# Patient Record
Sex: FEMALE | Race: BLACK OR AFRICAN AMERICAN | NOT HISPANIC OR LATINO | Employment: UNEMPLOYED | URBAN - METROPOLITAN AREA
[De-identification: names, ages, dates, MRNs, and addresses within clinical notes are randomized per-mention and may not be internally consistent; named-entity substitution may affect disease eponyms.]

---

## 2022-07-19 PROCEDURE — 88342 CHG IMMUNOCYTOCHEMISTRY: ICD-10-PCS | Mod: 26,59,, | Performed by: PATHOLOGY

## 2022-07-19 PROCEDURE — 88342 IMHCHEM/IMCYTCHM 1ST ANTB: CPT | Mod: 26,59,, | Performed by: PATHOLOGY

## 2022-07-19 PROCEDURE — 88323 CONSLTJ&REPRT MATRL PREP SLD: CPT | Mod: 26,,, | Performed by: PATHOLOGY

## 2022-07-19 PROCEDURE — 88323 PR  MICROSLIDE CONSULT W SLIDE PREP: ICD-10-PCS | Mod: 26,,, | Performed by: PATHOLOGY

## 2022-07-19 PROCEDURE — 88313 PR  SPECIAL STAINS,GROUP II: ICD-10-PCS | Mod: 26,59,, | Performed by: PATHOLOGY

## 2022-07-19 PROCEDURE — 88313 SPECIAL STAINS GROUP 2: CPT | Mod: 26,59,, | Performed by: PATHOLOGY

## 2022-07-20 ENCOUNTER — TELEPHONE (OUTPATIENT)
Dept: HEMATOLOGY/ONCOLOGY | Facility: CLINIC | Age: 62
End: 2022-07-20
Payer: MEDICAID

## 2022-07-20 NOTE — TELEPHONE ENCOUNTER
----- Message from Harvey Merino sent at 7/20/2022  1:48 PM CDT -----  Contact: Dr. Becca Swanson with Dr. Hudson office in [place], [place] called and said that the doctor would like to discuss the pt case with you     Please call him back at [phone] or [phone]

## 2022-07-20 NOTE — TELEPHONE ENCOUNTER
----- Message from Harvey Merino sent at 7/20/2022  1:48 PM CDT -----  Contact: Dr. Becca Swanson with Dr. Hudosn office in [place], [place] called and said that the doctor would like to discuss the pt case with you     Please call him back at [phone] or [phone]

## 2022-07-21 ENCOUNTER — TELEPHONE (OUTPATIENT)
Dept: HEMATOLOGY/ONCOLOGY | Facility: CLINIC | Age: 62
End: 2022-07-21
Payer: MEDICAID

## 2022-07-21 NOTE — TELEPHONE ENCOUNTER
Met with son, Fahad [place].   Scanned copy of Advance Directive and Power of  (POA) in media.    MM  dxd 7/19/22.  Started velcade-dexa-zometa regimen yesterday.      Audi Hudson MD  -  Hem-Onc    He has 2 other siblings but hes the only one here in [place].  Moved mother out of [place] as she cannot be by herself. He will be the caregiver. They scheduled appt for 8/19 but I explained to him this was incorrect scheduling.    We will correct this after we have sufficient data.    Explained the process of collecting clinic data, scheduling with appropriate specialist, getting insurance authorization and scheduling treatment.    Family was hoping to have patient to have a smooth transition of care . Cannot travel much due to physical limitation.  Hip surgery (with screw to femur) 7/18/22.  Will not be able to travel back-and-forth between [place] and [place].  This will have to be discussed with whoever the provider will be.  We will call him should we need more information and when ready to schedule.  Gave him contact information.    Await records.    =========================  From Dr. Ko:   Dr. Huntley from [place] confirmed she has a new diagnosis of myeloma and their family wants to establish care with us. There is apparently a family connection to [facility]. I believe the patient is currently in rehab or about to be discharged from inpatient to rehab.

## 2022-07-21 NOTE — TELEPHONE ENCOUNTER
Spoke with MD. Patient has a new diagnosis of Myeloma and family wants to establish care here at [facility]. Discussed plan to get patient in our Myeloma/Plasma Cell clinic with Dr. Sherman or Joey.

## 2022-07-25 ENCOUNTER — TELEPHONE (OUTPATIENT)
Dept: HEMATOLOGY/ONCOLOGY | Facility: CLINIC | Age: 62
End: 2022-07-25
Payer: MEDICAID

## 2022-07-25 DIAGNOSIS — C90.00 MULTIPLE MYELOMA, REMISSION STATUS UNSPECIFIED: Primary | ICD-10-CM

## 2022-07-27 ENCOUNTER — TELEPHONE (OUTPATIENT)
Dept: HEMATOLOGY/ONCOLOGY | Facility: CLINIC | Age: 62
End: 2022-07-27
Payer: MEDICAID

## 2022-07-29 ENCOUNTER — TELEPHONE (OUTPATIENT)
Dept: HEMATOLOGY/ONCOLOGY | Facility: CLINIC | Age: 62
End: 2022-07-29
Payer: MEDICAID

## 2022-07-29 DIAGNOSIS — C90.00 MULTIPLE MYELOMA, REMISSION STATUS UNSPECIFIED: Primary | ICD-10-CM

## 2022-08-01 NOTE — PROGRESS NOTES
PATIENT: Raquel [place]  MRN: [id number]  DATE: 8/2/2022      Diagnosis:   1. Multiple myeloma not having achieved remission        Chief Complaint: Multiple Myeloma    Oncologic History:     7/14/21: Presented with several months of worsening lethargy, generalized pain, 40-60lbs weight loss, and encephalopathy. Labs at time of presentation: hgb 7.6, plts 82, wbc 12, sodium 120, creatinine 1.55, corrected calcium 9.7, transaminitis, M-spike 6.4 g/dL. Skeletal survey showed lesions of left/right humerus, left/right proximal radius, pelvis, and femurs with a significant left femur lytic lesion. Multiple lucent lesions in calvarium. Given two doses of velcade while hospitalized.  7/18/21: Underwent left trochanteric femoral nail fixation. Pathology consistent with plasma cell neoplasm  7/19/22: BMBx showing 90% plasma cells with 100% cellularity, gain 1q21, t(4;14), and monosomy 13.      Subjective:    Initial History: Ms. [place] is a 62 y.o. female with medical history of OA, gout, and osteoporosis presenting for evaluation for Multiple Myeloma. History as above. Felt unable to live independently any longer so moved to live with son in [place]. Is currently wheelchair bound with her recent left femur procedure but is scheduled to being working with physical therapy on Monday. Feels well today. Has some MSK pain but otherwise denies complaints.    Smoked 1ppd for 20 years, quit April 2022  Quit alcohol Apirl 2022  Denies recreational drug use  Father has a history of a diffuse bone cancer that he did not want to be treated for      Past Medical History:   Past Medical History:   Diagnosis Date    Multiple myeloma        Past Surgical HIstory:   Past Surgical History:   Procedure Laterality Date    FEMUR SURGERY         Family History:   Family History   Problem Relation Age of Onset    Cancer Father        Social History:      Allergies:  Review of patient's allergies indicates:  No Known  "Allergies    Medications:  Current Outpatient Medications   Medication Sig Dispense Refill    aspirin (ECOTRIN) 325 MG EC tablet Take 325 mg by mouth once daily.      docusate sodium (COLACE) 100 MG capsule Take 100 mg by mouth 2 (two) times daily.      amLODIPine (NORVASC) 5 MG tablet Take 2 tablets (10 mg total) by mouth once daily. 30 tablet 3    aspirin (CIERRA ASPIRIN) 325 MG tablet Take 1 tablet (325 mg total) by mouth once daily. 100 tablet 3    oxyCODONE (ROXICODONE) 5 MG immediate release tablet Take 1 tablet (5 mg total) by mouth every 6 (six) hours as needed for Pain. 28 tablet 0    pantoprazole (PROTONIX) 40 MG tablet Take 1 tablet (40 mg total) by mouth once daily. 30 tablet 3     No current facility-administered medications for this visit.       Review of Systems   Constitutional: Negative for chills, fatigue and fever.   HENT: Negative for dental problem and trouble swallowing.    Eyes: Negative for photophobia and visual disturbance.   Respiratory: Negative for cough and shortness of breath.    Cardiovascular: Negative for chest pain and leg swelling.   Gastrointestinal: Negative for abdominal pain, diarrhea, nausea and vomiting.   Musculoskeletal: Positive for arthralgias.   Skin: Positive for wound (healing sites to left leg). Negative for rash.   Neurological: Negative for light-headedness and headaches.   Hematological: Negative for adenopathy. Does not bruise/bleed easily.   Psychiatric/Behavioral: Negative for confusion. The patient is not nervous/anxious.        ECOG Performance Status: 3; however underwent recent procedure which is limiting her abilities, expect to improve  Objective:      Vitals:   Vitals:    08/02/22 1050   BP: 124/71   Pulse: 101   Resp: 16   SpO2: 97%   Height: 5' 7" (1.702 m)       Physical Exam  Vitals reviewed.   Constitutional:       Appearance: Normal appearance.      Comments: In wheelchair   HENT:      Head: Normocephalic and atraumatic.      Mouth/Throat:     "  Mouth: Mucous membranes are moist.      Pharynx: Oropharynx is clear.   Eyes:      Extraocular Movements: Extraocular movements intact.      Conjunctiva/sclera: Conjunctivae normal.   Cardiovascular:      Rate and Rhythm: Normal rate and regular rhythm.   Pulmonary:      Effort: Pulmonary effort is normal. No respiratory distress.   Abdominal:      General: There is no distension.      Palpations: Abdomen is soft.      Tenderness: There is no abdominal tenderness.   Musculoskeletal:      Right lower leg: No edema.      Left lower leg: No edema.   Skin:     General: Skin is warm and dry.   Neurological:      General: No focal deficit present.      Mental Status: She is alert and oriented to person, place, and time.   Psychiatric:         Mood and Affect: Mood normal.         Behavior: Behavior normal.         Laboratory Data:  Lab Visit on 08/02/2022   Component Date Value Ref Range Status    WBC 08/02/2022 13.88 (A) 3.90 - 12.70 K/uL Final    RBC 08/02/2022 2.82 (A) 4.00 - 5.40 M/uL Final    Hemoglobin 08/02/2022 8.3 (A) 12.0 - 16.0 g/dL Final    Hematocrit 08/02/2022 25.4 (A) 37.0 - 48.5 % Final    MCV 08/02/2022 90  82 - 98 fL Final    MCH 08/02/2022 29.4  27.0 - 31.0 pg Final    MCHC 08/02/2022 32.7  32.0 - 36.0 g/dL Final    RDW 08/02/2022 19.9 (A) 11.5 - 14.5 % Final    Platelets 08/02/2022 178  150 - 450 K/uL Final    MPV 08/02/2022 12.6  9.2 - 12.9 fL Final    Immature Granulocytes 08/02/2022 CANCELED  0.0 - 0.5 % Final    Result canceled by the ancillary.    Immature Grans (Abs) 08/02/2022 CANCELED  0.00 - 0.04 K/uL Final    Comment: Mild elevation in immature granulocytes is non specific and   can be seen in a variety of conditions including stress response,   acute inflammation, trauma and pregnancy. Correlation with other   laboratory and clinical findings is essential.    Result canceled by the ancillary.      nRBC 08/02/2022 8 (A) 0 /100 WBC Final    Gran % 08/02/2022 56.0  38.0 - 73.0 %  Final    Lymph % 08/02/2022 32.0  18.0 - 48.0 % Final    Mono % 08/02/2022 7.0  4.0 - 15.0 % Final    Eosinophil % 08/02/2022 1.0  0.0 - 8.0 % Final    Basophil % 08/02/2022 0.0  0.0 - 1.9 % Final    Bands 08/02/2022 3.0  % Final    Myelocytes 08/02/2022 1.0  % Final    Other 08/02/2022 0  % Final    Platelet Estimate 08/02/2022 Appears normal   Final    Differential Method 08/02/2022 Manual   Final    Sodium 08/02/2022 131 (A) 136 - 145 mmol/L Final    Potassium 08/02/2022 3.7  3.5 - 5.1 mmol/L Final    Chloride 08/02/2022 106  95 - 110 mmol/L Final    CO2 08/02/2022 20 (A) 23 - 29 mmol/L Final    Glucose 08/02/2022 94  70 - 110 mg/dL Final    BUN 08/02/2022 19  6 - 20 mg/dL Final    Creatinine 08/02/2022 0.7  0.5 - 1.4 mg/dL Final    Calcium 08/02/2022 8.2 (A) 8.7 - 10.5 mg/dL Final    Total Protein 08/02/2022 11.8 (A) 6.0 - 8.4 g/dL Final    Albumin 08/02/2022 2.6 (A) 3.5 - 5.2 g/dL Final    Total Bilirubin 08/02/2022 0.8  0.1 - 1.0 mg/dL Final    Comment: For infants and newborns, interpretation of results should be based  on gestational age, weight and in agreement with clinical  observations.    Premature Infant recommended reference ranges:  Up to 24 hours.............<8.0 mg/dL  Up to 48 hours............<12.0 mg/dL  3-5 days..................<15.0 mg/dL  6-29 days.................<15.0 mg/dL      Alkaline Phosphatase 08/02/2022 582 (A) 55 - 135 U/L Final    AST 08/02/2022 62 (A) 10 - 40 U/L Final    ALT 08/02/2022 151 (A) 10 - 44 U/L Final    Anion Gap 08/02/2022 5 (A) 8 - 16 mmol/L Final    eGFR 08/02/2022 >60.0  >60 mL/min/1.73 m^2 Final    Group & Rh 08/02/2022 B POS   Final    IgG 08/02/2022 6874 (A) 650 - 1600 mg/dL Final    IgG Cord Blood Reference Range: 650-1600 mg/dL.    IgA 08/02/2022 26 (A) 40 - 350 mg/dL Final    IgA Cord Blood Reference Range: <5 mg/dL.    IgM 08/02/2022 20 (A) 50 - 300 mg/dL Final    IgM Cord Blood Reference Range: <25 mg/dL.    Protein, Serum  08/02/2022 11.3 (A) 6.0 - 8.4 g/dL Final    Comment: Serum protein electrophoresis and immunofixation results should be   interpreted in clinical context in that some therapeutic agents can   result   in false positive results (example, daratumumab). Correlation with   the   patient s therapeutic regimen is required.      Beta-2 Microglobulin 08/02/2022 5.6 (A) 0.0 - 2.5 ug/mL Final    LD 08/02/2022 522 (A) 110 - 260 U/L Final    Results are increased in hemolyzed samples.    Antibody Screen 08/02/2022 NEG   Final         Imaging:   Assessment:       1. Multiple myeloma not having achieved remission           Plan:     Multiple Myeloma  -- Complex cytogenetics with gain 1q21, t(4;14), and monosomy 13, bone marrow to be evaluated by [facility] hematopathologist  -- Diffuse lytics lesions  -- M-spike 6.4g/dL  -- Will check CBC, CMP, SPEP/SARAH, UIFE/UPEP, FLC, beta 2, LDH, immunoglobulins, Hep B, type and screen  -- Plan for Sofie-VRD; submit for prior auth today  -- Met with transplant coordinators today. Seems likely will need mammogram, colonoscopy, and dental appointment  -- Will need bisphosphonate  -- Acyclovir 400mg BID    RTC in 2 weeks. Pt knows to call or use [facility] in the interim with any questions or concerns.    Everett Boogie, PGY- VI  Hematology/Oncology Fellow     Route Chart for Scheduling    BMT Chart Routing  Urgent    Follow up with physician . RTC 8/16   Follow up with SERGOI No follow up needed.   Infusion scheduling note Infusion 8/16 and 8/23 for daratumumab and velcade, obtain prior auth for Sofie-VRD   Injection scheduling note None   Labs    Lab interval:  CBC, CMP, type and screen morning of appointment 8/16   Imaging None      Pharmacy appointment No pharmacy appointment needed      Other referrals No additional referrals needed         Treatment Plan Information   OP D-VRD DARATUMUMAB + BORTEZOMIB LENALIDOMIDE DEXAMETHASONE   Janice Casas MD   Upcoming Treatment Dates - OP D-VRD  DARATUMUMAB + BORTEZOMIB LENALIDOMIDE DEXAMETHASONE    8/15/2022       Antiemetics       Physician communication order       Take Home Chemotherapy       dexAMETHasone (DECADRON) 4 MG Tab       lenalidomide 25 mg Cap  8/16/2022       Pre-Medications       ACETAMINOPHEN  325 MG ORAL TAB       DIPHENHYDRAMINE IV ORDERABLE       FAMOTIDINE (PF) 20 MG/2 ML IV SOLN       MONTELUKAST 10 MG ORAL TAB       Chemotherapy       bortezomib (VELCADE) injection 1.3 mg/m2 (Treatment Plan)       daratumumab (DARZALEX) in sodium chloride 0.9% 1,000 mL chemo infusion  8/19/2022       Chemotherapy       bortezomib (VELCADE) injection 1.3 mg/m2 (Treatment Plan)  8/23/2022       Pre-Medications       ACETAMINOPHEN  325 MG ORAL TAB       DIPHENHYDRAMINE IV ORDERABLE       MONTELUKAST 10 MG ORAL TAB       Chemotherapy       bortezomib (VELCADE) injection 1.3 mg/m2 (Treatment Plan)       daratumumab (DARZALEX) in sodium chloride 0.9% 500 mL chemo infusion

## 2022-08-02 ENCOUNTER — OFFICE VISIT (OUTPATIENT)
Dept: HEMATOLOGY/ONCOLOGY | Facility: CLINIC | Age: 62
End: 2022-08-02
Payer: MEDICAID

## 2022-08-02 ENCOUNTER — SPECIALTY PHARMACY (OUTPATIENT)
Dept: PHARMACY | Facility: CLINIC | Age: 62
End: 2022-08-02
Payer: MEDICAID

## 2022-08-02 ENCOUNTER — LAB VISIT (OUTPATIENT)
Dept: LAB | Facility: HOSPITAL | Age: 62
End: 2022-08-02
Payer: MEDICAID

## 2022-08-02 VITALS
HEIGHT: 67 IN | HEART RATE: 101 BPM | DIASTOLIC BLOOD PRESSURE: 71 MMHG | SYSTOLIC BLOOD PRESSURE: 124 MMHG | OXYGEN SATURATION: 97 % | RESPIRATION RATE: 16 BRPM

## 2022-08-02 DIAGNOSIS — C90.00 MULTIPLE MYELOMA NOT HAVING ACHIEVED REMISSION: ICD-10-CM

## 2022-08-02 DIAGNOSIS — C90.00 MULTIPLE MYELOMA NOT HAVING ACHIEVED REMISSION: Primary | ICD-10-CM

## 2022-08-02 LAB
ABO + RH BLD: NORMAL
ALBUMIN SERPL BCP-MCNC: 2.6 G/DL (ref 3.5–5.2)
ALP SERPL-CCNC: 582 U/L (ref 55–135)
ALT SERPL W/O P-5'-P-CCNC: 151 U/L (ref 10–44)
ANION GAP SERPL CALC-SCNC: 5 MMOL/L (ref 8–16)
AST SERPL-CCNC: 62 U/L (ref 10–40)
B2 MICROGLOB SERPL-MCNC: 5.6 UG/ML (ref 0–2.5)
BASOPHILS NFR BLD: 0 % (ref 0–1.9)
BILIRUB SERPL-MCNC: 0.8 MG/DL (ref 0.1–1)
BLD GP AB SCN CELLS X3 SERPL QL: NORMAL
BUN SERPL-MCNC: 19 MG/DL (ref 6–20)
CALCIUM SERPL-MCNC: 8.2 MG/DL (ref 8.7–10.5)
CHLORIDE SERPL-SCNC: 106 MMOL/L (ref 95–110)
CO2 SERPL-SCNC: 20 MMOL/L (ref 23–29)
CREAT SERPL-MCNC: 0.7 MG/DL (ref 0.5–1.4)
DIFFERENTIAL METHOD: ABNORMAL
EOSINOPHIL NFR BLD: 1 % (ref 0–8)
ERYTHROCYTE [DISTWIDTH] IN BLOOD BY AUTOMATED COUNT: 19.9 % (ref 11.5–14.5)
EST. GFR  (NO RACE VARIABLE): >60 ML/MIN/1.73 M^2
GLUCOSE SERPL-MCNC: 94 MG/DL (ref 70–110)
HCT VFR BLD AUTO: 25.4 % (ref 37–48.5)
HGB BLD-MCNC: 8.3 G/DL (ref 12–16)
IGA SERPL-MCNC: 26 MG/DL (ref 40–350)
IGG SERPL-MCNC: 6874 MG/DL (ref 650–1600)
IGM SERPL-MCNC: 20 MG/DL (ref 50–300)
IMM GRANULOCYTES # BLD AUTO: ABNORMAL K/UL (ref 0–0.04)
IMM GRANULOCYTES NFR BLD AUTO: ABNORMAL % (ref 0–0.5)
LDH SERPL L TO P-CCNC: 522 U/L (ref 110–260)
LYMPHOCYTES NFR BLD: 32 % (ref 18–48)
MCH RBC QN AUTO: 29.4 PG (ref 27–31)
MCHC RBC AUTO-ENTMCNC: 32.7 G/DL (ref 32–36)
MCV RBC AUTO: 90 FL (ref 82–98)
MONOCYTES NFR BLD: 7 % (ref 4–15)
MYELOCYTES NFR BLD MANUAL: 1 %
NEUTROPHILS NFR BLD: 56 % (ref 38–73)
NEUTS BAND NFR BLD MANUAL: 3 %
NRBC BLD-RTO: 8 /100 WBC
PLATELET # BLD AUTO: 178 K/UL (ref 150–450)
PLATELET BLD QL SMEAR: ABNORMAL
PMV BLD AUTO: 12.6 FL (ref 9.2–12.9)
POTASSIUM SERPL-SCNC: 3.7 MMOL/L (ref 3.5–5.1)
PROT SERPL-MCNC: 11.8 G/DL (ref 6–8.4)
RBC # BLD AUTO: 2.82 M/UL (ref 4–5.4)
SODIUM SERPL-SCNC: 131 MMOL/L (ref 136–145)
WBC # BLD AUTO: 13.88 K/UL (ref 3.9–12.7)
WBC OTHER NFR BLD MANUAL: 0 %

## 2022-08-02 PROCEDURE — 86706 HEP B SURFACE ANTIBODY: CPT | Performed by: INTERNAL MEDICINE

## 2022-08-02 PROCEDURE — 3074F SYST BP LT 130 MM HG: CPT | Mod: CPTII,,, | Performed by: INTERNAL MEDICINE

## 2022-08-02 PROCEDURE — 86803 HEPATITIS C AB TEST: CPT | Performed by: INTERNAL MEDICINE

## 2022-08-02 PROCEDURE — 99213 OFFICE O/P EST LOW 20 MIN: CPT | Mod: PBBFAC | Performed by: INTERNAL MEDICINE

## 2022-08-02 PROCEDURE — 84165 PROTEIN E-PHORESIS SERUM: CPT | Performed by: INTERNAL MEDICINE

## 2022-08-02 PROCEDURE — 99999 PR PBB SHADOW E&M-EST. PATIENT-LVL III: ICD-10-PCS | Mod: PBBFAC,,, | Performed by: INTERNAL MEDICINE

## 2022-08-02 PROCEDURE — 99205 PR OFFICE/OUTPT VISIT, NEW, LEVL V, 60-74 MIN: ICD-10-PCS | Mod: S$PBB,,, | Performed by: INTERNAL MEDICINE

## 2022-08-02 PROCEDURE — 99999 PR PBB SHADOW E&M-EST. PATIENT-LVL III: CPT | Mod: PBBFAC,,, | Performed by: INTERNAL MEDICINE

## 2022-08-02 PROCEDURE — 83615 LACTATE (LD) (LDH) ENZYME: CPT | Performed by: INTERNAL MEDICINE

## 2022-08-02 PROCEDURE — 82784 ASSAY IGA/IGD/IGG/IGM EACH: CPT | Mod: 59 | Performed by: INTERNAL MEDICINE

## 2022-08-02 PROCEDURE — 3078F PR MOST RECENT DIASTOLIC BLOOD PRESSURE < 80 MM HG: ICD-10-PCS | Mod: CPTII,,, | Performed by: INTERNAL MEDICINE

## 2022-08-02 PROCEDURE — 85027 COMPLETE CBC AUTOMATED: CPT | Performed by: INTERNAL MEDICINE

## 2022-08-02 PROCEDURE — 86704 HEP B CORE ANTIBODY TOTAL: CPT | Performed by: INTERNAL MEDICINE

## 2022-08-02 PROCEDURE — 3078F DIAST BP <80 MM HG: CPT | Mod: CPTII,,, | Performed by: INTERNAL MEDICINE

## 2022-08-02 PROCEDURE — 36415 COLL VENOUS BLD VENIPUNCTURE: CPT | Performed by: INTERNAL MEDICINE

## 2022-08-02 PROCEDURE — 1160F RVW MEDS BY RX/DR IN RCRD: CPT | Mod: CPTII,,, | Performed by: INTERNAL MEDICINE

## 2022-08-02 PROCEDURE — 1159F PR MEDICATION LIST DOCUMENTED IN MEDICAL RECORD: ICD-10-PCS | Mod: CPTII,,, | Performed by: INTERNAL MEDICINE

## 2022-08-02 PROCEDURE — 83520 IMMUNOASSAY QUANT NOS NONAB: CPT | Mod: 59 | Performed by: INTERNAL MEDICINE

## 2022-08-02 PROCEDURE — 85007 BL SMEAR W/DIFF WBC COUNT: CPT | Performed by: INTERNAL MEDICINE

## 2022-08-02 PROCEDURE — 86334 IMMUNOFIX E-PHORESIS SERUM: CPT | Performed by: INTERNAL MEDICINE

## 2022-08-02 PROCEDURE — 86901 BLOOD TYPING SEROLOGIC RH(D): CPT | Performed by: INTERNAL MEDICINE

## 2022-08-02 PROCEDURE — 1160F PR REVIEW ALL MEDS BY PRESCRIBER/CLIN PHARMACIST DOCUMENTED: ICD-10-PCS | Mod: CPTII,,, | Performed by: INTERNAL MEDICINE

## 2022-08-02 PROCEDURE — 99205 OFFICE O/P NEW HI 60 MIN: CPT | Mod: S$PBB,,, | Performed by: INTERNAL MEDICINE

## 2022-08-02 PROCEDURE — 1159F MED LIST DOCD IN RCRD: CPT | Mod: CPTII,,, | Performed by: INTERNAL MEDICINE

## 2022-08-02 PROCEDURE — 86334 IMMUNOFIX E-PHORESIS SERUM: CPT | Mod: 26,,, | Performed by: PATHOLOGY

## 2022-08-02 PROCEDURE — 82232 ASSAY OF BETA-2 PROTEIN: CPT | Performed by: INTERNAL MEDICINE

## 2022-08-02 PROCEDURE — 80053 COMPREHEN METABOLIC PANEL: CPT | Performed by: INTERNAL MEDICINE

## 2022-08-02 PROCEDURE — 84165 PATHOLOGIST INTERPRETATION SPE: ICD-10-PCS | Mod: 26,,, | Performed by: PATHOLOGY

## 2022-08-02 PROCEDURE — 87340 HEPATITIS B SURFACE AG IA: CPT | Performed by: INTERNAL MEDICINE

## 2022-08-02 PROCEDURE — 3074F PR MOST RECENT SYSTOLIC BLOOD PRESSURE < 130 MM HG: ICD-10-PCS | Mod: CPTII,,, | Performed by: INTERNAL MEDICINE

## 2022-08-02 PROCEDURE — 84165 PROTEIN E-PHORESIS SERUM: CPT | Mod: 26,,, | Performed by: PATHOLOGY

## 2022-08-02 PROCEDURE — 86334 PATHOLOGIST INTERPRETATION IFE: ICD-10-PCS | Mod: 26,,, | Performed by: PATHOLOGY

## 2022-08-02 PROCEDURE — 86850 RBC ANTIBODY SCREEN: CPT | Performed by: INTERNAL MEDICINE

## 2022-08-02 RX ORDER — ACYCLOVIR 400 MG/1
400 TABLET ORAL 2 TIMES DAILY
Qty: 60 TABLET | Refills: 11 | Status: SHIPPED | OUTPATIENT
Start: 2022-08-02 | End: 2022-11-22 | Stop reason: SDUPTHER

## 2022-08-02 RX ORDER — AMLODIPINE BESYLATE 10 MG/1
10 TABLET ORAL DAILY
COMMUNITY
End: 2022-08-02

## 2022-08-02 RX ORDER — PANTOPRAZOLE SODIUM 40 MG/1
40 TABLET, DELAYED RELEASE ORAL DAILY
Qty: 30 TABLET | Refills: 3 | Status: SHIPPED | OUTPATIENT
Start: 2022-08-02 | End: 2022-12-27 | Stop reason: SDUPTHER

## 2022-08-02 RX ORDER — DOCUSATE SODIUM 100 MG/1
100 CAPSULE, LIQUID FILLED ORAL 2 TIMES DAILY
COMMUNITY
End: 2022-11-08

## 2022-08-02 RX ORDER — ASPIRIN 325 MG
325 TABLET, DELAYED RELEASE (ENTERIC COATED) ORAL DAILY
COMMUNITY
End: 2022-09-13 | Stop reason: SDUPTHER

## 2022-08-02 RX ORDER — ASPIRIN 325 MG
325 TABLET ORAL DAILY
Qty: 100 TABLET | Refills: 3 | Status: SHIPPED | OUTPATIENT
Start: 2022-08-02 | End: 2022-12-01 | Stop reason: SDUPTHER

## 2022-08-02 RX ORDER — LENALIDOMIDE 25 MG/1
25 CAPSULE ORAL SEE ADMIN INSTRUCTIONS
Qty: 21 CAPSULE | Refills: 0 | Status: SHIPPED | OUTPATIENT
Start: 2022-08-02 | End: 2022-08-04 | Stop reason: SDUPTHER

## 2022-08-02 RX ORDER — DEXAMETHASONE 4 MG/1
40 TABLET ORAL SEE ADMIN INSTRUCTIONS
Qty: 40 TABLET | Refills: 1 | Status: SHIPPED | OUTPATIENT
Start: 2022-08-02 | End: 2022-09-12 | Stop reason: SDUPTHER

## 2022-08-02 RX ORDER — AMLODIPINE BESYLATE 5 MG/1
10 TABLET ORAL DAILY
Qty: 30 TABLET | Refills: 3 | Status: SHIPPED | OUTPATIENT
Start: 2022-08-02 | End: 2022-08-17

## 2022-08-02 RX ORDER — LENALIDOMIDE 25 MG/1
25 CAPSULE ORAL SEE ADMIN INSTRUCTIONS
Qty: 21 CAPSULE | Refills: 6 | OUTPATIENT
Start: 2022-08-02 | End: 2022-08-02 | Stop reason: SDUPTHER

## 2022-08-02 RX ORDER — PANTOPRAZOLE SODIUM 40 MG/1
40 TABLET, DELAYED RELEASE ORAL DAILY
COMMUNITY
End: 2022-08-02

## 2022-08-02 RX ORDER — OXYCODONE HYDROCHLORIDE 5 MG/1
5 TABLET ORAL EVERY 6 HOURS PRN
Qty: 28 TABLET | Refills: 0 | Status: SHIPPED | OUTPATIENT
Start: 2022-08-02 | End: 2022-08-11 | Stop reason: SDUPTHER

## 2022-08-02 NOTE — TELEPHONE ENCOUNTER
Therapy appropriate. Sofie-VRD for MM. No adjustments needed.  Crcl: 84.9 mL/min.    PA not required. Unfortunately, [facility] does not have access to Revlimid. I will figure out where this medication can be filled and route it there. Once routed, the Owning pharmacy will need to do a cost exceeds override.

## 2022-08-02 NOTE — PLAN OF CARE
START ON PATHWAY REGIMEN - Multiple Myeloma and Other Plasma Cell Dyscrasias    [id number]        Lenalidomide (Revlimid)       Dexamethasone (Decadron)       Bortezomib (Velcade)       Daratumumab (Darzalex)           Additional Orders: Refer to PI for recommended pre- and post-daratumumab   medications. Initiate antiviral prophylaxis within 1 week after starting   daratumumab and continue for 3 months following daratumumab. Thromboprophylaxis   is recommended with lenalidomide. In the SWETHA trial, montelukast was used to   decrease daratumumab-related reactions. In this trial, all patients received 4   cycles of induction prior to stem cell harvest, 2 cycles of consolidation, given    days post-autologous stem cell transplant, and up to two years of   maintenance therapy. Maintenance began within 14 days after a post-consolidation   disease evaluation. Dexamethasone could be reduced to 20 mg per week, given   prior to daratumumab, after induction cycle 4.          Lenalidomide (Revlimid)       Dexamethasone (Decadron)       Bortezomib (Velcade)       Daratumumab (Darzalex)           Additional Orders: Refer to PI for recommended pre- and post-daratumumab   medications. Initiate antiviral prophylaxis within 1 week after starting   daratumumab and continue for 3 months following daratumumab. Thromboprophylaxis   is recommended with lenalidomide. In the SWETHA trial, montelukast was used to   decrease daratumumab-related reactions. In this trial, all patients received 4   cycles of induction prior to stem cell harvest, 2 cycles of consolidation, given    days post-autologous stem cell transplant, and up to two years of   maintenance therapy. Maintenance began within 14 days after a post-consolidation   disease evaluation. Dexamethasone could be reduced to 20 mg per week, given   prior to daratumumab, after induction cycle 4.    **Always confirm dose/schedule in your pharmacy ordering system**    Patient  Characteristics:  Multiple Myeloma, Newly Diagnosed, Transplant Eligible, High Risk  Disease Classification: Multiple Myeloma  R-ISS Staging: Unknown  Therapeutic Status: Newly Diagnosed  Is Patient Eligible for Transplant<= Transplant Eligible  Risk Status: High Risk  Intent of Therapy:  Non-Curative / Palliative Intent, Discussed with Patient

## 2022-08-03 ENCOUNTER — PATIENT MESSAGE (OUTPATIENT)
Dept: HEMATOLOGY/ONCOLOGY | Facility: CLINIC | Age: 62
End: 2022-08-03
Payer: MEDICAID

## 2022-08-03 DIAGNOSIS — C90.00 MULTIPLE MYELOMA NOT HAVING ACHIEVED REMISSION: Primary | ICD-10-CM

## 2022-08-03 LAB
ALBUMIN SERPL ELPH-MCNC: 3.88 G/DL (ref 3.35–5.55)
ALPHA1 GLOB SERPL ELPH-MCNC: 0.49 G/DL (ref 0.17–0.41)
ALPHA2 GLOB SERPL ELPH-MCNC: 0.86 G/DL (ref 0.43–0.99)
B-GLOBULIN SERPL ELPH-MCNC: 0.75 G/DL (ref 0.5–1.1)
GAMMA GLOB SERPL ELPH-MCNC: 5.33 G/DL (ref 0.67–1.58)
HBV CORE AB SERPL QL IA: NEGATIVE
HBV SURFACE AB SER-ACNC: NEGATIVE M[IU]/ML
HBV SURFACE AG SERPL QL IA: NEGATIVE
HCV AB SERPL QL IA: NEGATIVE
INTERPRETATION SERPL IFE-IMP: NORMAL
KAPPA LC SER QL IA: 0.55 MG/DL (ref 0.33–1.94)
KAPPA LC/LAMBDA SER IA: 3.24 (ref 0.26–1.65)
LAMBDA LC SER QL IA: 0.17 MG/DL (ref 0.57–2.63)
PATHOLOGIST INTERPRETATION IFE: NORMAL
PATHOLOGIST INTERPRETATION SPE: NORMAL
PROT SERPL-MCNC: 11.3 G/DL (ref 6–8.4)

## 2022-08-03 NOTE — TELEPHONE ENCOUNTER
Confirmed with [email or web address] that they have access to Revlimid. Routing script to [facility]. Patient's son notified.       [facility] - [place], [place] - [address] [place] [id number]   Phone: [phone] Fax: [phone]

## 2022-08-04 ENCOUNTER — LAB VISIT (OUTPATIENT)
Dept: LAB | Facility: HOSPITAL | Age: 62
End: 2022-08-04
Payer: MEDICAID

## 2022-08-04 DIAGNOSIS — C90.00 MULTIPLE MYELOMA, REMISSION STATUS UNSPECIFIED: ICD-10-CM

## 2022-08-04 DIAGNOSIS — C90.00 MULTIPLE MYELOMA NOT HAVING ACHIEVED REMISSION: ICD-10-CM

## 2022-08-04 PROCEDURE — 88341 IMHCHEM/IMCYTCHM EA ADD ANTB: CPT | Performed by: PATHOLOGY

## 2022-08-04 PROCEDURE — 88365 INSITU HYBRIDIZATION (FISH): CPT | Mod: 59 | Performed by: PATHOLOGY

## 2022-08-04 PROCEDURE — 88365 INSITU HYBRIDIZATION (FISH): CPT | Mod: 26,59,, | Performed by: PATHOLOGY

## 2022-08-04 PROCEDURE — 88342 IMHCHEM/IMCYTCHM 1ST ANTB: CPT | Performed by: PATHOLOGY

## 2022-08-04 PROCEDURE — 88321 CONSLTJ&REPRT SLD PREP ELSWR: CPT | Mod: 59 | Performed by: PATHOLOGY

## 2022-08-04 PROCEDURE — 88341 PR IHC OR ICC EACH ADD'L SINGLE ANTIBODY  STAINPR: ICD-10-PCS | Mod: 26,59,, | Performed by: PATHOLOGY

## 2022-08-04 PROCEDURE — 88341 IMHCHEM/IMCYTCHM EA ADD ANTB: CPT | Mod: 26,59,, | Performed by: PATHOLOGY

## 2022-08-04 PROCEDURE — 88342 CHG IMMUNOCYTOCHEMISTRY: ICD-10-PCS | Mod: 26,59,, | Performed by: PATHOLOGY

## 2022-08-04 PROCEDURE — 88365 PR  TISSUE HYBRIDIZATION: ICD-10-PCS | Mod: 26,59,, | Performed by: PATHOLOGY

## 2022-08-04 PROCEDURE — 88325 CONSLTJ COMPRE RVW REC REPRT: CPT | Performed by: PATHOLOGY

## 2022-08-04 PROCEDURE — 88325 CONSLTJ COMPRE RVW REC REPRT: CPT | Mod: ,,, | Performed by: PATHOLOGY

## 2022-08-04 PROCEDURE — 88364 INSITU HYBRIDIZATION (FISH): CPT | Mod: 26,59,, | Performed by: PATHOLOGY

## 2022-08-04 PROCEDURE — 88342 IMHCHEM/IMCYTCHM 1ST ANTB: CPT | Mod: 26,59,, | Performed by: PATHOLOGY

## 2022-08-04 PROCEDURE — 88364 INSITU HYBRIDIZATION (FISH): CPT | Performed by: PATHOLOGY

## 2022-08-04 PROCEDURE — 88364 CHG INSITU HYBRIDIZATION (FISH: ICD-10-PCS | Mod: 26,59,, | Performed by: PATHOLOGY

## 2022-08-04 PROCEDURE — 88325 PR  COMPREHENSIVE REVIEW OF DATA: ICD-10-PCS | Mod: ,,, | Performed by: PATHOLOGY

## 2022-08-04 RX ORDER — LENALIDOMIDE 25 MG/1
25 CAPSULE ORAL SEE ADMIN INSTRUCTIONS
Qty: 21 CAPSULE | Refills: 0 | Status: SHIPPED | OUTPATIENT
Start: 2022-08-04 | End: 2022-09-01 | Stop reason: SDUPTHER

## 2022-08-05 ENCOUNTER — DOCUMENTATION ONLY (OUTPATIENT)
Dept: ONCOLOGY | Facility: HOSPITAL | Age: 62
End: 2022-08-05
Payer: MEDICAID

## 2022-08-05 ENCOUNTER — LAB VISIT (OUTPATIENT)
Dept: LAB | Facility: HOSPITAL | Age: 62
End: 2022-08-05
Attending: INTERNAL MEDICINE
Payer: MEDICAID

## 2022-08-05 ENCOUNTER — PATIENT MESSAGE (OUTPATIENT)
Dept: HEMATOLOGY/ONCOLOGY | Facility: CLINIC | Age: 62
End: 2022-08-05
Payer: MEDICAID

## 2022-08-05 DIAGNOSIS — C90.00 MULTIPLE MYELOMA NOT HAVING ACHIEVED REMISSION: ICD-10-CM

## 2022-08-05 LAB
PROT 24H UR-MRATE: NORMAL MG/SPEC (ref 0–100)
PROT 24H UR-MRATE: NORMAL MG/SPEC (ref 0–100)
PROT UR-MCNC: <7 MG/DL (ref 0–15)
PROT UR-MCNC: <7 MG/DL (ref 0–15)
URINE COLLECTION DURATION: 24 HR
URINE COLLECTION DURATION: 24 HR
URINE VOLUME: 700 ML
URINE VOLUME: 700 ML

## 2022-08-05 PROCEDURE — 86335 IMMUNFIX E-PHORSIS/URINE/CSF: CPT | Mod: 26,,, | Performed by: PATHOLOGY

## 2022-08-05 PROCEDURE — 86335 PATHOLOGIST INTERPRETATION UIFE: ICD-10-PCS | Mod: 26,,, | Performed by: PATHOLOGY

## 2022-08-05 PROCEDURE — 84156 ASSAY OF PROTEIN URINE: CPT | Performed by: INTERNAL MEDICINE

## 2022-08-05 PROCEDURE — 84166 PATHOLOGIST INTERPRETATION UPE: ICD-10-PCS | Mod: 26,,, | Performed by: PATHOLOGY

## 2022-08-05 PROCEDURE — 86335 IMMUNFIX E-PHORSIS/URINE/CSF: CPT | Performed by: INTERNAL MEDICINE

## 2022-08-05 PROCEDURE — 84166 PROTEIN E-PHORESIS/URINE/CSF: CPT | Mod: 26,,, | Performed by: PATHOLOGY

## 2022-08-05 PROCEDURE — 84166 PROTEIN E-PHORESIS/URINE/CSF: CPT | Performed by: INTERNAL MEDICINE

## 2022-08-05 NOTE — PROGRESS NOTES
Oncology LCSW received in basket message from Everett Boogie MD requesting assistance with facilitating bedside commode for home use order. LCSW followed up with pt's son, Fahad [place]. Fahad [place] is [address] [place] [address]. Fahad also provided consent to forward referral to [facility]. LCSW write pt's current address on the fax cover sheet. LCSW also emailed pt's current address to Anastasia Vidal with [facility]. Pt's son, Fahad provided consent for LCSW to refer pt to the [employer] patient assistance fund.       Jaclyn Byrd, [facility]  Oncology Social Worker License # [id number]  [facility]  [email or web address]  P. [phone]; F. [phone]

## 2022-08-07 DIAGNOSIS — C90.00 MULTIPLE MYELOMA NOT HAVING ACHIEVED REMISSION: Primary | ICD-10-CM

## 2022-08-07 NOTE — PROGRESS NOTES
Oncology LCSW received a secure chat message from Dr. Everett Boogie stating that rivka's son, Fahad, is requesting a follow up call. LCSW contacted Fahad telephonically. Fahad is requesting a hospital bed for his mother. Fahad reports that he picks up his mother everyday to put her in bed. Dr. Boogie is in agreement with request and reports that he will enter order. When order is entered LCSW will facilitate. Fahad is also requesting information regarding financial assistance programs. LCSW provided verbal education and will provide education via email.

## 2022-08-07 NOTE — PROGRESS NOTES
Oncology LCSW followed up with pt's son, Fahad. Fahad reports that he picks up him mother to put her in bed due to the rogelio in her leg. Fahad is requesting a hospital bed. LCSW informed Dr. Boogie of pt's son's request. Pt also reports financial barriers. LCSW provided education regarding the  [employer] and multiple myeloma assistance programs. LCSW also informed Fahad about the [facility] financial assistance programs. LCSW will send an email to Fahad so that he can send a return email with documents needed for reimbursement attached. LCSW will follow and assist as needed.

## 2022-08-08 ENCOUNTER — TELEPHONE (OUTPATIENT)
Dept: HEMATOLOGY/ONCOLOGY | Facility: CLINIC | Age: 62
End: 2022-08-08
Payer: MEDICAID

## 2022-08-08 ENCOUNTER — DOCUMENTATION ONLY (OUTPATIENT)
Dept: ONCOLOGY | Facility: HOSPITAL | Age: 62
End: 2022-08-08
Payer: MEDICAID

## 2022-08-08 ENCOUNTER — CLINICAL SUPPORT (OUTPATIENT)
Dept: REHABILITATION | Facility: HOSPITAL | Age: 62
End: 2022-08-08
Payer: MEDICAID

## 2022-08-08 DIAGNOSIS — R26.89 BALANCE PROBLEM: ICD-10-CM

## 2022-08-08 DIAGNOSIS — R29.898 WEAKNESS OF BOTH LOWER EXTREMITIES: ICD-10-CM

## 2022-08-08 DIAGNOSIS — C90.00 MULTIPLE MYELOMA NOT HAVING ACHIEVED REMISSION: Primary | ICD-10-CM

## 2022-08-08 DIAGNOSIS — Z74.09 IMPAIRED FUNCTIONAL MOBILITY, BALANCE, GAIT, AND ENDURANCE: ICD-10-CM

## 2022-08-08 LAB
INTERPRETATION UR IFE-IMP: NORMAL
PROT PATTERN UR ELPH-IMP: NORMAL

## 2022-08-08 PROCEDURE — 97162 PT EVAL MOD COMPLEX 30 MIN: CPT | Mod: PN

## 2022-08-08 PROCEDURE — 97110 THERAPEUTIC EXERCISES: CPT | Mod: PN

## 2022-08-08 NOTE — TELEPHONE ENCOUNTER
"----- Message from Kishore [place] sent at 8/8/2022  4:12 PM CDT -----  Consult/Advisory:          Name Of Caller: [facility] - [address]        Contact Preference?: [phone]    Fax: [phone]        Provider Name: Chuyita      Does patient feel the need to be seen today? No      What is the nature of the call?: Calling to speak w/ nurse. Stating [employer] auth is flagged for lenalidomide 25 mg Cap refill. Also stating that medication is set to ship tomorrow, so they need a return call ASAP          Additional Notes:  "Thank you for all that you do for our patients"      "

## 2022-08-08 NOTE — TELEPHONE ENCOUNTER
"----- Message from Everett Boogie MD sent at 8/8/2022 11:49 AM CDT -----    ----- Message -----  From: Kishore Jenkins  Sent: 8/8/2022  11:18 AM CDT  To: Chuyita SHETTY Staff    Consult/Advisory:          Name Of Caller: [facility] [place] [address] [address]        Contact Preference?:  [phone] [facility]      Fax: [phone]        Provider Name: Chuyita      Does patient feel the need to be seen today? No      What is the nature of the call?: Requesting prior auth for lenalidomide 25 mg Cap refill. Inquiring if PA form was received          Additional Notes:  "Thank you for all that you do for our patients"        "

## 2022-08-08 NOTE — PLAN OF CARE
[facility]   Physical Therapy Initial Evaluation     Date: 8/8/2022   Name: Raquel Houston  Clinic Number: [id number]    Therapy Diagnosis:   Encounter Diagnoses   Name Primary?    Impaired functional mobility, balance, gait, and endurance     Weakness of both lower extremities     Balance problem      Physician: Oneyda, Provider    Physician Orders: PT Eval and Treat  Medical Diagnosis from Referral: Sepsis, due to unspecified organism, unspecified whether acute organ dysfunction present [A41.9]  Evaluation Date: 8/8/2022  Authorization Period Expiration: 12/31/2022  Plan of Care Expiration: 10/3/2022  Progress Note Due: 9/8/2022  Visit # / Visits authorized: 1/ 1   FOTO: 1/3    Precautions: Standard, hx of cancer/multiple myeloma, previous femur surgery 7/18/2022    Time In: 0830  Time Out: 0930  Total Appointment Time (timed & untimed codes): 60 minutes    SUBJECTIVE     Date of onset: 7/18/2022 ORIF LLE    History of current condition - Raquel reports: she moving to [place] to stay with her son, she is unable to live by herself after her surgery. She was going to a bone and joint clinic for hip pain. Not long after, she had to be admitted to the hospital due to running fever. During this visit, doctors discovered 3 brain lesions, multiple myeloma, and femur fracture that required surgery. She is from [place] and is transitioning medical care. Her surgery was performed in [place] on 7/18/2022. Her oncologist is now at [facility] in [place]. Her daughter is present here and states that she is supposed to be NWB for 6-8 weeks after surgery. She presents to PT with a rollator but they are having a wheelchair, bed, bedside commode, and shower chair delivered today.    Falls: None since surgery    Imaging, CT Scan    Prior Therapy: none  Social History: lives with their family  Occupation: was working in [place]  Prior Level of Function: independent  Current Level of Function:  moderate assistance, NWB in LLE    Pain:  Current 8/10, worst 10/10, best 4/10   Location: LLE, surgical site  Description: Aching, Dull, Throbbing and Deep  Aggravating Factors: Sitting, Standing, Laying and Bending  Easing Factors: rest and elevation    Patients goals: return to prior mobility levels     Medical History:   Past Medical History:   Diagnosis Date    Multiple myeloma        Surgical History:   Raquel Houston  has a past surgical history that includes Femur Surgery.    Medications:   Raquel has a current medication list which includes the following prescription(s): acyclovir, amlodipine, aspirin, aspirin, dexamethasone, docusate sodium, lenalidomide, oxycodone, and pantoprazole.    Allergies:   Review of patient's allergies indicates:  No Known Allergies     OBJECTIVE     CMS Impairment/Limitation/Restriction for FOTO Leg Survey    Therapist reviewed FOTO scores for Raquel Houston on 8/8/2022.   FOTO documents entered into EPIC - see Media section.    Limitation Score: 88%       Mental status: alert    Posture/ Alignment: Fair  Postural examination/scapula alignment: Rounded shoulder, Head forward and Slouched posture    Sensation:   Light Touch LEs  Impaired: LLE  Proprioception: Intact,     Dermatomes: Diminished LLE    DTR:   Right Left Comment   Patellar (L3-4) 2+ 2+    Achilles (S1) 2+ 2+        Lower Extremity Strength  Right LE  Left LE    Hip Flexion: 3+/5 Hip Flexion: 2/5   Hip Extension:  3+/5 Hip Extension: 2/5   Hip Abduction: 3+/5 Hip Abduction: 2/5   Hip Adduction: 3+/5 Hip Adduction 3-/5   Knee Extension: 4+/5 Knee Extension: 3+/5   Knee Flexion: 4+/5 Knee Flexion: 3+/5   Ankle Dorsiflexion: 5/5 Ankle Dorsiflexion: 4+/5   Ankle Plantarflexion: 5/5 Ankle Plantarflexion: 4+/5     Abdominal Strength: hindered by hip pain, fair tolerance to isometric posterior tilt    Special Tests     Transfers:   Min assist x1 required for sit to stand using rolling walker for support,  Min assist x1 required for stand<>pivot transfer with cueing required to adhere to WB precautions, Moderate assist x1 required for sit<>supine transfer    Balance Assessment:   Evaluation   Single Limb Stance R LE 0-5 sec with UE  (<10 sec = HIGH FALL RISK)   Single Limb Stance L LE Unable, NWB  (<10 sec = HIGH FALL RISK)      Evaluation   30 second Chair Rise  (adults > 61 y/o) NT completed with arms   5 times sit-stand  (adults 18-63 y/o) NT seconds  >12 sec= fall risk for general elderly  >16 sec= fall risk for Parkinson's disease  >10 sec= balance/vestibular dysfunction (<61 y/o)  >14.2 sec= balance/vestibular dysfunction (>61 y/o)  >12 sec= fall risk for CVA     GAIT DEVIATIONS: Raquel non ambulatory due to NWB status. Pt does not have any assistive devices. PT spoke with pt about possibility     Endurance Assessment:   Evaluation   Timed Up and Go NT sec  < 20 sec safe for independent transfers, < 30 sec safe for dependent transfers/assist required     Table: Population Norms for TUG    Age  Average TUG    60 - 69 years  8.1 seconds    70 - 79 years  9.2 seconds    80 - 99 years  11.3 seconds      Palpation:  Min TTP to surgical sites, 3 along lateral leg    Joint Play:  Limited in left hip due to muscle guarding      TREATMENT     Total Treatment time (time-based codes) separate from Evaluation: 15 minutes      Raquel received the treatments listed below:      therapeutic exercises to develop strength, endurance, ROM, flexibility, posture and core stabilization for 15 minutes including:    HEP review and patient education  Seated ankle pumps 3x20  Seated pillow squeeze 3x20   Seated marching 3x10     Possible next visit: NuStep, recumbent bike, SAQ, LAQ, HSS, calf stretch, heel slides, PROM, hip isometrics, gait training, transfer training, sit<>stand, parallel bars, modalities PRN      PATIENT EDUCATION AND HOME EXERCISES     Education provided:   - weight bearing precaution  - safety awareness  - use of  assistive devices  - transfers  - HEP provided and reviewed  - Anatomy and Physiology pertaining to current condition  - Possible response to exercise  - Importance of PT compliance    Written Home Exercises Provided: yes. Exercises were reviewed and Raquel was able to demonstrate them prior to the end of the session.  Raquel demonstrated fair  understanding of the education provided. See EMR under Patient Instructions for exercises provided during therapy sessions.    ASSESSMENT     Raquel is a 62 y.o. female referred to outpatient Physical Therapy with a medical diagnosis of Sepsis, due to unspecified organism, unspecified whether acute organ dysfunction present [A41.9]. Patient presents with decreased L hip ROM, weakness in BLE, impaired gait pattern and tolerance, poor balance, limited transfer ability/functional mobility, limited weight bearing status, decreased LE flexibility, and poor activity tolerance/endurance. Based on these limitations and symptoms, she would benefit from continuance of skilled PT at this time. PT spoke with referring MD and agreed to continue with NWB status for 6 weeks post op. Her family is working to acquire necessary equipment and assistive devices.     Patient prognosis is Fair.   Patient will benefit from skilled outpatient Physical Therapy to address the deficits stated above and in the chart below, provide patient /family education, and to maximize patientt's level of independence.     Plan of care discussed with patient: Yes  Patient's spiritual, cultural and educational needs considered and patient is agreeable to the plan of care and goals as stated below:     Anticipated Barriers for therapy: weight bearing precautions per patient, level of assistance required, ambulatory status, lack of assistive devices    Medical Necessity is demonstrated by the following  History  Co-morbidities and personal factors that may impact the plan of care Co-morbidities:   hx of  cancer/multiple myeloma, previous femur surgery 7/18/2022    Personal Factors:   coping style  lifestyle     moderate   Examination  Body Structures and Functions, activity limitations and participation restrictions that may impact the plan of care Body Regions:   lower extremities    Body Systems:    gross symmetry  ROM  strength  gross coordinated movement  balance  gait  transfers  transitions  motor control    Participation Restrictions:   ADLs, recreational     Activity limitations:   Learning and applying knowledge  no deficits    General Tasks and Commands  undertaking multiple tasks    Communication  no deficits    Mobility  walking  moving around using equipment (WC)  using transportation (bus, train, plane, car)  driving (bike, car, motorcycle)    Self care  looking after one's health    Domestic Life  shopping  cooking  doing house work (cleaning house, washing dishes, laundry)  assisting others    Interactions/Relationships  no deficits    Life Areas  employment    Community and Social Life  community life  recreation and leisure         high   Clinical Presentation unstable clinical presentation with unpredictable characteristics moderate   Decision Making/ Complexity Score: moderate     Goals:  Short Term Goals: 4 weeks   1. Pt will be independent with HEP to facilitate healing and improve outcome measures.  2. Pt will increased hip ROM by 5-10 degrees without pain.  3. Pt will be able to demonstrate full WB in LLE and walk greater than 50 feet with least restrictive AD.  4. Pt will increase LLE MMT values by at least 1/2 grade.    Long Term Goals: 8 weeks   1. Pt will demonstrate mod [place] with household transfers and gait.   2. Pt will demonstrate LLE gross strength to 4/5 or greater.   3. Pt will demonstrate full L hip ROM and flexibility.     PLAN     Plan of care Certification: 8/8/2022 to 10/3/2022.    Outpatient Physical Therapy 2 times weekly for 8 weeks to include the following  interventions: Gait Training, Manual Therapy, Moist Heat/ Ice, Neuromuscular Re-ed, Patient Education, Therapeutic Activities and Therapeutic Exercise.     Evan Tesfaye, PT DPT      I CERTIFY THE NEED FOR THESE SERVICES FURNISHED UNDER THIS PLAN OF TREATMENT AND WHILE UNDER MY CARE   Physician's comments:     Physician's Signature: ___________________________________________________

## 2022-08-08 NOTE — PROGRESS NOTES
Oncology LCSW followed up with pt's son, Fahad Houston, via email. LCSW informed Fahad that [facility] will contact him to arrange a time and date for delivery of hospital bed. LCSW also provided very detailed information regarding the [facility] and multiple myeloma reimbursement assistance programs. LCSW also provided information regarding the leukemia and lymphoma society financial assistance programs. LCSW provided contact information and instructed to call if any needs or concerns arise.  Will continue to follow and assist as needed.    Pt's current address: [address] [place] [address]  Fahad Houston- Pt's son- [email or web address]  Leukemia and lymphoma society- [email or web address]        Jaclyn Byrd LCSW  Oncology Social Worker License # [id number]  [facility]  [email or web address]  P. [phone]; F. [phone]

## 2022-08-09 ENCOUNTER — TELEPHONE (OUTPATIENT)
Dept: HEMATOLOGY/ONCOLOGY | Facility: CLINIC | Age: 62
End: 2022-08-09
Payer: MEDICAID

## 2022-08-09 DIAGNOSIS — C90.00 MULTIPLE MYELOMA NOT HAVING ACHIEVED REMISSION: Primary | ICD-10-CM

## 2022-08-09 LAB
COMMENT: NORMAL
FINAL PATHOLOGIC DIAGNOSIS: NORMAL
GROSS: NORMAL
Lab: NORMAL
MICROSCOPIC EXAM: NORMAL

## 2022-08-09 NOTE — TELEPHONE ENCOUNTER
Clarified that patient last menstrual cycle was at the age of 52. Updated [facility] team. Updated [facility]. They will set up delivery.

## 2022-08-09 NOTE — TELEPHONE ENCOUNTER
"----- Message from Everett Boogie MD sent at 8/9/2022  1:39 PM CDT -----    ----- Message -----  From: Kishore Jenkins  Sent: 8/9/2022  11:05 AM CDT  To: Chuyita SHETTY Staff    Consult/Advisory:          Name Of Caller: [facility] [address]        Contact Preference?:  [phone]      Fax: [phone]        Provider Name: Chuyita      Does patient feel the need to be seen today? No      What is the nature of the call?: Calling to speak w/ nurse. Stating [facility] auth for lenalidomide 25 mg Cap refill has been flagged          Additional Notes:  "Thank you for all that you do for our patients"        "

## 2022-08-10 ENCOUNTER — CLINICAL SUPPORT (OUTPATIENT)
Dept: REHABILITATION | Facility: HOSPITAL | Age: 62
End: 2022-08-10
Payer: MEDICAID

## 2022-08-10 DIAGNOSIS — R29.898 WEAKNESS OF BOTH LOWER EXTREMITIES: ICD-10-CM

## 2022-08-10 DIAGNOSIS — Z74.09 IMPAIRED FUNCTIONAL MOBILITY, BALANCE, GAIT, AND ENDURANCE: Primary | ICD-10-CM

## 2022-08-10 DIAGNOSIS — R26.89 BALANCE PROBLEM: ICD-10-CM

## 2022-08-10 LAB
PATHOLOGIST INTERPRETATION UIFE: NORMAL
PATHOLOGIST INTERPRETATION UPE: NORMAL

## 2022-08-10 PROCEDURE — 97530 THERAPEUTIC ACTIVITIES: CPT | Mod: PN

## 2022-08-10 PROCEDURE — 97110 THERAPEUTIC EXERCISES: CPT | Mod: PN

## 2022-08-10 NOTE — PROGRESS NOTES
[facility]   Physical Therapy Treatment Note     Name: Raquel Houston  Clinic Number: [id number]    Therapy Diagnosis:   Encounter Diagnoses   Name Primary?    Impaired functional mobility, balance, gait, and endurance Yes    Weakness of both lower extremities     Balance problem      Physician: Oneyda, Provider    Visit Date: 8/10/2022    Physician Orders: PT Eval and Treat  Medical Diagnosis from Referral: Sepsis, due to unspecified organism, unspecified whether acute organ dysfunction present [A41.9]  Evaluation Date: 8/8/2022  Authorization Period Expiration: 12/31/2022  Plan of Care Expiration: 10/3/2022  Progress Note Due: 9/8/2022  Visit # / Visits authorized: 1/12 auth (1/1 eval)  FOTO: 1/3    PTA Visit #: 0/5     Time In: 0750  Time Out: 0830  Total Billable Time: 40 minutes    SUBJECTIVE     Pt reports: she received a wheelchair, bedside commode, and bed for her home. This has helped tremendously. She complains of left side low back and hip pain this morning when she woke up.     She was compliant with home exercise program.  Response to previous treatment: initial evaluation  Functional change: better mobility with wheelchair    Pain: 9/10  Location: Left lower extremity     OBJECTIVE     Objective Measures updated at progress report unless specified.     Treatment     Raquel received the treatments listed below:      therapeutic exercises to develop strength, endurance, ROM, flexibility, posture and core stabilization for 30 minutes including:     SAQ x30 reps  HSS with strap 3 min 10 sec hold  Calf stretch with strap 3 min 10 sec hold  AROM heel slides 3 min  Supine hip adduction squeezes 3 min  Supine hip abduction YTB 3 min  Quad sets x30 reps 3 sec hold  Glute sets x30 reps 3 sec hold      Raquel participated in dynamic functional therapeutic activities to improve functional performance for 10 minutes, including:    Sit<>stand, using RW, NWB LLE x3  Bed  mobility  Stand<>pivot transfer training       Possible next visit: NuStep, recumbent bike,  PROM, hip isometrics, gait training, transfer training, sit<>stand, parallel bars, modalities PRN      Patient Education and Home Exercises     Home Exercises Provided and Patient Education Provided     Education provided:   - weight bearing precaution  - safety awareness  - use of assistive devices  - transfers  - HEP provided and reviewed  - Anatomy and Physiology pertaining to current condition  - Possible response to exercise  - Importance of PT compliance    Written Home Exercises Provided: Patient instructed to cont prior HEP. Exercises were reviewed and Raquel was able to demonstrate them prior to the end of the session.  Raquel demonstrated good  understanding of the education provided. See EMR under Patient Instructions for exercises provided during therapy sessions    ASSESSMENT     Raquel is able to perform mat exercises for hip strength, ROM, and flexibility. She requires cueing but is able to demonstrate exercises with proper form. Pt has trouble with adhering to NWB status during stand pivot transfers but improved with this after more education. She is progressing well toward established PT Goals.     Raquel Is progressing well towards her goals.   Pt prognosis is Fair.     Pt will continue to benefit from skilled outpatient physical therapy to address the deficits listed in the problem list box on initial evaluation, provide pt/family education and to maximize pt's level of independence in the home and community environment.     Pt's spiritual, cultural and educational needs considered and pt agreeable to plan of care and goals.     Anticipated barriers to physical therapy: weight bearing precautions per patient, level of assistance required, ambulatory status, lack of assistive devices    Goals:  Short Term Goals: 4 weeks   1. Pt will be independent with HEP to facilitate healing and improve outcome  measures. (progressing, not met)  2. Pt will increased hip ROM by 5-10 degrees without pain. (progressing, not met)  3. Pt will be able to demonstrate full WB in LLE and walk greater than 50 feet with least restrictive AD. (progressing, not met)  4. Pt will increase LLE MMT values by at least 1/2 grade. (progressing, not met)     Long Term Goals: 8 weeks   1. Pt will demonstrate mod [place] with household transfers and gait. (progressing, not met)  2. Pt will demonstrate LLE gross strength to 4/5 or greater. (progressing, not met)  3. Pt will demonstrate full L hip ROM and flexibility. (progressing, not met)    PLAN     Continue with PT POC to address functional deficits.    Evan Tesfaye, PT DPT

## 2022-08-15 ENCOUNTER — CLINICAL SUPPORT (OUTPATIENT)
Dept: REHABILITATION | Facility: HOSPITAL | Age: 62
End: 2022-08-15
Payer: MEDICAID

## 2022-08-15 ENCOUNTER — LAB VISIT (OUTPATIENT)
Dept: LAB | Facility: HOSPITAL | Age: 62
End: 2022-08-15
Payer: MEDICAID

## 2022-08-15 DIAGNOSIS — R29.898 WEAKNESS OF BOTH LOWER EXTREMITIES: ICD-10-CM

## 2022-08-15 DIAGNOSIS — Z74.09 IMPAIRED FUNCTIONAL MOBILITY, BALANCE, GAIT, AND ENDURANCE: Primary | ICD-10-CM

## 2022-08-15 DIAGNOSIS — R26.89 BALANCE PROBLEM: ICD-10-CM

## 2022-08-15 DIAGNOSIS — C90.00 MULTIPLE MYELOMA NOT HAVING ACHIEVED REMISSION: ICD-10-CM

## 2022-08-15 PROCEDURE — 97110 THERAPEUTIC EXERCISES: CPT | Mod: PN

## 2022-08-15 PROCEDURE — 88342 IMHCHEM/IMCYTCHM 1ST ANTB: CPT | Performed by: PATHOLOGY

## 2022-08-15 PROCEDURE — 88321 CONSLTJ&REPRT SLD PREP ELSWR: CPT | Performed by: PATHOLOGY

## 2022-08-15 PROCEDURE — 88313 SPECIAL STAINS GROUP 2: CPT | Mod: 59 | Performed by: PATHOLOGY

## 2022-08-15 NOTE — PROGRESS NOTES
[facility]   Physical Therapy Treatment Note     Name: Raquel Houston  Clinic Number: [id number]    Therapy Diagnosis:   Encounter Diagnoses   Name Primary?    Impaired functional mobility, balance, gait, and endurance Yes    Weakness of both lower extremities     Balance problem      Physician: Oneyda, Provider    Visit Date: 8/15/2022    Physician Orders: PT Eval and Treat  Medical Diagnosis from Referral: Sepsis, due to unspecified organism, unspecified whether acute organ dysfunction present [A41.9]  Evaluation Date: 8/8/2022  Authorization Period Expiration: 12/31/2022  Plan of Care Expiration: 10/3/2022  Progress Note Due: 9/8/2022  Visit # / Visits authorized: 2/12 auth (1/1 eval)  FOTO: 1/3    PTA Visit #: 0/5     Time In: 0215  Time Out: 0300  Total Billable Time: 45 minutes    SUBJECTIVE     Pt reports: pain levels are much better since her last treatment. She has been doing light exercises at home.     She was compliant with home exercise program.  Response to previous treatment: initial evaluation  Functional change: better mobility with wheelchair    Pain: 0/10  Location: Left lower extremity     OBJECTIVE     Objective Measures updated at progress report unless specified.     Treatment     Raquel received the treatments listed below:      therapeutic exercises to develop strength, endurance, ROM, flexibility, posture and core stabilization for 35 minutes including:     SAQ x30 reps 1# weight B  HSS with strap 3 min 10 sec hold  Calf stretch with strap 3 min 10 sec hold  AROM heel slides 3 min  Supine hip adduction squeezes 3 min  Supine hip abduction YTB 3 min  Quad sets x30 reps 3 sec hold  Glute sets x30 reps 3 sec hold  AROM hip abduction, supine slide board      Raquel participated in dynamic functional therapeutic activities to improve functional performance for 10 minutes, including:    Sit<>stand, using RW, NWB LLE x3  Bed mobility,  rolling  Stand<>pivot transfer training       Possible next visit: NuStep, recumbent bike,  PROM, hip isometrics, gait training, transfer training, sit<>stand, parallel bars, modalities PRN      Patient Education and Home Exercises     Home Exercises Provided and Patient Education Provided     Education provided:   - weight bearing precaution  - safety awareness  - use of assistive devices  - transfers  - HEP provided and reviewed  - Anatomy and Physiology pertaining to current condition  - Possible response to exercise  - Importance of PT compliance    Written Home Exercises Provided: Patient instructed to cont prior HEP. Exercises were reviewed and Raquel was able to demonstrate them prior to the end of the session.  Raquel demonstrated good  understanding of the education provided. See EMR under Patient Instructions for exercises provided during therapy sessions    ASSESSMENT     Pt still needs verbal and tactile cueing to remind her of WB precautions in LLE. We again practices stand pivot transfer and began bed mobility training such as scooting and rolling onto her side. She more she practices she more comfortable she becomes with this. Pain levels significant improved since last treatment.      Raquel Is progressing well towards her goals.   Pt prognosis is Fair.     Pt will continue to benefit from skilled outpatient physical therapy to address the deficits listed in the problem list box on initial evaluation, provide pt/family education and to maximize pt's level of independence in the home and community environment.     Pt's spiritual, cultural and educational needs considered and pt agreeable to plan of care and goals.     Anticipated barriers to physical therapy: weight bearing precautions per patient, level of assistance required, ambulatory status, lack of assistive devices    Goals:  Short Term Goals: 4 weeks   1. Pt will be independent with HEP to facilitate healing and improve outcome measures.  (progressing, not met)  2. Pt will increased hip ROM by 5-10 degrees without pain. (progressing, not met)  3. Pt will be able to demonstrate full WB in LLE and walk greater than 50 feet with least restrictive AD. (progressing, not met)  4. Pt will increase LLE MMT values by at least 1/2 grade. (progressing, not met)     Long Term Goals: 8 weeks   1. Pt will demonstrate mod [place] with household transfers and gait. (progressing, not met)  2. Pt will demonstrate LLE gross strength to 4/5 or greater. (progressing, not met)  3. Pt will demonstrate full L hip ROM and flexibility. (progressing, not met)    PLAN     Continue with PT POC to address functional deficits.    Evan Tesfaye, PT DPT

## 2022-08-15 NOTE — PROGRESS NOTES
PATIENT: Raquel Houston  MRN: [id number]  DATE: 8/16/2022      Diagnosis:   1. Multiple myeloma not having achieved remission        Chief Complaint: Multiple Myeloma    Oncologic History:     07/14/21: Presented with several months of worsening lethargy, generalized pain, 40-60lbs weight loss, and encephalopathy. Labs at time of presentation: hgb 7.6, plts 82, wbc 12, sodium 120, creatinine 1.55, corrected calcium 9.7, transaminitis, M-spike 6.4 g/dL. Skeletal survey showed lesions of left/right humerus, left/right proximal radius, pelvis, and femurs with a significant left femur lytic lesion. Multiple lucent lesions in calvarium. Given two doses of velcade while hospitalized.  07/18/21: Underwent left trochanteric femoral nail fixation. Pathology consistent with plasma cell neoplasm  07/19/22: BMBx showing 90% plasma cells with 100% cellularity, gain 1q21, t(4;14), and monosomy 13.  08/17/22: Cycle 1 Sofie-VRD for IgG Multiple Myeloma    Subjective:    Initial History: Ms. Houston is a 62 y.o. female with medical history of OA, gout, and osteoporosis presenting for evaluation for Multiple Myeloma. History as above. Has some left arm pain but feels the oxycodone is helping. Overall doing well otherwise. Started taking revlimid 3 days ago. Denies any new complaints.        Smoked 1ppd for 20 years, quit April 2022  Quit alcohol Apirl 2022  Denies recreational drug use  Father has a history of a diffuse bone cancer that he did not want to be treated for      Past Medical History:   Past Medical History:   Diagnosis Date    Multiple myeloma        Past Surgical HIstory:   Past Surgical History:   Procedure Laterality Date    FEMUR SURGERY         Family History:   Family History   Problem Relation Age of Onset    Cancer Father        Social History:      Allergies:  Review of patient's allergies indicates:  No Known Allergies    Medications:  Current Outpatient Medications   Medication Sig Dispense Refill     acyclovir (ZOVIRAX) 400 MG tablet Take 1 tablet (400 mg total) by mouth 2 (two) times daily. 60 tablet 11    amLODIPine (NORVASC) 5 MG tablet Take 2 tablets (10 mg total) by mouth once daily. 30 tablet 3    aspirin (CIERRA ASPIRIN) 325 MG tablet Take 1 tablet (325 mg total) by mouth once daily. 100 tablet 3    aspirin (ECOTRIN) 325 MG EC tablet Take 325 mg by mouth once daily.      dexAMETHasone (DECADRON) 4 MG Tab Take 10 tablets (40 mg total) by mouth As instructed (Take on days 1, 8, 15, and 22 of chemotherapy.). 40 tablet 1    docusate sodium (COLACE) 100 MG capsule Take 100 mg by mouth 2 (two) times daily.      lenalidomide 25 mg Cap Take 1 capsule (25 mg total) by mouth As instructed (Take by mouth once daily on days 1-21 of each 28 day cycle). 21 capsule 0    oxyCODONE (ROXICODONE) 5 MG immediate release tablet Take 1 tablet (5 mg total) by mouth every 6 (six) hours as needed for Pain. 28 tablet 0    pantoprazole (PROTONIX) 40 MG tablet Take 1 tablet (40 mg total) by mouth once daily. 30 tablet 3     No current facility-administered medications for this visit.       Review of Systems   Constitutional: Negative for chills, fatigue and fever.   HENT: Negative for dental problem and trouble swallowing.    Eyes: Negative for photophobia and visual disturbance.   Respiratory: Negative for cough and shortness of breath.    Cardiovascular: Negative for chest pain and leg swelling.   Gastrointestinal: Negative for abdominal pain, diarrhea, nausea and vomiting.   Musculoskeletal: Positive for arthralgias.   Skin: Positive for wound (healing sites to left leg). Negative for rash.   Neurological: Negative for light-headedness and headaches.   Hematological: Negative for adenopathy. Does not bruise/bleed easily.   Psychiatric/Behavioral: Negative for confusion. The patient is not nervous/anxious.        ECOG Performance Status: 3; however underwent recent procedure which is limiting her abilities, expect to  "improve  Objective:      Vitals:   Vitals:    08/16/22 0848   BP: 115/68   BP Location: Right arm   Pulse: 103   Resp: 16   Temp: 97.8 °F (36.6 °C)   SpO2: 100%   Height: 5' 7" (1.702 m)       Physical Exam  Vitals reviewed.   Constitutional:       Appearance: Normal appearance.      Comments: In wheelchair   HENT:      Head: Normocephalic and atraumatic.      Mouth/Throat:      Mouth: Mucous membranes are moist.      Pharynx: Oropharynx is clear.   Eyes:      Extraocular Movements: Extraocular movements intact.      Conjunctiva/sclera: Conjunctivae normal.   Cardiovascular:      Rate and Rhythm: Normal rate and regular rhythm.   Pulmonary:      Effort: Pulmonary effort is normal. No respiratory distress.   Abdominal:      General: There is no distension.      Palpations: Abdomen is soft.      Tenderness: There is no abdominal tenderness.   Musculoskeletal:      Right lower leg: No edema.      Left lower leg: No edema.   Skin:     General: Skin is warm and dry.   Neurological:      General: No focal deficit present.      Mental Status: She is alert and oriented to person, place, and time.   Psychiatric:         Mood and Affect: Mood normal.         Behavior: Behavior normal.         Laboratory Data:  Lab Visit on 08/16/2022   Component Date Value Ref Range Status    WBC 08/16/2022 7.95  3.90 - 12.70 K/uL Final    RBC 08/16/2022 2.81 (A) 4.00 - 5.40 M/uL Final    Hemoglobin 08/16/2022 8.2 (A) 12.0 - 16.0 g/dL Final    Hematocrit 08/16/2022 25.4 (A) 37.0 - 48.5 % Final    MCV 08/16/2022 90  82 - 98 fL Final    MCH 08/16/2022 29.2  27.0 - 31.0 pg Final    MCHC 08/16/2022 32.3  32.0 - 36.0 g/dL Final    RDW 08/16/2022 21.2 (A) 11.5 - 14.5 % Final    Platelets 08/16/2022 235  150 - 450 K/uL Final    MPV 08/16/2022 9.6  9.2 - 12.9 fL Final    Immature Granulocytes 08/16/2022 1.4 (A) 0.0 - 0.5 % Final    Gran # (ANC) 08/16/2022 5.2  1.8 - 7.7 K/uL Final    Immature Grans (Abs) 08/16/2022 0.11 (A) 0.00 - 0.04 " K/uL Final    Comment: Mild elevation in immature granulocytes is non specific and   can be seen in a variety of conditions including stress response,   acute inflammation, trauma and pregnancy. Correlation with other   laboratory and clinical findings is essential.      Lymph # 08/16/2022 2.2  1.0 - 4.8 K/uL Final    Mono # 08/16/2022 0.3  0.3 - 1.0 K/uL Final    Eos # 08/16/2022 0.1  0.0 - 0.5 K/uL Final    Baso # 08/16/2022 0.02  0.00 - 0.20 K/uL Final    nRBC 08/16/2022 1 (A) 0 /100 WBC Final    Gran % 08/16/2022 65.8  38.0 - 73.0 % Final    Lymph % 08/16/2022 27.2  18.0 - 48.0 % Final    Mono % 08/16/2022 4.0  4.0 - 15.0 % Final    Eosinophil % 08/16/2022 1.3  0.0 - 8.0 % Final    Basophil % 08/16/2022 0.3  0.0 - 1.9 % Final    Differential Method 08/16/2022 Automated   Final    Sodium 08/16/2022 133 (A) 136 - 145 mmol/L Final    Potassium 08/16/2022 3.5  3.5 - 5.1 mmol/L Final    Chloride 08/16/2022 104  95 - 110 mmol/L Final    CO2 08/16/2022 24  23 - 29 mmol/L Final    Glucose 08/16/2022 86  70 - 110 mg/dL Final    BUN 08/16/2022 9  6 - 20 mg/dL Final    Creatinine 08/16/2022 0.7  0.5 - 1.4 mg/dL Final    Calcium 08/16/2022 8.1 (A) 8.7 - 10.5 mg/dL Final    Total Protein 08/16/2022 10.6 (A) 6.0 - 8.4 g/dL Final    Albumin 08/16/2022 2.9 (A) 3.5 - 5.2 g/dL Final    Total Bilirubin 08/16/2022 0.8  0.1 - 1.0 mg/dL Final    Comment: For infants and newborns, interpretation of results should be based  on gestational age, weight and in agreement with clinical  observations.    Premature Infant recommended reference ranges:  Up to 24 hours.............<8.0 mg/dL  Up to 48 hours............<12.0 mg/dL  3-5 days..................<15.0 mg/dL  6-29 days.................<15.0 mg/dL      Alkaline Phosphatase 08/16/2022 564 (A) 55 - 135 U/L Final    AST 08/16/2022 32  10 - 40 U/L Final    ALT 08/16/2022 58 (A) 10 - 44 U/L Final    Anion Gap 08/16/2022 5 (A) 8 - 16 mmol/L Final    eGFR 08/16/2022  >60.0  >60 mL/min/1.73 m^2 Final         Imaging:   Assessment:       1. Multiple myeloma not having achieved remission           Plan:     Multiple Myeloma  -- Complex cytogenetics with gain 1q21, t(4;14), and monosomy 13, bone marrow to be evaluated by [facility] hematopathologist  -- Diffuse lytics lesions  -- M-spike 6.4g/dL  -- R-ISS stage III (elevated beta-2 microglobulin, elevated LDH, decreased albumin, and high risk cytogenetics)  -- Cycle 1 Sofie-VRD; consent obtained 08/16/22:  -- Has met with transplant coordinators. Will need mammogram, colonoscopy, and dental appointment  -- Will need bisphosphonate pending dental appointment  -- Acyclovir 400mg BID, aspirin daily    RTC in 4 weeks. Pt knows to call or use [facility] in the interim with any questions or concerns.    Everett Boogie, PGY- VI  Hematology/Oncology Fellow     Route Chart for Scheduling    BMT Chart Routing  Urgent    Follow up with physician . RTC 9/13   Follow up with SERGIO No follow up needed.   Infusion scheduling note none   Injection scheduling note daratumumab and velcade every tuesday   Labs    Lab interval:  CBC, CMP, type and screen every Tuesday prior to injection. serum protein electropheresis/immunofixation, immunoglobulins, and free lgiht chains 9/6   Imaging None      Pharmacy appointment No pharmacy appointment needed      Other referrals No additional referrals needed         Treatment Plan Information   OP D-VRD DARATUMUMAB + BORTEZOMIB LENALIDOMIDE DEXAMETHASONE   Janice Casas MD   Upcoming Treatment Dates - OP D-VRD DARATUMUMAB + BORTEZOMIB LENALIDOMIDE DEXAMETHASONE    8/15/2022       Antiemetics       Physician communication order       Take Home Chemotherapy       dexAMETHasone (DECADRON) 4 MG Tab       lenalidomide 25 mg Cap  8/16/2022       Pre-Medications       acetaminophen tablet 650 mg       diphenhydrAMINE capsule 50 mg       Chemotherapy       bortezomib (VELCADE) injection 1.3 mg/m2 (Treatment Plan)        daratumumab-hyaluronidase-fihj Soln 1,800 mg  8/23/2022       Pre-Medications       acetaminophen tablet 650 mg       diphenhydrAMINE capsule 50 mg       Chemotherapy       bortezomib (VELCADE) injection 1.3 mg/m2 (Treatment Plan)       daratumumab-hyaluronidase-fihj Soln 1,800 mg  8/30/2022       Pre-Medications       acetaminophen tablet 650 mg       diphenhydrAMINE capsule 50 mg       Chemotherapy       bortezomib (VELCADE) injection 1.3 mg/m2 (Treatment Plan)       daratumumab-hyaluronidase-fihj Soln 1,800 mg

## 2022-08-16 ENCOUNTER — CLINICAL SUPPORT (OUTPATIENT)
Dept: HEMATOLOGY/ONCOLOGY | Facility: CLINIC | Age: 62
End: 2022-08-16
Payer: MEDICAID

## 2022-08-16 ENCOUNTER — LAB VISIT (OUTPATIENT)
Dept: LAB | Facility: HOSPITAL | Age: 62
End: 2022-08-16
Payer: MEDICAID

## 2022-08-16 ENCOUNTER — OFFICE VISIT (OUTPATIENT)
Dept: HEMATOLOGY/ONCOLOGY | Facility: CLINIC | Age: 62
End: 2022-08-16
Payer: MEDICAID

## 2022-08-16 VITALS
SYSTOLIC BLOOD PRESSURE: 115 MMHG | OXYGEN SATURATION: 100 % | TEMPERATURE: 98 F | HEART RATE: 103 BPM | RESPIRATION RATE: 16 BRPM | HEIGHT: 67 IN | DIASTOLIC BLOOD PRESSURE: 68 MMHG

## 2022-08-16 DIAGNOSIS — R29.898 WEAKNESS OF BOTH LOWER EXTREMITIES: ICD-10-CM

## 2022-08-16 DIAGNOSIS — Z74.09 IMPAIRED FUNCTIONAL MOBILITY, BALANCE, GAIT, AND ENDURANCE: ICD-10-CM

## 2022-08-16 DIAGNOSIS — C90.00 MULTIPLE MYELOMA NOT HAVING ACHIEVED REMISSION: ICD-10-CM

## 2022-08-16 DIAGNOSIS — C90.00 MULTIPLE MYELOMA NOT HAVING ACHIEVED REMISSION: Primary | ICD-10-CM

## 2022-08-16 LAB
ABO + RH BLD: NORMAL
ALBUMIN SERPL BCP-MCNC: 2.9 G/DL (ref 3.5–5.2)
ALP SERPL-CCNC: 564 U/L (ref 55–135)
ALT SERPL W/O P-5'-P-CCNC: 58 U/L (ref 10–44)
ANION GAP SERPL CALC-SCNC: 5 MMOL/L (ref 8–16)
AST SERPL-CCNC: 32 U/L (ref 10–40)
BASOPHILS # BLD AUTO: 0.02 K/UL (ref 0–0.2)
BASOPHILS NFR BLD: 0.3 % (ref 0–1.9)
BILIRUB SERPL-MCNC: 0.8 MG/DL (ref 0.1–1)
BLD GP AB SCN CELLS X3 SERPL QL: NORMAL
BUN SERPL-MCNC: 9 MG/DL (ref 6–20)
CALCIUM SERPL-MCNC: 8.1 MG/DL (ref 8.7–10.5)
CHLORIDE SERPL-SCNC: 104 MMOL/L (ref 95–110)
CO2 SERPL-SCNC: 24 MMOL/L (ref 23–29)
CREAT SERPL-MCNC: 0.7 MG/DL (ref 0.5–1.4)
DIFFERENTIAL METHOD: ABNORMAL
EOSINOPHIL # BLD AUTO: 0.1 K/UL (ref 0–0.5)
EOSINOPHIL NFR BLD: 1.3 % (ref 0–8)
ERYTHROCYTE [DISTWIDTH] IN BLOOD BY AUTOMATED COUNT: 21.2 % (ref 11.5–14.5)
EST. GFR  (NO RACE VARIABLE): >60 ML/MIN/1.73 M^2
GLUCOSE SERPL-MCNC: 86 MG/DL (ref 70–110)
HCT VFR BLD AUTO: 25.4 % (ref 37–48.5)
HGB BLD-MCNC: 8.2 G/DL (ref 12–16)
IMM GRANULOCYTES # BLD AUTO: 0.11 K/UL (ref 0–0.04)
IMM GRANULOCYTES NFR BLD AUTO: 1.4 % (ref 0–0.5)
LYMPHOCYTES # BLD AUTO: 2.2 K/UL (ref 1–4.8)
LYMPHOCYTES NFR BLD: 27.2 % (ref 18–48)
MCH RBC QN AUTO: 29.2 PG (ref 27–31)
MCHC RBC AUTO-ENTMCNC: 32.3 G/DL (ref 32–36)
MCV RBC AUTO: 90 FL (ref 82–98)
MONOCYTES # BLD AUTO: 0.3 K/UL (ref 0.3–1)
MONOCYTES NFR BLD: 4 % (ref 4–15)
NEUTROPHILS # BLD AUTO: 5.2 K/UL (ref 1.8–7.7)
NEUTROPHILS NFR BLD: 65.8 % (ref 38–73)
NRBC BLD-RTO: 1 /100 WBC
PLATELET # BLD AUTO: 235 K/UL (ref 150–450)
PMV BLD AUTO: 9.6 FL (ref 9.2–12.9)
POTASSIUM SERPL-SCNC: 3.5 MMOL/L (ref 3.5–5.1)
PROT SERPL-MCNC: 10.6 G/DL (ref 6–8.4)
RBC # BLD AUTO: 2.81 M/UL (ref 4–5.4)
SODIUM SERPL-SCNC: 133 MMOL/L (ref 136–145)
WBC # BLD AUTO: 7.95 K/UL (ref 3.9–12.7)

## 2022-08-16 PROCEDURE — 99215 PR OFFICE/OUTPT VISIT, EST, LEVL V, 40-54 MIN: ICD-10-PCS | Mod: S$PBB,,, | Performed by: INTERNAL MEDICINE

## 2022-08-16 PROCEDURE — 99999 PR PBB SHADOW E&M-EST. PATIENT-LVL IV: ICD-10-PCS | Mod: PBBFAC,,, | Performed by: INTERNAL MEDICINE

## 2022-08-16 PROCEDURE — 80053 COMPREHEN METABOLIC PANEL: CPT | Performed by: INTERNAL MEDICINE

## 2022-08-16 PROCEDURE — 99215 OFFICE O/P EST HI 40 MIN: CPT | Mod: S$PBB,,, | Performed by: INTERNAL MEDICINE

## 2022-08-16 PROCEDURE — 3078F DIAST BP <80 MM HG: CPT | Mod: CPTII,,, | Performed by: INTERNAL MEDICINE

## 2022-08-16 PROCEDURE — 99999 PR PBB SHADOW E&M-EST. PATIENT-LVL IV: CPT | Mod: PBBFAC,,, | Performed by: INTERNAL MEDICINE

## 2022-08-16 PROCEDURE — 86850 RBC ANTIBODY SCREEN: CPT | Performed by: INTERNAL MEDICINE

## 2022-08-16 PROCEDURE — 85025 COMPLETE CBC W/AUTO DIFF WBC: CPT | Performed by: INTERNAL MEDICINE

## 2022-08-16 PROCEDURE — 3074F SYST BP LT 130 MM HG: CPT | Mod: CPTII,,, | Performed by: INTERNAL MEDICINE

## 2022-08-16 PROCEDURE — 99999 PR PBB SHADOW E&M-EST. PATIENT-LVL I: ICD-10-PCS | Mod: PBBFAC,,,

## 2022-08-16 PROCEDURE — 36415 COLL VENOUS BLD VENIPUNCTURE: CPT | Performed by: INTERNAL MEDICINE

## 2022-08-16 PROCEDURE — 99214 OFFICE O/P EST MOD 30 MIN: CPT | Mod: PBBFAC,25 | Performed by: INTERNAL MEDICINE

## 2022-08-16 PROCEDURE — 3074F PR MOST RECENT SYSTOLIC BLOOD PRESSURE < 130 MM HG: ICD-10-PCS | Mod: CPTII,,, | Performed by: INTERNAL MEDICINE

## 2022-08-16 PROCEDURE — 3078F PR MOST RECENT DIASTOLIC BLOOD PRESSURE < 80 MM HG: ICD-10-PCS | Mod: CPTII,,, | Performed by: INTERNAL MEDICINE

## 2022-08-16 PROCEDURE — 99211 OFF/OP EST MAY X REQ PHY/QHP: CPT | Mod: PBBFAC,25,27

## 2022-08-16 PROCEDURE — 99999 PR PBB SHADOW E&M-EST. PATIENT-LVL I: CPT | Mod: PBBFAC,,,

## 2022-08-16 NOTE — PROGRESS NOTES
Pharmacist Patient Education Note    DaraVelRevDex chemotherapy regimen was discussed with the patient and her daughter-in-law. Medication handouts for each agent were provided to the patient.    Administration instructions were discussed including:    Cycle = 28 days    Daratumumab hycela  (Faspro®) Bortezomib  (Velcade®) Dexamethasone  (Decadron®) Lenalidomide  (Revlimid®)   Cycles 1-2       Week 1 (Day 1) X X X Days 1-21   Week 2 (Day 8) X X X    Week 3 (Day 15) X X X    Week 4 (Day 22) X X X    Cycles 3-6        Week 1 (Day 1) X X X Days 1-21   Week 2 (Day 8)  X X    Week 3 (Day 15) X X X    Week 4 (Day 22)  X X          The following side effects were reviewed:    - Injection reactions and systemic reactions (flush, headache, dizziness, rigors, chest pain, shortness of breath, blood pressure changes, throat closure)   - Prevention and treatment of nausea/vomiting   - Prophylaxis against herpes virus with acyclovir and the importance of taking the medication as prescribed. Additionally, if myelosuppression were to occur additional antimicrobials would be added on to protect against bacteria and fungus while counts are low   - Fatigue   - Constipation/Diarrhea   - Peripheral neuropathy    - Rash   - Short term side effects of high dose steroids:changes in blood pressure and blood glucose, mood swings, increased appetite, and trouble sleeping    Patient was given further information regarding Revlimid:   - REMs program for close monitoring   - Increased risk of clots and the importance of taking a baby aspirin every day (unless instructed to hold for low platelets or preparation for a procedure)   - Avoid donating blood through treatment and up to 4 weeks after stopping Revlimid   - Administer at about the same time each day with water; administer with or without food. Swallow capsule whole; do not break, open, or chew.   - If you miss a dose, take it as soon as you think about it ONLY if it has been less than 12  hours since your regular time. If it has been more than 12 hours, skip the missed dose and take your next dose at the regular time. If you vomit a dose, take your next dose at the regular time; do NOT take 2 doses at the same time and never double up a dose to replace the missed dose.    - Wash your hands after handling this medication. Caretakers should NOT handle this medicine with bare hands and should wear latex gloves.    - Store this medication at room temperature. Avoid storing in a pill box with other medications.       All questions were answered.      Yesica Nooyla, PharmD, BCPS, BCOP  Clinical Pharmacy Specialist   BMT/Hematology Oncology  SpectraLink: [id number]

## 2022-08-17 ENCOUNTER — INFUSION (OUTPATIENT)
Dept: INFUSION THERAPY | Facility: HOSPITAL | Age: 62
End: 2022-08-17
Payer: MEDICAID

## 2022-08-17 ENCOUNTER — DOCUMENTATION ONLY (OUTPATIENT)
Dept: HEMATOLOGY/ONCOLOGY | Facility: CLINIC | Age: 62
End: 2022-08-17
Payer: MEDICAID

## 2022-08-17 VITALS
TEMPERATURE: 98 F | SYSTOLIC BLOOD PRESSURE: 113 MMHG | RESPIRATION RATE: 18 BRPM | HEIGHT: 67 IN | HEART RATE: 92 BPM | BODY MASS INDEX: 24.53 KG/M2 | WEIGHT: 156.31 LBS | DIASTOLIC BLOOD PRESSURE: 62 MMHG

## 2022-08-17 DIAGNOSIS — C90.00 MULTIPLE MYELOMA NOT HAVING ACHIEVED REMISSION: Primary | ICD-10-CM

## 2022-08-17 DIAGNOSIS — I10 HYPERTENSION, UNSPECIFIED TYPE: Primary | ICD-10-CM

## 2022-08-17 PROCEDURE — 25000003 PHARM REV CODE 250: Performed by: INTERNAL MEDICINE

## 2022-08-17 PROCEDURE — 63600175 PHARM REV CODE 636 W HCPCS: Mod: TB | Performed by: INTERNAL MEDICINE

## 2022-08-17 PROCEDURE — 96401 CHEMO ANTI-NEOPL SQ/IM: CPT

## 2022-08-17 RX ORDER — BORTEZOMIB 3.5 MG/1
1.3 INJECTION, POWDER, LYOPHILIZED, FOR SOLUTION INTRAVENOUS; SUBCUTANEOUS
Status: CANCELLED | OUTPATIENT
Start: 2022-08-17

## 2022-08-17 RX ORDER — BORTEZOMIB 3.5 MG/1
1.3 INJECTION, POWDER, LYOPHILIZED, FOR SOLUTION INTRAVENOUS; SUBCUTANEOUS
Status: CANCELLED | OUTPATIENT
Start: 2022-08-30

## 2022-08-17 RX ORDER — BORTEZOMIB 3.5 MG/1
1.3 INJECTION, POWDER, LYOPHILIZED, FOR SOLUTION INTRAVENOUS; SUBCUTANEOUS
Status: CANCELLED | OUTPATIENT
Start: 2022-08-23

## 2022-08-17 RX ORDER — DIPHENHYDRAMINE HCL 50 MG
50 CAPSULE ORAL
Status: CANCELLED
Start: 2022-08-30

## 2022-08-17 RX ORDER — ACETAMINOPHEN 325 MG/1
650 TABLET ORAL
Status: CANCELLED | OUTPATIENT
Start: 2022-08-17

## 2022-08-17 RX ORDER — DIPHENHYDRAMINE HCL 50 MG
50 CAPSULE ORAL
Status: CANCELLED
Start: 2022-09-06

## 2022-08-17 RX ORDER — BORTEZOMIB 3.5 MG/1
1.3 INJECTION, POWDER, LYOPHILIZED, FOR SOLUTION INTRAVENOUS; SUBCUTANEOUS
Status: COMPLETED | OUTPATIENT
Start: 2022-08-17 | End: 2022-08-17

## 2022-08-17 RX ORDER — BORTEZOMIB 3.5 MG/1
1.3 INJECTION, POWDER, LYOPHILIZED, FOR SOLUTION INTRAVENOUS; SUBCUTANEOUS
Status: CANCELLED | OUTPATIENT
Start: 2022-09-06

## 2022-08-17 RX ORDER — HEPARIN 100 UNIT/ML
500 SYRINGE INTRAVENOUS
Status: CANCELLED | OUTPATIENT
Start: 2022-09-06

## 2022-08-17 RX ORDER — AMLODIPINE BESYLATE 10 MG/1
10 TABLET ORAL DAILY
Qty: 30 TABLET | Refills: 11 | Status: ON HOLD | OUTPATIENT
Start: 2022-08-17 | End: 2023-02-15 | Stop reason: SDUPTHER

## 2022-08-17 RX ORDER — HEPARIN 100 UNIT/ML
500 SYRINGE INTRAVENOUS
Status: DISCONTINUED | OUTPATIENT
Start: 2022-08-17 | End: 2022-08-17 | Stop reason: HOSPADM

## 2022-08-17 RX ORDER — SODIUM CHLORIDE 0.9 % (FLUSH) 0.9 %
10 SYRINGE (ML) INJECTION
Status: DISCONTINUED | OUTPATIENT
Start: 2022-08-17 | End: 2022-08-17 | Stop reason: HOSPADM

## 2022-08-17 RX ORDER — HEPARIN 100 UNIT/ML
500 SYRINGE INTRAVENOUS
Status: CANCELLED | OUTPATIENT
Start: 2022-08-23

## 2022-08-17 RX ORDER — ACETAMINOPHEN 325 MG/1
650 TABLET ORAL
Status: COMPLETED | OUTPATIENT
Start: 2022-08-17 | End: 2022-08-17

## 2022-08-17 RX ORDER — SODIUM CHLORIDE 0.9 % (FLUSH) 0.9 %
10 SYRINGE (ML) INJECTION
Status: CANCELLED | OUTPATIENT
Start: 2022-08-23

## 2022-08-17 RX ORDER — DIPHENHYDRAMINE HCL 50 MG
50 CAPSULE ORAL
Status: COMPLETED | OUTPATIENT
Start: 2022-08-17 | End: 2022-08-17

## 2022-08-17 RX ORDER — SODIUM CHLORIDE 0.9 % (FLUSH) 0.9 %
10 SYRINGE (ML) INJECTION
Status: CANCELLED | OUTPATIENT
Start: 2022-09-06

## 2022-08-17 RX ORDER — ACETAMINOPHEN 325 MG/1
650 TABLET ORAL
Status: CANCELLED | OUTPATIENT
Start: 2022-09-06

## 2022-08-17 RX ORDER — SODIUM CHLORIDE 0.9 % (FLUSH) 0.9 %
10 SYRINGE (ML) INJECTION
Status: CANCELLED | OUTPATIENT
Start: 2022-08-30

## 2022-08-17 RX ORDER — HEPARIN 100 UNIT/ML
500 SYRINGE INTRAVENOUS
Status: CANCELLED | OUTPATIENT
Start: 2022-08-30

## 2022-08-17 RX ORDER — HEPARIN 100 UNIT/ML
500 SYRINGE INTRAVENOUS
Status: CANCELLED | OUTPATIENT
Start: 2022-08-17

## 2022-08-17 RX ORDER — DIPHENHYDRAMINE HCL 50 MG
50 CAPSULE ORAL
Status: CANCELLED
Start: 2022-08-23

## 2022-08-17 RX ORDER — ACETAMINOPHEN 325 MG/1
650 TABLET ORAL
Status: CANCELLED | OUTPATIENT
Start: 2022-08-30

## 2022-08-17 RX ORDER — DIPHENHYDRAMINE HCL 50 MG
50 CAPSULE ORAL
Status: CANCELLED
Start: 2022-08-17

## 2022-08-17 RX ORDER — ACETAMINOPHEN 325 MG/1
650 TABLET ORAL
Status: CANCELLED | OUTPATIENT
Start: 2022-08-23

## 2022-08-17 RX ORDER — SODIUM CHLORIDE 0.9 % (FLUSH) 0.9 %
10 SYRINGE (ML) INJECTION
Status: CANCELLED | OUTPATIENT
Start: 2022-08-17

## 2022-08-17 RX ADMIN — ACETAMINOPHEN 650 MG: 325 TABLET ORAL at 10:08

## 2022-08-17 RX ADMIN — DIPHENHYDRAMINE HYDROCHLORIDE 50 MG: 50 CAPSULE ORAL at 10:08

## 2022-08-17 RX ADMIN — BORTEZOMIB 2.4 MG: 3.5 INJECTION, POWDER, LYOPHILIZED, FOR SOLUTION INTRAVENOUS; SUBCUTANEOUS at 11:08

## 2022-08-17 RX ADMIN — DARATUMUMAB AND HYALURONIDASE-FIHJ (HUMAN RECOMBINANT) 1800 MG: 1800; 30000 INJECTION SUBCUTANEOUS at 11:08

## 2022-08-17 NOTE — PROGRESS NOTES
Oncology LCSW followed up with pt's son, Fahad, telephonically. LCSW provided Fahad with instructions on how to use the [employer] gas card. LCSW also informed Fahad that the Boost has not arrived. Fahad verbalized understanding.      Jaclyn Byrd, NIKAW  Oncology Social Worker License # [id number]  [facility]  [email or web address]  P. [phone]; F. [phone]

## 2022-08-17 NOTE — PROGRESS NOTES
"OPCM LCSW provided gas card to pt's chemo nurse (Feliciadalia Gresham). Liliane reports that pt's family member is not available at this time and is requesting that LCSW return. LCSW informed Liliane that she will return at approximately 1P. Liliane agreed with time and stated "you have time, pt and family member will be here for a while."    $25- Gas card # - [id number]      Jaclyn Byrd, [facility]  Oncology Social Worker License # [id number]  [facility]  [email or web address]  P. [phone]; F. [phone]  "

## 2022-08-17 NOTE — PROGRESS NOTES
[facility]   Physical Therapy Treatment Note     Name: Raquel Houston  Clinic Number: [id number]    Therapy Diagnosis:   Encounter Diagnoses   Name Primary?    Impaired functional mobility, balance, gait, and endurance Yes    Weakness of both lower extremities     Balance problem      Physician: Oneyda, Provider    Visit Date: 8/18/2022    Physician Orders: PT Eval and Treat  Medical Diagnosis from Referral: Sepsis, due to unspecified organism, unspecified whether acute organ dysfunction present [A41.9]  Evaluation Date: 8/8/2022  Authorization Period Expiration: 12/31/2022  Plan of Care Expiration: 10/3/2022  Progress Note Due: 9/8/2022  Visit # / Visits authorized: 3/12 auth (1/1 eval)  FOTO: 1/3  PTA Visit #: 1/5     Precautions: 25% weight bearing (per patient)    Time In: 1050 am  Time Out: 1140 am  Total Billable Time: 50 minutes    SUBJECTIVE     Pt reports: she has some stiffness but not too much pain. She is now 25% weight bearing per patient and family member.    She was compliant with home exercise program.  Response to previous treatment: initial evaluation  Functional change: better mobility with wheelchair    Pain: 0/10  Location: Left lower extremity    OBJECTIVE     Objective Measures updated at progress report unless specified.     Treatment     Raquel received the treatments listed below:      Therapeutic exercises to develop strength, endurance, ROM, flexibility, posture and core stabilization for 35 minutes including:     NuStep 5 min (Level: 1) - Stand pivot transfer with walker  HSS with strap 3 min 10 sec hold  Calf stretch with strap 3 min 10 sec hold  AROM heel slides 3 min  AROM hip abduction, supine slide board 3 min  SAQ x30 reps 1# weight B  Supine hip adduction squeezes 3 min  Supine hip abduction YTB 3 min  Quad sets x30 reps 3 sec hold  Glute sets x30 reps 3 sec hold    Raquel participated in dynamic functional therapeutic activities to  improve functional performance for 10 minutes, including:    Sit<>stand, using RW, NWB LLE x3  Bed mobility, rolling  Stand<>pivot transfer training     Possible next visit: recumbent bike,  PROM, hip isometrics, gait training, transfer training, sit<>stand, parallel bars, modalities PRN    Patient Education and Home Exercises     Education provided:   - Weight bearing precaution  - Safety awareness  - Use of assistive devices  - Transfers  - HEP reviewed  - Anatomy and Physiology pertaining to current condition  - Possible response to exercise  - Importance of PT compliance    Written Home Exercises Provided: Patient instructed to cont prior HEP. Exercises were reviewed and Raquel was able to demonstrate them prior to the end of the session.  Raquel demonstrated good  understanding of the education provided. See EMR under Patient Instructions for exercises provided during therapy sessions    ASSESSMENT     Patient did well with transfers while maintaining weightbearing restrictions. She still requires moderate assistance with transfers and bed mobility. NuStep was introduced this date, patient reports this felt good and helped to decrease some of the stiffness she presented to therapy with this date. She had appropriate fatigue throughout session; however, she was able to complete all therex as prescribed.    Raquel Is progressing well towards her goals.   Pt prognosis is Fair.     Pt will continue to benefit from skilled outpatient physical therapy to address the deficits listed in the problem list box on initial evaluation, provide pt/family education and to maximize pt's level of independence in the home and community environment.     Pt's spiritual, cultural and educational needs considered and pt agreeable to plan of care and goals.     Anticipated barriers to physical therapy: weight bearing precautions per patient, level of assistance required, ambulatory status, lack of assistive devices    Goals:  Short  Term Goals: 4 weeks   1. Pt will be independent with HEP to facilitate healing and improve outcome measures. (progressing, not met)  2. Pt will increased hip ROM by 5-10 degrees without pain. (progressing, not met)  3. Pt will be able to demonstrate full WB in LLE and walk greater than 50 feet with least restrictive AD. (progressing, not met)  4. Pt will increase LLE MMT values by at least 1/2 grade. (progressing, not met)     Long Term Goals: 8 weeks   1. Pt will demonstrate mod Villalba with household transfers and gait. (progressing, not met)  2. Pt will demonstrate LLE gross strength to 4/5 or greater. (progressing, not met)  3. Pt will demonstrate full L hip ROM and flexibility. (progressing, not met)    PLAN     Continue with PT POC to address functional deficits.    Adiel Garrett, PTA

## 2022-08-17 NOTE — PLAN OF CARE
Did well with darzalex and velcade had 2 hour observation. Left via w/c  Will see  next time for boost.

## 2022-08-18 ENCOUNTER — CLINICAL SUPPORT (OUTPATIENT)
Dept: REHABILITATION | Facility: HOSPITAL | Age: 62
End: 2022-08-18
Payer: MEDICAID

## 2022-08-18 DIAGNOSIS — R26.89 BALANCE PROBLEM: ICD-10-CM

## 2022-08-18 DIAGNOSIS — R29.898 WEAKNESS OF BOTH LOWER EXTREMITIES: ICD-10-CM

## 2022-08-18 DIAGNOSIS — Z74.09 IMPAIRED FUNCTIONAL MOBILITY, BALANCE, GAIT, AND ENDURANCE: Primary | ICD-10-CM

## 2022-08-18 PROCEDURE — 97110 THERAPEUTIC EXERCISES: CPT | Mod: PN,CQ

## 2022-08-18 NOTE — PROGRESS NOTES
[facility]   Physical Therapy Treatment Note     Name: Raquel Houston  Clinic Number: [id number]    Therapy Diagnosis:   Encounter Diagnoses   Name Primary?    Impaired functional mobility, balance, gait, and endurance Yes    Weakness of both lower extremities     Balance problem      Physician: Oneyda, Provider    Visit Date: 8/22/2022    Physician Orders: PT Eval and Treat  Medical Diagnosis from Referral: Sepsis, due to unspecified organism, unspecified whether acute organ dysfunction present [A41.9]  Evaluation Date: 8/8/2022  Authorization Period Expiration: 12/31/2022  Plan of Care Expiration: 10/3/2022  Progress Note Due: 9/8/2022  Visit # / Visits authorized: 4/12 auth (1/1 eval)  FOTO: 1/3  PTA Visit #: 2/5     Precautions: 25% weight bearing (per patient)    Time In: 750 am  Time Out: 830 am  Total Billable Time: 40 minutes    SUBJECTIVE     Pt reports: she was sore after last time, but it went away the next day.    She was compliant with home exercise program.  Response to previous treatment: soreness  Functional change: better mobility with wheelchair    Pain: 0/10  Location: Left lower extremity     OBJECTIVE     Objective Measures updated at progress report unless specified.     Treatment     Raquel received the treatments listed below:      Therapeutic exercises to develop strength, endurance, ROM, flexibility, posture and core stabilization for 38 minutes including:     NuStep 7 min (Level: 1) - Stand pivot transfer with walker  HSS with strap 3 min 10 sec hold    AROM heel slides 3 min  AROM hip abduction, supine slide board 3 min  SAQ x30 reps 1# weight B  Supine hip adduction squeezes 3 min  Supine hip abduction YTB 3 min  Lateral weight shifting 2 min    Raquel participated in dynamic functional therapeutic activities to improve functional performance for 2 minutes, including:    Sit<>stand, using RW, NWB LLE x3  Bed mobility, rolling  Stand<>pivot  transfer training     Possible next visit: recumbent bike, PROM, hip isometrics, gait training, transfer training, sit<>stand, parallel bars, modalities PRN    Patient Education and Home Exercises     Education provided:   - Weight bearing precaution  - Safety awareness  - Use of assistive devices  - Transfers  - HEP reviewed  - Anatomy and Physiology pertaining to current condition  - Possible response to exercise  - Importance of PT compliance    Written Home Exercises Provided: Patient instructed to cont prior HEP. Exercises were reviewed and Raquel was able to demonstrate them prior to the end of the session. Raquel demonstrated good  understanding of the education provided. See EMR under Patient Instructions for exercises provided during therapy sessions.    ASSESSMENT     Patient did well with all transfers this date requiring minimal assistance for set up. She only required stand by assist for bed mobility and is able to wheel herself around in wheelchair independently. Patient did well with addition of weight shifting this date staying as pain free as possible.     Raquel Is progressing well towards her goals.   Pt prognosis is Fair.     Pt will continue to benefit from skilled outpatient physical therapy to address the deficits listed in the problem list box on initial evaluation, provide pt/family education and to maximize pt's level of independence in the home and community environment.     Pt's spiritual, cultural and educational needs considered and pt agreeable to plan of care and goals.     Anticipated barriers to physical therapy: weight bearing precautions per patient, level of assistance required, ambulatory status, lack of assistive devices    Goals:  Short Term Goals: 4 weeks   1. Pt will be independent with HEP to facilitate healing and improve outcome measures. (progressing, not met)  2. Pt will increased hip ROM by 5-10 degrees without pain. (progressing, not met)  3. Pt will be able to  demonstrate full WB in LLE and walk greater than 50 feet with least restrictive AD. (progressing, not met)  4. Pt will increase LLE MMT values by at least 1/2 grade. (progressing, not met)     Long Term Goals: 8 weeks   1. Pt will demonstrate mod Winkler with household transfers and gait. (progressing, not met)  2. Pt will demonstrate LLE gross strength to 4/5 or greater. (progressing, not met)  3. Pt will demonstrate full L hip ROM and flexibility. (progressing, not met)    PLAN     Continue with PT POC to address functional deficits.    Adiel Garrett, PTA

## 2022-08-19 LAB
COMMENT: NORMAL
FINAL PATHOLOGIC DIAGNOSIS: NORMAL
Lab: NORMAL
MICROSCOPIC EXAM: NORMAL

## 2022-08-22 ENCOUNTER — CLINICAL SUPPORT (OUTPATIENT)
Dept: REHABILITATION | Facility: HOSPITAL | Age: 62
End: 2022-08-22
Payer: MEDICAID

## 2022-08-22 DIAGNOSIS — R29.898 WEAKNESS OF BOTH LOWER EXTREMITIES: ICD-10-CM

## 2022-08-22 DIAGNOSIS — Z74.09 IMPAIRED FUNCTIONAL MOBILITY, BALANCE, GAIT, AND ENDURANCE: Primary | ICD-10-CM

## 2022-08-22 DIAGNOSIS — R26.89 BALANCE PROBLEM: ICD-10-CM

## 2022-08-22 PROCEDURE — 97110 THERAPEUTIC EXERCISES: CPT | Mod: PN,CQ

## 2022-08-23 ENCOUNTER — INFUSION (OUTPATIENT)
Dept: INFUSION THERAPY | Facility: HOSPITAL | Age: 62
End: 2022-08-23
Attending: INTERNAL MEDICINE
Payer: MEDICAID

## 2022-08-23 ENCOUNTER — TELEPHONE (OUTPATIENT)
Dept: ENDOSCOPY | Facility: HOSPITAL | Age: 62
End: 2022-08-23
Payer: MEDICAID

## 2022-08-23 ENCOUNTER — LAB VISIT (OUTPATIENT)
Dept: LAB | Facility: HOSPITAL | Age: 62
End: 2022-08-23
Attending: INTERNAL MEDICINE
Payer: MEDICAID

## 2022-08-23 ENCOUNTER — PATIENT MESSAGE (OUTPATIENT)
Dept: HEMATOLOGY/ONCOLOGY | Facility: CLINIC | Age: 62
End: 2022-08-23
Payer: MEDICAID

## 2022-08-23 VITALS
OXYGEN SATURATION: 100 % | DIASTOLIC BLOOD PRESSURE: 70 MMHG | RESPIRATION RATE: 18 BRPM | WEIGHT: 165.81 LBS | BODY MASS INDEX: 26.02 KG/M2 | HEIGHT: 67 IN | TEMPERATURE: 98 F | SYSTOLIC BLOOD PRESSURE: 120 MMHG | HEART RATE: 85 BPM

## 2022-08-23 DIAGNOSIS — C90.00 MULTIPLE MYELOMA NOT HAVING ACHIEVED REMISSION: ICD-10-CM

## 2022-08-23 DIAGNOSIS — E87.6 HYPOKALEMIA: Primary | ICD-10-CM

## 2022-08-23 DIAGNOSIS — C90.00 MULTIPLE MYELOMA NOT HAVING ACHIEVED REMISSION: Primary | ICD-10-CM

## 2022-08-23 DIAGNOSIS — E87.6 HYPOKALEMIA: ICD-10-CM

## 2022-08-23 LAB
ABO + RH BLD: NORMAL
ALBUMIN SERPL BCP-MCNC: 2.9 G/DL (ref 3.5–5.2)
ALP SERPL-CCNC: 522 U/L (ref 55–135)
ALT SERPL W/O P-5'-P-CCNC: 67 U/L (ref 10–44)
ANION GAP SERPL CALC-SCNC: 6 MMOL/L (ref 8–16)
AST SERPL-CCNC: 39 U/L (ref 10–40)
BASOPHILS # BLD AUTO: 0.01 K/UL (ref 0–0.2)
BASOPHILS NFR BLD: 0.2 % (ref 0–1.9)
BILIRUB SERPL-MCNC: 0.5 MG/DL (ref 0.1–1)
BLD GP AB SCN CELLS X3 SERPL QL: NORMAL
BUN SERPL-MCNC: 8 MG/DL (ref 6–20)
CALCIUM SERPL-MCNC: 7.7 MG/DL (ref 8.7–10.5)
CHLORIDE SERPL-SCNC: 103 MMOL/L (ref 95–110)
CO2 SERPL-SCNC: 25 MMOL/L (ref 23–29)
CREAT SERPL-MCNC: 0.6 MG/DL (ref 0.5–1.4)
DIFFERENTIAL METHOD: ABNORMAL
EOSINOPHIL # BLD AUTO: 0.2 K/UL (ref 0–0.5)
EOSINOPHIL NFR BLD: 4.1 % (ref 0–8)
ERYTHROCYTE [DISTWIDTH] IN BLOOD BY AUTOMATED COUNT: 20.8 % (ref 11.5–14.5)
EST. GFR  (NO RACE VARIABLE): >60 ML/MIN/1.73 M^2
GLUCOSE SERPL-MCNC: 117 MG/DL (ref 70–110)
HCT VFR BLD AUTO: 27 % (ref 37–48.5)
HGB BLD-MCNC: 9 G/DL (ref 12–16)
IMM GRANULOCYTES # BLD AUTO: 0.01 K/UL (ref 0–0.04)
IMM GRANULOCYTES NFR BLD AUTO: 0.2 % (ref 0–0.5)
LYMPHOCYTES # BLD AUTO: 1.7 K/UL (ref 1–4.8)
LYMPHOCYTES NFR BLD: 34 % (ref 18–48)
MCH RBC QN AUTO: 29.3 PG (ref 27–31)
MCHC RBC AUTO-ENTMCNC: 33.3 G/DL (ref 32–36)
MCV RBC AUTO: 88 FL (ref 82–98)
MONOCYTES # BLD AUTO: 0.4 K/UL (ref 0.3–1)
MONOCYTES NFR BLD: 7.6 % (ref 4–15)
NEUTROPHILS # BLD AUTO: 2.6 K/UL (ref 1.8–7.7)
NEUTROPHILS NFR BLD: 53.9 % (ref 38–73)
NRBC BLD-RTO: 0 /100 WBC
PLATELET # BLD AUTO: 189 K/UL (ref 150–450)
PMV BLD AUTO: 11.2 FL (ref 9.2–12.9)
POTASSIUM SERPL-SCNC: 2.8 MMOL/L (ref 3.5–5.1)
PROT SERPL-MCNC: 7.9 G/DL (ref 6–8.4)
RBC # BLD AUTO: 3.07 M/UL (ref 4–5.4)
SODIUM SERPL-SCNC: 134 MMOL/L (ref 136–145)
WBC # BLD AUTO: 4.86 K/UL (ref 3.9–12.7)

## 2022-08-23 PROCEDURE — 25000003 PHARM REV CODE 250: Performed by: INTERNAL MEDICINE

## 2022-08-23 PROCEDURE — 36415 COLL VENOUS BLD VENIPUNCTURE: CPT | Performed by: INTERNAL MEDICINE

## 2022-08-23 PROCEDURE — 86901 BLOOD TYPING SEROLOGIC RH(D): CPT | Performed by: INTERNAL MEDICINE

## 2022-08-23 PROCEDURE — 96401 CHEMO ANTI-NEOPL SQ/IM: CPT

## 2022-08-23 PROCEDURE — 63600175 PHARM REV CODE 636 W HCPCS: Mod: JG | Performed by: INTERNAL MEDICINE

## 2022-08-23 PROCEDURE — 85025 COMPLETE CBC W/AUTO DIFF WBC: CPT | Performed by: INTERNAL MEDICINE

## 2022-08-23 PROCEDURE — 80053 COMPREHEN METABOLIC PANEL: CPT | Performed by: INTERNAL MEDICINE

## 2022-08-23 RX ORDER — POTASSIUM CHLORIDE 20 MEQ/1
20 TABLET, EXTENDED RELEASE ORAL DAILY
Qty: 10 TABLET | Refills: 0 | Status: SHIPPED | OUTPATIENT
Start: 2022-08-23 | End: 2022-11-08

## 2022-08-23 RX ORDER — POTASSIUM CHLORIDE 20 MEQ/1
20 TABLET, EXTENDED RELEASE ORAL DAILY
Qty: 10 TABLET | Refills: 0 | Status: SHIPPED | OUTPATIENT
Start: 2022-08-23 | End: 2022-08-23 | Stop reason: SDUPTHER

## 2022-08-23 RX ORDER — HEPARIN 100 UNIT/ML
500 SYRINGE INTRAVENOUS
Status: DISCONTINUED | OUTPATIENT
Start: 2022-08-23 | End: 2022-08-23 | Stop reason: HOSPADM

## 2022-08-23 RX ORDER — DIPHENHYDRAMINE HCL 50 MG
50 CAPSULE ORAL
Status: COMPLETED | OUTPATIENT
Start: 2022-08-23 | End: 2022-08-23

## 2022-08-23 RX ORDER — POTASSIUM CHLORIDE 20 MEQ/1
40 TABLET, EXTENDED RELEASE ORAL ONCE
Status: COMPLETED | OUTPATIENT
Start: 2022-08-23 | End: 2022-08-23

## 2022-08-23 RX ORDER — ACETAMINOPHEN 325 MG/1
650 TABLET ORAL
Status: COMPLETED | OUTPATIENT
Start: 2022-08-23 | End: 2022-08-23

## 2022-08-23 RX ORDER — BORTEZOMIB 3.5 MG/1
1.3 INJECTION, POWDER, LYOPHILIZED, FOR SOLUTION INTRAVENOUS; SUBCUTANEOUS
Status: COMPLETED | OUTPATIENT
Start: 2022-08-23 | End: 2022-08-23

## 2022-08-23 RX ORDER — SODIUM CHLORIDE 0.9 % (FLUSH) 0.9 %
10 SYRINGE (ML) INJECTION
Status: DISCONTINUED | OUTPATIENT
Start: 2022-08-23 | End: 2022-08-23 | Stop reason: HOSPADM

## 2022-08-23 RX ADMIN — ACETAMINOPHEN 650 MG: 325 TABLET ORAL at 10:08

## 2022-08-23 RX ADMIN — POTASSIUM CHLORIDE 40 MEQ: 20 TABLET, EXTENDED RELEASE ORAL at 11:08

## 2022-08-23 RX ADMIN — DARATUMUMAB AND HYALURONIDASE-FIHJ (HUMAN RECOMBINANT) 1800 MG: 1800; 30000 INJECTION SUBCUTANEOUS at 10:08

## 2022-08-23 RX ADMIN — DIPHENHYDRAMINE HYDROCHLORIDE 50 MG: 50 CAPSULE ORAL at 10:08

## 2022-08-23 RX ADMIN — BORTEZOMIB 2.5 MG: 3.5 INJECTION, POWDER, LYOPHILIZED, FOR SOLUTION INTRAVENOUS; SUBCUTANEOUS at 10:08

## 2022-08-23 NOTE — TELEPHONE ENCOUNTER
Patient's son called to schedule colonoscopy. However there is no order or referral. Please order if needed.     Thanks,    July

## 2022-08-23 NOTE — PLAN OF CARE
Pt admitted for C1D8 Darzalex/Velcade. Pt arrived in W/C, recently had surgery in [place] to repair femur fx to Lt leg. Pt's labs reviewed and K 2.8 required oral supplements of K Dur 40 meq, and supplements for home ordered ny Dr Boogie. ( Pt took 40 mg Of decadron at home this am) Pt tolerated treatment well, discharged home in W/C with family member @ 11:30

## 2022-08-24 ENCOUNTER — CLINICAL SUPPORT (OUTPATIENT)
Dept: REHABILITATION | Facility: HOSPITAL | Age: 62
End: 2022-08-24
Payer: MEDICAID

## 2022-08-24 DIAGNOSIS — R29.898 WEAKNESS OF BOTH LOWER EXTREMITIES: ICD-10-CM

## 2022-08-24 DIAGNOSIS — Z74.09 IMPAIRED FUNCTIONAL MOBILITY, BALANCE, GAIT, AND ENDURANCE: Primary | ICD-10-CM

## 2022-08-24 DIAGNOSIS — R26.89 BALANCE PROBLEM: ICD-10-CM

## 2022-08-24 PROCEDURE — 97110 THERAPEUTIC EXERCISES: CPT | Mod: PN

## 2022-08-24 NOTE — PROGRESS NOTES
[facility]   Physical Therapy Treatment Note     Name: Raquel Houston  Clinic Number: [id number]    Therapy Diagnosis:   Encounter Diagnoses   Name Primary?    Impaired functional mobility, balance, gait, and endurance Yes    Weakness of both lower extremities     Balance problem      Physician: Oneyda, Provider    Visit Date: 8/24/2022    Physician Orders: PT Eval and Treat  Medical Diagnosis from Referral: Sepsis, due to unspecified organism, unspecified whether acute organ dysfunction present [A41.9]  Evaluation Date: 8/8/2022  Authorization Period Expiration: 12/31/2022  Plan of Care Expiration: 10/3/2022  Progress Note Due: 9/8/2022  Visit # / Visits authorized: 5/12 auth (1/1 eval)  FOTO: 1/3  PTA Visit #: 0/5     Precautions: 25% weight bearing (per patient)    Time In: 748 am  Time Out: 830 am  Total Billable Time: 42 minutes    SUBJECTIVE     Pt reports: she is feeling great this morning and was not too sore after her last PT session. Activities at home are becoming easier as she gets stronger and pain subsides.     She was compliant with home exercise program.  Response to previous treatment: soreness  Functional change: better mobility with wheelchair    Pain: 0/10   Location: Left lower extremity     OBJECTIVE     Objective Measures updated at progress report unless specified.     Treatment     Raquel received the treatments listed below:      Therapeutic exercises to develop strength, endurance, ROM, flexibility, posture and core stabilization for 42 minutes including:     NuStep 10 min (Level: 1)  Standing hip abduction LLE x20 reps  Standing hip extension LLE x20 reps  Standing hip flexion/marching LLE x20 reps  Lateral weight shifting 2 min  Long sitting hamstring stretch 2 min  AROM heel slides 2 min  AROM hip abduction, supine slide board 3 min  Supine hip adduction squeezes 2 min  Supine hip abduction YTB 2 min  SAQ x30 reps 3#        Raquel participated  in dynamic functional therapeutic activities to improve functional performance for 00 minutes, including:      Possible next visit: recumbent bike, PROM, hip isometrics, gait training, transfer training, sit<>stand, parallel bars, modalities PRN    Patient Education and Home Exercises     Education provided:   - Weight bearing precaution  - Safety awareness  - Use of assistive devices  - Transfers  - HEP reviewed  - Anatomy and Physiology pertaining to current condition  - Possible response to exercise  - Importance of PT compliance    Written Home Exercises Provided: Patient instructed to cont prior HEP. Exercises were reviewed and Raquel was able to demonstrate them prior to the end of the session. Raquel demonstrated good  understanding of the education provided. See EMR under Patient Instructions for exercises provided during therapy sessions.    ASSESSMENT     Raquel was able to begin standing activities today with cueing necessary to improve posture. Next week we plan to increase to WBAT if possible since she will be 6 weeks post op. She is progressing well toward established goals.     Raquel Is progressing well towards her goals.   Pt prognosis is Fair.     Pt will continue to benefit from skilled outpatient physical therapy to address the deficits listed in the problem list box on initial evaluation, provide pt/family education and to maximize pt's level of independence in the home and community environment.     Pt's spiritual, cultural and educational needs considered and pt agreeable to plan of care and goals.     Anticipated barriers to physical therapy: weight bearing precautions per patient, level of assistance required, ambulatory status, lack of assistive devices    Goals:  Short Term Goals: 4 weeks   1. Pt will be independent with HEP to facilitate healing and improve outcome measures. (met)  2. Pt will increased hip ROM by 5-10 degrees without pain. (progressing, not met)  3. Pt will be able to  demonstrate full WB in LLE and walk greater than 50 feet with least restrictive AD. (progressing, not met)  4. Pt will increase LLE MMT values by at least 1/2 grade. (progressing, not met)     Long Term Goals: 8 weeks   1. Pt will demonstrate mod [place] with household transfers and gait. (progressing, not met)  2. Pt will demonstrate LLE gross strength to 4/5 or greater. (progressing, not met)  3. Pt will demonstrate full L hip ROM and flexibility. (progressing, not met)    PLAN     Continue with PT POC to address functional deficits.    Evan Tesfaye, PT

## 2022-08-25 NOTE — PROGRESS NOTES
[facility]   Physical Therapy Treatment Note     Name: Raquel Houston  Clinic Number: [id number]    Therapy Diagnosis:   Encounter Diagnoses   Name Primary?    Impaired functional mobility, balance, gait, and endurance Yes    Weakness of both lower extremities     Balance problem      Physician: Oneyda, Provider    Visit Date: 8/29/2022    Physician Orders: PT Eval and Treat  Medical Diagnosis from Referral: Sepsis, due to unspecified organism, unspecified whether acute organ dysfunction present [A41.9]  Evaluation Date: 8/8/2022  Authorization Period Expiration: 12/31/2022  Plan of Care Expiration: 10/3/2022  Progress Note Due: 9/8/2022  Visit # / Visits authorized: 6/12 auth (1/1 eval)  FOTO: 1/3  PTA Visit #: 1/5     Precautions: 25% weight bearing (per patient)    Time In: 745 am  Time Out: 830 am  Total Billable Time: 45 minutes    SUBJECTIVE     Pt reports: she has been having pain and spasms in her right lower back since the last time she was here.    She was compliant with home exercise program.  Response to previous treatment: soreness  Functional change: better mobility with wheelchair    Pain: 10/10   Location:  Right lower back    OBJECTIVE     Objective Measures updated at progress report unless specified.     Treatment     Raquel received the treatments listed below:      Therapeutic exercises to develop strength, endurance, ROM, flexibility, posture and core stabilization for 45 minutes including:     SB roll out with lateral component 3 min for R low back pain and spasms  Long sitting hamstring stretch 2 min ea    **Patient received moist heat to low back while performing seated therex  Seated hip adduction 3s x2 min  Seated hip abduction RTB 2 min  HS curls RTB x20 ea  LAQ 3s x20 ea  Seated marching x20 ea  Seated glut set 3s x20    Not performed today:    Raquel participated in dynamic functional therapeutic activities to improve functional performance for  "00 minutes, including:    Possible next visit: recumbent bike, PROM, hip isometrics, gait training, transfer training, sit<>stand, parallel bars, modalities PRN    Patient Education and Home Exercises     Education provided:   - Weight bearing precaution  - Safety awareness  - Use of assistive devices  - Transfers  - HEP reviewed  - Anatomy and Physiology pertaining to current condition  - Possible response to exercise  - Importance of PT compliance    Written Home Exercises Provided: Patient instructed to cont prior HEP. Exercises were reviewed and Raquel was able to demonstrate them prior to the end of the session. Raquel demonstrated good  understanding of the education provided. See EMR under Patient Instructions for exercises provided during therapy sessions.    ASSESSMENT   Patient was limited to seated exercises this date secondary to increased pain and muscle spasms she presented to therapy with this date. She has increased pain with "specific movements" and also when standing up from seated. Heat was applied with heated exercises for decreased tension and pain. Will resume standing exercises as able next session.     Raquel Is progressing well towards her goals.   Pt prognosis is Fair.     Pt will continue to benefit from skilled outpatient physical therapy to address the deficits listed in the problem list box on initial evaluation, provide pt/family education and to maximize pt's level of independence in the home and community environment.     Pt's spiritual, cultural and educational needs considered and pt agreeable to plan of care and goals.     Anticipated barriers to physical therapy: weight bearing precautions per patient, level of assistance required, ambulatory status, lack of assistive devices    Goals:  Short Term Goals: 4 weeks   1. Pt will be independent with HEP to facilitate healing and improve outcome measures. (met)  2. Pt will increased hip ROM by 5-10 degrees without pain. " (progressing, not met)  3. Pt will be able to demonstrate full WB in LLE and walk greater than 50 feet with least restrictive AD. (progressing, not met)  4. Pt will increase LLE MMT values by at least 1/2 grade. (progressing, not met)     Long Term Goals: 8 weeks   1. Pt will demonstrate mod [place] with household transfers and gait. (progressing, not met)  2. Pt will demonstrate LLE gross strength to 4/5 or greater. (progressing, not met)  3. Pt will demonstrate full L hip ROM and flexibility. (progressing, not met)    PLAN     Continue with PT POC to address functional deficits.    Adiel Garrett, PTA

## 2022-08-29 ENCOUNTER — CLINICAL SUPPORT (OUTPATIENT)
Dept: REHABILITATION | Facility: HOSPITAL | Age: 62
End: 2022-08-29
Payer: MEDICAID

## 2022-08-29 ENCOUNTER — PATIENT MESSAGE (OUTPATIENT)
Dept: HEMATOLOGY/ONCOLOGY | Facility: CLINIC | Age: 62
End: 2022-08-29
Payer: MEDICAID

## 2022-08-29 DIAGNOSIS — R26.89 BALANCE PROBLEM: ICD-10-CM

## 2022-08-29 DIAGNOSIS — Z74.09 IMPAIRED FUNCTIONAL MOBILITY, BALANCE, GAIT, AND ENDURANCE: Primary | ICD-10-CM

## 2022-08-29 DIAGNOSIS — R29.898 WEAKNESS OF BOTH LOWER EXTREMITIES: ICD-10-CM

## 2022-08-29 PROCEDURE — 97110 THERAPEUTIC EXERCISES: CPT | Mod: PN,CQ

## 2022-08-30 ENCOUNTER — INFUSION (OUTPATIENT)
Dept: INFUSION THERAPY | Facility: HOSPITAL | Age: 62
End: 2022-08-30
Payer: MEDICAID

## 2022-08-30 ENCOUNTER — LAB VISIT (OUTPATIENT)
Dept: LAB | Facility: HOSPITAL | Age: 62
End: 2022-08-30
Payer: MEDICAID

## 2022-08-30 VITALS
TEMPERATURE: 98 F | DIASTOLIC BLOOD PRESSURE: 65 MMHG | BODY MASS INDEX: 26.02 KG/M2 | HEIGHT: 67 IN | OXYGEN SATURATION: 99 % | WEIGHT: 165.81 LBS | SYSTOLIC BLOOD PRESSURE: 121 MMHG | RESPIRATION RATE: 18 BRPM | HEART RATE: 87 BPM

## 2022-08-30 DIAGNOSIS — Z74.09 IMPAIRED FUNCTIONAL MOBILITY, BALANCE, GAIT, AND ENDURANCE: Primary | ICD-10-CM

## 2022-08-30 DIAGNOSIS — C90.00 MULTIPLE MYELOMA NOT HAVING ACHIEVED REMISSION: Primary | ICD-10-CM

## 2022-08-30 DIAGNOSIS — C90.00 MULTIPLE MYELOMA NOT HAVING ACHIEVED REMISSION: ICD-10-CM

## 2022-08-30 LAB
ABO + RH BLD: NORMAL
ALBUMIN SERPL BCP-MCNC: 2.9 G/DL (ref 3.5–5.2)
ALP SERPL-CCNC: 598 U/L (ref 55–135)
ALT SERPL W/O P-5'-P-CCNC: 99 U/L (ref 10–44)
ANION GAP SERPL CALC-SCNC: 6 MMOL/L (ref 8–16)
AST SERPL-CCNC: 69 U/L (ref 10–40)
BASOPHILS # BLD AUTO: 0.01 K/UL (ref 0–0.2)
BASOPHILS NFR BLD: 0.3 % (ref 0–1.9)
BILIRUB SERPL-MCNC: 0.5 MG/DL (ref 0.1–1)
BLD GP AB SCN CELLS X3 SERPL QL: NORMAL
BUN SERPL-MCNC: 8 MG/DL (ref 6–20)
CALCIUM SERPL-MCNC: 8.1 MG/DL (ref 8.7–10.5)
CHLORIDE SERPL-SCNC: 108 MMOL/L (ref 95–110)
CO2 SERPL-SCNC: 23 MMOL/L (ref 23–29)
CREAT SERPL-MCNC: 0.6 MG/DL (ref 0.5–1.4)
DIFFERENTIAL METHOD: ABNORMAL
EOSINOPHIL # BLD AUTO: 0.2 K/UL (ref 0–0.5)
EOSINOPHIL NFR BLD: 5.1 % (ref 0–8)
ERYTHROCYTE [DISTWIDTH] IN BLOOD BY AUTOMATED COUNT: 20 % (ref 11.5–14.5)
EST. GFR  (NO RACE VARIABLE): >60 ML/MIN/1.73 M^2
GLUCOSE SERPL-MCNC: 114 MG/DL (ref 70–110)
HCT VFR BLD AUTO: 28.1 % (ref 37–48.5)
HGB BLD-MCNC: 9.2 G/DL (ref 12–16)
IMM GRANULOCYTES # BLD AUTO: 0.01 K/UL (ref 0–0.04)
IMM GRANULOCYTES NFR BLD AUTO: 0.3 % (ref 0–0.5)
LYMPHOCYTES # BLD AUTO: 1.7 K/UL (ref 1–4.8)
LYMPHOCYTES NFR BLD: 43.8 % (ref 18–48)
MCH RBC QN AUTO: 30 PG (ref 27–31)
MCHC RBC AUTO-ENTMCNC: 32.7 G/DL (ref 32–36)
MCV RBC AUTO: 92 FL (ref 82–98)
MONOCYTES # BLD AUTO: 0.5 K/UL (ref 0.3–1)
MONOCYTES NFR BLD: 12.3 % (ref 4–15)
NEUTROPHILS # BLD AUTO: 1.5 K/UL (ref 1.8–7.7)
NEUTROPHILS NFR BLD: 38.2 % (ref 38–73)
NRBC BLD-RTO: 0 /100 WBC
PLATELET # BLD AUTO: 260 K/UL (ref 150–450)
PMV BLD AUTO: 11.5 FL (ref 9.2–12.9)
POTASSIUM SERPL-SCNC: 3.5 MMOL/L (ref 3.5–5.1)
PROT SERPL-MCNC: 6.7 G/DL (ref 6–8.4)
RBC # BLD AUTO: 3.07 M/UL (ref 4–5.4)
SODIUM SERPL-SCNC: 137 MMOL/L (ref 136–145)
WBC # BLD AUTO: 3.9 K/UL (ref 3.9–12.7)

## 2022-08-30 PROCEDURE — 80053 COMPREHEN METABOLIC PANEL: CPT | Performed by: INTERNAL MEDICINE

## 2022-08-30 PROCEDURE — 96401 CHEMO ANTI-NEOPL SQ/IM: CPT

## 2022-08-30 PROCEDURE — 25000003 PHARM REV CODE 250: Performed by: INTERNAL MEDICINE

## 2022-08-30 PROCEDURE — 86901 BLOOD TYPING SEROLOGIC RH(D): CPT | Performed by: INTERNAL MEDICINE

## 2022-08-30 PROCEDURE — 63600175 PHARM REV CODE 636 W HCPCS: Mod: JG | Performed by: INTERNAL MEDICINE

## 2022-08-30 PROCEDURE — 85025 COMPLETE CBC W/AUTO DIFF WBC: CPT | Performed by: INTERNAL MEDICINE

## 2022-08-30 RX ORDER — BORTEZOMIB 3.5 MG/1
1.3 INJECTION, POWDER, LYOPHILIZED, FOR SOLUTION INTRAVENOUS; SUBCUTANEOUS
Status: COMPLETED | OUTPATIENT
Start: 2022-08-30 | End: 2022-08-30

## 2022-08-30 RX ORDER — HEPARIN 100 UNIT/ML
500 SYRINGE INTRAVENOUS
Status: DISCONTINUED | OUTPATIENT
Start: 2022-08-30 | End: 2022-08-30 | Stop reason: HOSPADM

## 2022-08-30 RX ORDER — ACETAMINOPHEN 325 MG/1
650 TABLET ORAL
Status: COMPLETED | OUTPATIENT
Start: 2022-08-30 | End: 2022-08-30

## 2022-08-30 RX ORDER — DIPHENHYDRAMINE HCL 50 MG
50 CAPSULE ORAL
Status: COMPLETED | OUTPATIENT
Start: 2022-08-30 | End: 2022-08-30

## 2022-08-30 RX ORDER — SODIUM CHLORIDE 0.9 % (FLUSH) 0.9 %
10 SYRINGE (ML) INJECTION
Status: DISCONTINUED | OUTPATIENT
Start: 2022-08-30 | End: 2022-08-30 | Stop reason: HOSPADM

## 2022-08-30 RX ADMIN — BORTEZOMIB 2.5 MG: 3.5 INJECTION, POWDER, LYOPHILIZED, FOR SOLUTION INTRAVENOUS; SUBCUTANEOUS at 08:08

## 2022-08-30 RX ADMIN — DIPHENHYDRAMINE HYDROCHLORIDE 50 MG: 50 CAPSULE ORAL at 07:08

## 2022-08-30 RX ADMIN — DARATUMUMAB AND HYALURONIDASE-FIHJ (HUMAN RECOMBINANT) 1800 MG: 1800; 30000 INJECTION SUBCUTANEOUS at 08:08

## 2022-08-30 RX ADMIN — ACETAMINOPHEN 650 MG: 325 TABLET ORAL at 07:08

## 2022-08-30 NOTE — PLAN OF CARE
Pt received Darzalex & Velcade today and tolerated well, without complications. Educated patient about Darzalex & Velcade (indications, side effects, possible reactions, chemotherapy precautions) and verbalized understanding. Velcade inj administered SQ to R abd, tolerated well, dsg to site. Darzalex inj administered SQ to L abd, tolerated well, dsg to site. VSS. Pt DC with no distress noted, WC off of unit escorted by family, w/o event, pleased.

## 2022-08-31 ENCOUNTER — CLINICAL SUPPORT (OUTPATIENT)
Dept: REHABILITATION | Facility: HOSPITAL | Age: 62
End: 2022-08-31
Payer: MEDICAID

## 2022-08-31 DIAGNOSIS — R29.898 WEAKNESS OF BOTH LOWER EXTREMITIES: ICD-10-CM

## 2022-08-31 DIAGNOSIS — Z74.09 IMPAIRED FUNCTIONAL MOBILITY, BALANCE, GAIT, AND ENDURANCE: Primary | ICD-10-CM

## 2022-08-31 DIAGNOSIS — R26.89 BALANCE PROBLEM: ICD-10-CM

## 2022-08-31 PROCEDURE — 97110 THERAPEUTIC EXERCISES: CPT | Mod: PN

## 2022-08-31 PROCEDURE — 97116 GAIT TRAINING THERAPY: CPT | Mod: PN

## 2022-08-31 NOTE — PROGRESS NOTES
[facility]   Physical Therapy Treatment Note     Name: Raquel Houston  Clinic Number: [id number]    Therapy Diagnosis:   Encounter Diagnoses   Name Primary?    Impaired functional mobility, balance, gait, and endurance Yes    Weakness of both lower extremities     Balance problem      Physician: Oneyda, Provider    Visit Date: 8/31/2022    Physician Orders: PT Eval and Treat  Medical Diagnosis from Referral: Sepsis, due to unspecified organism, unspecified whether acute organ dysfunction present [A41.9]  Evaluation Date: 8/8/2022  Authorization Period Expiration: 12/31/2022  Plan of Care Expiration: 10/3/2022  Progress Note Due: 9/8/2022  Visit # / Visits authorized: 7/12 auth (1/1 eval)  FOTO: 1/3  PTA Visit #: 0/5     Precautions: WBAT 6 weeks, sx 7/18/2022 ORIF    Time In: 752 am (Pt arrived late)  Time Out: 830 am  Total Billable Time: 38 minutes    SUBJECTIVE     Pt reports: the hip and legs both are doing very well without too much pain. Her main complaint is the low back on the right side, although it is feeling better than at her last treatment session.     She was compliant with home exercise program.  Response to previous treatment: soreness  Functional change: better mobility with wheelchair    Pain: 10/10 pre, 4/10 post  Location:  Right lower back    OBJECTIVE     Objective Measures updated at progress report unless specified.     Treatment     Raquel received the treatments listed below:      Therapeutic exercises to develop strength, endurance, ROM, flexibility, posture and core stabilization for 23 minutes including:     NuStep 6 min (Level: 1)  Seated low back stretch 3 min  Seated hip adduction 3s x2 min  Seated hip abduction RTB 2 min  Seated hamstring stretch 2 min ea      Not performed today:  Standing hip abduction LLE x20 reps  Standing hip extension LLE x20 reps  Standing hip flexion/marching LLE x20 reps  SAQ x30 reps 3#  Calf stretch with strap 3 min  10 sec hold  Quad sets x30 reps 3 sec hold  Glute sets x30 reps 3 sec hold  HS curls RTB x20 ea  LAQ 3s x20 ea  Seated marching x20 ea  Seated glut set 3s x20      Raquel participated in gait training to improve functional mobility and safety for 10 minutes, including:    Gait training, WBAT, Rolling walker, cueing for foot clearance, weight transfer, hip and knee flexion, heel strike toe-off  45 feet x1, Supervision assist  20 feet x1, supervision assist       Raquel received the following manual therapy techniques: Soft tissue Mobilization were applied to the: low back paraspinals for 5 minutes, including:    Percussion gun STM, Hypervolt while pt is seated with back on stretch       Patient Education and Home Exercises     Education provided:   - Weight bearing precaution  - Safety awareness  - Use of assistive devices  - Transfers  - HEP reviewed  - Anatomy and Physiology pertaining to current condition  - Possible response to exercise  - Importance of PT compliance    Written Home Exercises Provided: Patient instructed to cont prior HEP. Exercises were reviewed and Raquel was able to demonstrate them prior to the end of the session. Raquel demonstrated good  understanding of the education provided. See EMR under Patient Instructions for exercises provided during therapy sessions.    ASSESSMENT   Patient able to ambulate today with supervision assist after being educated on technique with assistive device. She also notes immediate relief of back discomfort after using percussion gun in seated. PT spoke with pt's daughter in an attempt to get a rolling walker for home.     Raquel Is progressing well towards her goals.   Pt prognosis is Fair.     Pt will continue to benefit from skilled outpatient physical therapy to address the deficits listed in the problem list box on initial evaluation, provide pt/family education and to maximize pt's level of independence in the home and community environment.     Pt's  spiritual, cultural and educational needs considered and pt agreeable to plan of care and goals.     Anticipated barriers to physical therapy: weight bearing precautions per patient, level of assistance required, ambulatory status, lack of assistive devices    Goals:  Short Term Goals: 4 weeks   1. Pt will be independent with HEP to facilitate healing and improve outcome measures. (met)  2. Pt will increased hip ROM by 5-10 degrees without pain. (met)  3. Pt will be able to demonstrate full WB in LLE and walk greater than 50 feet with least restrictive AD. (met)  4. Pt will increase LLE MMT values by at least 1/2 grade. (progressing, not met)     Long Term Goals: 8 weeks   1. Pt will demonstrate mod [place] with household transfers and gait. (progressing, not met)  2. Pt will demonstrate LLE gross strength to 4/5 or greater. (progressing, not met)  3. Pt will demonstrate full L hip ROM and flexibility. (progressing, not met)    PLAN     Continue with PT POC to address functional deficits.    Evan Tesfaye, PT DPT

## 2022-09-01 DIAGNOSIS — C90.00 MULTIPLE MYELOMA NOT HAVING ACHIEVED REMISSION: ICD-10-CM

## 2022-09-01 RX ORDER — LENALIDOMIDE 25 MG/1
25 CAPSULE ORAL SEE ADMIN INSTRUCTIONS
Qty: 21 CAPSULE | Refills: 0 | Status: SHIPPED | OUTPATIENT
Start: 2022-09-01 | End: 2022-09-28 | Stop reason: SDUPTHER

## 2022-09-06 ENCOUNTER — LAB VISIT (OUTPATIENT)
Dept: LAB | Facility: HOSPITAL | Age: 62
End: 2022-09-06
Attending: INTERNAL MEDICINE
Payer: MEDICAID

## 2022-09-06 ENCOUNTER — INFUSION (OUTPATIENT)
Dept: INFUSION THERAPY | Facility: HOSPITAL | Age: 62
End: 2022-09-06
Attending: INTERNAL MEDICINE
Payer: MEDICAID

## 2022-09-06 VITALS
BODY MASS INDEX: 26.02 KG/M2 | HEART RATE: 70 BPM | RESPIRATION RATE: 18 BRPM | DIASTOLIC BLOOD PRESSURE: 65 MMHG | TEMPERATURE: 98 F | WEIGHT: 165.81 LBS | HEIGHT: 67 IN | SYSTOLIC BLOOD PRESSURE: 116 MMHG

## 2022-09-06 DIAGNOSIS — C90.00 MULTIPLE MYELOMA NOT HAVING ACHIEVED REMISSION: ICD-10-CM

## 2022-09-06 DIAGNOSIS — C90.00 MULTIPLE MYELOMA NOT HAVING ACHIEVED REMISSION: Primary | ICD-10-CM

## 2022-09-06 LAB
ABO + RH BLD: NORMAL
ALBUMIN SERPL BCP-MCNC: 3.3 G/DL (ref 3.5–5.2)
ALP SERPL-CCNC: 537 U/L (ref 55–135)
ALT SERPL W/O P-5'-P-CCNC: 68 U/L (ref 10–44)
ANION GAP SERPL CALC-SCNC: 5 MMOL/L (ref 8–16)
AST SERPL-CCNC: 38 U/L (ref 10–40)
BASOPHILS # BLD AUTO: 0.01 K/UL (ref 0–0.2)
BASOPHILS NFR BLD: 0.2 % (ref 0–1.9)
BILIRUB SERPL-MCNC: 0.4 MG/DL (ref 0.1–1)
BLD GP AB SCN CELLS X3 SERPL QL: NORMAL
BUN SERPL-MCNC: 9 MG/DL (ref 6–20)
CALCIUM SERPL-MCNC: 8.8 MG/DL (ref 8.7–10.5)
CHLORIDE SERPL-SCNC: 107 MMOL/L (ref 95–110)
CO2 SERPL-SCNC: 24 MMOL/L (ref 23–29)
CREAT SERPL-MCNC: 0.7 MG/DL (ref 0.5–1.4)
DIFFERENTIAL METHOD: ABNORMAL
EOSINOPHIL # BLD AUTO: 0 K/UL (ref 0–0.5)
EOSINOPHIL NFR BLD: 0.4 % (ref 0–8)
ERYTHROCYTE [DISTWIDTH] IN BLOOD BY AUTOMATED COUNT: 19.6 % (ref 11.5–14.5)
EST. GFR  (NO RACE VARIABLE): >60 ML/MIN/1.73 M^2
GLUCOSE SERPL-MCNC: 104 MG/DL (ref 70–110)
HCT VFR BLD AUTO: 31.7 % (ref 37–48.5)
HGB BLD-MCNC: 10.1 G/DL (ref 12–16)
IGA SERPL-MCNC: 52 MG/DL (ref 40–350)
IGG SERPL-MCNC: 1310 MG/DL (ref 650–1600)
IGM SERPL-MCNC: 54 MG/DL (ref 50–300)
IMM GRANULOCYTES # BLD AUTO: 0.02 K/UL (ref 0–0.04)
IMM GRANULOCYTES NFR BLD AUTO: 0.4 % (ref 0–0.5)
LYMPHOCYTES # BLD AUTO: 1.5 K/UL (ref 1–4.8)
LYMPHOCYTES NFR BLD: 29.4 % (ref 18–48)
MCH RBC QN AUTO: 29.3 PG (ref 27–31)
MCHC RBC AUTO-ENTMCNC: 31.9 G/DL (ref 32–36)
MCV RBC AUTO: 92 FL (ref 82–98)
MONOCYTES # BLD AUTO: 0.4 K/UL (ref 0.3–1)
MONOCYTES NFR BLD: 8.7 % (ref 4–15)
NEUTROPHILS # BLD AUTO: 3.1 K/UL (ref 1.8–7.7)
NEUTROPHILS NFR BLD: 60.9 % (ref 38–73)
NRBC BLD-RTO: 0 /100 WBC
PLATELET # BLD AUTO: 285 K/UL (ref 150–450)
PMV BLD AUTO: 10.7 FL (ref 9.2–12.9)
POTASSIUM SERPL-SCNC: 4.4 MMOL/L (ref 3.5–5.1)
PROT SERPL-MCNC: 6.8 G/DL (ref 6–8.4)
RBC # BLD AUTO: 3.45 M/UL (ref 4–5.4)
SODIUM SERPL-SCNC: 136 MMOL/L (ref 136–145)
WBC # BLD AUTO: 5.04 K/UL (ref 3.9–12.7)

## 2022-09-06 PROCEDURE — 84165 PROTEIN E-PHORESIS SERUM: CPT | Mod: 26,,, | Performed by: PATHOLOGY

## 2022-09-06 PROCEDURE — 83520 IMMUNOASSAY QUANT NOS NONAB: CPT | Mod: 59 | Performed by: INTERNAL MEDICINE

## 2022-09-06 PROCEDURE — 86334 PATHOLOGIST INTERPRETATION IFE: ICD-10-PCS | Mod: 26,,, | Performed by: PATHOLOGY

## 2022-09-06 PROCEDURE — 80053 COMPREHEN METABOLIC PANEL: CPT | Performed by: INTERNAL MEDICINE

## 2022-09-06 PROCEDURE — 85025 COMPLETE CBC W/AUTO DIFF WBC: CPT | Performed by: INTERNAL MEDICINE

## 2022-09-06 PROCEDURE — 86901 BLOOD TYPING SEROLOGIC RH(D): CPT | Performed by: INTERNAL MEDICINE

## 2022-09-06 PROCEDURE — 84165 PROTEIN E-PHORESIS SERUM: CPT | Performed by: INTERNAL MEDICINE

## 2022-09-06 PROCEDURE — 25000003 PHARM REV CODE 250: Performed by: INTERNAL MEDICINE

## 2022-09-06 PROCEDURE — 86334 IMMUNOFIX E-PHORESIS SERUM: CPT | Mod: 26,,, | Performed by: PATHOLOGY

## 2022-09-06 PROCEDURE — 84165 PATHOLOGIST INTERPRETATION SPE: ICD-10-PCS | Mod: 26,,, | Performed by: PATHOLOGY

## 2022-09-06 PROCEDURE — 36415 COLL VENOUS BLD VENIPUNCTURE: CPT | Performed by: INTERNAL MEDICINE

## 2022-09-06 PROCEDURE — 96401 CHEMO ANTI-NEOPL SQ/IM: CPT

## 2022-09-06 PROCEDURE — 82784 ASSAY IGA/IGD/IGG/IGM EACH: CPT | Performed by: INTERNAL MEDICINE

## 2022-09-06 PROCEDURE — 63600175 PHARM REV CODE 636 W HCPCS: Mod: TB | Performed by: INTERNAL MEDICINE

## 2022-09-06 PROCEDURE — 86334 IMMUNOFIX E-PHORESIS SERUM: CPT | Performed by: INTERNAL MEDICINE

## 2022-09-06 RX ORDER — ACETAMINOPHEN 325 MG/1
650 TABLET ORAL
Status: COMPLETED | OUTPATIENT
Start: 2022-09-06 | End: 2022-09-06

## 2022-09-06 RX ORDER — BORTEZOMIB 3.5 MG/1
1.3 INJECTION, POWDER, LYOPHILIZED, FOR SOLUTION INTRAVENOUS; SUBCUTANEOUS
Status: COMPLETED | OUTPATIENT
Start: 2022-09-06 | End: 2022-09-06

## 2022-09-06 RX ORDER — SODIUM CHLORIDE 0.9 % (FLUSH) 0.9 %
10 SYRINGE (ML) INJECTION
Status: DISCONTINUED | OUTPATIENT
Start: 2022-09-06 | End: 2022-09-06 | Stop reason: HOSPADM

## 2022-09-06 RX ORDER — HEPARIN 100 UNIT/ML
500 SYRINGE INTRAVENOUS
Status: DISCONTINUED | OUTPATIENT
Start: 2022-09-06 | End: 2022-09-06 | Stop reason: HOSPADM

## 2022-09-06 RX ORDER — DIPHENHYDRAMINE HCL 50 MG
50 CAPSULE ORAL
Status: COMPLETED | OUTPATIENT
Start: 2022-09-06 | End: 2022-09-06

## 2022-09-06 RX ADMIN — ACETAMINOPHEN 650 MG: 325 TABLET ORAL at 01:09

## 2022-09-06 RX ADMIN — DARATUMUMAB AND HYALURONIDASE-FIHJ (HUMAN RECOMBINANT) 1800 MG: 1800; 30000 INJECTION SUBCUTANEOUS at 01:09

## 2022-09-06 RX ADMIN — BORTEZOMIB 2.5 MG: 3.5 INJECTION, POWDER, LYOPHILIZED, FOR SOLUTION INTRAVENOUS; SUBCUTANEOUS at 02:09

## 2022-09-06 RX ADMIN — DIPHENHYDRAMINE HYDROCHLORIDE 50 MG: 50 CAPSULE ORAL at 01:09

## 2022-09-06 NOTE — PROGRESS NOTES
[facility]   Physical Therapy Treatment Note     Name: Raquel Houston  Clinic Number: [id number]    Therapy Diagnosis:   Encounter Diagnoses   Name Primary?    Impaired functional mobility, balance, gait, and endurance Yes    Weakness of both lower extremities     Balance problem        Physician: Oneyda, Provider    Visit Date: 9/7/2022    Physician Orders: PT Eval and Treat  Medical Diagnosis from Referral: Sepsis, due to unspecified organism, unspecified whether acute organ dysfunction present [A41.9]  Evaluation Date: 8/8/2022  Authorization Period Expiration: 12/31/2022  Plan of Care Expiration: 10/3/2022  Progress Note Due: 9/8/2022  Visit # / Visits authorized: 8 / 12 auth (1/1 eval)  FOTO: 1 / 3  PTA Visit #: 0 / 5     Precautions: WBAT 6 weeks, sx 7/18/2022 ORIF    Time In: 11:35 AM  Time Out: 12:15 PM  Total Billable Time: 40 minutes    SUBJECTIVE     Pt reports: that she is doing okay this morning. Had her chemo yesterday and did well with that. She is having a little swelling and pain in the left knee today but not sure why.     She was compliant with home exercise program.  Response to previous treatment: mild soreness that improved over time  Functional change: better mobility with wheelchair    Pain: 5/10  Location: L knee    OBJECTIVE     Objective Measures updated at progress report unless specified.     Treatment     Raquel received the treatments listed below:      Therapeutic exercises to develop strength, endurance, ROM, flexibility, posture and core stabilization for 24 minutes including:     NuStep x6 min (Level: 1)  Seated low back stretch x3 min (reaching and with ball)  Seated Hip Adduction 3s x2 min  Seated Hip Abduction RTB x2 min  Seated Hamstring Stretch x2 min ea + LE's on stool  Seated Marching x20 ea (alternating LE's)  Sit to Stands x5 reps - no UE assist/hands in lap, table elevated    Not performed today:  Standing hip abduction LLE x20  reps  Standing hip extension LLE x20 reps  Standing hip flexion/marching LLE x20 reps  SAQ x30 reps 3#  Calf stretch with strap 3 min 10 sec hold  Quad sets x30 reps 3 sec hold  Glute sets x30 reps 3 sec hold  HS curls RTB x20 ea  LAQ 3s x20 ea  Seated glut set 3s x20    Raquel participated in gait training to improve functional mobility and safety for 8 minutes, including:    Gait training, WBAT, Rolling walker, cueing for foot clearance, weight transfer, hip and knee flexion, heel strike toe-off  50 feet x1, Supervision assist  50 feet x1, supervision assist       Raquel received the following manual therapy techniques: Soft tissue Mobilization were applied to the: low back paraspinals for 8 minutes, including:    Percussion gun STM, Hypervolt while pt is seated with back on stretch       Patient Education and Home Exercises     Education provided:   - Weight bearing precaution  - Safety awareness  - Use of assistive devices  - Transfers  - HEP reviewed  - Anatomy and Physiology pertaining to current condition  - Possible response to exercise  - Importance of PT compliance    Written Home Exercises Provided: Patient instructed to cont prior HEP. Exercises were reviewed and Raquel was able to demonstrate them prior to the end of the session. Raquel demonstrated good  understanding of the education provided. See EMR under Patient Instructions for exercises provided during therapy sessions.    ASSESSMENT   Patient tolerated therapy session very well today. Able to ambulate with supervision assist and verbal cues for improved foot clearance and overall technique. Percussion gun continues to help with alleviating tension along the lumbar spine. Emphasis was also placed on flexibility and LE strengthening. Introduced sit to stands - emphasis on posture and proper use of the LE's with correct mechanics - able to perform without UE support.    Raquel Is progressing well towards her goals.   Pt prognosis is Fair.      Pt will continue to benefit from skilled outpatient physical therapy to address the deficits listed in the problem list box on initial evaluation, provide pt/family education and to maximize pt's level of independence in the home and community environment.     Pt's spiritual, cultural and educational needs considered and pt agreeable to plan of care and goals.     Anticipated barriers to physical therapy: weight bearing precautions per patient, level of assistance required, ambulatory status, lack of assistive devices    Goals:  Short Term Goals: 4 weeks   1. Pt will be independent with HEP to facilitate healing and improve outcome measures. (met)  2. Pt will increased hip ROM by 5-10 degrees without pain. (met)  3. Pt will be able to demonstrate full WB in LLE and walk greater than 50 feet with least restrictive AD. (met)  4. Pt will increase LLE MMT values by at least 1/2 grade. (progressing, not met)     Long Term Goals: 8 weeks   1. Pt will demonstrate mod [place] with household transfers and gait. (progressing, not met)  2. Pt will demonstrate LLE gross strength to 4/5 or greater. (progressing, not met)  3. Pt will demonstrate full L hip ROM and flexibility. (progressing, not met)    PLAN     Continue with PT POC to address functional deficits.    Shellie Thomas, PT, DPT, Cert. DN

## 2022-09-06 NOTE — PLAN OF CARE
Pt admitted for C1 D#22 Velcade/Darzalex. Labs reviewed and assessment performed. Fatigue addressed. Pt tolerated well (NOTE- Pt takes Decadron 40 mg at home this am). Pt observed  30 min post Darzalex and 15 min post Velcade. Discharged in W/C with daughter

## 2022-09-07 ENCOUNTER — CLINICAL SUPPORT (OUTPATIENT)
Dept: REHABILITATION | Facility: HOSPITAL | Age: 62
End: 2022-09-07
Payer: MEDICAID

## 2022-09-07 DIAGNOSIS — Z74.09 IMPAIRED FUNCTIONAL MOBILITY, BALANCE, GAIT, AND ENDURANCE: Primary | ICD-10-CM

## 2022-09-07 DIAGNOSIS — R26.89 BALANCE PROBLEM: ICD-10-CM

## 2022-09-07 DIAGNOSIS — R29.898 WEAKNESS OF BOTH LOWER EXTREMITIES: ICD-10-CM

## 2022-09-07 LAB
ALBUMIN SERPL ELPH-MCNC: 3.62 G/DL (ref 3.35–5.55)
ALPHA1 GLOB SERPL ELPH-MCNC: 0.35 G/DL (ref 0.17–0.41)
ALPHA2 GLOB SERPL ELPH-MCNC: 0.77 G/DL (ref 0.43–0.99)
B-GLOBULIN SERPL ELPH-MCNC: 0.58 G/DL (ref 0.5–1.1)
GAMMA GLOB SERPL ELPH-MCNC: 1.08 G/DL (ref 0.67–1.58)
INTERPRETATION SERPL IFE-IMP: NORMAL
KAPPA LC SER QL IA: 1.38 MG/DL (ref 0.33–1.94)
KAPPA LC/LAMBDA SER IA: 2 (ref 0.26–1.65)
LAMBDA LC SER QL IA: 0.69 MG/DL (ref 0.57–2.63)
PATHOLOGIST INTERPRETATION IFE: NORMAL
PATHOLOGIST INTERPRETATION SPE: NORMAL
PROT SERPL-MCNC: 6.4 G/DL (ref 6–8.4)

## 2022-09-07 PROCEDURE — 97140 MANUAL THERAPY 1/> REGIONS: CPT | Mod: PN

## 2022-09-07 PROCEDURE — 97116 GAIT TRAINING THERAPY: CPT | Mod: PN

## 2022-09-07 PROCEDURE — 97110 THERAPEUTIC EXERCISES: CPT | Mod: PN

## 2022-09-08 ENCOUNTER — CLINICAL SUPPORT (OUTPATIENT)
Dept: REHABILITATION | Facility: HOSPITAL | Age: 62
End: 2022-09-08
Payer: MEDICAID

## 2022-09-08 DIAGNOSIS — R29.898 WEAKNESS OF BOTH LOWER EXTREMITIES: ICD-10-CM

## 2022-09-08 DIAGNOSIS — R26.89 BALANCE PROBLEM: ICD-10-CM

## 2022-09-08 DIAGNOSIS — Z74.09 IMPAIRED FUNCTIONAL MOBILITY, BALANCE, GAIT, AND ENDURANCE: Primary | ICD-10-CM

## 2022-09-08 PROCEDURE — 97110 THERAPEUTIC EXERCISES: CPT | Mod: PN | Performed by: PHYSICAL THERAPIST

## 2022-09-08 NOTE — PROGRESS NOTES
PATIENT: Raquel Houston  MRN: [id number]  DATE: 9/13/2022      Diagnosis:   1. Multiple myeloma not having achieved remission          Chief Complaint: Multiple Myeloma    Oncologic History:     07/14/21: Presented with several months of worsening lethargy, generalized pain, 40-60lbs weight loss, and encephalopathy. Labs at time of presentation: hgb 7.6, plts 82, wbc 12, sodium 120, creatinine 1.55, corrected calcium 9.7, transaminitis, M-spike 6.4 g/dL. Skeletal survey showed lesions of left/right humerus, left/right proximal radius, pelvis, and femurs with a significant left femur lytic lesion. Multiple lucent lesions in calvarium. Given two doses of velcade while hospitalized.  07/18/21: Underwent left trochanteric femoral nail fixation. Pathology consistent with plasma cell neoplasm  07/19/22: BMBx showing 90% plasma cells with 100% cellularity, gain 1q21, t(4;14), and monosomy 13.  08/17/22: Cycle 1 Sofie-VRD for IgG Kappa Multiple Myeloma  09/13/22: Cycle 2 Sofie-VRD    Subjective:    Initial History: Ms. Houston is a 62 y.o. female with medical history of OA, gout, and osteoporosis presenting for follow up of multiple myeloma. History as above. Pain has dramatically improved and is no longer requiring pain medication. She is up walking now and overall doing very well. Tolerating therapy well with no issues. Saw a dentist and needs a deep cleaning which they are working on arranging in the near future.      Smoked 1ppd for 20 years, quit April 2022  Quit alcohol Apirl 2022  Denies recreational drug use  Father has a history of a diffuse bone cancer that he did not want to be treated for      Past Medical History:   Past Medical History:   Diagnosis Date    Arthritis     Multiple myeloma     Osteoporosis        Past Surgical HIstory:   Past Surgical History:   Procedure Laterality Date    FEMUR SURGERY         Family History:   Family History   Problem Relation Age of Onset    Cancer Father         Social History:  reports that she has quit smoking. Her smoking use included cigarettes. She has quit using smokeless tobacco. She reports that she does not currently use alcohol. She reports that she does not use drugs.    Allergies:  Review of patient's allergies indicates:  No Known Allergies    Medications:  Current Outpatient Medications   Medication Sig Dispense Refill    acyclovir (ZOVIRAX) 400 MG tablet Take 1 tablet (400 mg total) by mouth 2 (two) times daily. 60 tablet 11    amLODIPine (NORVASC) 10 MG tablet Take 1 tablet (10 mg total) by mouth once daily. 30 tablet 11    aspirin (CIERRA ASPIRIN) 325 MG tablet Take 1 tablet (325 mg total) by mouth once daily. 100 tablet 3    dexAMETHasone (DECADRON) 4 MG Tab Take 10 tablets (40 mg total) by mouth As instructed (Take on days 1, 8, 15, and 22 of chemotherapy.). 40 tablet 1    docusate sodium (COLACE) 100 MG capsule Take 100 mg by mouth 2 (two) times daily.      lenalidomide 25 mg Cap Take 1 capsule (25 mg total) by mouth As instructed (Take by mouth once daily on days 1-21 of each 28 day cycle). 21 capsule 0    oxyCODONE (ROXICODONE) 5 MG immediate release tablet Take 2 tablets (10 mg total) by mouth every 6 (six) hours as needed for Pain. 56 tablet 0    pantoprazole (PROTONIX) 40 MG tablet Take 1 tablet (40 mg total) by mouth once daily. 30 tablet 3    potassium chloride SA (K-DUR,KLOR-CON) 20 MEQ tablet Take 1 tablet (20 mEq total) by mouth once daily. 10 tablet 0     No current facility-administered medications for this visit.       Review of Systems   Constitutional:  Negative for chills, fatigue and fever.   HENT:  Negative for dental problem and trouble swallowing.    Eyes:  Negative for photophobia and visual disturbance.   Respiratory:  Negative for cough and shortness of breath.    Cardiovascular:  Negative for chest pain and leg swelling.   Gastrointestinal:  Negative for abdominal pain, diarrhea, nausea and vomiting.   Musculoskeletal:   "Negative for arthralgias.   Skin:  Negative for rash and wound.   Neurological:  Negative for light-headedness and headaches.   Hematological:  Negative for adenopathy. Does not bruise/bleed easily.   Psychiatric/Behavioral:  Negative for confusion. The patient is not nervous/anxious.      ECOG Performance Status: 3; however underwent recent procedure which is limiting her abilities, expect to improve  Objective:      Vitals:   Vitals:    09/13/22 1005   BP: 119/62   BP Location: Left arm   Patient Position: Sitting   BP Method: Medium (Automatic)   Pulse: 91   Resp: 18   Temp: 98.4 °F (36.9 °C)   TempSrc: Oral   SpO2: 99%   Weight: 75.1 kg (165 lb 9.1 oz)   Height: 5' 7" (1.702 m)         Physical Exam  Vitals reviewed.   Constitutional:       Appearance: Normal appearance.      Comments: In wheelchair   HENT:      Head: Normocephalic and atraumatic.      Mouth/Throat:      Mouth: Mucous membranes are moist.      Pharynx: Oropharynx is clear.   Eyes:      Extraocular Movements: Extraocular movements intact.      Conjunctiva/sclera: Conjunctivae normal.   Cardiovascular:      Rate and Rhythm: Normal rate and regular rhythm.   Pulmonary:      Effort: Pulmonary effort is normal. No respiratory distress.   Abdominal:      General: There is no distension.      Palpations: Abdomen is soft.      Tenderness: There is no abdominal tenderness.   Musculoskeletal:      Right lower leg: No edema.      Left lower leg: No edema.   Skin:     General: Skin is warm and dry.   Neurological:      General: No focal deficit present.      Mental Status: She is alert and oriented to person, place, and time.   Psychiatric:         Mood and Affect: Mood normal.         Behavior: Behavior normal.       Laboratory Data:  Lab Visit on 09/13/2022   Component Date Value Ref Range Status    WBC 09/13/2022 6.09  3.90 - 12.70 K/uL Final    RBC 09/13/2022 3.18 (L)  4.00 - 5.40 M/uL Final    Hemoglobin 09/13/2022 9.6 (L)  12.0 - 16.0 g/dL Final    " Hematocrit 09/13/2022 29.5 (L)  37.0 - 48.5 % Final    MCV 09/13/2022 93  82 - 98 fL Final    MCH 09/13/2022 30.2  27.0 - 31.0 pg Final    MCHC 09/13/2022 32.5  32.0 - 36.0 g/dL Final    RDW 09/13/2022 18.7 (H)  11.5 - 14.5 % Final    Platelets 09/13/2022 241  150 - 450 K/uL Final    MPV 09/13/2022 11.7  9.2 - 12.9 fL Final    Immature Granulocytes 09/13/2022 0.5  0.0 - 0.5 % Final    Gran # (ANC) 09/13/2022 3.1  1.8 - 7.7 K/uL Final    Immature Grans (Abs) 09/13/2022 0.03  0.00 - 0.04 K/uL Final    Comment: Mild elevation in immature granulocytes is non specific and   can be seen in a variety of conditions including stress response,   acute inflammation, trauma and pregnancy. Correlation with other   laboratory and clinical findings is essential.      Lymph # 09/13/2022 2.2  1.0 - 4.8 K/uL Final    Mono # 09/13/2022 0.6  0.3 - 1.0 K/uL Final    Eos # 09/13/2022 0.1  0.0 - 0.5 K/uL Final    Baso # 09/13/2022 0.03  0.00 - 0.20 K/uL Final    nRBC 09/13/2022 0  0 /100 WBC Final    Gran % 09/13/2022 51.1  38.0 - 73.0 % Final    Lymph % 09/13/2022 35.5  18.0 - 48.0 % Final    Mono % 09/13/2022 10.3  4.0 - 15.0 % Final    Eosinophil % 09/13/2022 2.1  0.0 - 8.0 % Final    Basophil % 09/13/2022 0.5  0.0 - 1.9 % Final    Differential Method 09/13/2022 Automated   Final    Sodium 09/13/2022 140  136 - 145 mmol/L Final    Potassium 09/13/2022 3.8  3.5 - 5.1 mmol/L Final    Chloride 09/13/2022 109  95 - 110 mmol/L Final    CO2 09/13/2022 23  23 - 29 mmol/L Final    Glucose 09/13/2022 93  70 - 110 mg/dL Final    BUN 09/13/2022 11  6 - 20 mg/dL Final    Creatinine 09/13/2022 0.7  0.5 - 1.4 mg/dL Final    Calcium 09/13/2022 8.2 (L)  8.7 - 10.5 mg/dL Final    Total Protein 09/13/2022 6.2  6.0 - 8.4 g/dL Final    Albumin 09/13/2022 3.3 (L)  3.5 - 5.2 g/dL Final    Total Bilirubin 09/13/2022 0.4  0.1 - 1.0 mg/dL Final    Comment: For infants and newborns, interpretation of results should be based  on gestational age, weight and in  agreement with clinical  observations.    Premature Infant recommended reference ranges:  Up to 24 hours.............<8.0 mg/dL  Up to 48 hours............<12.0 mg/dL  3-5 days..................<15.0 mg/dL  6-29 days.................<15.0 mg/dL      Alkaline Phosphatase 09/13/2022 463 (H)  55 - 135 U/L Final    AST 09/13/2022 36  10 - 40 U/L Final    ALT 09/13/2022 58 (H)  10 - 44 U/L Final    Anion Gap 09/13/2022 8  8 - 16 mmol/L Final    eGFR 09/13/2022 >60.0  >60 mL/min/1.73 m^2 Final    Group & Rh 09/13/2022 B POS   Final    Indirect Jd 09/13/2022 NEG   Final         Imaging:   Assessment:       1. Multiple myeloma not having achieved remission             Plan:     Multiple Myeloma  -- Complex cytogenetics with gain 1q21, t(4;14), and monosomy 13, bone marrow to be evaluated by [facility] hematopathologist  -- Diffuse lytics lesions  -- M-spike 6.4g/dL  -- R-ISS stage III (elevated beta-2 microglobulin, elevated LDH, decreased albumin, and high risk cytogenetics)  -- Cycle 2 Sofie-VRD today; consent obtained 08/16/22  -- Has had significant clinical improvement as well as reduction in IgG and M-spike showing excellent response  -- Has met with transplant coordinators. Will need mammogram, colonoscopy, and dental appointment  -- Will need bisphosphonate pending dental appointment  -- Acyclovir 400mg BID, aspirin daily    RTC in 4 weeks. Pt knows to call or use [facility] in the interim with any questions or concerns.    Everett Boogie, PGY- VI  Hematology/Oncology Fellow     Route Chart for Scheduling    BMT Chart Routing      Follow up with physician . RTC 10/11   Follow up with SERGIO No follow up needed.   Infusion scheduling note none   Injection scheduling note daratumumab and velcade every tuesday   Labs   Lab interval:  CBC, CMP, type and screen every Tuesday prior to injection. serum protein electropheresis/immunofixation, immunoglobulins, and free lgiht chains 10/4   Imaging None      Pharmacy appointment  No pharmacy appointment needed      Other referrals No additional referrals needed       Treatment Plan Information   OP D-VRD DARATUMUMAB + BORTEZOMIB LENALIDOMIDE DEXAMETHASONE   Janice Casas MD   Upcoming Treatment Dates - OP D-VRD DARATUMUMAB + BORTEZOMIB LENALIDOMIDE DEXAMETHASONE    9/13/2022       Pre-Medications       acetaminophen tablet 650 mg       diphenhydrAMINE capsule 50 mg       Chemotherapy       bortezomib (VELCADE) injection 2.4 mg       daratumumab-hyaluronidase-fihj Soln 1,800 mg  9/20/2022       Pre-Medications       acetaminophen tablet 650 mg       diphenhydrAMINE capsule 50 mg       Chemotherapy       bortezomib (VELCADE) injection 2.4 mg       daratumumab-hyaluronidase-fihj Soln 1,800 mg  9/27/2022       Pre-Medications       acetaminophen tablet 650 mg       diphenhydrAMINE capsule 50 mg       Chemotherapy       bortezomib (VELCADE) injection 2.4 mg       daratumumab-hyaluronidase-fihj Soln 1,800 mg  10/4/2022       Pre-Medications       acetaminophen tablet 650 mg       diphenhydrAMINE capsule 50 mg       Chemotherapy       bortezomib (VELCADE) injection 2.4 mg       daratumumab-hyaluronidase-fihj Soln 1,800 mg

## 2022-09-08 NOTE — PROGRESS NOTES
[facility]   Physical Therapy Treatment Note     Name: Raquel Houston  Clinic Number: [id number]    Therapy Diagnosis:   Encounter Diagnoses   Name Primary?    Impaired functional mobility, balance, gait, and endurance Yes    Weakness of both lower extremities     Balance problem        Physician: Oneyda, Provider    Visit Date: 9/8/2022    Physician Orders: PT Eval and Treat  Medical Diagnosis from Referral: Sepsis, due to unspecified organism, unspecified whether acute organ dysfunction present [A41.9]  Evaluation Date: 8/8/2022  Authorization Period Expiration: 12/31/2022  Plan of Care Expiration: 10/3/2022  Progress Note Due: 9/8/2022  Visit # / Visits authorized: 9 / 12 auth (1/1 eval)  FOTO: 1 / 3  PTA Visit #: 0 / 5     Precautions: WBAT 6 weeks, sx 7/18/2022 ORIF    Time In: 12:45 PM  Time Out: 12:35 PM  Total Billable Time: 43 minutes    SUBJECTIVE     Pt reports: that she is feeling good today.     She was compliant with home exercise program.  Response to previous treatment: mild soreness that improved over time  Functional change: Improved walking ability with rolling walker     Pain: 4/10  Location: L knee    OBJECTIVE     Objective Measures updated at progress report unless specified.     Treatment     Raquel received the treatments listed below:      Therapeutic exercises to develop strength, endurance, ROM, flexibility, posture and core stabilization for 30 minutes including:     NuStep x6 min (Level: 1)  Seated low back stretch x3 min (reaching and with ball)  Seated Hip Adduction 3s x2 min  Seated Hip Abduction RTB x2 min  Seated Hamstring Stretch x2 min ea + LE's on stool  Seated Marching x20 ea (alternating LE's)  Sit to Stands x5 reps - no UE assist/hands in lap, table elevated  Mini squats w walker 2 x 10     Not performed today:  Standing hip abduction LLE x20 reps  Standing hip extension LLE x20 reps  Standing hip flexion/marching LLE x20 reps  SAQ x30  reps 3#  Calf stretch with strap 3 min 10 sec hold  Quad sets x30 reps 3 sec hold  Glute sets x30 reps 3 sec hold  HS curls RTB x20 ea  LAQ 3s x20 ea  Seated glut set 3s x20    Raqule participated in gait training to improve functional mobility and safety for 5 minutes, including:    Gait training, WBAT, Rolling walker, cueing for foot clearance, weight transfer, hip and knee flexion, heel strike toe-off  100 feet x1, Supervision assist        Raquel received the following manual therapy techniques: Soft tissue Mobilization were applied to the: low back paraspinals for 8 minutes, including:    Percussion gun STM, [employer] while pt is seated with back on stretch       Patient Education and Home Exercises     Education provided:   - Weight bearing precaution  - Safety awareness  - Use of assistive devices  - Transfers  - HEP reviewed  - Anatomy and Physiology pertaining to current condition  - Possible response to exercise  - Importance of PT compliance    Written Home Exercises Provided: Patient instructed to cont prior HEP. Exercises were reviewed and Raquel was able to demonstrate them prior to the end of the session. Raquel demonstrated good  understanding of the education provided. See EMR under Patient Instructions for exercises provided during therapy sessions.    ASSESSMENT   Patient was able to add additional functional strengthening without an increase in pain levels. Mild increase in soreness after walking. She was able to increase her walking distance with minimal increase in pain. She will benefit from continued care with a focus on functional strengthening to gait endurance.     Raquel Is progressing well towards her goals.   Pt prognosis is Fair.     Pt will continue to benefit from skilled outpatient physical therapy to address the deficits listed in the problem list box on initial evaluation, provide pt/family education and to maximize pt's level of independence in the home and community  environment.     Pt's spiritual, cultural and educational needs considered and pt agreeable to plan of care and goals.     Anticipated barriers to physical therapy: weight bearing precautions per patient, level of assistance required, ambulatory status, lack of assistive devices    Goals:  Short Term Goals: 4 weeks   1. Pt will be independent with HEP to facilitate healing and improve outcome measures. (met)  2. Pt will increased hip ROM by 5-10 degrees without pain. (met)  3. Pt will be able to demonstrate full WB in LLE and walk greater than 50 feet with least restrictive AD. (met)  4. Pt will increase LLE MMT values by at least 1/2 grade. (progressing, not met)     Long Term Goals: 8 weeks   1. Pt will demonstrate mod [place] with household transfers and gait. (progressing, not met)  2. Pt will demonstrate LLE gross strength to 4/5 or greater. (progressing, not met)  3. Pt will demonstrate full L hip ROM and flexibility. (progressing, not met)    PLAN     Continue with PT POC to address functional deficits.    Taurus Arreaga, PT, DPT

## 2022-09-12 ENCOUNTER — CLINICAL SUPPORT (OUTPATIENT)
Dept: REHABILITATION | Facility: HOSPITAL | Age: 62
End: 2022-09-12
Payer: MEDICAID

## 2022-09-12 DIAGNOSIS — R29.898 WEAKNESS OF BOTH LOWER EXTREMITIES: ICD-10-CM

## 2022-09-12 DIAGNOSIS — Z74.09 IMPAIRED FUNCTIONAL MOBILITY, BALANCE, GAIT, AND ENDURANCE: Primary | ICD-10-CM

## 2022-09-12 DIAGNOSIS — R26.89 BALANCE PROBLEM: ICD-10-CM

## 2022-09-12 PROCEDURE — 97110 THERAPEUTIC EXERCISES: CPT | Mod: PN

## 2022-09-12 NOTE — PROGRESS NOTES
[facility]   Physical Therapy Treatment Note and Reassessment    Name: Raquel Houston  Clinic Number: [id number]    Therapy Diagnosis:   Encounter Diagnoses   Name Primary?    Impaired functional mobility, balance, gait, and endurance Yes    Weakness of both lower extremities     Balance problem      Physician: Oneyda, Provider    Visit Date: 9/12/2022    Physician Orders: PT Eval and Treat  Medical Diagnosis from Referral: Sepsis, due to unspecified organism, unspecified whether acute organ dysfunction present [A41.9]  Evaluation Date: 8/8/2022  Authorization Period Expiration: 12/31/2022  Plan of Care Expiration: 10/3/2022  Progress Note Due: 10/12/2022 (Reassessed 9/12/2022)  Visit # / Visits authorized: 10 / 12 auth (1/1 eval)  FOTO: 3 / 3  PTA Visit #: 0 / 5     Precautions: WBAT 6 weeks, sx 7/18/2022 ORIF    Time In: 11:30 AM  Time Out: 12:13 AM  Total Billable Time: 43 minutes    SUBJECTIVE     Pt reports: pain and soreness in the leg are very minimal. This morning all she has is a little pinch in her back on the right side. She has been up and moving with the rollator as much as possible. She has little issues with weight bearing now.     She was compliant with home exercise program.  Response to previous treatment: mild soreness that improved over time  Functional change: Improved walking ability with rolling walker     Pain: 3/10  Location: Right side of back    OBJECTIVE     CMS Impairment/Limitation/Restriction for FOTO Leg Survey     Therapist reviewed FOTO scores for Raquel Houston on 9/12/2022.   FOTO documents entered into EPIC - see Media section.     Limitation Score: 62%         Mental status: alert     Sensation:   Light Touch LEs  WNL  Proprioception: Intact      Dermatomes: WNL     DTR:    Right Left Comment   Patellar (L3-4) 2+ 2+     Achilles (S1) 2+ 2+        Lower Extremity Strength  Right LE   Left LE     Hip Flexion: 4/5 Hip Flexion: 3+/5    Hip Extension:  4/5 Hip Extension: 3+/5   Hip Abduction: 4/5 Hip Abduction: 3+/5   Hip Adduction: 4+/5 Hip Adduction 4/5   Knee Extension: 5/5 Knee Extension: 5/5   Knee Flexion: 5/5 Knee Flexion: 5-/5   Ankle Dorsiflexion: 5/5 Ankle Dorsiflexion: 5/5   Ankle Plantarflexion: 5/5 Ankle Plantarflexion: 5/5      Abdominal Strength: poor, weakness, no pain     Special Tests      Transfers: Mod independent, using rollator     Balance Assessment:    Evaluation   Single Limb Stance R LE 20+ sec with UE  (<10 sec = HIGH FALL RISK)   Single Limb Stance L LE 5-10 sec with UE support  (<10 sec = HIGH FALL RISK)        Evaluation   30 second Chair Rise  (adults > 59 y/o) 9 completed with arms   5 times sit-stand  (adults 18-65 y/o) 15.55 seconds  >12 sec= fall risk for general elderly  >16 sec= fall risk for Parkinson's disease  >10 sec= balance/vestibular dysfunction (<59 y/o)  >14.2 sec= balance/vestibular dysfunction (>59 y/o)  >12 sec= fall risk for CVA      Gait Analysis:   Assistive device:  []None []Crutches []Straight Cane [x]Rolling Walker  [] Other:   Gait abnormalities:   []No significant gait deviations noted  []Increased HARRY  [x]Anterior Trunk Lean  []Increased Knee Flexion in Stance  []Knee Hyperextension in stance  []Foot Drop/Drag  [x]Decreased toe off  [x]Decreased heel strike  [x]Trendelenburg: bilateral   [x]Decreased speed     Endurance Assessment:    Evaluation   Timed Up and Go 27.75 sec using rollator  < 20 sec safe for independent transfers, < 30 sec safe for dependent transfers/assist required      Table: Population Norms for TUG    Age  Average TUG    60 - 69 years  8.1 seconds    70 - 79 years  9.2 seconds    80 - 99 years  11.3 seconds       Palpation:  Min TTP to surgical sites, 3 along lateral leg     Joint Play:  Limited in left hip due to muscle guarding    HS flexibility: lacking 10 deg Bilateral     Objective Measures updated at progress report unless specified.     Treatment     Raquel  received the treatments listed below:      Therapeutic exercises to develop strength, endurance, ROM, flexibility, posture and core stabilization for 38 minutes including:     (Remaining therex time used for reassessment purposes)    NuStep x6 min (Level: 3)  Seated Hip Adduction 3s x2 min  Seated Hip Abduction RTB x2 min  Mini squats w walker 2 x 10     Not performed today:  Seated low back stretch x3 min (reaching and with ball)  Seated Hamstring Stretch x2 min ea + LE's on stool  Seated Marching x20 ea (alternating LE's)  Sit to Stands x5 reps - no UE assist/hands in lap, table elevated  Standing hip abduction LLE x20 reps  Standing hip extension LLE x20 reps  Standing hip flexion/marching LLE x20 reps  SAQ x30 reps 3#  Calf stretch with strap 3 min 10 sec hold  Quad sets x30 reps 3 sec hold  Glute sets x30 reps 3 sec hold  HS curls RTB x20 ea  LAQ 3s x20 ea  Seated glut set 3s x20      Raquel participated in gait training to improve functional mobility and safety for 5 minutes, including:    Gait training, WBAT, Rolling walker, cueing for foot clearance, weight transfer, hip and knee flexion, heel strike toe-off  100 feet x1, Supervision assist      Raquel received the following manual therapy techniques: Soft tissue Mobilization were applied to the: low back paraspinals for 00 minutes, including:      Patient Education and Home Exercises     Education provided:   - Weight bearing precaution  - Safety awareness  - Use of assistive devices  - Transfers  - HEP reviewed  - Anatomy and Physiology pertaining to current condition  - Possible response to exercise  - Importance of PT compliance    Written Home Exercises Provided: Patient instructed to cont prior HEP. Exercises were reviewed and Raquel was able to demonstrate them prior to the end of the session. Raquel demonstrated good  understanding of the education provided. See EMR under Patient Instructions for exercises provided during therapy  sessions.    ASSESSMENT   Upon assessment, Raquel has progressed well toward PT goals. She demonstrates improved overall LE strength, hip ROM, gait mechanics, and functional mobility. Pain levels and muscle guarding have also reduced as a result. She is responding well with PT at this time and would benefit from continuance of progression at this time.     Raquel Is progressing well towards her goals.   Pt prognosis is Fair.     Pt will continue to benefit from skilled outpatient physical therapy to address the deficits listed in the problem list box on initial evaluation, provide pt/family education and to maximize pt's level of independence in the home and community environment.     Pt's spiritual, cultural and educational needs considered and pt agreeable to plan of care and goals.     Anticipated barriers to physical therapy: WBAT    Goals:  Short Term Goals: 4 weeks   1. Pt will be independent with HEP to facilitate healing and improve outcome measures. (met)  2. Pt will increased hip ROM by 5-10 degrees without pain. (met)  3. Pt will be able to demonstrate full WB in LLE and walk greater than 50 feet with least restrictive AD. (met)  4. Pt will increase LLE MMT values by at least 1/2 grade. (met)     Long Term Goals: 8 weeks   1. Pt will demonstrate mod [place] with household transfers and gait. (progressing, not met)  2. Pt will demonstrate LLE gross strength to 4/5 or greater. (progressing, not met)  3. Pt will demonstrate full L hip ROM and flexibility. (progressing, not met)    PLAN     Continue with PT POC to address functional deficits.    Evan Tesfaye, PT, DPT

## 2022-09-12 NOTE — PLAN OF CARE
[facility]   Physical Therapy Treatment Note and Reassessment    Name: Raquel Houston  Clinic Number: [id number]    Therapy Diagnosis:   Encounter Diagnoses   Name Primary?    Impaired functional mobility, balance, gait, and endurance Yes    Weakness of both lower extremities     Balance problem      Physician: Oneyda, Provider    Visit Date: 9/12/2022    Physician Orders: PT Eval and Treat  Medical Diagnosis from Referral: Sepsis, due to unspecified organism, unspecified whether acute organ dysfunction present [A41.9]  Evaluation Date: 8/8/2022  Authorization Period Expiration: 12/31/2022  Plan of Care Expiration: 10/3/2022  Progress Note Due: 10/12/2022 (Reassessed 9/12/2022)  Visit # / Visits authorized: 10 / 12 auth (1/1 eval)  FOTO: 3 / 3  PTA Visit #: 0 / 5     Precautions: WBAT 6 weeks, sx 7/18/2022 ORIF    Time In: 11:30 AM  Time Out: 12:13 AM  Total Billable Time: 43 minutes    SUBJECTIVE     Pt reports: pain and soreness in the leg are very minimal. This morning all she has is a little pinch in her back on the right side. She has been up and moving with the rollator as much as possible. She has little issues with weight bearing now.     She was compliant with home exercise program.  Response to previous treatment: mild soreness that improved over time  Functional change: Improved walking ability with rolling walker     Pain: 3/10  Location: Right side of back    OBJECTIVE     CMS Impairment/Limitation/Restriction for FOTO Leg Survey     Therapist reviewed FOTO scores for Raquel Houston on 9/12/2022.   FOTO documents entered into [employer] - see Media section.     Limitation Score: 62%         Mental status: alert     Sensation:   Light Touch LEs  WNL  Proprioception: Intact      Dermatomes: WNL     DTR:    Right Left Comment   Patellar (L3-4) 2+ 2+     Achilles (S1) 2+ 2+        Lower Extremity Strength  Right LE   Left LE     Hip Flexion: 4/5 Hip Flexion: 3+/5    Hip Extension:  4/5 Hip Extension: 3+/5   Hip Abduction: 4/5 Hip Abduction: 3+/5   Hip Adduction: 4+/5 Hip Adduction 4/5   Knee Extension: 5/5 Knee Extension: 5/5   Knee Flexion: 5/5 Knee Flexion: 5-/5   Ankle Dorsiflexion: 5/5 Ankle Dorsiflexion: 5/5   Ankle Plantarflexion: 5/5 Ankle Plantarflexion: 5/5      Abdominal Strength: poor, weakness, no pain     Special Tests      Transfers: Mod independent, using rollator     Balance Assessment:    Evaluation   Single Limb Stance R LE 20+ sec with UE  (<10 sec = HIGH FALL RISK)   Single Limb Stance L LE 5-10 sec with UE support  (<10 sec = HIGH FALL RISK)        Evaluation   30 second Chair Rise  (adults > 59 y/o) 9 completed with arms   5 times sit-stand  (adults 18-63 y/o) 15.55 seconds  >12 sec= fall risk for general elderly  >16 sec= fall risk for Parkinson's disease  >10 sec= balance/vestibular dysfunction (<59 y/o)  >14.2 sec= balance/vestibular dysfunction (>59 y/o)  >12 sec= fall risk for CVA      Gait Analysis:   Assistive device:  []None []Crutches []Straight Cane [x]Rolling Walker  [] Other:   Gait abnormalities:   []No significant gait deviations noted  []Increased HARRY  [x]Anterior Trunk Lean  []Increased Knee Flexion in Stance  []Knee Hyperextension in stance  []Foot Drop/Drag  [x]Decreased toe off  [x]Decreased heel strike  [x]Trendelenburg: bilateral   [x]Decreased speed     Endurance Assessment:    Evaluation   Timed Up and Go 27.75 sec using rollator  < 20 sec safe for independent transfers, < 30 sec safe for dependent transfers/assist required      Table: Population Norms for TUG    Age  Average TUG    60 - 69 years  8.1 seconds    70 - 79 years  9.2 seconds    80 - 99 years  11.3 seconds       Palpation:  Min TTP to surgical sites, 3 along lateral leg     Joint Play:  Limited in left hip due to muscle guarding    HS flexibility: lacking 10 deg Bilateral     Objective Measures updated at progress report unless specified.     Treatment     Raquel  received the treatments listed below:      Therapeutic exercises to develop strength, endurance, ROM, flexibility, posture and core stabilization for 38 minutes including:     (Remaining therex time used for reassessment purposes)    NuStep x6 min (Level: 3)  Seated Hip Adduction 3s x2 min  Seated Hip Abduction RTB x2 min  Mini squats w walker 2 x 10     Not performed today:  Seated low back stretch x3 min (reaching and with ball)  Seated Hamstring Stretch x2 min ea + LE's on stool  Seated Marching x20 ea (alternating LE's)  Sit to Stands x5 reps - no UE assist/hands in lap, table elevated  Standing hip abduction LLE x20 reps  Standing hip extension LLE x20 reps  Standing hip flexion/marching LLE x20 reps  SAQ x30 reps 3#  Calf stretch with strap 3 min 10 sec hold  Quad sets x30 reps 3 sec hold  Glute sets x30 reps 3 sec hold  HS curls RTB x20 ea  LAQ 3s x20 ea  Seated glut set 3s x20      Raquel participated in gait training to improve functional mobility and safety for 5 minutes, including:    Gait training, WBAT, Rolling walker, cueing for foot clearance, weight transfer, hip and knee flexion, heel strike toe-off  100 feet x1, Supervision assist      Raquel received the following manual therapy techniques: Soft tissue Mobilization were applied to the: low back paraspinals for 00 minutes, including:      Patient Education and Home Exercises     Education provided:   - Weight bearing precaution  - Safety awareness  - Use of assistive devices  - Transfers  - HEP reviewed  - Anatomy and Physiology pertaining to current condition  - Possible response to exercise  - Importance of PT compliance    Written Home Exercises Provided: Patient instructed to cont prior HEP. Exercises were reviewed and Raquel was able to demonstrate them prior to the end of the session. Raquel demonstrated good  understanding of the education provided. See EMR under Patient Instructions for exercises provided during therapy  sessions.    ASSESSMENT   Upon assessment, Raquel has progressed well toward PT goals. She demonstrates improved overall LE strength, hip ROM, gait mechanics, and functional mobility. Pain levels and muscle guarding have also reduced as a result. She is responding well with PT at this time and would benefit from continuance of progression at this time.     Raquel Is progressing well towards her goals.   Pt prognosis is Fair.     Pt will continue to benefit from skilled outpatient physical therapy to address the deficits listed in the problem list box on initial evaluation, provide pt/family education and to maximize pt's level of independence in the home and community environment.     Pt's spiritual, cultural and educational needs considered and pt agreeable to plan of care and goals.     Anticipated barriers to physical therapy: WBAT    Goals:  Short Term Goals: 4 weeks   1. Pt will be independent with HEP to facilitate healing and improve outcome measures. (met)  2. Pt will increased hip ROM by 5-10 degrees without pain. (met)  3. Pt will be able to demonstrate full WB in LLE and walk greater than 50 feet with least restrictive AD. (met)  4. Pt will increase LLE MMT values by at least 1/2 grade. (met)     Long Term Goals: 8 weeks   1. Pt will demonstrate mod [place] with household transfers and gait. (progressing, not met)  2. Pt will demonstrate LLE gross strength to 4/5 or greater. (progressing, not met)  3. Pt will demonstrate full L hip ROM and flexibility. (progressing, not met)    PLAN     Continue with PT POC to address functional deficits.    Evan Tesfaye, PT, DPT

## 2022-09-13 ENCOUNTER — INFUSION (OUTPATIENT)
Dept: INFUSION THERAPY | Facility: HOSPITAL | Age: 62
End: 2022-09-13
Payer: MEDICAID

## 2022-09-13 ENCOUNTER — LAB VISIT (OUTPATIENT)
Dept: LAB | Facility: HOSPITAL | Age: 62
End: 2022-09-13
Payer: MEDICAID

## 2022-09-13 ENCOUNTER — OFFICE VISIT (OUTPATIENT)
Dept: HEMATOLOGY/ONCOLOGY | Facility: CLINIC | Age: 62
End: 2022-09-13
Payer: MEDICAID

## 2022-09-13 VITALS
RESPIRATION RATE: 18 BRPM | BODY MASS INDEX: 25.99 KG/M2 | TEMPERATURE: 98 F | SYSTOLIC BLOOD PRESSURE: 119 MMHG | OXYGEN SATURATION: 99 % | DIASTOLIC BLOOD PRESSURE: 62 MMHG | HEIGHT: 67 IN | HEART RATE: 91 BPM | WEIGHT: 165.56 LBS

## 2022-09-13 VITALS
SYSTOLIC BLOOD PRESSURE: 131 MMHG | WEIGHT: 165.56 LBS | TEMPERATURE: 98 F | DIASTOLIC BLOOD PRESSURE: 70 MMHG | BODY MASS INDEX: 25.99 KG/M2 | HEART RATE: 83 BPM | RESPIRATION RATE: 18 BRPM | HEIGHT: 67 IN | OXYGEN SATURATION: 100 %

## 2022-09-13 DIAGNOSIS — C90.00 MULTIPLE MYELOMA NOT HAVING ACHIEVED REMISSION: ICD-10-CM

## 2022-09-13 DIAGNOSIS — R29.898 WEAKNESS OF BOTH LOWER EXTREMITIES: ICD-10-CM

## 2022-09-13 DIAGNOSIS — C90.00 MULTIPLE MYELOMA NOT HAVING ACHIEVED REMISSION: Primary | ICD-10-CM

## 2022-09-13 LAB
ABO + RH BLD: NORMAL
ALBUMIN SERPL BCP-MCNC: 3.3 G/DL (ref 3.5–5.2)
ALP SERPL-CCNC: 463 U/L (ref 55–135)
ALT SERPL W/O P-5'-P-CCNC: 58 U/L (ref 10–44)
ANION GAP SERPL CALC-SCNC: 8 MMOL/L (ref 8–16)
AST SERPL-CCNC: 36 U/L (ref 10–40)
BASOPHILS # BLD AUTO: 0.03 K/UL (ref 0–0.2)
BASOPHILS NFR BLD: 0.5 % (ref 0–1.9)
BILIRUB SERPL-MCNC: 0.4 MG/DL (ref 0.1–1)
BLD GP AB SCN CELLS X3 SERPL QL: NORMAL
BUN SERPL-MCNC: 11 MG/DL (ref 6–20)
CALCIUM SERPL-MCNC: 8.2 MG/DL (ref 8.7–10.5)
CHLORIDE SERPL-SCNC: 109 MMOL/L (ref 95–110)
CO2 SERPL-SCNC: 23 MMOL/L (ref 23–29)
CREAT SERPL-MCNC: 0.7 MG/DL (ref 0.5–1.4)
DIFFERENTIAL METHOD: ABNORMAL
EOSINOPHIL # BLD AUTO: 0.1 K/UL (ref 0–0.5)
EOSINOPHIL NFR BLD: 2.1 % (ref 0–8)
ERYTHROCYTE [DISTWIDTH] IN BLOOD BY AUTOMATED COUNT: 18.7 % (ref 11.5–14.5)
EST. GFR  (NO RACE VARIABLE): >60 ML/MIN/1.73 M^2
GLUCOSE SERPL-MCNC: 93 MG/DL (ref 70–110)
HCT VFR BLD AUTO: 29.5 % (ref 37–48.5)
HGB BLD-MCNC: 9.6 G/DL (ref 12–16)
IMM GRANULOCYTES # BLD AUTO: 0.03 K/UL (ref 0–0.04)
IMM GRANULOCYTES NFR BLD AUTO: 0.5 % (ref 0–0.5)
LYMPHOCYTES # BLD AUTO: 2.2 K/UL (ref 1–4.8)
LYMPHOCYTES NFR BLD: 35.5 % (ref 18–48)
MCH RBC QN AUTO: 30.2 PG (ref 27–31)
MCHC RBC AUTO-ENTMCNC: 32.5 G/DL (ref 32–36)
MCV RBC AUTO: 93 FL (ref 82–98)
MONOCYTES # BLD AUTO: 0.6 K/UL (ref 0.3–1)
MONOCYTES NFR BLD: 10.3 % (ref 4–15)
NEUTROPHILS # BLD AUTO: 3.1 K/UL (ref 1.8–7.7)
NEUTROPHILS NFR BLD: 51.1 % (ref 38–73)
NRBC BLD-RTO: 0 /100 WBC
PLATELET # BLD AUTO: 241 K/UL (ref 150–450)
PMV BLD AUTO: 11.7 FL (ref 9.2–12.9)
POTASSIUM SERPL-SCNC: 3.8 MMOL/L (ref 3.5–5.1)
PROT SERPL-MCNC: 6.2 G/DL (ref 6–8.4)
RBC # BLD AUTO: 3.18 M/UL (ref 4–5.4)
SODIUM SERPL-SCNC: 140 MMOL/L (ref 136–145)
WBC # BLD AUTO: 6.09 K/UL (ref 3.9–12.7)

## 2022-09-13 PROCEDURE — 99215 PR OFFICE/OUTPT VISIT, EST, LEVL V, 40-54 MIN: ICD-10-PCS | Mod: S$PBB,,, | Performed by: INTERNAL MEDICINE

## 2022-09-13 PROCEDURE — 36415 COLL VENOUS BLD VENIPUNCTURE: CPT | Performed by: INTERNAL MEDICINE

## 2022-09-13 PROCEDURE — 80053 COMPREHEN METABOLIC PANEL: CPT | Performed by: INTERNAL MEDICINE

## 2022-09-13 PROCEDURE — 85025 COMPLETE CBC W/AUTO DIFF WBC: CPT | Performed by: INTERNAL MEDICINE

## 2022-09-13 PROCEDURE — 96401 CHEMO ANTI-NEOPL SQ/IM: CPT

## 2022-09-13 PROCEDURE — 99213 OFFICE O/P EST LOW 20 MIN: CPT | Mod: PBBFAC,59 | Performed by: INTERNAL MEDICINE

## 2022-09-13 PROCEDURE — 99215 OFFICE O/P EST HI 40 MIN: CPT | Mod: S$PBB,,, | Performed by: INTERNAL MEDICINE

## 2022-09-13 PROCEDURE — 86850 RBC ANTIBODY SCREEN: CPT | Performed by: INTERNAL MEDICINE

## 2022-09-13 PROCEDURE — 25000003 PHARM REV CODE 250: Performed by: INTERNAL MEDICINE

## 2022-09-13 PROCEDURE — 99999 PR PBB SHADOW E&M-EST. PATIENT-LVL III: CPT | Mod: PBBFAC,,, | Performed by: INTERNAL MEDICINE

## 2022-09-13 PROCEDURE — 63600175 PHARM REV CODE 636 W HCPCS: Mod: JW,JG | Performed by: INTERNAL MEDICINE

## 2022-09-13 PROCEDURE — 99999 PR PBB SHADOW E&M-EST. PATIENT-LVL III: ICD-10-PCS | Mod: PBBFAC,,, | Performed by: INTERNAL MEDICINE

## 2022-09-13 RX ORDER — SODIUM CHLORIDE 0.9 % (FLUSH) 0.9 %
10 SYRINGE (ML) INJECTION
Status: CANCELLED | OUTPATIENT
Start: 2022-09-27

## 2022-09-13 RX ORDER — HEPARIN 100 UNIT/ML
500 SYRINGE INTRAVENOUS
Status: CANCELLED | OUTPATIENT
Start: 2022-09-20

## 2022-09-13 RX ORDER — BORTEZOMIB 3.5 MG/1
1.3 INJECTION, POWDER, LYOPHILIZED, FOR SOLUTION INTRAVENOUS; SUBCUTANEOUS
Status: CANCELLED | OUTPATIENT
Start: 2022-10-04

## 2022-09-13 RX ORDER — ACETAMINOPHEN 325 MG/1
650 TABLET ORAL
Status: CANCELLED | OUTPATIENT
Start: 2022-09-13

## 2022-09-13 RX ORDER — HEPARIN 100 UNIT/ML
500 SYRINGE INTRAVENOUS
Status: CANCELLED | OUTPATIENT
Start: 2022-10-04

## 2022-09-13 RX ORDER — DIPHENHYDRAMINE HCL 50 MG
50 CAPSULE ORAL
Status: CANCELLED
Start: 2022-09-20

## 2022-09-13 RX ORDER — SODIUM CHLORIDE 0.9 % (FLUSH) 0.9 %
10 SYRINGE (ML) INJECTION
Status: CANCELLED | OUTPATIENT
Start: 2022-09-20

## 2022-09-13 RX ORDER — DIPHENHYDRAMINE HCL 50 MG
50 CAPSULE ORAL
Status: CANCELLED
Start: 2022-10-04

## 2022-09-13 RX ORDER — BORTEZOMIB 3.5 MG/1
1.3 INJECTION, POWDER, LYOPHILIZED, FOR SOLUTION INTRAVENOUS; SUBCUTANEOUS
Status: COMPLETED | OUTPATIENT
Start: 2022-09-13 | End: 2022-09-13

## 2022-09-13 RX ORDER — BORTEZOMIB 3.5 MG/1
1.3 INJECTION, POWDER, LYOPHILIZED, FOR SOLUTION INTRAVENOUS; SUBCUTANEOUS
Status: CANCELLED | OUTPATIENT
Start: 2022-09-13

## 2022-09-13 RX ORDER — ACETAMINOPHEN 325 MG/1
650 TABLET ORAL
Status: CANCELLED | OUTPATIENT
Start: 2022-09-27

## 2022-09-13 RX ORDER — BORTEZOMIB 3.5 MG/1
1.3 INJECTION, POWDER, LYOPHILIZED, FOR SOLUTION INTRAVENOUS; SUBCUTANEOUS
Status: CANCELLED | OUTPATIENT
Start: 2022-09-20

## 2022-09-13 RX ORDER — ACETAMINOPHEN 325 MG/1
650 TABLET ORAL
Status: CANCELLED | OUTPATIENT
Start: 2022-09-20

## 2022-09-13 RX ORDER — SODIUM CHLORIDE 0.9 % (FLUSH) 0.9 %
10 SYRINGE (ML) INJECTION
Status: CANCELLED | OUTPATIENT
Start: 2022-09-13

## 2022-09-13 RX ORDER — HEPARIN 100 UNIT/ML
500 SYRINGE INTRAVENOUS
Status: CANCELLED | OUTPATIENT
Start: 2022-09-27

## 2022-09-13 RX ORDER — ACETAMINOPHEN 325 MG/1
650 TABLET ORAL
Status: CANCELLED | OUTPATIENT
Start: 2022-10-04

## 2022-09-13 RX ORDER — BORTEZOMIB 3.5 MG/1
1.3 INJECTION, POWDER, LYOPHILIZED, FOR SOLUTION INTRAVENOUS; SUBCUTANEOUS
Status: CANCELLED | OUTPATIENT
Start: 2022-09-27

## 2022-09-13 RX ORDER — HEPARIN 100 UNIT/ML
500 SYRINGE INTRAVENOUS
Status: DISCONTINUED | OUTPATIENT
Start: 2022-09-13 | End: 2022-09-13 | Stop reason: HOSPADM

## 2022-09-13 RX ORDER — HEPARIN 100 UNIT/ML
500 SYRINGE INTRAVENOUS
Status: CANCELLED | OUTPATIENT
Start: 2022-09-13

## 2022-09-13 RX ORDER — DIPHENHYDRAMINE HCL 50 MG
50 CAPSULE ORAL
Status: COMPLETED | OUTPATIENT
Start: 2022-09-13 | End: 2022-09-13

## 2022-09-13 RX ORDER — SODIUM CHLORIDE 0.9 % (FLUSH) 0.9 %
10 SYRINGE (ML) INJECTION
Status: CANCELLED | OUTPATIENT
Start: 2022-10-04

## 2022-09-13 RX ORDER — ACETAMINOPHEN 325 MG/1
650 TABLET ORAL
Status: COMPLETED | OUTPATIENT
Start: 2022-09-13 | End: 2022-09-13

## 2022-09-13 RX ORDER — SODIUM CHLORIDE 0.9 % (FLUSH) 0.9 %
10 SYRINGE (ML) INJECTION
Status: DISCONTINUED | OUTPATIENT
Start: 2022-09-13 | End: 2022-09-13 | Stop reason: HOSPADM

## 2022-09-13 RX ORDER — DIPHENHYDRAMINE HCL 50 MG
50 CAPSULE ORAL
Status: CANCELLED
Start: 2022-09-27

## 2022-09-13 RX ORDER — DIPHENHYDRAMINE HCL 50 MG
50 CAPSULE ORAL
Status: CANCELLED
Start: 2022-09-13

## 2022-09-13 RX ADMIN — DIPHENHYDRAMINE HYDROCHLORIDE 50 MG: 50 CAPSULE ORAL at 01:09

## 2022-09-13 RX ADMIN — DARATUMUMAB AND HYALURONIDASE-FIHJ (HUMAN RECOMBINANT) 1800 MG: 1800; 30000 INJECTION SUBCUTANEOUS at 01:09

## 2022-09-13 RX ADMIN — BORTEZOMIB 2.4 MG: 3.5 INJECTION, POWDER, LYOPHILIZED, FOR SOLUTION INTRAVENOUS; SUBCUTANEOUS at 01:09

## 2022-09-13 RX ADMIN — ACETAMINOPHEN 650 MG: 325 TABLET ORAL at 01:09

## 2022-09-13 NOTE — PLAN OF CARE
Pt received Darzalex & Velcade today and tolerated well, without complications. Educated patient about Darzalex & Velcade (indications, side effects, possible reactions, chemotherapy precautions) and verbalized understanding. Velcade inj administered SQ to R abd, tolerated well, dsg to site. Darzalex inj administered SQ to L abd, tolerated well, dsg to site. VSS. Pt DC with no distress noted, WC off of unit escorted by daughter, w/o event, pleased.

## 2022-09-14 ENCOUNTER — CLINICAL SUPPORT (OUTPATIENT)
Dept: REHABILITATION | Facility: HOSPITAL | Age: 62
End: 2022-09-14
Payer: MEDICAID

## 2022-09-14 DIAGNOSIS — R29.898 WEAKNESS OF BOTH LOWER EXTREMITIES: ICD-10-CM

## 2022-09-14 DIAGNOSIS — R26.89 BALANCE PROBLEM: ICD-10-CM

## 2022-09-14 DIAGNOSIS — Z74.09 IMPAIRED FUNCTIONAL MOBILITY, BALANCE, GAIT, AND ENDURANCE: Primary | ICD-10-CM

## 2022-09-14 PROCEDURE — 97110 THERAPEUTIC EXERCISES: CPT | Mod: PN

## 2022-09-14 NOTE — PROGRESS NOTES
[facility]   Physical Therapy Treatment Note    Name: Raquel Houston  Clinic Number: [id number]    Therapy Diagnosis:   Encounter Diagnoses   Name Primary?    Impaired functional mobility, balance, gait, and endurance Yes    Weakness of both lower extremities     Balance problem      Physician: Oneyda, Provider    Visit Date: 9/14/2022    Physician Orders: PT Eval and Treat  Medical Diagnosis from Referral: Sepsis, due to unspecified organism, unspecified whether acute organ dysfunction present [A41.9]  Evaluation Date: 8/8/2022  Authorization Period Expiration: 12/31/2022  Plan of Care Expiration: 10/3/2022  Progress Note Due: 10/12/2022 (Reassessed 9/12/2022)  Visit # / Visits authorized: 11 / 12 auth (1/1 eval)  FOTO: 3 / 3  PTA Visit #: 0 / 5     Precautions: WBAT 6 weeks, sx 7/18/2022 ORIF    Time In: 10:48 AM  Time Out: 11:30 AM  Total Billable Time: 42 minutes    SUBJECTIVE     Pt reports: she notices increased swelling in the left knee but other than that she is doing well overall since her last visit.     She was compliant with home exercise program.  Response to previous treatment: mild soreness that improved over time  Functional change: Improved walking ability with rolling walker     Pain: 5/10  Location: left knee    OBJECTIVE     Objective Measures updated at progress report unless specified.     Treatment     Raquel received the treatments listed below:      Therapeutic exercises to develop strength, endurance, ROM, flexibility, posture and core stabilization for 42 minutes including:    Bilateral shuttle 2 min 1.5 bands  Single leg shuttle 0.5 band x20 reps LLE  Recumbent bike lvl 1 seat 6 5 min  Seated Hip Adduction 3s x2 min  Seated Hip Abduction GTB x2 min  LAQ 3# 2 min BLE  HSC RTB x15 each  Seated Marching x20 ea (alternating LE's) 3#  Hamstring stretch with strap 2 min LLE  Bridging glute set x20 reps    Not performed today:  Mini squats w walker 2 x 10    Seated low back stretch x3 min (reaching and with ball)  Seated Hamstring Stretch x2 min ea + LE's on stool  Sit to Stands x5 reps - no UE assist/hands in lap, table elevated  Standing hip abduction LLE x20 reps  Standing hip extension LLE x20 reps  Standing hip flexion/marching LLE x20 reps  SAQ x30 reps 3#      Patient Education and Home Exercises     Education provided:   - Weight bearing precaution  - Safety awareness  - Use of assistive devices  - Transfers  - HEP reviewed  - Anatomy and Physiology pertaining to current condition  - Possible response to exercise  - Importance of PT compliance    Written Home Exercises Provided: Patient instructed to cont prior HEP. Exercises were reviewed and Raquel was able to demonstrate them prior to the end of the session. Raquel demonstrated good  understanding of the education provided. See EMR under Patient Instructions for exercises provided during therapy sessions.    ASSESSMENT   Raquel is able to significantly progress therex today by completing shuttle leg press with bilateral and single legs and also completing recumbent bike with low resistance.  She was unable to fully lift her body off the mat during bridges but was able to initiate glutes.     Raquel Is progressing well towards her goals.   Pt prognosis is Fair.     Pt will continue to benefit from skilled outpatient physical therapy to address the deficits listed in the problem list box on initial evaluation, provide pt/family education and to maximize pt's level of independence in the home and community environment.     Pt's spiritual, cultural and educational needs considered and pt agreeable to plan of care and goals.     Anticipated barriers to physical therapy: WBAT    Goals:  Short Term Goals: 4 weeks   1. Pt will be independent with HEP to facilitate healing and improve outcome measures. (met)  2. Pt will increased hip ROM by 5-10 degrees without pain. (met)  3. Pt will be able to demonstrate full  WB in LLE and walk greater than 50 feet with least restrictive AD. (met)  4. Pt will increase LLE MMT values by at least 1/2 grade. (met)     Long Term Goals: 8 weeks   1. Pt will demonstrate mod [place] with household transfers and gait. (progressing, not met)  2. Pt will demonstrate LLE gross strength to 4/5 or greater. (progressing, not met)  3. Pt will demonstrate full L hip ROM and flexibility. (progressing, not met)    PLAN     Continue with PT POC to address functional deficits.    Evan Tesfaye, PT, DPT

## 2022-09-19 ENCOUNTER — CLINICAL SUPPORT (OUTPATIENT)
Dept: REHABILITATION | Facility: HOSPITAL | Age: 62
End: 2022-09-19
Payer: MEDICAID

## 2022-09-19 DIAGNOSIS — Z74.09 IMPAIRED FUNCTIONAL MOBILITY, BALANCE, GAIT, AND ENDURANCE: Primary | ICD-10-CM

## 2022-09-19 DIAGNOSIS — R29.898 WEAKNESS OF BOTH LOWER EXTREMITIES: ICD-10-CM

## 2022-09-19 DIAGNOSIS — R26.89 BALANCE PROBLEM: ICD-10-CM

## 2022-09-19 PROCEDURE — 97110 THERAPEUTIC EXERCISES: CPT | Mod: PN

## 2022-09-19 NOTE — PROGRESS NOTES
[facility]   Physical Therapy Treatment Note    Name: Raquel Houston  Clinic Number: [id number]    Therapy Diagnosis:   Encounter Diagnoses   Name Primary?    Impaired functional mobility, balance, gait, and endurance Yes    Weakness of both lower extremities     Balance problem      Physician: Oneyda, Provider    Visit Date: 9/19/2022    Physician Orders: PT Eval and Treat  Medical Diagnosis from Referral: Sepsis, due to unspecified organism, unspecified whether acute organ dysfunction present [A41.9]  Evaluation Date: 8/8/2022  Authorization Period Expiration: 12/31/2022  Plan of Care Expiration: 10/3/2022  Progress Note Due: 10/12/2022 (Reassessed 9/12/2022)  Visit # / Visits authorized: 12/12 auth (1/1 eval)  FOTO: 3 / 3  PTA Visit #: 0 / 5     Precautions: WBAT 6 weeks, sx 7/18/2022 ORIF    Time In: 0928 am (Pt arrived late)  Time Out: 1000 am  Total Billable Time: 32 minutes    SUBJECTIVE     Pt reports: minimal discomfort in the back this morning but she continues to try and be more independent with activities at home.     She was compliant with home exercise program.  Response to previous treatment: mild soreness that improved over time  Functional change: Improved walking ability with rolling walker     Pain: 4/10  Location: low back     OBJECTIVE     Objective Measures updated at progress report unless specified.     Treatment     Raquel received the treatments listed below:      Therapeutic exercises to develop strength, endurance, ROM, flexibility, posture and core stabilization for 30 minutes including:    Bilateral shuttle 2 min 1.5 bands  Single leg shuttle 0.5 band x20 reps LLE  Recumbent bike lvl 1 seat 5 5 min  LAQ 3# 2 min BLE  HSC RTB x15 each  Hamstring stretch with strap 2 min each   Bridging glute set x20 reps  Stair climbing 3 laps      Raquel participated in gait training to improve functional mobility and safety for 2 minutes,  including:    Ambulation, gait training using straight cane, 25 feet      Not performed today:  Seated Marching x20 ea (alternating LE's) 3#  Seated Hip Adduction 3s x2 min  Seated Hip Abduction GTB x2 min  Mini squats w walker 2 x 10   Seated low back stretch x3 min (reaching and with ball)  Seated Hamstring Stretch x2 min ea + LE's on stool  Sit to Stands x5 reps - no UE assist/hands in lap, table elevated  Standing hip abduction LLE x20 reps  Standing hip extension LLE x20 reps  Standing hip flexion/marching LLE x20 reps  SAQ x30 reps 3#      Patient Education and Home Exercises     Education provided:   - Gait training with straight cane*  - Weight bearing precaution  - Safety awareness  - Use of assistive devices  - Transfers  - HEP reviewed  - Anatomy and Physiology pertaining to current condition  - Possible response to exercise  - Importance of PT compliance    Written Home Exercises Provided: Patient instructed to cont prior HEP. Exercises were reviewed and Raquel was able to demonstrate them prior to the end of the session. Raquel demonstrated good  understanding of the education provided. See EMR under Patient Instructions for exercises provided during therapy sessions.    ASSESSMENT   Treatment time limited today due to patient arriving late but she was still able to tolerate LE strengthening activities. She was able to perform stairs today with supervision assist. Pt was also educated on use of a straight cane for 25 feet. She demonstrates understanding of this technique.     Raquel Is progressing well towards her goals.   Pt prognosis is Fair.     Pt will continue to benefit from skilled outpatient physical therapy to address the deficits listed in the problem list box on initial evaluation, provide pt/family education and to maximize pt's level of independence in the home and community environment.     Pt's spiritual, cultural and educational needs considered and pt agreeable to plan of care and  goals.     Anticipated barriers to physical therapy: WBAT    Goals:  Short Term Goals: 4 weeks   1. Pt will be independent with HEP to facilitate healing and improve outcome measures. (met)  2. Pt will increased hip ROM by 5-10 degrees without pain. (met)  3. Pt will be able to demonstrate full WB in LLE and walk greater than 50 feet with least restrictive AD. (met)  4. Pt will increase LLE MMT values by at least 1/2 grade. (met)     Long Term Goals: 8 weeks   1. Pt will demonstrate mod [place] with household transfers and gait. (progressing, not met)  2. Pt will demonstrate LLE gross strength to 4/5 or greater. (progressing, not met)  3. Pt will demonstrate full L hip ROM and flexibility. (progressing, not met)    PLAN     Continue with PT POC to address functional deficits.    Evan Tesfaye, PT, DPT

## 2022-09-20 ENCOUNTER — INFUSION (OUTPATIENT)
Dept: INFUSION THERAPY | Facility: OTHER | Age: 62
End: 2022-09-20
Attending: STUDENT IN AN ORGANIZED HEALTH CARE EDUCATION/TRAINING PROGRAM
Payer: MEDICAID

## 2022-09-20 VITALS
RESPIRATION RATE: 16 BRPM | HEART RATE: 98 BPM | WEIGHT: 191.81 LBS | SYSTOLIC BLOOD PRESSURE: 130 MMHG | BODY MASS INDEX: 30.1 KG/M2 | TEMPERATURE: 98 F | DIASTOLIC BLOOD PRESSURE: 64 MMHG | HEIGHT: 67 IN | OXYGEN SATURATION: 100 %

## 2022-09-20 DIAGNOSIS — C90.00 MULTIPLE MYELOMA NOT HAVING ACHIEVED REMISSION: Primary | ICD-10-CM

## 2022-09-20 PROCEDURE — 63600175 PHARM REV CODE 636 W HCPCS: Mod: TB | Performed by: INTERNAL MEDICINE

## 2022-09-20 PROCEDURE — 96401 CHEMO ANTI-NEOPL SQ/IM: CPT

## 2022-09-20 PROCEDURE — 25000003 PHARM REV CODE 250: Performed by: INTERNAL MEDICINE

## 2022-09-20 RX ORDER — SODIUM CHLORIDE 0.9 % (FLUSH) 0.9 %
10 SYRINGE (ML) INJECTION
Status: DISCONTINUED | OUTPATIENT
Start: 2022-09-20 | End: 2022-09-20 | Stop reason: HOSPADM

## 2022-09-20 RX ORDER — BORTEZOMIB 3.5 MG/1
1.3 INJECTION, POWDER, LYOPHILIZED, FOR SOLUTION INTRAVENOUS; SUBCUTANEOUS
Status: COMPLETED | OUTPATIENT
Start: 2022-09-20 | End: 2022-09-20

## 2022-09-20 RX ORDER — ACETAMINOPHEN 325 MG/1
650 TABLET ORAL
Status: COMPLETED | OUTPATIENT
Start: 2022-09-20 | End: 2022-09-20

## 2022-09-20 RX ORDER — HEPARIN 100 UNIT/ML
500 SYRINGE INTRAVENOUS
Status: DISCONTINUED | OUTPATIENT
Start: 2022-09-20 | End: 2022-09-20 | Stop reason: HOSPADM

## 2022-09-20 RX ORDER — DIPHENHYDRAMINE HCL 25 MG
50 CAPSULE ORAL
Status: COMPLETED | OUTPATIENT
Start: 2022-09-20 | End: 2022-09-20

## 2022-09-20 RX ADMIN — ACETAMINOPHEN 650 MG: 325 TABLET ORAL at 01:09

## 2022-09-20 RX ADMIN — BORTEZOMIB 2.4 MG: 3.5 INJECTION, POWDER, LYOPHILIZED, FOR SOLUTION INTRAVENOUS; SUBCUTANEOUS at 03:09

## 2022-09-20 RX ADMIN — DIPHENHYDRAMINE HYDROCHLORIDE 50 MG: 25 CAPSULE ORAL at 01:09

## 2022-09-20 RX ADMIN — DARATUMUMAB AND HYALURONIDASE-FIHJ (HUMAN RECOMBINANT) 1800 MG: 1800; 30000 INJECTION SUBCUTANEOUS at 03:09

## 2022-09-20 NOTE — PLAN OF CARE
Sofie/Velcade injections to abdomen complete. Pt tolerated well. VSS. NAD. Pt verbalized understanding of discharge instructions before leaving.

## 2022-09-21 ENCOUNTER — CLINICAL SUPPORT (OUTPATIENT)
Dept: REHABILITATION | Facility: HOSPITAL | Age: 62
End: 2022-09-21
Payer: MEDICAID

## 2022-09-21 DIAGNOSIS — R26.89 BALANCE PROBLEM: ICD-10-CM

## 2022-09-21 DIAGNOSIS — H91.90 HEARING LOSS, UNSPECIFIED HEARING LOSS TYPE, UNSPECIFIED LATERALITY: Primary | ICD-10-CM

## 2022-09-21 DIAGNOSIS — R29.898 WEAKNESS OF BOTH LOWER EXTREMITIES: ICD-10-CM

## 2022-09-21 DIAGNOSIS — Z74.09 IMPAIRED FUNCTIONAL MOBILITY, BALANCE, GAIT, AND ENDURANCE: Primary | ICD-10-CM

## 2022-09-21 PROCEDURE — 97110 THERAPEUTIC EXERCISES: CPT | Mod: PN

## 2022-09-21 NOTE — PROGRESS NOTES
[facility]   Physical Therapy Treatment Note    Name: Raquel Houston  Clinic Number: [id number]    Therapy Diagnosis:   Encounter Diagnoses   Name Primary?    Impaired functional mobility, balance, gait, and endurance Yes    Weakness of both lower extremities     Balance problem      Physician: Oneyda, Provider    Visit Date: 9/21/2022    Physician Orders: PT Eval and Treat  Medical Diagnosis from Referral: Sepsis, due to unspecified organism, unspecified whether acute organ dysfunction present [A41.9]  Evaluation Date: 8/8/2022  Authorization Period Expiration: 12/31/2022  Plan of Care Expiration: 10/3/2022  Progress Note Due: 10/12/2022 (Reassessed 9/12/2022)  Visit # / Visits authorized: 13/20 auth (1/1 eval)  FOTO: 3 / 3  PTA Visit #: 0 / 5     Precautions: WBAT 6 weeks, sx 7/18/2022 ORIF    Time In: 0921 am (Pt arrived late)  Time Out: 1000 am  Total Billable Time: 39 minutes    SUBJECTIVE     Pt reports: she has little to no soreness after last session. She states if anything exercising makes the legs feel better. This morning she only has a little aching in the right side low back.    She was compliant with home exercise program.  Response to previous treatment: mild soreness that improved over time  Functional change: Improved walking ability with rolling walker     Pain: 2/10  Location: low back     OBJECTIVE     Objective Measures updated at progress report unless specified.     Treatment     Raquel received the treatments listed below:      Therapeutic exercises to develop strength, endurance, ROM, flexibility, posture and core stabilization for 31 minutes including:    Bilateral shuttle 2 min 2 bands  Single leg shuttle 1 band x20 reps LLE  Recumbent bike lvl 2 seat 5 5 min  LAQ 4# 2 min BLE  HSC RTB 2x10 each  Hamstring stretch with strap 2 min each   Bridging glute set x20 reps  Stair climbing 4 laps      Raquel participated in gait training to improve  functional mobility and safety for 8 minutes, including:    Ambulation, gait training using straight cane, 100 feet      Not performed today:  Seated Marching x20 ea (alternating LE's) 3#  Seated Hip Adduction 3s x2 min  Seated Hip Abduction GTB x2 min  Mini squats w walker 2 x 10   Seated low back stretch x3 min (reaching and with ball)  Seated Hamstring Stretch x2 min ea + LE's on stool  Sit to Stands x5 reps - no UE assist/hands in lap, table elevated  Standing hip abduction LLE x20 reps  Standing hip extension LLE x20 reps  Standing hip flexion/marching LLE x20 reps  SAQ x30 reps 3#      Patient Education and Home Exercises     Education provided:   - Gait training with straight cane*  - Weight bearing precaution  - Safety awareness  - Use of assistive devices  - Transfers  - HEP reviewed  - Anatomy and Physiology pertaining to current condition  - Possible response to exercise  - Importance of PT compliance    Written Home Exercises Provided: Patient instructed to cont prior HEP. Exercises were reviewed and Raquel was able to demonstrate them prior to the end of the session. Raquel demonstrated good  understanding of the education provided. See EMR under Patient Instructions for exercises provided during therapy sessions.    ASSESSMENT   Pt tolerates additional progression with LE strengthening including increased resistance with bike, LAQ, HSC, and shuttle activities. She is able to increase stair climbing reps and distance ambulating with straight cane. She is making significant progress at this time.     Raquel Is progressing well towards her goals.   Pt prognosis is Fair.     Pt will continue to benefit from skilled outpatient physical therapy to address the deficits listed in the problem list box on initial evaluation, provide pt/family education and to maximize pt's level of independence in the home and community environment.     Pt's spiritual, cultural and educational needs considered and pt  agreeable to plan of care and goals.     Anticipated barriers to physical therapy: WBAT    Goals:  Short Term Goals: 4 weeks   1. Pt will be independent with HEP to facilitate healing and improve outcome measures. (met)  2. Pt will increased hip ROM by 5-10 degrees without pain. (met)  3. Pt will be able to demonstrate full WB in LLE and walk greater than 50 feet with least restrictive AD. (met)  4. Pt will increase LLE MMT values by at least 1/2 grade. (met)     Long Term Goals: 8 weeks   1. Pt will demonstrate mod [place] with household transfers and gait. (progressing, not met)  2. Pt will demonstrate LLE gross strength to 4/5 or greater. (progressing, not met)  3. Pt will demonstrate full L hip ROM and flexibility. (progressing, not met)    PLAN     Continue with PT POC to address functional deficits.    Evan Tesfaye, PT, DPT

## 2022-09-26 ENCOUNTER — CLINICAL SUPPORT (OUTPATIENT)
Dept: REHABILITATION | Facility: HOSPITAL | Age: 62
End: 2022-09-26
Payer: MEDICAID

## 2022-09-26 DIAGNOSIS — Z74.09 IMPAIRED FUNCTIONAL MOBILITY, BALANCE, GAIT, AND ENDURANCE: Primary | ICD-10-CM

## 2022-09-26 DIAGNOSIS — R26.89 BALANCE PROBLEM: ICD-10-CM

## 2022-09-26 DIAGNOSIS — R29.898 WEAKNESS OF BOTH LOWER EXTREMITIES: ICD-10-CM

## 2022-09-26 PROCEDURE — 97110 THERAPEUTIC EXERCISES: CPT | Mod: PN

## 2022-09-26 NOTE — PROGRESS NOTES
[facility]   Physical Therapy Treatment Note    Name: Raquel Houston  Clinic Number: [id number]    Therapy Diagnosis:   Encounter Diagnoses   Name Primary?    Impaired functional mobility, balance, gait, and endurance Yes    Weakness of both lower extremities     Balance problem      Physician: Oneyda, Provider    Visit Date: 9/26/2022    Physician Orders: PT Eval and Treat  Medical Diagnosis from Referral: Sepsis, due to unspecified organism, unspecified whether acute organ dysfunction present [A41.9]  Evaluation Date: 8/8/2022  Authorization Period Expiration: 12/31/2022  Plan of Care Expiration: 10/3/2022  Progress Note Due: 10/12/2022 (Reassessed 9/12/2022)  Visit # / Visits authorized: 13/20 auth (1/1 eval)  FOTO: 3 / 3  PTA Visit #: 0 / 5     Precautions: WBAT 6 weeks, sx 7/18/2022 ORIF    Time In: 0921 am (Pt arrived late)  Time Out: 0959 am  Total Billable Time: 38 minutes    SUBJECTIVE     Pt reports: only minimal back pain this morning. There have been no new issues with the leg or hip.     She was compliant with home exercise program.  Response to previous treatment: mild soreness that improved over time  Functional change: Improved walking ability with rolling walker and straight cane    Pain: 5/10  Location: low back     OBJECTIVE     Objective Measures updated at progress report unless specified.     Treatment     Raquel received the treatments listed below:      Therapeutic exercises to develop strength, endurance, ROM, flexibility, posture and core stabilization for 38 minutes including:    Bilateral shuttle 2 min 2 bands  Single leg shuttle 1 band x20 reps LLE  Recumbent bike lvl 2 seat 5 5 min  LAQ 4# 2 min BLE  HSC RTB 2x10 each  Hamstring stretch with strap 2 min each   Bridging glute set x20 reps  Stair climbing 4 laps  Standing hip abduction LLE x20 reps  Standing hip extension LLE x20 reps  Straight cane walking 25 ft     Not performed today:  Seated  Marching x20 ea (alternating LE's) 3#  Seated Hip Adduction 3s x2 min  Seated Hip Abduction GTB x2 min  Mini squats w walker 2 x 10   Seated low back stretch x3 min (reaching and with ball)  Seated Hamstring Stretch x2 min ea + LE's on stool  Sit to Stands x5 reps - no UE assist/hands in lap, table elevated  Standing hip flexion/marching LLE x20 reps  SAQ x30 reps 3#      Patient Education and Home Exercises     Education provided:   - Gait training with straight cane*  - Weight bearing precaution  - Safety awareness  - Use of assistive devices  - Transfers  - HEP reviewed  - Anatomy and Physiology pertaining to current condition  - Possible response to exercise  - Importance of PT compliance    Written Home Exercises Provided: Patient instructed to cont prior HEP. Exercises were reviewed and Raquel was able to demonstrate them prior to the end of the session. Raquel demonstrated good  understanding of the education provided. See EMR under Patient Instructions for exercises provided during therapy sessions.    ASSESSMENT   Raquel is able to complete more exercises today with minimal fatigue. Her legs were tired and sore by the end of our session which is partly due to her attending a weekend this past weekend. She is progressing toward PT goals.     Raquel Is progressing well towards her goals.   Pt prognosis is Fair.     Pt will continue to benefit from skilled outpatient physical therapy to address the deficits listed in the problem list box on initial evaluation, provide pt/family education and to maximize pt's level of independence in the home and community environment.     Pt's spiritual, cultural and educational needs considered and pt agreeable to plan of care and goals.     Anticipated barriers to physical therapy: WBAT    Goals:  Short Term Goals: 4 weeks   1. Pt will be independent with HEP to facilitate healing and improve outcome measures. (met)  2. Pt will increased hip ROM by 5-10 degrees without  pain. (met)  3. Pt will be able to demonstrate full WB in LLE and walk greater than 50 feet with least restrictive AD. (met)  4. Pt will increase LLE MMT values by at least 1/2 grade. (met)     Long Term Goals: 8 weeks   1. Pt will demonstrate mod [place] with household transfers and gait. (progressing, not met)  2. Pt will demonstrate LLE gross strength to 4/5 or greater. (progressing, not met)  3. Pt will demonstrate full L hip ROM and flexibility. (progressing, not met)    PLAN     Continue with PT POC to address functional deficits.    Evan Tesfaye, PT, DPT

## 2022-09-27 ENCOUNTER — LAB VISIT (OUTPATIENT)
Dept: LAB | Facility: OTHER | Age: 62
End: 2022-09-27
Attending: INTERNAL MEDICINE
Payer: MEDICAID

## 2022-09-27 ENCOUNTER — INFUSION (OUTPATIENT)
Dept: INFUSION THERAPY | Facility: OTHER | Age: 62
End: 2022-09-27
Attending: INTERNAL MEDICINE
Payer: MEDICAID

## 2022-09-27 VITALS
RESPIRATION RATE: 17 BRPM | HEIGHT: 67 IN | TEMPERATURE: 98 F | SYSTOLIC BLOOD PRESSURE: 132 MMHG | BODY MASS INDEX: 30.69 KG/M2 | WEIGHT: 195.56 LBS | HEART RATE: 83 BPM | DIASTOLIC BLOOD PRESSURE: 60 MMHG | OXYGEN SATURATION: 100 %

## 2022-09-27 DIAGNOSIS — C90.00 MULTIPLE MYELOMA NOT HAVING ACHIEVED REMISSION: Primary | ICD-10-CM

## 2022-09-27 LAB
ALBUMIN SERPL BCP-MCNC: 3.3 G/DL (ref 3.5–5.2)
ALP SERPL-CCNC: 291 U/L (ref 55–135)
ALT SERPL W/O P-5'-P-CCNC: 36 U/L (ref 10–44)
ANION GAP SERPL CALC-SCNC: 10 MMOL/L (ref 8–16)
AST SERPL-CCNC: 25 U/L (ref 10–40)
BASOPHILS # BLD AUTO: 0.04 K/UL (ref 0–0.2)
BASOPHILS NFR BLD: 0.7 % (ref 0–1.9)
BILIRUB SERPL-MCNC: 0.5 MG/DL (ref 0.1–1)
BUN SERPL-MCNC: 9 MG/DL (ref 6–20)
CALCIUM SERPL-MCNC: 8.3 MG/DL (ref 8.7–10.5)
CHLORIDE SERPL-SCNC: 110 MMOL/L (ref 95–110)
CO2 SERPL-SCNC: 22 MMOL/L (ref 23–29)
CREAT SERPL-MCNC: 0.7 MG/DL (ref 0.5–1.4)
DIFFERENTIAL METHOD: ABNORMAL
EOSINOPHIL # BLD AUTO: 0.4 K/UL (ref 0–0.5)
EOSINOPHIL NFR BLD: 7.5 % (ref 0–8)
ERYTHROCYTE [DISTWIDTH] IN BLOOD BY AUTOMATED COUNT: 17.3 % (ref 11.5–14.5)
EST. GFR  (NO RACE VARIABLE): >60 ML/MIN/1.73 M^2
GLUCOSE SERPL-MCNC: 87 MG/DL (ref 70–110)
HCT VFR BLD AUTO: 31.6 % (ref 37–48.5)
HGB BLD-MCNC: 10.1 G/DL (ref 12–16)
IMM GRANULOCYTES # BLD AUTO: 0.01 K/UL (ref 0–0.04)
IMM GRANULOCYTES NFR BLD AUTO: 0.2 % (ref 0–0.5)
LYMPHOCYTES # BLD AUTO: 1.9 K/UL (ref 1–4.8)
LYMPHOCYTES NFR BLD: 32.8 % (ref 18–48)
MCH RBC QN AUTO: 29.6 PG (ref 27–31)
MCHC RBC AUTO-ENTMCNC: 32 G/DL (ref 32–36)
MCV RBC AUTO: 93 FL (ref 82–98)
MONOCYTES # BLD AUTO: 0.7 K/UL (ref 0.3–1)
MONOCYTES NFR BLD: 11.2 % (ref 4–15)
NEUTROPHILS # BLD AUTO: 2.8 K/UL (ref 1.8–7.7)
NEUTROPHILS NFR BLD: 47.6 % (ref 38–73)
NRBC BLD-RTO: 0 /100 WBC
PLATELET # BLD AUTO: 216 K/UL (ref 150–450)
PMV BLD AUTO: 12 FL (ref 9.2–12.9)
POTASSIUM SERPL-SCNC: 3.6 MMOL/L (ref 3.5–5.1)
PROT SERPL-MCNC: 6.2 G/DL (ref 6–8.4)
RBC # BLD AUTO: 3.41 M/UL (ref 4–5.4)
SODIUM SERPL-SCNC: 142 MMOL/L (ref 136–145)
WBC # BLD AUTO: 5.89 K/UL (ref 3.9–12.7)

## 2022-09-27 PROCEDURE — 96401 CHEMO ANTI-NEOPL SQ/IM: CPT

## 2022-09-27 PROCEDURE — 36415 COLL VENOUS BLD VENIPUNCTURE: CPT | Performed by: INTERNAL MEDICINE

## 2022-09-27 PROCEDURE — 85025 COMPLETE CBC W/AUTO DIFF WBC: CPT | Performed by: INTERNAL MEDICINE

## 2022-09-27 PROCEDURE — 80053 COMPREHEN METABOLIC PANEL: CPT | Performed by: INTERNAL MEDICINE

## 2022-09-27 PROCEDURE — 96413 CHEMO IV INFUSION 1 HR: CPT

## 2022-09-27 PROCEDURE — 63600175 PHARM REV CODE 636 W HCPCS: Mod: TB | Performed by: INTERNAL MEDICINE

## 2022-09-27 PROCEDURE — 25000003 PHARM REV CODE 250: Performed by: INTERNAL MEDICINE

## 2022-09-27 RX ORDER — HEPARIN 100 UNIT/ML
500 SYRINGE INTRAVENOUS
Status: DISCONTINUED | OUTPATIENT
Start: 2022-09-27 | End: 2022-09-27 | Stop reason: HOSPADM

## 2022-09-27 RX ORDER — BORTEZOMIB 3.5 MG/1
1.3 INJECTION, POWDER, LYOPHILIZED, FOR SOLUTION INTRAVENOUS; SUBCUTANEOUS
Status: COMPLETED | OUTPATIENT
Start: 2022-09-27 | End: 2022-09-27

## 2022-09-27 RX ORDER — SODIUM CHLORIDE 0.9 % (FLUSH) 0.9 %
10 SYRINGE (ML) INJECTION
Status: DISCONTINUED | OUTPATIENT
Start: 2022-09-27 | End: 2022-09-27 | Stop reason: HOSPADM

## 2022-09-27 RX ORDER — DIPHENHYDRAMINE HCL 25 MG
50 CAPSULE ORAL
Status: COMPLETED | OUTPATIENT
Start: 2022-09-27 | End: 2022-09-27

## 2022-09-27 RX ORDER — ACETAMINOPHEN 325 MG/1
650 TABLET ORAL
Status: COMPLETED | OUTPATIENT
Start: 2022-09-27 | End: 2022-09-27

## 2022-09-27 RX ADMIN — ACETAMINOPHEN 650 MG: 325 TABLET ORAL at 10:09

## 2022-09-27 RX ADMIN — BORTEZOMIB 2.7 MG: 3.5 INJECTION, POWDER, LYOPHILIZED, FOR SOLUTION INTRAVENOUS; SUBCUTANEOUS at 11:09

## 2022-09-27 RX ADMIN — DIPHENHYDRAMINE HYDROCHLORIDE 50 MG: 25 CAPSULE ORAL at 10:09

## 2022-09-27 RX ADMIN — DARATUMUMAB AND HYALURONIDASE-FIHJ (HUMAN RECOMBINANT) 1800 MG: 1800; 30000 INJECTION SUBCUTANEOUS at 11:09

## 2022-09-27 NOTE — PLAN OF CARE
Patient tolerated her chemotherapy injections of Sofie/Velcade subcuataneous to the ABD. Patient reports no discomfort. VSS. NAD. Discussed discharge instructions. Verbalized understanding. No questions asked. Patient discharged to home per self.

## 2022-09-28 ENCOUNTER — CLINICAL SUPPORT (OUTPATIENT)
Dept: REHABILITATION | Facility: HOSPITAL | Age: 62
End: 2022-09-28
Payer: MEDICAID

## 2022-09-28 DIAGNOSIS — C90.00 MULTIPLE MYELOMA NOT HAVING ACHIEVED REMISSION: ICD-10-CM

## 2022-09-28 DIAGNOSIS — Z74.09 IMPAIRED FUNCTIONAL MOBILITY, BALANCE, GAIT, AND ENDURANCE: Primary | ICD-10-CM

## 2022-09-28 DIAGNOSIS — R26.89 BALANCE PROBLEM: ICD-10-CM

## 2022-09-28 DIAGNOSIS — R29.898 WEAKNESS OF BOTH LOWER EXTREMITIES: ICD-10-CM

## 2022-09-28 PROCEDURE — 97110 THERAPEUTIC EXERCISES: CPT | Mod: PN

## 2022-09-28 RX ORDER — LENALIDOMIDE 25 MG/1
25 CAPSULE ORAL SEE ADMIN INSTRUCTIONS
Qty: 21 CAPSULE | Refills: 0 | Status: SHIPPED | OUTPATIENT
Start: 2022-09-28 | End: 2022-10-06 | Stop reason: SDUPTHER

## 2022-09-28 NOTE — PROGRESS NOTES
[facility]   Physical Therapy Treatment Note    Name: Raquel Houston  Clinic Number: [id number]    Therapy Diagnosis:   Encounter Diagnoses   Name Primary?    Impaired functional mobility, balance, gait, and endurance Yes    Weakness of both lower extremities     Balance problem      Physician: Oneyda, Provider    Visit Date: 9/28/2022    Physician Orders: PT Eval and Treat  Medical Diagnosis from Referral: Sepsis, due to unspecified organism, unspecified whether acute organ dysfunction present [A41.9]  Evaluation Date: 8/8/2022  Authorization Period Expiration: 12/31/2022  Plan of Care Expiration: 10/3/2022  Progress Note Due: 10/12/2022 (Reassessed 9/12/2022)  Visit # / Visits authorized: 15/20 auth (1/1 eval)  FOTO: 3 / 3  PTA Visit #: 0 / 5     Precautions: WBAT 6 weeks, sx 7/18/2022 ORIF    Time In: 0920 am  Time Out: 1002 am  Total Billable Time: 42 minutes    SUBJECTIVE     Pt reports: she went through chemo therapy yesterday but is not feel any symptoms at this time. She states there is no pain in the leg or back this morning.     She was compliant with home exercise program.  Response to previous treatment: mild soreness that improved over time  Functional change: Improved walking ability with rolling walker and straight cane    Pain: 0/10  Location: low back     OBJECTIVE     Objective Measures updated at progress report unless specified.     Treatment     Raquel received the treatments listed below:      Therapeutic exercises to develop strength, endurance, ROM, flexibility, posture and core stabilization for 42 minutes including:    Bilateral shuttle 2 min 2 bands  Single leg shuttle 2 band x20 reps LLE  Recumbent bike lvl 2, seat 5, 5 min  LAQ 5# 2 min BLE  HSC RTB 2x10 each  Hamstring stretch with strap 2 min each   Bridging glute set x20 reps  Straight cane walking 2x100 ft     Not performed today:  Stair climbing 4 laps  Standing hip abduction LLE x20  reps  Standing hip extension LLE x20 reps  Seated Marching x20 ea (alternating LE's) 3#  Seated Hip Adduction 3s x2 min  Seated Hip Abduction GTB x2 min  Mini squats w walker 2 x 10   Seated low back stretch x3 min (reaching and with ball)  Seated Hamstring Stretch x2 min ea + LE's on stool  Sit to Stands x5 reps - no UE assist/hands in lap, table elevated  Standing hip flexion/marching LLE x20 reps  SAQ x30 reps 3#      Patient Education and Home Exercises     Education provided:   - Gait training with straight cane*  - Weight bearing precaution  - Safety awareness  - Use of assistive devices  - Transfers  - HEP reviewed  - Anatomy and Physiology pertaining to current condition  - Possible response to exercise  - Importance of PT compliance    Written Home Exercises Provided: Patient instructed to cont prior HEP. Exercises were reviewed and Raquel was able to demonstrate them prior to the end of the session. Raquel demonstrated good  understanding of the education provided. See EMR under Patient Instructions for exercises provided during therapy sessions.    ASSESSMENT   Raquel tolerates progress of therex today without onset of pain. She was able to increase cuff weight during long arc quad, perform a bridge lifting off the mat, and significantly increase walking distance with straight cane. She was also able to transfer to shuttle by walking with only HHA. She is progressing well toward PT goals.     Raquel Is progressing well towards her goals.   Pt prognosis is Fair.     Pt will continue to benefit from skilled outpatient physical therapy to address the deficits listed in the problem list box on initial evaluation, provide pt/family education and to maximize pt's level of independence in the home and community environment.     Pt's spiritual, cultural and educational needs considered and pt agreeable to plan of care and goals.     Anticipated barriers to physical therapy: WBAT    Goals:  Short Term Goals: 4  weeks   1. Pt will be independent with HEP to facilitate healing and improve outcome measures. (met)  2. Pt will increased hip ROM by 5-10 degrees without pain. (met)  3. Pt will be able to demonstrate full WB in LLE and walk greater than 50 feet with least restrictive AD. (met)  4. Pt will increase LLE MMT values by at least 1/2 grade. (met)     Long Term Goals: 8 weeks   1. Pt will demonstrate mod [place] with household transfers and gait. (progressing, not met)  2. Pt will demonstrate LLE gross strength to 4/5 or greater. (progressing, not met)  3. Pt will demonstrate full L hip ROM and flexibility. (met)    PLAN     Continue with PT POC to address functional deficits.    Evan Tesfaye, PT, DPT

## 2022-10-03 ENCOUNTER — CLINICAL SUPPORT (OUTPATIENT)
Dept: REHABILITATION | Facility: HOSPITAL | Age: 62
End: 2022-10-03
Payer: MEDICAID

## 2022-10-03 DIAGNOSIS — R26.89 BALANCE PROBLEM: ICD-10-CM

## 2022-10-03 DIAGNOSIS — R29.898 WEAKNESS OF BOTH LOWER EXTREMITIES: ICD-10-CM

## 2022-10-03 DIAGNOSIS — Z74.09 IMPAIRED FUNCTIONAL MOBILITY, BALANCE, GAIT, AND ENDURANCE: Primary | ICD-10-CM

## 2022-10-03 PROCEDURE — 97110 THERAPEUTIC EXERCISES: CPT | Mod: PN

## 2022-10-03 NOTE — PROGRESS NOTES
[facility]   Physical Therapy Treatment Note    Name: Raquel Houston  Clinic Number: [id number]    Therapy Diagnosis:   Encounter Diagnoses   Name Primary?    Impaired functional mobility, balance, gait, and endurance Yes    Weakness of both lower extremities     Balance problem      Physician: Oneyda, Provider    Visit Date: 10/3/2022    Physician Orders: PT Eval and Treat  Medical Diagnosis from Referral: Sepsis, due to unspecified organism, unspecified whether acute organ dysfunction present [A41.9]  Evaluation Date: 8/8/2022  Authorization Period Expiration: 12/31/2022  Plan of Care Expiration: 10/3/2022  Progress Note Due: 10/12/2022 (Reassessed 9/12/2022)  Visit # / Visits authorized: 16/20 auth (1/1 eval)  FOTO: 3 / 3  PTA Visit #: 0 / 5     Precautions: WBAT 6 weeks, sx 7/18/2022 ORIF    Time In: 0920 am  Time Out: 1000 am  Total Billable Time: 40 minutes    SUBJECTIVE     Pt reports: that everything is going well at home. She has been walking around her room without her AD and been doing her exercises. PT spoke with patient about getting rid of the rollator all together soon.     She was compliant with home exercise program.  Response to previous treatment: mild soreness that improved over time  Functional change: Improved walking ability with rolling walker and straight cane    Pain: 0/10  Location: low back     OBJECTIVE     Objective Measures updated at progress report unless specified.     Treatment     Raquel received the treatments listed below:      Therapeutic exercises to develop strength, endurance, ROM, flexibility, posture and core stabilization for 40 minutes including:    Bilateral shuttle 2 min 2 bands  Single leg shuttle 2 band x20 reps LLE  Recumbent bike lvl 2, seat 5, 5 min  LAQ 5# 2 min BLE  HSC RTB 2x10 each  Hamstring stretch with strap 2 min each   Bridging glute set x20 reps   Stair climbing 4 laps  Standing hip extension LLE x20 reps    Not  performed today:  Standing hip abduction LLE x20 reps  Seated Marching x20 ea (alternating LE's) 3#  Seated Hip Adduction 3s x2 min  Seated Hip Abduction GTB x2 min  Mini squats w walker 2 x 10   Seated low back stretch x3 min (reaching and with ball)  Seated Hamstring Stretch x2 min ea + LE's on stool  Sit to Stands x5 reps - no UE assist/hands in lap, table elevated  Standing hip flexion/marching LLE x20 reps  SAQ x30 reps 3#      Patient Education and Home Exercises     Education provided:   - Gait training without AD  - Weight bearing precaution  - Safety awareness  - Use of assistive devices  - Transfers  - HEP reviewed  - Anatomy and Physiology pertaining to current condition  - Possible response to exercise  - Importance of PT compliance    Written Home Exercises Provided: Patient instructed to cont prior HEP. Exercises were reviewed and Raquel was able to demonstrate them prior to the end of the session. Raquel demonstrated good  understanding of the education provided. See EMR under Patient Instructions for exercises provided during therapy sessions.    ASSESSMENT   Raquel is able to progress strengthening activities including increased resistance, standing hip strengthening, and ambulation without an assistive device. Her overall functional mobility is increasing indicating increased independence. She is hopeful she will be fully off of her assistive device soon. She has began to wean off.     Raquel Is progressing well towards her goals.   Pt prognosis is Fair.     Pt will continue to benefit from skilled outpatient physical therapy to address the deficits listed in the problem list box on initial evaluation, provide pt/family education and to maximize pt's level of independence in the home and community environment.     Pt's spiritual, cultural and educational needs considered and pt agreeable to plan of care and goals.     Anticipated barriers to physical therapy: WBAT    Goals:  Short Term Goals: 4  weeks   1. Pt will be independent with HEP to facilitate healing and improve outcome measures. (met)  2. Pt will increased hip ROM by 5-10 degrees without pain. (met)  3. Pt will be able to demonstrate full WB in LLE and walk greater than 50 feet with least restrictive AD. (met)  4. Pt will increase LLE MMT values by at least 1/2 grade. (met)     Long Term Goals: 8 weeks   1. Pt will demonstrate mod [place] with household transfers and gait. (met)  2. Pt will demonstrate LLE gross strength to 4/5 or greater. (progressing, not met)  3. Pt will demonstrate full L hip ROM and flexibility. (met)    PLAN     Continue with PT POC to address functional deficits.    Evan Tesfaye, PT, DPT

## 2022-10-04 ENCOUNTER — INFUSION (OUTPATIENT)
Dept: INFUSION THERAPY | Facility: HOSPITAL | Age: 62
End: 2022-10-04
Attending: INTERNAL MEDICINE
Payer: MEDICAID

## 2022-10-04 VITALS
RESPIRATION RATE: 18 BRPM | SYSTOLIC BLOOD PRESSURE: 114 MMHG | OXYGEN SATURATION: 99 % | TEMPERATURE: 98 F | HEART RATE: 84 BPM | DIASTOLIC BLOOD PRESSURE: 69 MMHG

## 2022-10-04 DIAGNOSIS — C90.00 MULTIPLE MYELOMA NOT HAVING ACHIEVED REMISSION: Primary | ICD-10-CM

## 2022-10-04 PROCEDURE — 96401 CHEMO ANTI-NEOPL SQ/IM: CPT

## 2022-10-04 PROCEDURE — 25000003 PHARM REV CODE 250: Performed by: INTERNAL MEDICINE

## 2022-10-04 PROCEDURE — 63600175 PHARM REV CODE 636 W HCPCS: Mod: JG | Performed by: INTERNAL MEDICINE

## 2022-10-04 RX ORDER — HEPARIN 100 UNIT/ML
500 SYRINGE INTRAVENOUS
Status: DISCONTINUED | OUTPATIENT
Start: 2022-10-04 | End: 2022-10-04 | Stop reason: HOSPADM

## 2022-10-04 RX ORDER — ACETAMINOPHEN 325 MG/1
650 TABLET ORAL
Status: COMPLETED | OUTPATIENT
Start: 2022-10-04 | End: 2022-10-04

## 2022-10-04 RX ORDER — SODIUM CHLORIDE 0.9 % (FLUSH) 0.9 %
10 SYRINGE (ML) INJECTION
Status: DISCONTINUED | OUTPATIENT
Start: 2022-10-04 | End: 2022-10-04 | Stop reason: HOSPADM

## 2022-10-04 RX ORDER — BORTEZOMIB 3.5 MG/1
1.3 INJECTION, POWDER, LYOPHILIZED, FOR SOLUTION INTRAVENOUS; SUBCUTANEOUS
Status: COMPLETED | OUTPATIENT
Start: 2022-10-04 | End: 2022-10-04

## 2022-10-04 RX ORDER — DIPHENHYDRAMINE HCL 50 MG
50 CAPSULE ORAL
Status: COMPLETED | OUTPATIENT
Start: 2022-10-04 | End: 2022-10-04

## 2022-10-04 RX ADMIN — DARATUMUMAB AND HYALURONIDASE-FIHJ (HUMAN RECOMBINANT) 1800 MG: 1800; 30000 INJECTION SUBCUTANEOUS at 03:10

## 2022-10-04 RX ADMIN — DIPHENHYDRAMINE HYDROCHLORIDE 50 MG: 50 CAPSULE ORAL at 02:10

## 2022-10-04 RX ADMIN — BORTEZOMIB 2.7 MG: 3.5 INJECTION, POWDER, LYOPHILIZED, FOR SOLUTION INTRAVENOUS; SUBCUTANEOUS at 03:10

## 2022-10-04 RX ADMIN — ACETAMINOPHEN 650 MG: 325 TABLET ORAL at 02:10

## 2022-10-04 NOTE — PLAN OF CARE
1400 Labs, hx, and medications reviewed, pt meets parameters for treatment today. Assessment completed and plan of care reviewed. Pt verbalized understanding. Pt voices no new complaints or concerns, will continue to monitor for safety.

## 2022-10-06 ENCOUNTER — PATIENT MESSAGE (OUTPATIENT)
Dept: INFUSION THERAPY | Facility: HOSPITAL | Age: 62
End: 2022-10-06
Payer: MEDICAID

## 2022-10-06 DIAGNOSIS — C90.00 MULTIPLE MYELOMA NOT HAVING ACHIEVED REMISSION: ICD-10-CM

## 2022-10-06 RX ORDER — LENALIDOMIDE 25 MG/1
25 CAPSULE ORAL SEE ADMIN INSTRUCTIONS
Qty: 21 CAPSULE | Refills: 0 | Status: SHIPPED | OUTPATIENT
Start: 2022-10-06 | End: 2022-11-22 | Stop reason: SDUPTHER

## 2022-10-06 NOTE — TELEPHONE ENCOUNTER
----- Message from Everett Boogie MD sent at 10/6/2022 11:52 AM CDT -----  Regarding: FW: Pharm request  Contact: [phone]    ----- Message -----  From: Mari Ruiz  Sent: 10/6/2022  10:02 AM CDT  To: [facility] Staff  Subject: Pharm request                                    Rhjvi w/[facility] [phone] F#[phone]. Patients rx for Revlimid was delivered to the wrong address. Pharmacy need a new rx with a new [employer] auth number to have the patients rx reshipped.     Thank You

## 2022-10-10 ENCOUNTER — CLINICAL SUPPORT (OUTPATIENT)
Dept: REHABILITATION | Facility: HOSPITAL | Age: 62
End: 2022-10-10
Payer: MEDICAID

## 2022-10-10 DIAGNOSIS — Z74.09 IMPAIRED FUNCTIONAL MOBILITY, BALANCE, GAIT, AND ENDURANCE: Primary | ICD-10-CM

## 2022-10-10 DIAGNOSIS — R26.89 BALANCE PROBLEM: ICD-10-CM

## 2022-10-10 DIAGNOSIS — R29.898 WEAKNESS OF BOTH LOWER EXTREMITIES: ICD-10-CM

## 2022-10-10 PROCEDURE — 97110 THERAPEUTIC EXERCISES: CPT | Mod: PN

## 2022-10-10 NOTE — PROGRESS NOTES
PATIENT: Raquel Houston  MRN: [id number]  DATE: 10/11/2022      Diagnosis:   1. Multiple myeloma not having achieved remission            Chief Complaint: Multiple Myeloma    Oncologic History:     07/14/21: Presented with several months of worsening lethargy, generalized pain, 40-60lbs weight loss, and encephalopathy. Labs at time of presentation: hgb 7.6, plts 82, wbc 12, sodium 120, creatinine 1.55, corrected calcium 9.7, transaminitis, M-spike 6.4 g/dL. Skeletal survey showed lesions of left/right humerus, left/right proximal radius, pelvis, and femurs with a significant left femur lytic lesion. Multiple lucent lesions in calvarium. Given two doses of velcade while hospitalized.  07/18/21: Underwent left trochanteric femoral nail fixation. Pathology consistent with plasma cell neoplasm  07/19/22: BMBx showing 90% plasma cells with 100% cellularity, gain 1q21, t(4;14), and monosomy 13.  08/17/22: Cycle 1 Sofie-VRD for IgG Kappa Multiple Myeloma  09/13/22: Cycle 2 Sofie-VRD  10/11/22: Cycle 3 Sofie-VRD    Subjective:    Initial History: Ms. Houston is a 62 y.o. female with medical history of OA, gout, and osteoporosis presenting for follow up of multiple myeloma. History as above. Continues to do very well and is having no issues tolerating therapy. Is working on getting a dental appointment.      Smoked 1ppd for 20 years, quit April 2022  Quit alcohol Apirl 2022  Denies recreational drug use  Father has a history of a diffuse bone cancer that he did not want to be treated for      Past Medical History:   Past Medical History:   Diagnosis Date    Arthritis     Multiple myeloma     Osteoporosis        Past Surgical HIstory:   Past Surgical History:   Procedure Laterality Date    FEMUR SURGERY         Family History:   Family History   Problem Relation Age of Onset    Cancer Father        Social History:  reports that she has quit smoking. Her smoking use included cigarettes. She has quit using smokeless  tobacco. She reports that she does not currently use alcohol. She reports that she does not use drugs.    Allergies:  Review of patient's allergies indicates:  No Known Allergies    Medications:  Current Outpatient Medications   Medication Sig Dispense Refill    acyclovir (ZOVIRAX) 400 MG tablet Take 1 tablet (400 mg total) by mouth 2 (two) times daily. 60 tablet 11    amLODIPine (NORVASC) 10 MG tablet Take 1 tablet (10 mg total) by mouth once daily. 30 tablet 11    aspirin (CIERRA ASPIRIN) 325 MG tablet Take 1 tablet (325 mg total) by mouth once daily. 100 tablet 3    lenalidomide 25 mg Cap Take 1 capsule (25 mg total) by mouth As instructed (Take by mouth once daily on days 1-21 of each 28 day cycle). 21 capsule 0    oxyCODONE (ROXICODONE) 5 MG immediate release tablet Take 2 tablets (10 mg total) by mouth every 6 (six) hours as needed for Pain. 56 tablet 0    pantoprazole (PROTONIX) 40 MG tablet Take 1 tablet (40 mg total) by mouth once daily. 30 tablet 3    potassium chloride SA (K-DUR,KLOR-CON) 20 MEQ tablet Take 1 tablet (20 mEq total) by mouth once daily. 10 tablet 0    dexAMETHasone (DECADRON) 4 MG Tab Take 10 tablets (40 mg total) by mouth As instructed (Take on days 1, 8, 15, and 22 of chemotherapy.). (Patient not taking: Reported on 10/11/2022) 40 tablet 1    docusate sodium (COLACE) 100 MG capsule Take 100 mg by mouth 2 (two) times daily.       No current facility-administered medications for this visit.     Facility-Administered Medications Ordered in Other Visits   Medication Dose Route Frequency Provider Last Rate Last Admin    acetaminophen tablet 650 mg  650 mg Oral 1 time in Clinic/HOD Janice Casas MD        alteplase injection 2 mg  2 mg Intra-Catheter PRN Janice Casas MD        bortezomib (VELCADE) injection 2.7 mg  1.3 mg/m2 Subcutaneous 1 time in Clinic/HOD Janice Casas MD        daratumumab-hyaluronidase-[facility] Soln 1,800 mg  1,800 mg Subcutaneous 1 time in Clinic/HOD Janice Casas  "MD        dexAMETHasone tablet 40 mg  40 mg Oral 1 time in Clinic/HOD Everett Boogie MD        diphenhydrAMINE capsule 50 mg  50 mg Oral 1 time in Clinic/HOD Janice Casas MD        heparin, porcine (PF) 100 unit/mL injection flush 500 Units  500 Units Intravenous PRN Janice Casas MD        sodium chloride 0.9% 250 mL flush bag   Intravenous 1 time in Clinic/HOD Janice Casas MD        sodium chloride 0.9% flush 10 mL  10 mL Intravenous PRN Janice Casas MD           Review of Systems   Constitutional:  Negative for chills, fatigue and fever.   HENT:  Negative for dental problem and trouble swallowing.    Eyes:  Negative for photophobia and visual disturbance.   Respiratory:  Negative for cough and shortness of breath.    Cardiovascular:  Negative for chest pain and leg swelling.   Gastrointestinal:  Negative for abdominal pain, diarrhea, nausea and vomiting.   Musculoskeletal:  Negative for arthralgias.   Skin:  Negative for rash and wound.   Neurological:  Negative for light-headedness and headaches.   Hematological:  Negative for adenopathy. Does not bruise/bleed easily.   Psychiatric/Behavioral:  Negative for confusion. The patient is not nervous/anxious.      ECOG Performance Status: 1  Objective:      Vitals:   Vitals:    10/11/22 0937   BP: 119/69   BP Location: Left arm   Patient Position: Sitting   BP Method: Large (Automatic)   Pulse: 95   Resp: 20   Temp: 98.4 °F (36.9 °C)   TempSrc: Oral   SpO2: 99%   Weight: 91.9 kg (202 lb 9.6 oz)   Height: 5' 7" (1.702 m)           Physical Exam  Vitals reviewed.   Constitutional:       Appearance: Normal appearance.   HENT:      Head: Normocephalic and atraumatic.   Eyes:      Extraocular Movements: Extraocular movements intact.      Conjunctiva/sclera: Conjunctivae normal.   Cardiovascular:      Rate and Rhythm: Normal rate and regular rhythm.   Pulmonary:      Effort: Pulmonary effort is normal. No respiratory distress.   Abdominal:      General: " There is no distension.      Palpations: Abdomen is soft.      Tenderness: There is no abdominal tenderness.   Musculoskeletal:      Right lower leg: No edema.      Left lower leg: No edema.   Skin:     General: Skin is warm and dry.   Neurological:      General: No focal deficit present.      Mental Status: She is alert and oriented to person, place, and time.   Psychiatric:         Mood and Affect: Mood normal.         Behavior: Behavior normal.       Laboratory Data:  Lab Visit on 10/11/2022   Component Date Value Ref Range Status    WBC 10/11/2022 5.80  3.90 - 12.70 K/uL Final    RBC 10/11/2022 3.84 (L)  4.00 - 5.40 M/uL Final    Hemoglobin 10/11/2022 11.1 (L)  12.0 - 16.0 g/dL Final    Hematocrit 10/11/2022 35.7 (L)  37.0 - 48.5 % Final    MCV 10/11/2022 93  82 - 98 fL Final    MCH 10/11/2022 28.9  27.0 - 31.0 pg Final    MCHC 10/11/2022 31.1 (L)  32.0 - 36.0 g/dL Final    RDW 10/11/2022 16.7 (H)  11.5 - 14.5 % Final    Platelets 10/11/2022 209  150 - 450 K/uL Final    MPV 10/11/2022 12.3  9.2 - 12.9 fL Final    Immature Granulocytes 10/11/2022 0.2  0.0 - 0.5 % Final    Gran # (ANC) 10/11/2022 2.6  1.8 - 7.7 K/uL Final    Immature Grans (Abs) 10/11/2022 0.01  0.00 - 0.04 K/uL Final    Comment: Mild elevation in immature granulocytes is non specific and   can be seen in a variety of conditions including stress response,   acute inflammation, trauma and pregnancy. Correlation with other   laboratory and clinical findings is essential.      Lymph # 10/11/2022 2.3  1.0 - 4.8 K/uL Final    Mono # 10/11/2022 0.7  0.3 - 1.0 K/uL Final    Eos # 10/11/2022 0.2  0.0 - 0.5 K/uL Final    Baso # 10/11/2022 0.03  0.00 - 0.20 K/uL Final    nRBC 10/11/2022 0  0 /100 WBC Final    Gran % 10/11/2022 44.1  38.0 - 73.0 % Final    Lymph % 10/11/2022 39.5  18.0 - 48.0 % Final    Mono % 10/11/2022 12.1  4.0 - 15.0 % Final    Eosinophil % 10/11/2022 3.6  0.0 - 8.0 % Final    Basophil % 10/11/2022 0.5  0.0 - 1.9 % Final    Differential  Method 10/11/2022 Automated   Final    Sodium 10/11/2022 140  136 - 145 mmol/L Final    Potassium 10/11/2022 4.0  3.5 - 5.1 mmol/L Final    Chloride 10/11/2022 106  95 - 110 mmol/L Final    CO2 10/11/2022 26  23 - 29 mmol/L Final    Glucose 10/11/2022 69 (L)  70 - 110 mg/dL Final    BUN 10/11/2022 13  6 - 20 mg/dL Final    Creatinine 10/11/2022 0.8  0.5 - 1.4 mg/dL Final    Calcium 10/11/2022 8.8  8.7 - 10.5 mg/dL Final    Total Protein 10/11/2022 6.4  6.0 - 8.4 g/dL Final    Albumin 10/11/2022 3.6  3.5 - 5.2 g/dL Final    Total Bilirubin 10/11/2022 0.4  0.1 - 1.0 mg/dL Final    Comment: For infants and newborns, interpretation of results should be based  on gestational age, weight and in agreement with clinical  observations.    Premature Infant recommended reference ranges:  Up to 24 hours.............<8.0 mg/dL  Up to 48 hours............<12.0 mg/dL  3-5 days..................<15.0 mg/dL  6-29 days.................<15.0 mg/dL      Alkaline Phosphatase 10/11/2022 342 (H)  55 - 135 U/L Final    AST 10/11/2022 29  10 - 40 U/L Final    ALT 10/11/2022 60 (H)  10 - 44 U/L Final    Anion Gap 10/11/2022 8  8 - 16 mmol/L Final    eGFR 10/11/2022 >60.0  >60 mL/min/1.73 m^2 Final    Protein, Serum 10/11/2022 6.2  6.0 - 8.4 g/dL Final    Comment: Serum protein electrophoresis and immunofixation results should be   interpreted in clinical context in that some therapeutic agents can   result   in false positive results (example, daratumumab). Correlation with   the   patient s therapeutic regimen is required.      IgG 10/11/2022 666  650 - 1600 mg/dL Final    IgG Cord Blood Reference Range: 650-1600 mg/dL.    IgA 10/11/2022 46  40 - 350 mg/dL Final    IgA Cord Blood Reference Range: <5 mg/dL.    IgM 10/11/2022 32 (L)  50 - 300 mg/dL Final    IgM Cord Blood Reference Range: <25 mg/dL.         Imaging:   Assessment:       1. Multiple myeloma not having achieved remission               Plan:     Multiple Myeloma  -- Complex  cytogenetics with gain 1q21, t(4;14), and monosomy 13, bone marrow to be evaluated by [facility] hematopathologist  -- Diffuse lytics lesions  -- M-spike 6.4g/dL  -- R-ISS stage III (elevated beta-2 microglobulin, elevated LDH, decreased albumin, and high risk cytogenetics)  -- Cycle 3 Sofie-VRD today; consent obtained 08/16/22  -- Has had significant clinical improvement as well as reduction in IgG and M-spike showing excellent response  -- Has met with transplant coordinators. Will need mammogram, colonoscopy, and dental appointment  -- Will need bisphosphonate pending dental appointment  -- Acyclovir 400mg BID, aspirin daily    RTC in 4 weeks. Pt knows to call or use [facility] in the interim with any questions or concerns.    Everett Boogie, PGY- VI  Hematology/Oncology Fellow     Route Chart for Scheduling    BMT Chart Routing      Follow up with physician . RTC 11/08   Follow up with SERGIO No follow up needed.   Provider visit type    Infusion scheduling note none   Injection scheduling note velcade every tuesday, daratumumab 10/25, 11/08,11/22, 12/06   Labs   Lab interval:  CBC, CMP, type and screen 10/25, 11/08, 11/22, 12/06 prior to injection. serum protein electropheresis/immunofixation, immunoglobulins, and free light chains 10/25   Imaging None      Pharmacy appointment No pharmacy appointment needed      Other referrals No additional referrals needed         Treatment Plan Information   OP D-VRD DARATUMUMAB + BORTEZOMIB LENALIDOMIDE DEXAMETHASONE   Janice Casas MD   Upcoming Treatment Dates - OP D-VRD DARATUMUMAB + BORTEZOMIB LENALIDOMIDE DEXAMETHASONE    10/18/2022       Chemotherapy       bortezomib (VELCADE) injection 2.7 mg  10/25/2022       Pre-Medications       acetaminophen tablet 650 mg       diphenhydrAMINE capsule 50 mg       Chemotherapy       bortezomib (VELCADE) injection 2.7 mg       daratumumab-hyaluronidase-[facility] Soln 1,800 mg  11/1/2022       Chemotherapy       bortezomib (VELCADE)  injection 2.7 mg  11/8/2022       Pre-Medications       acetaminophen tablet 650 mg       diphenhydrAMINE capsule 50 mg       Chemotherapy       bortezomib (VELCADE) injection 1.3 mg/m2 (Treatment Plan)       daratumumab-hyaluronidase-[facility] Soln 1,800 mg         Patient/Caregiver provided printed discharge information.

## 2022-10-10 NOTE — PROGRESS NOTES
[facility]   Physical Therapy Treatment Note    Name: Raquel Houston  Clinic Number: [id number]    Therapy Diagnosis:   Encounter Diagnoses   Name Primary?    Impaired functional mobility, balance, gait, and endurance Yes    Weakness of both lower extremities     Balance problem      Physician: Oneyda, Provider    Visit Date: 10/10/2022    Physician Orders: PT Eval and Treat  Medical Diagnosis from Referral: Sepsis, due to unspecified organism, unspecified whether acute organ dysfunction present [A41.9]  Evaluation Date: 8/8/2022  Authorization Period Expiration: 12/31/2022  Plan of Care Expiration: 10/3/2022  Progress Note Due: 10/12/2022 (Reassessed 9/12/2022)  Visit # / Visits authorized: 17/20 auth (1/1 eval)  FOTO: 3 / 3  PTA Visit #: 0 / 5     Precautions: WBAT 6 weeks, sx 7/18/2022 ORIF    Time In: 0917 am  Time Out: 1000 am  Total Billable Time: 43 minutes    SUBJECTIVE     Pt reports: progress continues with no setbacks. She presents to PT today using straight cane.     She was compliant with home exercise program.  Response to previous treatment: mild soreness that improved over time  Functional change: Improved walking ability with rolling walker and straight cane    Pain: 0/10  Location: low back     OBJECTIVE     Objective Measures updated at progress report unless specified.     Treatment     Raquel received the treatments listed below:      Therapeutic exercises to develop strength, endurance, ROM, flexibility, posture and core stabilization for 40 minutes including:    Bilateral shuttle 2 min 2 bands  Single leg shuttle 2 band x20 reps LLE  Recumbent bike lvl 2, seat 5, 5 min  LAQ 5# 2 min BLE  HSC RTB 2x10 each  Hamstring stretch with strap 2 min each   Bridging glute set x20 reps  Supine ball squeezes 2 min  Side lying clamshells 2x20 reps  Standing hip extension BLE x20 each  Standing hip abduction BLE x20 each    Not performed today:  Stair climbing 4  laps  Seated Marching x20 ea (alternating LE's) 3#  Seated Hip Adduction 3s x2 min  Seated Hip Abduction GTB x2 min  Mini squats w walker 2 x 10   Seated low back stretch x3 min (reaching and with ball)  Seated Hamstring Stretch x2 min ea + LE's on stool  Sit to Stands x5 reps - no UE assist/hands in lap, table elevated  Standing hip flexion/marching LLE x20 reps  SAQ x30 reps 3#      Patient Education and Home Exercises     Education provided:   - Gait training without AD  - Weight bearing precaution  - Safety awareness  - Use of assistive devices  - Transfers  - HEP reviewed  - Anatomy and Physiology pertaining to current condition  - Possible response to exercise  - Importance of PT compliance    Written Home Exercises Provided: Patient instructed to cont prior HEP. Exercises were reviewed and Raquel was able to demonstrate them prior to the end of the session. Raquel demonstrated good  understanding of the education provided. See EMR under Patient Instructions for exercises provided during therapy sessions.    ASSESSMENT   Raquel demonstrates further improvements in functional mobility and tolerates progression of hip strengthening activities. She has responded well in recent weeks of physical therapy. She will continue as planned at this time.     Raquel Is progressing well towards her goals.   Pt prognosis is Fair.     Pt will continue to benefit from skilled outpatient physical therapy to address the deficits listed in the problem list box on initial evaluation, provide pt/family education and to maximize pt's level of independence in the home and community environment.     Pt's spiritual, cultural and educational needs considered and pt agreeable to plan of care and goals.     Anticipated barriers to physical therapy: WBAT    Goals:  Short Term Goals: 4 weeks   1. Pt will be independent with HEP to facilitate healing and improve outcome measures. (met)  2. Pt will increased hip ROM by 5-10 degrees  without pain. (met)  3. Pt will be able to demonstrate full WB in LLE and walk greater than 50 feet with least restrictive AD. (met)  4. Pt will increase LLE MMT values by at least 1/2 grade. (met)     Long Term Goals: 8 weeks   1. Pt will demonstrate mod [place] with household transfers and gait. (met)  2. Pt will demonstrate LLE gross strength to 4/5 or greater. (progressing, not met)  3. Pt will demonstrate full L hip ROM and flexibility. (met)    PLAN     Continue with PT POC to address functional deficits.    Evan Tesfaye, PT, DPT

## 2022-10-11 ENCOUNTER — INFUSION (OUTPATIENT)
Dept: INFUSION THERAPY | Facility: HOSPITAL | Age: 62
End: 2022-10-11
Attending: INTERNAL MEDICINE
Payer: MEDICAID

## 2022-10-11 ENCOUNTER — OFFICE VISIT (OUTPATIENT)
Dept: HEMATOLOGY/ONCOLOGY | Facility: CLINIC | Age: 62
End: 2022-10-11
Payer: MEDICAID

## 2022-10-11 VITALS
HEIGHT: 67 IN | DIASTOLIC BLOOD PRESSURE: 69 MMHG | SYSTOLIC BLOOD PRESSURE: 119 MMHG | BODY MASS INDEX: 31.8 KG/M2 | RESPIRATION RATE: 20 BRPM | HEART RATE: 95 BPM | TEMPERATURE: 98 F | OXYGEN SATURATION: 99 % | WEIGHT: 202.63 LBS

## 2022-10-11 VITALS
TEMPERATURE: 98 F | RESPIRATION RATE: 18 BRPM | SYSTOLIC BLOOD PRESSURE: 139 MMHG | OXYGEN SATURATION: 99 % | DIASTOLIC BLOOD PRESSURE: 65 MMHG | HEART RATE: 84 BPM

## 2022-10-11 DIAGNOSIS — C90.00 MULTIPLE MYELOMA NOT HAVING ACHIEVED REMISSION: Primary | ICD-10-CM

## 2022-10-11 PROCEDURE — 99214 OFFICE O/P EST MOD 30 MIN: CPT | Mod: PBBFAC,25 | Performed by: INTERNAL MEDICINE

## 2022-10-11 PROCEDURE — 63600175 PHARM REV CODE 636 W HCPCS: Performed by: INTERNAL MEDICINE

## 2022-10-11 PROCEDURE — 99215 OFFICE O/P EST HI 40 MIN: CPT | Mod: S$PBB,,, | Performed by: INTERNAL MEDICINE

## 2022-10-11 PROCEDURE — 96401 CHEMO ANTI-NEOPL SQ/IM: CPT

## 2022-10-11 PROCEDURE — 63600175 PHARM REV CODE 636 W HCPCS: Mod: TB | Performed by: INTERNAL MEDICINE

## 2022-10-11 PROCEDURE — 99999 PR PBB SHADOW E&M-EST. PATIENT-LVL IV: CPT | Mod: PBBFAC,,, | Performed by: INTERNAL MEDICINE

## 2022-10-11 PROCEDURE — 99999 PR PBB SHADOW E&M-EST. PATIENT-LVL IV: ICD-10-PCS | Mod: PBBFAC,,, | Performed by: INTERNAL MEDICINE

## 2022-10-11 PROCEDURE — 25000003 PHARM REV CODE 250: Performed by: INTERNAL MEDICINE

## 2022-10-11 PROCEDURE — 99215 PR OFFICE/OUTPT VISIT, EST, LEVL V, 40-54 MIN: ICD-10-PCS | Mod: S$PBB,,, | Performed by: INTERNAL MEDICINE

## 2022-10-11 RX ORDER — ACETAMINOPHEN 325 MG/1
650 TABLET ORAL
Status: COMPLETED | OUTPATIENT
Start: 2022-10-11 | End: 2022-10-11

## 2022-10-11 RX ORDER — ACETAMINOPHEN 325 MG/1
650 TABLET ORAL
Status: CANCELLED | OUTPATIENT
Start: 2022-10-25

## 2022-10-11 RX ORDER — DIPHENHYDRAMINE HCL 50 MG
50 CAPSULE ORAL
Status: CANCELLED
Start: 2022-10-11

## 2022-10-11 RX ORDER — DIPHENHYDRAMINE HCL 50 MG
50 CAPSULE ORAL
Status: CANCELLED
Start: 2022-10-25

## 2022-10-11 RX ORDER — SODIUM CHLORIDE 0.9 % (FLUSH) 0.9 %
10 SYRINGE (ML) INJECTION
Status: CANCELLED | OUTPATIENT
Start: 2022-11-01

## 2022-10-11 RX ORDER — SODIUM CHLORIDE 0.9 % (FLUSH) 0.9 %
10 SYRINGE (ML) INJECTION
Status: CANCELLED | OUTPATIENT
Start: 2022-10-25

## 2022-10-11 RX ORDER — DIPHENHYDRAMINE HCL 50 MG
50 CAPSULE ORAL
Status: COMPLETED | OUTPATIENT
Start: 2022-10-11 | End: 2022-10-11

## 2022-10-11 RX ORDER — BORTEZOMIB 3.5 MG/1
1.3 INJECTION, POWDER, LYOPHILIZED, FOR SOLUTION INTRAVENOUS; SUBCUTANEOUS
Status: CANCELLED | OUTPATIENT
Start: 2022-11-01

## 2022-10-11 RX ORDER — ACETAMINOPHEN 325 MG/1
650 TABLET ORAL
Status: CANCELLED | OUTPATIENT
Start: 2022-10-11

## 2022-10-11 RX ORDER — SODIUM CHLORIDE 0.9 % (FLUSH) 0.9 %
10 SYRINGE (ML) INJECTION
Status: CANCELLED | OUTPATIENT
Start: 2022-10-11

## 2022-10-11 RX ORDER — HEPARIN 100 UNIT/ML
500 SYRINGE INTRAVENOUS
Status: CANCELLED | OUTPATIENT
Start: 2022-10-18

## 2022-10-11 RX ORDER — SODIUM CHLORIDE 0.9 % (FLUSH) 0.9 %
10 SYRINGE (ML) INJECTION
Status: DISCONTINUED | OUTPATIENT
Start: 2022-10-11 | End: 2022-10-11 | Stop reason: HOSPADM

## 2022-10-11 RX ORDER — SODIUM CHLORIDE 0.9 % (FLUSH) 0.9 %
10 SYRINGE (ML) INJECTION
Status: CANCELLED | OUTPATIENT
Start: 2022-10-18

## 2022-10-11 RX ORDER — HEPARIN 100 UNIT/ML
500 SYRINGE INTRAVENOUS
Status: CANCELLED | OUTPATIENT
Start: 2022-11-01

## 2022-10-11 RX ORDER — BORTEZOMIB 3.5 MG/1
1.3 INJECTION, POWDER, LYOPHILIZED, FOR SOLUTION INTRAVENOUS; SUBCUTANEOUS
Status: COMPLETED | OUTPATIENT
Start: 2022-10-11 | End: 2022-10-11

## 2022-10-11 RX ORDER — HEPARIN 100 UNIT/ML
500 SYRINGE INTRAVENOUS
Status: DISCONTINUED | OUTPATIENT
Start: 2022-10-11 | End: 2022-10-11 | Stop reason: HOSPADM

## 2022-10-11 RX ORDER — BORTEZOMIB 3.5 MG/1
1.3 INJECTION, POWDER, LYOPHILIZED, FOR SOLUTION INTRAVENOUS; SUBCUTANEOUS
Status: CANCELLED | OUTPATIENT
Start: 2022-10-18

## 2022-10-11 RX ORDER — HEPARIN 100 UNIT/ML
500 SYRINGE INTRAVENOUS
Status: CANCELLED | OUTPATIENT
Start: 2022-10-11

## 2022-10-11 RX ORDER — BORTEZOMIB 3.5 MG/1
1.3 INJECTION, POWDER, LYOPHILIZED, FOR SOLUTION INTRAVENOUS; SUBCUTANEOUS
Status: CANCELLED | OUTPATIENT
Start: 2022-10-11

## 2022-10-11 RX ORDER — DEXAMETHASONE 4 MG/1
40 TABLET ORAL
Status: COMPLETED | OUTPATIENT
Start: 2022-10-11 | End: 2022-10-11

## 2022-10-11 RX ORDER — BORTEZOMIB 3.5 MG/1
1.3 INJECTION, POWDER, LYOPHILIZED, FOR SOLUTION INTRAVENOUS; SUBCUTANEOUS
Status: CANCELLED | OUTPATIENT
Start: 2022-10-25

## 2022-10-11 RX ORDER — HEPARIN 100 UNIT/ML
500 SYRINGE INTRAVENOUS
Status: CANCELLED | OUTPATIENT
Start: 2022-10-25

## 2022-10-11 RX ADMIN — DARATUMUMAB AND HYALURONIDASE-FIHJ (HUMAN RECOMBINANT) 1800 MG: 1800; 30000 INJECTION SUBCUTANEOUS at 12:10

## 2022-10-11 RX ADMIN — ACETAMINOPHEN 650 MG: 325 TABLET ORAL at 11:10

## 2022-10-11 RX ADMIN — DIPHENHYDRAMINE HYDROCHLORIDE 50 MG: 50 CAPSULE ORAL at 11:10

## 2022-10-11 RX ADMIN — DEXAMETHASONE 40 MG: 4 TABLET ORAL at 11:10

## 2022-10-11 RX ADMIN — BORTEZOMIB 2.7 MG: 3.5 INJECTION, POWDER, LYOPHILIZED, FOR SOLUTION INTRAVENOUS; SUBCUTANEOUS at 12:10

## 2022-10-11 NOTE — PLAN OF CARE
1300- Pt tolerated Darzalex SQ and Velcade SQ well, no complications or side effects, POC and meds discussed with pt, pt aware of upcoming appts, pt knows to call MD with any questions or concerns. Pt ambulated off unit using walker, no distress noted.

## 2022-10-12 ENCOUNTER — CLINICAL SUPPORT (OUTPATIENT)
Dept: REHABILITATION | Facility: HOSPITAL | Age: 62
End: 2022-10-12
Payer: MEDICAID

## 2022-10-12 DIAGNOSIS — R29.898 WEAKNESS OF BOTH LOWER EXTREMITIES: ICD-10-CM

## 2022-10-12 DIAGNOSIS — R26.89 BALANCE PROBLEM: ICD-10-CM

## 2022-10-12 DIAGNOSIS — Z74.09 IMPAIRED FUNCTIONAL MOBILITY, BALANCE, GAIT, AND ENDURANCE: Primary | ICD-10-CM

## 2022-10-12 PROCEDURE — 97110 THERAPEUTIC EXERCISES: CPT | Mod: PN

## 2022-10-12 NOTE — PROGRESS NOTES
[facility]   Physical Therapy Treatment Note, Reassessment, and POC Update    Name: Raquel Houston  Clinic Number: [id number]    Therapy Diagnosis:   Encounter Diagnoses   Name Primary?    Impaired functional mobility, balance, gait, and endurance Yes    Weakness of both lower extremities     Balance problem      Physician: Oneyda, Provider    Visit Date: 10/12/2022    Physician Orders: PT Eval and Treat  Medical Diagnosis from Referral: Sepsis, due to unspecified organism, unspecified whether acute organ dysfunction present [A41.9]  Evaluation Date: 8/8/2022  Authorization Period Expiration: 12/31/2022  Plan of Care Expiration: 12/7/2022 (Updated)  Progress Note Due: 11/12/2022 (Reassessed 10/12/2022)  Visit # / Visits authorized: 18/20 auth (1/1 eval)  FOTO: 3 / 3  PTA Visit #: 0 / 5     Precautions: WBAT 6 weeks, sx 7/18/2022 ORIF    Time In: 1005 am  Time Out: 1045 am  Total Billable Time: 40 minutes    SUBJECTIVE     Pt reports: overall she is feeling much better and gaining back more of her independence. She is walking with a straight cane confidently and trying to return to more of her normal activities.     She was compliant with home exercise program.  Response to previous treatment: mild soreness that improved over time  Functional change: Improved walking ability with rolling walker and straight cane    Pain: 0/10  Location: low back     OBJECTIVE     CMS Impairment/Limitation/Restriction for FOTO Leg Survey     Therapist reviewed FOTO scores for Raquel Houston on 10/12/2022.   FOTO documents entered into [employer] - see Media section.     Limitation Score: 62%        Lower Extremity Strength  Right LE   Left LE     Hip Flexion: 5/5 Hip Flexion: 5/5   Hip Extension:  4/5 Hip Extension: 3+/5   Hip Abduction: 4/5 Hip Abduction: 4+/5   Hip Adduction: 5/5 Hip Adduction 5/5   Knee Extension: 5/5 Knee Extension: 5/5   Knee Flexion: 5/5 Knee Flexion: 5/5   Ankle  Dorsiflexion: 5/5 Ankle Dorsiflexion: 5/5   Ankle Plantarflexion: 5/5 Ankle Plantarflexion: 5/5      Abdominal Strength: poor     Special Tests      Transfers: Mod independent, using cane      Balance Assessment:    Evaluation   Single Limb Stance R LE 20+ sec with UE, 0-5 with no UE  (<10 sec = HIGH FALL RISK)   Single Limb Stance L LE 20+sec with UE, 0-5 with no UE  (<10 sec = HIGH FALL RISK)        Evaluation   30 second Chair Rise  (adults > 61 y/o) 10 completed with arms   5 times sit-stand  (adults 18-63 y/o) 14.25 seconds  >12 sec= fall risk for general elderly  >16 sec= fall risk for Parkinson's disease  >10 sec= balance/vestibular dysfunction (<61 y/o)  >14.2 sec= balance/vestibular dysfunction (>61 y/o)  >12 sec= fall risk for CVA      Gait Analysis:   Assistive device:  []None []Crutches [x]Straight Cane []Rolling Walker  [] Other:   Gait abnormalities:   []No significant gait deviations noted  []Increased HARRY  []Anterior Trunk Lean  []Increased Knee Flexion in Stance  []Knee Hyperextension in stance  []Foot Drop/Drag  []Decreased toe off  []Decreased heel strike  []Trendelenburg: bilateral   [x]Decreased speed     Endurance Assessment:    Evaluation   Timed Up and Go 16.55 sec using rollator  < 20 sec safe for independent transfers, < 30 sec safe for dependent transfers/assist required      Table: Population Norms for TUG    Age  Average TUG    60 - 69 years  8.1 seconds    70 - 79 years  9.2 seconds    80 - 99 years  11.3 seconds       Palpation:  Min TTP to right side lumbar paraspinals     HS flexibility: WNL    Objective Measures updated at progress report unless specified.     Treatment     Raquel received the treatments listed below:      Therapeutic exercises to develop strength, endurance, ROM, flexibility, posture and core stabilization for 40 minutes including:    (Remaining therex time used for reassessment purposes)    Bilateral shuttle 2 min 2 bands  Single leg shuttle 2 band x20 reps  LLE  Recumbent bike lvl 2, seat 5, 5 min    Not performed today due to time:  LAQ 5# 2 min BLE  HSC RTB 2x10 each  Hamstring stretch with strap 2 min each   Bridging glute set x20 reps  Supine ball squeezes 2 min  Side lying clamshells 2x20 reps  Standing hip extension BLE x20 each  Standing hip abduction BLE x20 each    Stair climbing 4 laps  Seated Marching x20 ea (alternating LE's) 3#  Seated Hip Adduction 3s x2 min  Seated Hip Abduction GTB x2 min  Mini squats w walker 2 x 10   Seated low back stretch x3 min (reaching and with ball)  Seated Hamstring Stretch x2 min ea + LE's on stool  Sit to Stands x5 reps - no UE assist/hands in lap, table elevated  Standing hip flexion/marching LLE x20 reps  SAQ x30 reps 3#      Patient Education and Home Exercises     Education provided:   - Gait training without AD  - Weight bearing precaution  - Safety awareness  - Use of assistive devices  - Transfers  - HEP reviewed  - Anatomy and Physiology pertaining to current condition  - Possible response to exercise  - Importance of PT compliance    Written Home Exercises Provided: Patient instructed to cont prior HEP. Exercises were reviewed and Raquel was able to demonstrate them prior to the end of the session. Raquel demonstrated good  understanding of the education provided. See EMR under Patient Instructions for exercises provided during therapy sessions.    ASSESSMENT   Upon assessment, Raquel has greatly improved functional mobility and level of independence. She is now ambulating with straight cane. LE strength is near full except for deficits in glutes/hip extension and core. She also believes she needs more practice and training in her balance for when she starts doing more of her normal routine. At this time she would benefit from continued skilled PT with an updated plan of care.     Raquel Is progressing well towards her goals.   Pt prognosis is Fair.     Pt will continue to benefit from skilled outpatient physical  therapy to address the deficits listed in the problem list box on initial evaluation, provide pt/family education and to maximize pt's level of independence in the home and community environment.     Pt's spiritual, cultural and educational needs considered and pt agreeable to plan of care and goals.     Anticipated barriers to physical therapy: WBAT    Goals:  Short Term Goals: 4 weeks   1. Pt will be independent with HEP to facilitate healing and improve outcome measures. (met)  2. Pt will increased hip ROM by 5-10 degrees without pain. (met)  3. Pt will be able to demonstrate full WB in LLE and walk greater than 50 feet with least restrictive AD. (met)  4. Pt will increase LLE MMT values by at least 1/2 grade. (met)     Long Term Goals: 8 weeks   1. Pt will demonstrate mod [place] with household transfers and gait. (met)  2. Pt will demonstrate LLE gross strength to 4/5 or greater. (progressing, not met)  3. Pt will demonstrate full L hip ROM and flexibility. (met)    PLAN     Continue with PT POC to address functional deficits.    Plan of Care Expiration: 12/7/2022 (Updated)    Evan Tesfaye, PT, DPT

## 2022-10-12 NOTE — PLAN OF CARE
[facility]   Physical Therapy Treatment Note, Reassessment, and POC Update    Name: Raquel Houston  Clinic Number: [id number]    Therapy Diagnosis:   Encounter Diagnoses   Name Primary?    Impaired functional mobility, balance, gait, and endurance Yes    Weakness of both lower extremities     Balance problem      Physician: Oneyda, Provider    Visit Date: 10/12/2022    Physician Orders: PT Eval and Treat  Medical Diagnosis from Referral: Sepsis, due to unspecified organism, unspecified whether acute organ dysfunction present [A41.9]  Evaluation Date: 8/8/2022  Authorization Period Expiration: 12/31/2022  Plan of Care Expiration: 12/7/2022 (Updated)  Progress Note Due: 11/12/2022 (Reassessed 10/12/2022)  Visit # / Visits authorized: 18/20 auth (1/1 eval)  FOTO: 3 / 3  PTA Visit #: 0 / 5     Precautions: WBAT 6 weeks, sx 7/18/2022 ORIF    Time In: 1005 am  Time Out: 1045 am  Total Billable Time: 40 minutes    SUBJECTIVE     Pt reports: overall she is feeling much better and gaining back more of her independence. She is walking with a straight cane confidently and trying to return to more of her normal activities.     She was compliant with home exercise program.  Response to previous treatment: mild soreness that improved over time  Functional change: Improved walking ability with rolling walker and straight cane    Pain: 0/10  Location: low back     OBJECTIVE     CMS Impairment/Limitation/Restriction for FOTO Leg Survey     Therapist reviewed FOTO scores for Raquel Houston on 10/12/2022.   FOTO documents entered into [employer] - see Media section.     Limitation Score: 62%        Lower Extremity Strength  Right LE   Left LE     Hip Flexion: 5/5 Hip Flexion: 5/5   Hip Extension:  4/5 Hip Extension: 3+/5   Hip Abduction: 4/5 Hip Abduction: 4+/5   Hip Adduction: 5/5 Hip Adduction 5/5   Knee Extension: 5/5 Knee Extension: 5/5   Knee Flexion: 5/5 Knee Flexion: 5/5   Ankle  Dorsiflexion: 5/5 Ankle Dorsiflexion: 5/5   Ankle Plantarflexion: 5/5 Ankle Plantarflexion: 5/5      Abdominal Strength: poor     Special Tests      Transfers: Mod independent, using cane      Balance Assessment:    Evaluation   Single Limb Stance R LE 20+ sec with UE, 0-5 with no UE  (<10 sec = HIGH FALL RISK)   Single Limb Stance L LE 20+sec with UE, 0-5 with no UE  (<10 sec = HIGH FALL RISK)        Evaluation   30 second Chair Rise  (adults > 59 y/o) 10 completed with arms   5 times sit-stand  (adults 18-63 y/o) 14.25 seconds  >12 sec= fall risk for general elderly  >16 sec= fall risk for Parkinson's disease  >10 sec= balance/vestibular dysfunction (<59 y/o)  >14.2 sec= balance/vestibular dysfunction (>59 y/o)  >12 sec= fall risk for CVA      Gait Analysis:   Assistive device:  []None []Crutches [x]Straight Cane []Rolling Walker  [] Other:   Gait abnormalities:   []No significant gait deviations noted  []Increased HARRY  []Anterior Trunk Lean  []Increased Knee Flexion in Stance  []Knee Hyperextension in stance  []Foot Drop/Drag  []Decreased toe off  []Decreased heel strike  []Trendelenburg: bilateral   [x]Decreased speed     Endurance Assessment:    Evaluation   Timed Up and Go 16.55 sec using rollator  < 20 sec safe for independent transfers, < 30 sec safe for dependent transfers/assist required      Table: Population Norms for TUG    Age  Average TUG    60 - 69 years  8.1 seconds    70 - 79 years  9.2 seconds    80 - 99 years  11.3 seconds       Palpation:  Min TTP to right side lumbar paraspinals     HS flexibility: WNL    Objective Measures updated at progress report unless specified.     Treatment     Raquel received the treatments listed below:      Therapeutic exercises to develop strength, endurance, ROM, flexibility, posture and core stabilization for 40 minutes including:    (Remaining therex time used for reassessment purposes)    Bilateral shuttle 2 min 2 bands  Single leg shuttle 2 band x20 reps  LLE  Recumbent bike lvl 2, seat 5, 5 min    Not performed today due to time:  LAQ 5# 2 min BLE  HSC RTB 2x10 each  Hamstring stretch with strap 2 min each   Bridging glute set x20 reps  Supine ball squeezes 2 min  Side lying clamshells 2x20 reps  Standing hip extension BLE x20 each  Standing hip abduction BLE x20 each    Stair climbing 4 laps  Seated Marching x20 ea (alternating LE's) 3#  Seated Hip Adduction 3s x2 min  Seated Hip Abduction GTB x2 min  Mini squats w walker 2 x 10   Seated low back stretch x3 min (reaching and with ball)  Seated Hamstring Stretch x2 min ea + LE's on stool  Sit to Stands x5 reps - no UE assist/hands in lap, table elevated  Standing hip flexion/marching LLE x20 reps  SAQ x30 reps 3#      Patient Education and Home Exercises     Education provided:   - Gait training without AD  - Weight bearing precaution  - Safety awareness  - Use of assistive devices  - Transfers  - HEP reviewed  - Anatomy and Physiology pertaining to current condition  - Possible response to exercise  - Importance of PT compliance    Written Home Exercises Provided: Patient instructed to cont prior HEP. Exercises were reviewed and Raquel was able to demonstrate them prior to the end of the session. Raquel demonstrated good  understanding of the education provided. See EMR under Patient Instructions for exercises provided during therapy sessions.    ASSESSMENT   Upon assessment, Raquel has greatly improved functional mobility and level of independence. She is now ambulating with straight cane. LE strength is near full except for deficits in glutes/hip extension and core. She also believes she needs more practice and training in her balance for when she starts doing more of her normal routine. At this time she would benefit from continued skilled PT with an updated plan of care.     Raquel Is progressing well towards her goals.   Pt prognosis is Fair.     Pt will continue to benefit from skilled outpatient physical  therapy to address the deficits listed in the problem list box on initial evaluation, provide pt/family education and to maximize pt's level of independence in the home and community environment.     Pt's spiritual, cultural and educational needs considered and pt agreeable to plan of care and goals.     Anticipated barriers to physical therapy: WBAT    Goals:  Short Term Goals: 4 weeks   1. Pt will be independent with HEP to facilitate healing and improve outcome measures. (met)  2. Pt will increased hip ROM by 5-10 degrees without pain. (met)  3. Pt will be able to demonstrate full WB in LLE and walk greater than 50 feet with least restrictive AD. (met)  4. Pt will increase LLE MMT values by at least 1/2 grade. (met)     Long Term Goals: 8 weeks   1. Pt will demonstrate mod [place] with household transfers and gait. (met)  2. Pt will demonstrate LLE gross strength to 4/5 or greater. (progressing, not met)  3. Pt will demonstrate full L hip ROM and flexibility. (met)    PLAN     Continue with PT POC to address functional deficits.    Plan of Care Expiration: 12/7/2022 (Updated)    Evan Tesfaye, PT, DPT

## 2022-10-17 ENCOUNTER — CLINICAL SUPPORT (OUTPATIENT)
Dept: REHABILITATION | Facility: HOSPITAL | Age: 62
End: 2022-10-17
Payer: MEDICAID

## 2022-10-17 DIAGNOSIS — R26.89 BALANCE PROBLEM: ICD-10-CM

## 2022-10-17 DIAGNOSIS — R29.898 WEAKNESS OF BOTH LOWER EXTREMITIES: ICD-10-CM

## 2022-10-17 DIAGNOSIS — Z74.09 IMPAIRED FUNCTIONAL MOBILITY, BALANCE, GAIT, AND ENDURANCE: Primary | ICD-10-CM

## 2022-10-17 PROCEDURE — 97110 THERAPEUTIC EXERCISES: CPT | Mod: PN

## 2022-10-17 NOTE — PROGRESS NOTES
[facility]   Physical Therapy Treatment Note    Name: Raquel Houston  Clinic Number: [id number]    Therapy Diagnosis:   Encounter Diagnoses   Name Primary?    Impaired functional mobility, balance, gait, and endurance Yes    Weakness of both lower extremities     Balance problem      Physician: Referring, Unknown    Visit Date: 10/17/2022    Physician Orders: PT Eval and Treat  Medical Diagnosis from Referral: Sepsis, due to unspecified organism, unspecified whether acute organ dysfunction present [A41.9]  Evaluation Date: 8/8/2022  Authorization Period Expiration: 12/31/2022  Plan of Care Expiration: 12/7/2022 (Updated)  Progress Note Due: 11/12/2022 (Reassessed 10/12/2022)  Visit # / Visits authorized: 19/40 auth (1/1 eval)  FOTO: 3 / 3  PTA Visit #: 0 / 5     Precautions: WBAT 6 weeks, sx 7/18/2022 ORIF    Time In: 0920 am  Time Out: 1000 am  Total Billable Time: 40 minutes    SUBJECTIVE     Pt reports: no new complaints at this time. She is feeling good this morning.     She was compliant with home exercise program.  Response to previous treatment: mild soreness that improved over time  Functional change: Improved walking ability with rolling walker and straight cane    Pain: 0/10  Location: low back     OBJECTIVE     Objective Measures updated at progress report unless specified.     Treatment     Raquel received the treatments listed below:      Therapeutic exercises to develop strength, endurance, ROM, flexibility, posture and core stabilization for 40 minutes including:    Recumbent bike lvl 2, seat 5, 6 min  Bilateral shuttle 2 min 2 bands  Single leg shuttle 2 band x20 reps LLE  Hamstring stretch with strap 2 min each   Bridging glute set x20 reps  Straight leg raises x20 LLE  Supine ball squeezes 2 min  Side lying clamshells 2x20 reps  Side lying hip abduction 2x10 reps  TRX mini squats x20 reps    Not performed today due to time:  LAQ 5# 2 min BLE  HSC RTB 2x10  each  Standing hip extension BLE x20 each  Standing hip abduction BLE x20 each  Stair climbing 4 laps  Seated Marching x20 ea (alternating LE's) 3#  Seated Hip Adduction 3s x2 min  Seated Hip Abduction GTB x2 min  Mini squats w walker 2 x 10   Seated low back stretch x3 min (reaching and with ball)  Seated Hamstring Stretch x2 min ea + LE's on stool  Sit to Stands x5 reps - no UE assist/hands in lap, table elevated  Standing hip flexion/marching LLE x20 reps  SAQ x30 reps 3#      Patient Education and Home Exercises     Education provided:   - Gait training without AD  - Weight bearing precaution  - Safety awareness  - Use of assistive devices  - Transfers  - HEP reviewed  - Anatomy and Physiology pertaining to current condition  - Possible response to exercise  - Importance of PT compliance    Written Home Exercises Provided: Patient instructed to cont prior HEP. Exercises were reviewed and Raquel was able to demonstrate them prior to the end of the session. Raquel demonstrated good  understanding of the education provided. See EMR under Patient Instructions for exercises provided during therapy sessions.    ASSESSMENT   Pt tolerates additional therex today including SLR, side lying abduction, and TRX mini squats. She walked around the clinic today without an assistive device. Strength and mobility continue to improve.     Raquel Is progressing well towards her goals.   Pt prognosis is Fair.     Pt will continue to benefit from skilled outpatient physical therapy to address the deficits listed in the problem list box on initial evaluation, provide pt/family education and to maximize pt's level of independence in the home and community environment.     Pt's spiritual, cultural and educational needs considered and pt agreeable to plan of care and goals.     Anticipated barriers to physical therapy: WBAT    Goals:  Short Term Goals: 4 weeks   1. Pt will be independent with HEP to facilitate healing and improve  outcome measures. (met)  2. Pt will increased hip ROM by 5-10 degrees without pain. (met)  3. Pt will be able to demonstrate full WB in LLE and walk greater than 50 feet with least restrictive AD. (met)  4. Pt will increase LLE MMT values by at least 1/2 grade. (met)     Long Term Goals: 8 weeks   1. Pt will demonstrate mod [place] with household transfers and gait. (met)  2. Pt will demonstrate LLE gross strength to 4/5 or greater. (progressing, not met)  3. Pt will demonstrate full L hip ROM and flexibility. (met)    PLAN     Continue with PT POC to address functional deficits.    Evan Tesfaye, PT, DPT

## 2022-10-18 ENCOUNTER — INFUSION (OUTPATIENT)
Dept: INFUSION THERAPY | Facility: HOSPITAL | Age: 62
End: 2022-10-18
Attending: INTERNAL MEDICINE
Payer: MEDICAID

## 2022-10-18 VITALS
TEMPERATURE: 98 F | HEART RATE: 85 BPM | RESPIRATION RATE: 18 BRPM | SYSTOLIC BLOOD PRESSURE: 135 MMHG | DIASTOLIC BLOOD PRESSURE: 67 MMHG

## 2022-10-18 DIAGNOSIS — C90.00 MULTIPLE MYELOMA NOT HAVING ACHIEVED REMISSION: Primary | ICD-10-CM

## 2022-10-18 PROCEDURE — 63600175 PHARM REV CODE 636 W HCPCS: Mod: JW,JG | Performed by: INTERNAL MEDICINE

## 2022-10-18 PROCEDURE — 96401 CHEMO ANTI-NEOPL SQ/IM: CPT

## 2022-10-18 RX ORDER — BORTEZOMIB 3.5 MG/1
1.3 INJECTION, POWDER, LYOPHILIZED, FOR SOLUTION INTRAVENOUS; SUBCUTANEOUS
Status: COMPLETED | OUTPATIENT
Start: 2022-10-18 | End: 2022-10-18

## 2022-10-18 RX ADMIN — BORTEZOMIB 2.7 MG: 3.5 INJECTION, POWDER, LYOPHILIZED, FOR SOLUTION INTRAVENOUS; SUBCUTANEOUS at 10:10

## 2022-10-19 ENCOUNTER — CLINICAL SUPPORT (OUTPATIENT)
Dept: REHABILITATION | Facility: HOSPITAL | Age: 62
End: 2022-10-19
Payer: MEDICAID

## 2022-10-19 DIAGNOSIS — R26.89 BALANCE PROBLEM: ICD-10-CM

## 2022-10-19 DIAGNOSIS — Z74.09 IMPAIRED FUNCTIONAL MOBILITY, BALANCE, GAIT, AND ENDURANCE: Primary | ICD-10-CM

## 2022-10-19 DIAGNOSIS — R29.898 WEAKNESS OF BOTH LOWER EXTREMITIES: ICD-10-CM

## 2022-10-19 PROCEDURE — 97110 THERAPEUTIC EXERCISES: CPT | Mod: PN

## 2022-10-19 NOTE — PROGRESS NOTES
[facility]   Physical Therapy Treatment Note    Name: Raquel Houston  Clinic Number: [id number]    Therapy Diagnosis:   Encounter Diagnoses   Name Primary?    Impaired functional mobility, balance, gait, and endurance Yes    Weakness of both lower extremities     Balance problem      Physician: Oneyda, Provider    Visit Date: 10/19/2022    Physician Orders: PT Eval and Treat  Medical Diagnosis from Referral: Sepsis, due to unspecified organism, unspecified whether acute organ dysfunction present [A41.9]  Evaluation Date: 8/8/2022  Authorization Period Expiration: 12/31/2022  Plan of Care Expiration: 12/7/2022 (Updated)  Progress Note Due: 11/12/2022 (Reassessed 10/12/2022)  Visit # / Visits authorized: 20/40 auth (1/1 eval)  FOTO: 3 / 3  PTA Visit #: 0 / 5     Precautions: WBAT 6 weeks, sx 7/18/2022 ORIF    Time In: 0917 am  Time Out: 0957 am  Total Billable Time: 40 minutes    SUBJECTIVE     Pt reports: she had more soreness after her last visit than what she usually does. She thinks it may be from doing more squatting exercises.    She was compliant with home exercise program.  Response to previous treatment: mild soreness that improved over time  Functional change: Improved walking ability with rolling walker and straight cane    Pain: 0/10  Location: low back     OBJECTIVE     Objective Measures updated at progress report unless specified.     Treatment     Raquel received the treatments listed below:      Therapeutic exercises to develop strength, endurance, ROM, flexibility, posture and core stabilization for 30 minutes including:    Recumbent bike lvl 2, seat 5, 6 min  Bilateral shuttle 2 min 2 bands  Single leg shuttle 2 band x20 reps LLE  Hamstring stretch with strap 2 min each   Bridging glute set x20 reps  Straight leg raises x20 LLE  Supine ball squeezes 2 min  Side lying clamshells 2x20 reps  Side lying hip abduction 2x10 reps  TRX mini squats x20 reps    Not  performed today due to time:  LAQ 5# 2 min BLE  HSC RTB 2x10 each  Standing hip extension BLE x20 each  Standing hip abduction BLE x20 each  Stair climbing 4 laps  Seated Marching x20 ea (alternating LE's) 3#  Seated Hip Adduction 3s x2 min  Seated Hip Abduction GTB x2 min  Mini squats w walker 2 x 10   Seated low back stretch x3 min (reaching and with ball)  Seated Hamstring Stretch x2 min ea + LE's on stool  Sit to Stands x5 reps - no UE assist/hands in lap, table elevated  Standing hip flexion/marching LLE x20 reps  SAQ x30 reps 3#      Raquel received the following manual therapy techniques: Joint mobilizations and Soft tissue Mobilization were applied to the: lumbar spine for 10 minutes, including:    Percussion gun hypervolt STM, pt in prone  Gentle joint mobilizations, PA lumbar spine      Patient Education and Home Exercises     Education provided:   - Gait training without AD  - Weight bearing precaution  - Safety awareness  - Use of assistive devices  - Transfers  - HEP reviewed  - Anatomy and Physiology pertaining to current condition  - Possible response to exercise  - Importance of PT compliance    Written Home Exercises Provided: Patient instructed to cont prior HEP. Exercises were reviewed and Raquel was able to demonstrate them prior to the end of the session. Raquel demonstrated good  understanding of the education provided. See EMR under Patient Instructions for exercises provided during therapy sessions.    ASSESSMENT   Pt notes overall positive response and reduced back discomfort following treatment session. She continues to progress with LE strengthening activities. We did not use her cane at all throughout the session. She demonstrates greatly improved functional mobility.      Raquel Is progressing well towards her goals.   Pt prognosis is Fair.     Pt will continue to benefit from skilled outpatient physical therapy to address the deficits listed in the problem list box on initial  evaluation, provide pt/family education and to maximize pt's level of independence in the home and community environment.     Pt's spiritual, cultural and educational needs considered and pt agreeable to plan of care and goals.     Anticipated barriers to physical therapy: WBAT    Goals:  Short Term Goals: 4 weeks   1. Pt will be independent with HEP to facilitate healing and improve outcome measures. (met)  2. Pt will increased hip ROM by 5-10 degrees without pain. (met)  3. Pt will be able to demonstrate full WB in LLE and walk greater than 50 feet with least restrictive AD. (met)  4. Pt will increase LLE MMT values by at least 1/2 grade. (met)     Long Term Goals: 8 weeks   1. Pt will demonstrate mod [place] with household transfers and gait. (met)  2. Pt will demonstrate LLE gross strength to 4/5 or greater. (progressing, not met)  3. Pt will demonstrate full L hip ROM and flexibility. (met)    PLAN     Continue with PT POC to address functional deficits.    Evan Tesfaye, PT, DPT

## 2022-10-24 ENCOUNTER — CLINICAL SUPPORT (OUTPATIENT)
Dept: REHABILITATION | Facility: HOSPITAL | Age: 62
End: 2022-10-24
Payer: MEDICAID

## 2022-10-24 DIAGNOSIS — R29.898 WEAKNESS OF BOTH LOWER EXTREMITIES: ICD-10-CM

## 2022-10-24 DIAGNOSIS — R26.89 BALANCE PROBLEM: ICD-10-CM

## 2022-10-24 DIAGNOSIS — Z74.09 IMPAIRED FUNCTIONAL MOBILITY, BALANCE, GAIT, AND ENDURANCE: Primary | ICD-10-CM

## 2022-10-24 PROCEDURE — 97110 THERAPEUTIC EXERCISES: CPT | Mod: PN

## 2022-10-24 NOTE — PROGRESS NOTES
[facility]   Physical Therapy Treatment Note    Name: Raquel Houston  Clinic Number: [id number]    Therapy Diagnosis:   Encounter Diagnoses   Name Primary?    Impaired functional mobility, balance, gait, and endurance Yes    Weakness of both lower extremities     Balance problem      Physician: Oneyda, Provider    Visit Date: 10/24/2022    Physician Orders: PT Eval and Treat  Medical Diagnosis from Referral: Sepsis, due to unspecified organism, unspecified whether acute organ dysfunction present [A41.9]  Evaluation Date: 8/8/2022  Authorization Period Expiration: 12/31/2022  Plan of Care Expiration: 12/7/2022 (Updated)  Progress Note Due: 11/12/2022 (Reassessed 10/12/2022)  Visit # / Visits authorized: 21/40 auth (1/1 eval)  FOTO: 3 / 3  PTA Visit #: 0 / 5     Precautions: WBAT 6 weeks, sx 7/18/2022 ORIF    Time In: 0917 am  Time Out: 0957 am  Total Billable Time: 40 minutes    SUBJECTIVE     Pt reports: no pain this morning and everything at home continues to go well.    She was compliant with home exercise program.  Response to previous treatment: mild soreness that improved over time  Functional change: Improved walking ability without AD    Pain: 0/10  Location: low back     OBJECTIVE     Objective Measures updated at progress report unless specified.     Treatment     Raquel received the treatments listed below:      Therapeutic exercises to develop strength, endurance, ROM, flexibility, posture and core stabilization for 40 minutes including:    Recumbent bike lvl 2, seat 5, 6 min  Bilateral shuttle 2 min 2 bands  Single leg shuttle 2 band x20 reps LLE  Hamstring stretch with strap 2 min each   Bridging glute set x20 reps  Straight leg raises x20 LLE  Supine ball squeezes 2 min  Side lying clamshells 2x20 reps  Side lying hip abduction 2x10 reps  TRX mini squats x20 reps    Not performed today due to time:  LAQ 5# 2 min BLE  HSC RTB 2x10 each  Standing hip extension BLE  x20 each  Standing hip abduction BLE x20 each  Stair climbing 4 laps  Seated Marching x20 ea (alternating LE's) 3#  Seated Hip Adduction 3s x2 min  Seated Hip Abduction GTB x2 min  Mini squats w walker 2 x 10   Seated low back stretch x3 min (reaching and with ball)  Seated Hamstring Stretch x2 min ea + LE's on stool  Sit to Stands x5 reps - no UE assist/hands in lap, table elevated  Standing hip flexion/marching LLE x20 reps  SAQ x30 reps 3#      Raquel received the following manual therapy techniques: Joint mobilizations and Soft tissue Mobilization were applied to the: lumbar spine for 00 minutes, including:    Percussion gun hypervolt STM, pt in prone  Gentle joint mobilizations, PA lumbar spine      Patient Education and Home Exercises     Education provided:   - Gait training without AD  - Weight bearing precaution  - Safety awareness  - Use of assistive devices  - Transfers  - HEP reviewed  - Anatomy and Physiology pertaining to current condition  - Possible response to exercise  - Importance of PT compliance    Written Home Exercises Provided: Patient instructed to cont prior HEP. Exercises were reviewed and Raquel was able to demonstrate them prior to the end of the session. Raquel demonstrated good  understanding of the education provided. See EMR under Patient Instructions for exercises provided during therapy sessions.    ASSESSMENT   Pt tolerates progression of strengthening activities and demonstrates improved gait pattern without AD. She is making progress toward long term goals. Back pain has also decreased in recent weeks.       Raquel Is progressing well towards her goals.   Pt prognosis is Fair.     Pt will continue to benefit from skilled outpatient physical therapy to address the deficits listed in the problem list box on initial evaluation, provide pt/family education and to maximize pt's level of independence in the home and community environment.     Pt's spiritual, cultural and  educational needs considered and pt agreeable to plan of care and goals.     Anticipated barriers to physical therapy: WBAT    Goals:  Short Term Goals: 4 weeks   1. Pt will be independent with HEP to facilitate healing and improve outcome measures. (met)  2. Pt will increased hip ROM by 5-10 degrees without pain. (met)  3. Pt will be able to demonstrate full WB in LLE and walk greater than 50 feet with least restrictive AD. (met)  4. Pt will increase LLE MMT values by at least 1/2 grade. (met)     Long Term Goals: 8 weeks   1. Pt will demonstrate mod [place] with household transfers and gait. (met)  2. Pt will demonstrate LLE gross strength to 4/5 or greater. (progressing, not met)  3. Pt will demonstrate full L hip ROM and flexibility. (met)    PLAN     Continue with PT POC to address functional deficits.    Evan Tesfaye, PT, DPT

## 2022-10-25 ENCOUNTER — INFUSION (OUTPATIENT)
Dept: INFUSION THERAPY | Facility: HOSPITAL | Age: 62
End: 2022-10-25
Attending: INTERNAL MEDICINE
Payer: MEDICAID

## 2022-10-25 VITALS
SYSTOLIC BLOOD PRESSURE: 123 MMHG | TEMPERATURE: 98 F | BODY MASS INDEX: 31.83 KG/M2 | RESPIRATION RATE: 18 BRPM | DIASTOLIC BLOOD PRESSURE: 69 MMHG | HEART RATE: 76 BPM | HEIGHT: 67 IN | WEIGHT: 202.81 LBS

## 2022-10-25 DIAGNOSIS — C90.00 MULTIPLE MYELOMA NOT HAVING ACHIEVED REMISSION: Primary | ICD-10-CM

## 2022-10-25 DIAGNOSIS — Z76.82 STEM CELL TRANSPLANT CANDIDATE: Primary | ICD-10-CM

## 2022-10-25 DIAGNOSIS — C90.00 MULTIPLE MYELOMA NOT HAVING ACHIEVED REMISSION: ICD-10-CM

## 2022-10-25 PROCEDURE — 63600175 PHARM REV CODE 636 W HCPCS: Mod: TB | Performed by: INTERNAL MEDICINE

## 2022-10-25 PROCEDURE — 25000003 PHARM REV CODE 250: Performed by: INTERNAL MEDICINE

## 2022-10-25 PROCEDURE — 96401 CHEMO ANTI-NEOPL SQ/IM: CPT

## 2022-10-25 RX ORDER — SODIUM CHLORIDE 0.9 % (FLUSH) 0.9 %
10 SYRINGE (ML) INJECTION
Status: DISCONTINUED | OUTPATIENT
Start: 2022-10-25 | End: 2022-10-25 | Stop reason: HOSPADM

## 2022-10-25 RX ORDER — HEPARIN 100 UNIT/ML
500 SYRINGE INTRAVENOUS
Status: DISCONTINUED | OUTPATIENT
Start: 2022-10-25 | End: 2022-10-25 | Stop reason: HOSPADM

## 2022-10-25 RX ORDER — BORTEZOMIB 3.5 MG/1
1.3 INJECTION, POWDER, LYOPHILIZED, FOR SOLUTION INTRAVENOUS; SUBCUTANEOUS
Status: COMPLETED | OUTPATIENT
Start: 2022-10-25 | End: 2022-10-25

## 2022-10-25 RX ORDER — ACETAMINOPHEN 325 MG/1
650 TABLET ORAL
Status: COMPLETED | OUTPATIENT
Start: 2022-10-25 | End: 2022-10-25

## 2022-10-25 RX ORDER — DIPHENHYDRAMINE HCL 50 MG
50 CAPSULE ORAL
Status: COMPLETED | OUTPATIENT
Start: 2022-10-25 | End: 2022-10-25

## 2022-10-25 RX ADMIN — BORTEZOMIB 2.7 MG: 3.5 INJECTION, POWDER, LYOPHILIZED, FOR SOLUTION INTRAVENOUS; SUBCUTANEOUS at 02:10

## 2022-10-25 RX ADMIN — DIPHENHYDRAMINE HYDROCHLORIDE 50 MG: 50 CAPSULE ORAL at 01:10

## 2022-10-25 RX ADMIN — ACETAMINOPHEN 650 MG: 325 TABLET ORAL at 01:10

## 2022-10-25 RX ADMIN — DARATUMUMAB AND HYALURONIDASE-FIHJ (HUMAN RECOMBINANT) 1800 MG: 1800; 30000 INJECTION SUBCUTANEOUS at 02:10

## 2022-10-25 NOTE — PLAN OF CARE
Patient tolerated Sofie SQ and Velcade with no complications. VSS. Pt instructed to call MD with any problems. November appointment calender given to patient. Pt discharged home via wheelchair.

## 2022-10-26 ENCOUNTER — CLINICAL SUPPORT (OUTPATIENT)
Dept: REHABILITATION | Facility: HOSPITAL | Age: 62
End: 2022-10-26
Payer: MEDICAID

## 2022-10-26 ENCOUNTER — HOSPITAL ENCOUNTER (OUTPATIENT)
Dept: RADIOLOGY | Facility: HOSPITAL | Age: 62
Discharge: HOME | End: 2022-10-26
Attending: INTERNAL MEDICINE
Payer: MEDICAID

## 2022-10-26 DIAGNOSIS — R29.898 WEAKNESS OF BOTH LOWER EXTREMITIES: ICD-10-CM

## 2022-10-26 DIAGNOSIS — R26.89 BALANCE PROBLEM: ICD-10-CM

## 2022-10-26 DIAGNOSIS — C90.00 MULTIPLE MYELOMA NOT HAVING ACHIEVED REMISSION: ICD-10-CM

## 2022-10-26 DIAGNOSIS — Z74.09 IMPAIRED FUNCTIONAL MOBILITY, BALANCE, GAIT, AND ENDURANCE: Primary | ICD-10-CM

## 2022-10-26 DIAGNOSIS — Z76.82 STEM CELL TRANSPLANT CANDIDATE: ICD-10-CM

## 2022-10-26 PROCEDURE — 71046 X-RAY EXAM CHEST 2 VIEWS: CPT | Mod: TC,PO

## 2022-10-26 PROCEDURE — 71046 X-RAY EXAM CHEST 2 VIEWS: CPT | Mod: 26,,, | Performed by: RADIOLOGY

## 2022-10-26 PROCEDURE — 97110 THERAPEUTIC EXERCISES: CPT | Mod: PN

## 2022-10-26 PROCEDURE — 71046 XR CHEST PA AND LATERAL: ICD-10-PCS | Mod: 26,,, | Performed by: RADIOLOGY

## 2022-10-26 NOTE — PROGRESS NOTES
[facility]   Physical Therapy Treatment Note    Name: Raquel Houston  Clinic Number: [id number]    Therapy Diagnosis:   Encounter Diagnoses   Name Primary?    Impaired functional mobility, balance, gait, and endurance Yes    Weakness of both lower extremities     Balance problem      Physician: Oneyda, Provider    Visit Date: 10/26/2022    Physician Orders: PT Eval and Treat  Medical Diagnosis from Referral: Sepsis, due to unspecified organism, unspecified whether acute organ dysfunction present [A41.9]  Evaluation Date: 8/8/2022  Authorization Period Expiration: 12/31/2022  Plan of Care Expiration: 12/7/2022 (Updated)  Progress Note Due: 11/12/2022 (Reassessed 10/12/2022)  Visit # / Visits authorized: 22/40 auth (1/1 eval)  FOTO: 3 / 3  PTA Visit #: 0 / 5     Precautions: WBAT 6 weeks, sx 7/18/2022 ORIF    Time In: 0922 am  Time Out: 1000 am  Total Billable Time: 38 minutes    SUBJECTIVE     Pt reports: no new complaint. She feels better every day.    She was compliant with home exercise program.  Response to previous treatment: mild soreness that improved over time  Functional change: Improved walking ability without AD    Pain: 0/10  Location: low back     OBJECTIVE     Objective Measures updated at progress report unless specified.     Treatment     Raquel received the treatments listed below:      Therapeutic exercises to develop strength, endurance, ROM, flexibility, posture and core stabilization for 38 minutes including:    Recumbent bike lvl 2, seat 5, 6 min  Bilateral shuttle 2 min 2 bands  Single leg shuttle 2 band x20 reps LLE  Hamstring stretch with strap 2 min each  Resisted side stepping in // bars 5 laps, green loop  Bridging x30 reps  Straight leg raises x20 LLE  Supine ball squeezes 2 min  Side lying clamshells 2x20 reps  Side lying hip abduction 2x10 reps  TRX squats x25 reps    Not performed today due to time:  LAQ 5# 2 min BLE  HSC RTB 2x10 each  Standing  hip extension BLE x20 each  Standing hip abduction BLE x20 each  Stair climbing 4 laps  Seated Marching x20 ea (alternating LE's) 3#  Seated Hip Adduction 3s x2 min  Seated Hip Abduction GTB x2 min  Mini squats w walker 2 x 10   Seated low back stretch x3 min (reaching and with ball)  Seated Hamstring Stretch x2 min ea + LE's on stool  Sit to Stands x5 reps - no UE assist/hands in lap, table elevated  Standing hip flexion/marching LLE x20 reps  SAQ x30 reps 3#      Raquel received the following manual therapy techniques: Joint mobilizations and Soft tissue Mobilization were applied to the: lumbar spine for 00 minutes, including:    Percussion gun hypervolt STM, pt in prone  Gentle joint mobilizations, PA lumbar spine      Patient Education and Home Exercises     Education provided:   - Gait training without AD  - Weight bearing precaution  - Safety awareness  - Use of assistive devices  - Transfers  - HEP reviewed  - Anatomy and Physiology pertaining to current condition  - Possible response to exercise  - Importance of PT compliance    Written Home Exercises Provided: Patient instructed to cont prior HEP. Exercises were reviewed and Raquel was able to demonstrate them prior to the end of the session. Raquel demonstrated good  understanding of the education provided. See EMR under Patient Instructions for exercises provided during therapy sessions.    ASSESSMENT   Pt tolerates additional therex progression including deeper TRX squats, resisted side stepping, and increased reps. She demonstrates improved functional mobility without an assistive device.       Raquel Is progressing well towards her goals.   Pt prognosis is Fair.     Pt will continue to benefit from skilled outpatient physical therapy to address the deficits listed in the problem list box on initial evaluation, provide pt/family education and to maximize pt's level of independence in the home and community environment.     Pt's spiritual, cultural  and educational needs considered and pt agreeable to plan of care and goals.     Anticipated barriers to physical therapy: WBAT    Goals:  Short Term Goals: 4 weeks   1. Pt will be independent with HEP to facilitate healing and improve outcome measures. (met)  2. Pt will increased hip ROM by 5-10 degrees without pain. (met)  3. Pt will be able to demonstrate full WB in LLE and walk greater than 50 feet with least restrictive AD. (met)  4. Pt will increase LLE MMT values by at least 1/2 grade. (met)     Long Term Goals: 8 weeks   1. Pt will demonstrate mod [place] with household transfers and gait. (met)  2. Pt will demonstrate LLE gross strength to 4/5 or greater. (progressing, not met)  3. Pt will demonstrate full L hip ROM and flexibility. (met)    PLAN     Continue with PT POC to address functional deficits.    Evan Tesfaye, PT, DPT

## 2022-10-31 ENCOUNTER — CLINICAL SUPPORT (OUTPATIENT)
Dept: REHABILITATION | Facility: HOSPITAL | Age: 62
End: 2022-10-31
Payer: MEDICAID

## 2022-10-31 DIAGNOSIS — R29.898 WEAKNESS OF BOTH LOWER EXTREMITIES: ICD-10-CM

## 2022-10-31 DIAGNOSIS — R26.89 BALANCE PROBLEM: ICD-10-CM

## 2022-10-31 DIAGNOSIS — Z74.09 IMPAIRED FUNCTIONAL MOBILITY, BALANCE, GAIT, AND ENDURANCE: Primary | ICD-10-CM

## 2022-10-31 PROCEDURE — 97110 THERAPEUTIC EXERCISES: CPT | Mod: PN

## 2022-10-31 NOTE — PROGRESS NOTES
[facility]   Physical Therapy Treatment Note    Name: Raquel Houston  Clinic Number: [id number]    Therapy Diagnosis:   Encounter Diagnoses   Name Primary?    Impaired functional mobility, balance, gait, and endurance Yes    Weakness of both lower extremities     Balance problem      Physician: Oneyda, Provider    Visit Date: 10/31/2022    Physician Orders: PT Eval and Treat  Medical Diagnosis from Referral: Sepsis, due to unspecified organism, unspecified whether acute organ dysfunction present [A41.9]  Evaluation Date: 8/8/2022  Authorization Period Expiration: 12/31/2022  Plan of Care Expiration: 12/7/2022 (Updated)  Progress Note Due: 11/12/2022 (Reassessed 10/12/2022)  Visit # / Visits authorized: 23/40 auth (1/1 eval)  FOTO: 3 / 3  PTA Visit #: 0 / 5     Precautions: WBAT 6 weeks, sx 7/18/2022 ORIF    Time In: 0915 am  Time Out: 1000 am  Total Billable Time: 45 minutes    SUBJECTIVE     Pt reports: she presents to PT using a straight cane but leaves it up front prior to our session. She is comfortable without it.    She was compliant with home exercise program.  Response to previous treatment: mild soreness that improved over time  Functional change: Improved walking ability without AD    Pain: 0/10  Location: low back     OBJECTIVE     Objective Measures updated at progress report unless specified.     Treatment     Raquel received the treatments listed below:      Therapeutic exercises to develop strength, endurance, ROM, flexibility, posture and core stabilization for 40 minutes including:    Recumbent bike lvl 2, seat 5, 6 min  Bilateral shuttle 2 min 2 bands  Single leg shuttle 2 band x20 reps LLE  Hamstring stretch with strap 2 min each  Resisted side stepping in // bars 5 laps, green loop  Bridging x30 reps  Straight leg raises x20 LLE  Supine ball squeezes 2 min  Side lying clamshells 2x20 reps  Side lying hip abduction 2x10 reps  TRX squats x25 reps    Not  performed today due to time:  LAQ 5# 2 min BLE  HSC RTB 2x10 each  Standing hip extension BLE x20 each  Standing hip abduction BLE x20 each  Stair climbing 4 laps  Seated Marching x20 ea (alternating LE's) 3#  Seated Hip Adduction 3s x2 min  Seated Hip Abduction GTB x2 min  Mini squats w walker 2 x 10   Seated low back stretch x3 min (reaching and with ball)  Seated Hamstring Stretch x2 min ea + LE's on stool  Sit to Stands x5 reps - no UE assist/hands in lap, table elevated  Standing hip flexion/marching LLE x20 reps  SAQ x30 reps 3#      Raquel received the following manual therapy techniques: Joint mobilizations and Soft tissue Mobilization were applied to the: lumbar spine for 5 minutes, including:    Percussion gun hypervolt STM, pt in prone  Gentle joint mobilizations, PA lumbar spine      Patient Education and Home Exercises     Education provided:   - Gait training without AD  - Weight bearing precaution  - Safety awareness  - Use of assistive devices  - Transfers  - HEP reviewed  - Anatomy and Physiology pertaining to current condition  - Possible response to exercise  - Importance of PT compliance    Written Home Exercises Provided: Patient instructed to cont prior HEP. Exercises were reviewed and Raquel was able to demonstrate them prior to the end of the session. Raquel demonstrated good  understanding of the education provided. See EMR under Patient Instructions for exercises provided during therapy sessions.    ASSESSMENT   She feels relief after manual techniques and is progressively getting stronger in both legs. She is improving functional mobility and does not require an AD most of the time.       Raquel Is progressing well towards her goals.   Pt prognosis is Fair.     Pt will continue to benefit from skilled outpatient physical therapy to address the deficits listed in the problem list box on initial evaluation, provide pt/family education and to maximize pt's level of independence in the  home and community environment.     Pt's spiritual, cultural and educational needs considered and pt agreeable to plan of care and goals.     Anticipated barriers to physical therapy: WBAT    Goals:  Short Term Goals: 4 weeks   1. Pt will be independent with HEP to facilitate healing and improve outcome measures. (met)  2. Pt will increased hip ROM by 5-10 degrees without pain. (met)  3. Pt will be able to demonstrate full WB in LLE and walk greater than 50 feet with least restrictive AD. (met)  4. Pt will increase LLE MMT values by at least 1/2 grade. (met)     Long Term Goals: 8 weeks   1. Pt will demonstrate mod [place] with household transfers and gait. (met)  2. Pt will demonstrate LLE gross strength to 4/5 or greater. (progressing, not met)  3. Pt will demonstrate full L hip ROM and flexibility. (met)    PLAN     Continue with PT POC to address functional deficits.    Evan Tesfaye, PT, DPT

## 2022-11-01 ENCOUNTER — LAB VISIT (OUTPATIENT)
Dept: LAB | Facility: HOSPITAL | Age: 62
End: 2022-11-01
Payer: MEDICAID

## 2022-11-01 ENCOUNTER — INFUSION (OUTPATIENT)
Dept: INFUSION THERAPY | Facility: HOSPITAL | Age: 62
End: 2022-11-01
Attending: INTERNAL MEDICINE
Payer: MEDICAID

## 2022-11-01 VITALS
HEART RATE: 90 BPM | DIASTOLIC BLOOD PRESSURE: 72 MMHG | SYSTOLIC BLOOD PRESSURE: 164 MMHG | TEMPERATURE: 99 F | RESPIRATION RATE: 18 BRPM

## 2022-11-01 DIAGNOSIS — C90.00 MULTIPLE MYELOMA NOT HAVING ACHIEVED REMISSION: ICD-10-CM

## 2022-11-01 DIAGNOSIS — C90.00 MULTIPLE MYELOMA NOT HAVING ACHIEVED REMISSION: Primary | ICD-10-CM

## 2022-11-01 LAB
ABO + RH BLD: ABNORMAL
ALBUMIN SERPL BCP-MCNC: 3.5 G/DL (ref 3.5–5.2)
ALP SERPL-CCNC: 276 U/L (ref 55–135)
ALT SERPL W/O P-5'-P-CCNC: 44 U/L (ref 10–44)
ANION GAP SERPL CALC-SCNC: 8 MMOL/L (ref 8–16)
AST SERPL-CCNC: 25 U/L (ref 10–40)
BASOPHILS # BLD AUTO: 0.02 K/UL (ref 0–0.2)
BASOPHILS NFR BLD: 0.4 % (ref 0–1.9)
BILIRUB SERPL-MCNC: 0.4 MG/DL (ref 0.1–1)
BLD GP AB SCN CELLS X3 SERPL QL: ABNORMAL
BLOOD GROUP ANTIBODIES SERPL: NORMAL
BUN SERPL-MCNC: 14 MG/DL (ref 6–20)
CALCIUM SERPL-MCNC: 9.1 MG/DL (ref 8.7–10.5)
CHLORIDE SERPL-SCNC: 109 MMOL/L (ref 95–110)
CO2 SERPL-SCNC: 24 MMOL/L (ref 23–29)
CREAT SERPL-MCNC: 0.8 MG/DL (ref 0.5–1.4)
DAT IGG-SP REAG RBC-IMP: NORMAL
DIFFERENTIAL METHOD: ABNORMAL
EOSINOPHIL # BLD AUTO: 0.1 K/UL (ref 0–0.5)
EOSINOPHIL NFR BLD: 1.4 % (ref 0–8)
ERYTHROCYTE [DISTWIDTH] IN BLOOD BY AUTOMATED COUNT: 15.8 % (ref 11.5–14.5)
EST. GFR  (NO RACE VARIABLE): >60 ML/MIN/1.73 M^2
GLUCOSE SERPL-MCNC: 99 MG/DL (ref 70–110)
HCT VFR BLD AUTO: 37.3 % (ref 37–48.5)
HGB BLD-MCNC: 11.6 G/DL (ref 12–16)
IMM GRANULOCYTES # BLD AUTO: 0.01 K/UL (ref 0–0.04)
IMM GRANULOCYTES NFR BLD AUTO: 0.2 % (ref 0–0.5)
LYMPHOCYTES # BLD AUTO: 2.9 K/UL (ref 1–4.8)
LYMPHOCYTES NFR BLD: 56.7 % (ref 18–48)
MCH RBC QN AUTO: 27.9 PG (ref 27–31)
MCHC RBC AUTO-ENTMCNC: 31.1 G/DL (ref 32–36)
MCV RBC AUTO: 90 FL (ref 82–98)
MONOCYTES # BLD AUTO: 0.7 K/UL (ref 0.3–1)
MONOCYTES NFR BLD: 13.6 % (ref 4–15)
NEUTROPHILS # BLD AUTO: 1.4 K/UL (ref 1.8–7.7)
NEUTROPHILS NFR BLD: 27.7 % (ref 38–73)
NRBC BLD-RTO: 0 /100 WBC
PLATELET # BLD AUTO: 178 K/UL (ref 150–450)
PMV BLD AUTO: 11.4 FL (ref 9.2–12.9)
POTASSIUM SERPL-SCNC: 3.6 MMOL/L (ref 3.5–5.1)
PROT SERPL-MCNC: 6.2 G/DL (ref 6–8.4)
RBC # BLD AUTO: 4.16 M/UL (ref 4–5.4)
SODIUM SERPL-SCNC: 141 MMOL/L (ref 136–145)
WBC # BLD AUTO: 5.15 K/UL (ref 3.9–12.7)

## 2022-11-01 PROCEDURE — 63600175 PHARM REV CODE 636 W HCPCS: Mod: JG | Performed by: INTERNAL MEDICINE

## 2022-11-01 PROCEDURE — 80053 COMPREHEN METABOLIC PANEL: CPT | Performed by: INTERNAL MEDICINE

## 2022-11-01 PROCEDURE — 86880 COOMBS TEST DIRECT: CPT | Performed by: INTERNAL MEDICINE

## 2022-11-01 PROCEDURE — 86850 RBC ANTIBODY SCREEN: CPT | Performed by: INTERNAL MEDICINE

## 2022-11-01 PROCEDURE — 36415 COLL VENOUS BLD VENIPUNCTURE: CPT | Performed by: INTERNAL MEDICINE

## 2022-11-01 PROCEDURE — 96401 CHEMO ANTI-NEOPL SQ/IM: CPT

## 2022-11-01 PROCEDURE — 86870 RBC ANTIBODY IDENTIFICATION: CPT | Performed by: INTERNAL MEDICINE

## 2022-11-01 PROCEDURE — 85025 COMPLETE CBC W/AUTO DIFF WBC: CPT | Performed by: INTERNAL MEDICINE

## 2022-11-01 RX ORDER — BORTEZOMIB 3.5 MG/1
1.3 INJECTION, POWDER, LYOPHILIZED, FOR SOLUTION INTRAVENOUS; SUBCUTANEOUS
Status: COMPLETED | OUTPATIENT
Start: 2022-11-01 | End: 2022-11-01

## 2022-11-01 RX ADMIN — BORTEZOMIB 2.7 MG: 3.5 INJECTION, POWDER, LYOPHILIZED, FOR SOLUTION INTRAVENOUS; SUBCUTANEOUS at 11:11

## 2022-11-02 ENCOUNTER — CLINICAL SUPPORT (OUTPATIENT)
Dept: REHABILITATION | Facility: HOSPITAL | Age: 62
End: 2022-11-02
Payer: MEDICAID

## 2022-11-02 DIAGNOSIS — R26.89 BALANCE PROBLEM: ICD-10-CM

## 2022-11-02 DIAGNOSIS — R29.898 WEAKNESS OF BOTH LOWER EXTREMITIES: ICD-10-CM

## 2022-11-02 DIAGNOSIS — Z74.09 IMPAIRED FUNCTIONAL MOBILITY, BALANCE, GAIT, AND ENDURANCE: Primary | ICD-10-CM

## 2022-11-02 PROCEDURE — 97110 THERAPEUTIC EXERCISES: CPT | Mod: PN

## 2022-11-02 NOTE — PROGRESS NOTES
[facility]   Physical Therapy Treatment Note    Name: Raquel Houston  Clinic Number: [id number]    Therapy Diagnosis:   Encounter Diagnoses   Name Primary?    Impaired functional mobility, balance, gait, and endurance Yes    Weakness of both lower extremities     Balance problem      Physician: Oneyda, Provider    Visit Date: 11/2/2022    Physician Orders: PT Eval and Treat  Medical Diagnosis from Referral: Sepsis, due to unspecified organism, unspecified whether acute organ dysfunction present [A41.9]  Evaluation Date: 8/8/2022  Authorization Period Expiration: 12/31/2022  Plan of Care Expiration: 12/7/2022 (Updated)  Progress Note Due: 11/12/2022 (Reassessed 10/12/2022)  Visit # / Visits authorized: 24/40 auth (1/1 eval)  FOTO: 3 / 3  PTA Visit #: 0 / 5     Precautions: WBAT 6 weeks, sx 7/18/2022 ORIF    Time In: 0930 am (Pt arrived late)  Time Out: 1000 am  Total Billable Time: 30 minutes    SUBJECTIVE     Pt reports: no pain or soreness this morning. She is only taking medication as needed.    She was compliant with home exercise program.  Response to previous treatment: mild soreness that improved over time  Functional change: Improved walking ability without AD    Pain: 0/10  Location: low back     OBJECTIVE     Objective Measures updated at progress report unless specified.     Treatment     Raquel received the treatments listed below:      Therapeutic exercises to develop strength, endurance, ROM, flexibility, posture and core stabilization for 30 minutes including:    Recumbent bike lvl 2, seat 5, 6 min  Bilateral shuttle 2 min 2 bands  Single leg shuttle 2 band x20 reps LLE  Hamstring stretch with strap 2 min each  Resisted side stepping in // bars 7 laps, green loop  Bridging x30 reps  Side lying clamshells 2x20 reps  Side lying hip abduction 2x10 reps  TRX squats x25 reps    Not performed today due to time:  Straight leg raises x20 LLE  Supine ball squeezes 2  min  LAQ 5# 2 min BLE  HSC RTB 2x10 each  Standing hip extension BLE x20 each  Standing hip abduction BLE x20 each  Stair climbing 4 laps  Seated Marching x20 ea (alternating LE's) 3#  Seated Hip Adduction 3s x2 min  Seated Hip Abduction GTB x2 min  Mini squats w walker 2 x 10   Seated low back stretch x3 min (reaching and with ball)  Seated Hamstring Stretch x2 min ea + LE's on stool  Sit to Stands x5 reps - no UE assist/hands in lap, table elevated  Standing hip flexion/marching LLE x20 reps  SAQ x30 reps 3#      Raquel received the following manual therapy techniques: Joint mobilizations and Soft tissue Mobilization were applied to the: lumbar spine for 5 minutes, including:    Percussion gun hypervolt STM, pt in prone  Gentle joint mobilizations, PA lumbar spine      Patient Education and Home Exercises     Education provided:   - Gait training without AD  - Weight bearing precaution  - Safety awareness  - Use of assistive devices  - Transfers  - HEP reviewed  - Anatomy and Physiology pertaining to current condition  - Possible response to exercise  - Importance of PT compliance    Written Home Exercises Provided: Patient instructed to cont prior HEP. Exercises were reviewed and Raquel was able to demonstrate them prior to the end of the session. Raquel demonstrated good  understanding of the education provided. See EMR under Patient Instructions for exercises provided during therapy sessions.    ASSESSMENT   Progress continues toward PT goals. She is independent while ambulating without an assistive device. Continue as planned.     Raquel Is progressing well towards her goals.   Pt prognosis is Fair.     Pt will continue to benefit from skilled outpatient physical therapy to address the deficits listed in the problem list box on initial evaluation, provide pt/family education and to maximize pt's level of independence in the home and community environment.     Pt's spiritual, cultural and educational needs  considered and pt agreeable to plan of care and goals.     Anticipated barriers to physical therapy: WBAT    Goals:  Short Term Goals: 4 weeks   1. Pt will be independent with HEP to facilitate healing and improve outcome measures. (met)  2. Pt will increased hip ROM by 5-10 degrees without pain. (met)  3. Pt will be able to demonstrate full WB in LLE and walk greater than 50 feet with least restrictive AD. (met)  4. Pt will increase LLE MMT values by at least 1/2 grade. (met)     Long Term Goals: 8 weeks   1. Pt will demonstrate mod [place] with household transfers and gait. (met)  2. Pt will demonstrate LLE gross strength to 4/5 or greater. (progressing, not met)  3. Pt will demonstrate full L hip ROM and flexibility. (met)    PLAN     Continue with PT POC to address functional deficits.    Evan Tesfaye, PT, DPT

## 2022-11-07 ENCOUNTER — CLINICAL SUPPORT (OUTPATIENT)
Dept: REHABILITATION | Facility: HOSPITAL | Age: 62
End: 2022-11-07
Payer: MEDICAID

## 2022-11-07 DIAGNOSIS — Z74.09 IMPAIRED FUNCTIONAL MOBILITY, BALANCE, GAIT, AND ENDURANCE: Primary | ICD-10-CM

## 2022-11-07 DIAGNOSIS — R26.89 BALANCE PROBLEM: ICD-10-CM

## 2022-11-07 DIAGNOSIS — R29.898 WEAKNESS OF BOTH LOWER EXTREMITIES: ICD-10-CM

## 2022-11-07 PROCEDURE — 97110 THERAPEUTIC EXERCISES: CPT | Mod: PN

## 2022-11-07 NOTE — PROGRESS NOTES
PATIENT: Raquel Houston  MRN: [id number]  DATE: 11/8/2022      Diagnosis:   1. Multiple myeloma not having achieved remission              Chief Complaint: Multiple Myeloma    Oncologic History:     07/14/21: Presented with several months of worsening lethargy, generalized pain, 40-60lbs weight loss, and encephalopathy. Labs at time of presentation: hgb 7.6, plts 82, wbc 12, sodium 120, creatinine 1.55, corrected calcium 9.7, transaminitis, M-spike 6.4 g/dL. Skeletal survey showed lesions of left/right humerus, left/right proximal radius, pelvis, and femurs with a significant left femur lytic lesion. Multiple lucent lesions in calvarium. Given two doses of velcade while hospitalized.  07/18/21: Underwent left trochanteric femoral nail fixation. Pathology consistent with plasma cell neoplasm  07/19/22: BMBx showing 90% plasma cells with 100% cellularity, gain 1q21, t(4;14), and monosomy 13.  08/17/22: Cycle 1 Sofie-VRD for IgG Kappa Multiple Myeloma  09/13/22: Cycle 2 Sofie-VRD  10/11/22: Cycle 3 Sofie-VRD  11/08/22: Cycle 4 Sofie-VRD    Subjective:    Initial History: Ms. Houston is a 62 y.o. female with medical history of OA, gout, and osteoporosis presenting for follow up of multiple myeloma. History as above. Arrived alone today. Continues to do very well and is having no issues tolerating therapy. Continues to work on setting up dental appointment. Has some pain to left trapezius after sleeping wrong, massage helped but continues to have pain. Denies any other complaints.      Smoked 1ppd for 20 years, quit April 2022  Quit alcohol Apirl 2022  Denies recreational drug use  Father has a history of a diffuse bone cancer that he did not want to be treated for      Past Medical History:   Past Medical History:   Diagnosis Date    Arthritis     Multiple myeloma     Osteoporosis        Past Surgical HIstory:   Past Surgical History:   Procedure Laterality Date    FEMUR SURGERY         Family History:   Family  History   Problem Relation Age of Onset    Cancer Father        Social History:  reports that she has quit smoking. Her smoking use included cigarettes. She has quit using smokeless tobacco. She reports that she does not currently use alcohol. She reports that she does not use drugs.    Allergies:  Review of patient's allergies indicates:  No Known Allergies    Medications:  Current Outpatient Medications   Medication Sig Dispense Refill    acyclovir (ZOVIRAX) 400 MG tablet Take 1 tablet (400 mg total) by mouth 2 (two) times daily. 60 tablet 11    alendronate (FOSAMAX) 70 MG tablet alendronate 70 mg tablet   Take 1 tablet every week by oral route.      allopurinoL (ZYLOPRIM) 100 MG tablet allopurinol 100 mg tablet   Take 1 tablet every day by oral route.      amLODIPine (NORVASC) 10 MG tablet Take 1 tablet (10 mg total) by mouth once daily. 30 tablet 11    aspirin (CIERRA ASPIRIN) 325 MG tablet Take 1 tablet (325 mg total) by mouth once daily. 100 tablet 3    dexAMETHasone (DECADRON) 4 MG Tab Take 10 tablets (40 mg total) by mouth As instructed (Take on days 1, 8, 15, and 22 of chemotherapy.). 40 tablet 1    lenalidomide 25 mg Cap Take 1 capsule (25 mg total) by mouth As instructed (Take by mouth once daily on days 1-21 of each 28 day cycle). 21 capsule 0    meloxicam (MOBIC) 7.5 MG tablet Mobic 7.5 mg tablet   Take 1 tablet every day by oral route with meals for 40 days.      oxyCODONE (ROXICODONE) 5 MG immediate release tablet Take 2 tablets (10 mg total) by mouth every 6 (six) hours as needed for Pain. 56 tablet 0    pantoprazole (PROTONIX) 40 MG tablet Take 1 tablet (40 mg total) by mouth once daily. 30 tablet 3     No current facility-administered medications for this visit.       Review of Systems   Constitutional:  Negative for chills, fatigue and fever.   HENT:  Negative for dental problem and trouble swallowing.    Eyes:  Negative for photophobia and visual disturbance.   Respiratory:  Negative for cough and  "shortness of breath.    Cardiovascular:  Negative for chest pain and leg swelling.   Gastrointestinal:  Negative for abdominal pain, diarrhea, nausea and vomiting.   Musculoskeletal:  Positive for myalgias (right trapezius pain). Negative for arthralgias.   Skin:  Negative for rash and wound.   Neurological:  Negative for light-headedness and headaches.   Hematological:  Negative for adenopathy. Does not bruise/bleed easily.   Psychiatric/Behavioral:  Negative for confusion. The patient is not nervous/anxious.      ECOG Performance Status: 1  Objective:      Vitals:   Vitals:    11/08/22 1007   BP: (!) 142/93   Pulse: 101   Resp: 17   Temp: 98.4 °F (36.9 °C)   SpO2: 99%   Weight: 93.2 kg (205 lb 9.3 oz)   Height: 5' 7" (1.702 m)             Physical Exam  Vitals reviewed.   Constitutional:       Appearance: Normal appearance.   HENT:      Head: Normocephalic and atraumatic.   Eyes:      Extraocular Movements: Extraocular movements intact.      Conjunctiva/sclera: Conjunctivae normal.   Cardiovascular:      Rate and Rhythm: Normal rate and regular rhythm.   Pulmonary:      Effort: Pulmonary effort is normal. No respiratory distress.   Abdominal:      General: There is no distension.      Palpations: Abdomen is soft.      Tenderness: There is no abdominal tenderness.   Musculoskeletal:      Right lower leg: No edema.      Left lower leg: No edema.   Skin:     General: Skin is warm and dry.   Neurological:      General: No focal deficit present.      Mental Status: She is alert and oriented to person, place, and time.   Psychiatric:         Mood and Affect: Mood normal.         Behavior: Behavior normal.       Laboratory Data:  Lab Visit on 11/08/2022   Component Date Value Ref Range Status    WBC 11/08/2022 5.66  3.90 - 12.70 K/uL Final    RBC 11/08/2022 4.16  4.00 - 5.40 M/uL Final    Hemoglobin 11/08/2022 11.8 (L)  12.0 - 16.0 g/dL Final    Hematocrit 11/08/2022 37.3  37.0 - 48.5 % Final    MCV 11/08/2022 90  82 - " 98 fL Final    MCH 11/08/2022 28.4  27.0 - 31.0 pg Final    MCHC 11/08/2022 31.6 (L)  32.0 - 36.0 g/dL Final    RDW 11/08/2022 16.0 (H)  11.5 - 14.5 % Final    Platelets 11/08/2022 178  150 - 450 K/uL Final    MPV 11/08/2022 12.0  9.2 - 12.9 fL Final    Immature Granulocytes 11/08/2022 0.4  0.0 - 0.5 % Final    Gran # (ANC) 11/08/2022 3.9  1.8 - 7.7 K/uL Final    Immature Grans (Abs) 11/08/2022 0.02  0.00 - 0.04 K/uL Final    Comment: Mild elevation in immature granulocytes is non specific and   can be seen in a variety of conditions including stress response,   acute inflammation, trauma and pregnancy. Correlation with other   laboratory and clinical findings is essential.      Lymph # 11/08/2022 1.3  1.0 - 4.8 K/uL Final    Mono # 11/08/2022 0.4  0.3 - 1.0 K/uL Final    Eos # 11/08/2022 0.1  0.0 - 0.5 K/uL Final    Baso # 11/08/2022 0.03  0.00 - 0.20 K/uL Final    nRBC 11/08/2022 0  0 /100 WBC Final    Gran % 11/08/2022 68.1  38.0 - 73.0 % Final    Lymph % 11/08/2022 23.3  18.0 - 48.0 % Final    Mono % 11/08/2022 6.5  4.0 - 15.0 % Final    Eosinophil % 11/08/2022 1.2  0.0 - 8.0 % Final    Basophil % 11/08/2022 0.5  0.0 - 1.9 % Final    Differential Method 11/08/2022 Automated   Final         Imaging:   Assessment:       1. Multiple myeloma not having achieved remission                 Plan:     Multiple Myeloma  -- Complex cytogenetics with gain 1q21, t(4;14), and monosomy 13, bone marrow to be evaluated by [facility] hematopathologist  -- Diffuse lytics lesions  -- M-spike 6.4g/dL  -- R-ISS stage III (elevated beta-2 microglobulin, elevated LDH, decreased albumin, and high risk cytogenetics)  -- Cycle 4 Sofie-VRD today; consent obtained 08/16/22  -- Has had significant clinical improvement as well as reduction in IgG and M-spike showing excellent response  -- Has met with transplant coordinators. Transplant workup scheduled in December  -- Will need bisphosphonate pending dental appointment  -- Acyclovir 400mg BID,  aspirin daily    RTC in 4 weeks. Pt knows to call or use [facility] in the interim with any questions or concerns.    Everett Boogie, PGY- VI  Hematology/Oncology Fellow     Route Chart for Scheduling    BMT Chart Routing      Follow up with physician . 4 weeks   Follow up with SERGIO No follow up needed.   Provider visit type Malignant hem   Infusion scheduling note None   Injection scheduling note As scheduled   Labs   Lab interval:  CBC, CMP, SPEP/SARAH, immunoglobulins, Free light chains 11/22   Imaging None      Pharmacy appointment No pharmacy appointment needed      Other referrals No additional referrals needed         Treatment Plan Information   OP D-VRD DARATUMUMAB + BORTEZOMIB LENALIDOMIDE DEXAMETHASONE   Janice Casas MD   Upcoming Treatment Dates - OP D-VRD DARATUMUMAB + BORTEZOMIB LENALIDOMIDE DEXAMETHASONE    11/8/2022       Pre-Medications       acetaminophen tablet 650 mg       diphenhydrAMINE capsule 50 mg       Chemotherapy       bortezomib (VELCADE) injection 2.7 mg       daratumumab-hyaluronidase-fihj Soln 1,800 mg  11/15/2022       Chemotherapy       bortezomib (VELCADE) injection 2.7 mg  11/22/2022       Pre-Medications       acetaminophen tablet 650 mg       diphenhydrAMINE capsule 50 mg       Chemotherapy       bortezomib (VELCADE) injection 2.7 mg       daratumumab-hyaluronidase-fihj Soln 1,800 mg  11/29/2022       Chemotherapy       bortezomib (VELCADE) injection 2.7 mg

## 2022-11-07 NOTE — PROGRESS NOTES
[facility]   Physical Therapy Treatment Note    Name: Raquel Houston  Clinic Number: [id number]    Therapy Diagnosis:   Encounter Diagnoses   Name Primary?    Impaired functional mobility, balance, gait, and endurance Yes    Weakness of both lower extremities     Balance problem      Physician: Oneyda, Provider    Visit Date: 11/7/2022    Physician Orders: PT Eval and Treat  Medical Diagnosis from Referral: Sepsis, due to unspecified organism, unspecified whether acute organ dysfunction present [A41.9]  Evaluation Date: 8/8/2022  Authorization Period Expiration: 12/31/2022  Plan of Care Expiration: 12/7/2022 (Updated)  Progress Note Due: 11/12/2022 (Reassessed 10/12/2022)  Visit # / Visits authorized: 25/40 auth (1/1 eval)  FOTO: 3 / 3  PTA Visit #: 0 / 5     Precautions: WBAT 6 weeks, sx 7/18/2022 ORIF    Time In: 0915 am  Time Out: 0953 am  Total Billable Time: 38 minutes    SUBJECTIVE     Pt reports: no changes in symptoms since last week. She is improving her overall mobility daily.     She was compliant with home exercise program.  Response to previous treatment: mild soreness that improved over time  Functional change: Improved walking ability without AD    Pain: 0/10  Location: low back     OBJECTIVE     Objective Measures updated at progress report unless specified.     Treatment     Raquel received the treatments listed below:    Therapeutic exercises to develop strength, endurance, ROM, flexibility, posture and core stabilization for 38 minutes including:    Recumbent bike lvl 2, seat 5, 6 min  TRX squats x30 reps  Bilateral shuttle 2 min 2.5 bands  Single leg shuttle 2 band x20 reps LLE  Hamstring stretch with strap 2 min each  Side stepping on turf, 3 laps  Bridging x30 reps  Straight leg raises 3x10 3# LLE  Supine ball squeezes 2 min  Side lying clamshells 2x20 reps  Side lying hip abduction 2x10 reps    Not performed today due to time:  LAQ 5# 2 min BLE  HSC RTB  2x10 each  Standing hip extension BLE x20 each  Standing hip abduction BLE x20 each  Stair climbing 4 laps  Seated Marching x20 ea (alternating LE's) 3#  Seated Hip Adduction 3s x2 min  Seated Hip Abduction GTB x2 min  Mini squats w walker 2 x 10   Seated low back stretch x3 min (reaching and with ball)  Seated Hamstring Stretch x2 min ea + LE's on stool  Sit to Stands x5 reps - no UE assist/hands in lap, table elevated  Standing hip flexion/marching LLE x20 reps  SAQ x30 reps 3#      Patient Education and Home Exercises     Education provided:   - Gait training without AD  - Weight bearing precaution  - Safety awareness  - Use of assistive devices  - Transfers  - HEP reviewed  - Anatomy and Physiology pertaining to current condition  - Possible response to exercise  - Importance of PT compliance    Written Home Exercises Provided: Patient instructed to cont prior HEP. Exercises were reviewed and Raquel was able to demonstrate them prior to the end of the session. Raquel demonstrated good  understanding of the education provided. See EMR under Patient Instructions for exercises provided during therapy sessions.    ASSESSMENT   She tolerates progression of exercises including side stepping on turf, increased shuttle weight and weighted straight leg raise. Some fatigue is evident at the end of our session but this is improved with rest. She plans to continue with PT POC.     Raquel Is progressing well towards her goals.   Pt prognosis is Fair.     Pt will continue to benefit from skilled outpatient physical therapy to address the deficits listed in the problem list box on initial evaluation, provide pt/family education and to maximize pt's level of independence in the home and community environment.     Pt's spiritual, cultural and educational needs considered and pt agreeable to plan of care and goals.     Anticipated barriers to physical therapy: WBAT    Goals:  Short Term Goals: 4 weeks   1. Pt will be  independent with HEP to facilitate healing and improve outcome measures. (met)  2. Pt will increased hip ROM by 5-10 degrees without pain. (met)  3. Pt will be able to demonstrate full WB in LLE and walk greater than 50 feet with least restrictive AD. (met)  4. Pt will increase LLE MMT values by at least 1/2 grade. (met)     Long Term Goals: 8 weeks   1. Pt will demonstrate mod [place] with household transfers and gait. (met)  2. Pt will demonstrate LLE gross strength to 4/5 or greater. (progressing, not met)  3. Pt will demonstrate full L hip ROM and flexibility. (met)    PLAN     Continue with PT POC to address functional deficits.    Evan Tesfaye, PT, DPT

## 2022-11-08 ENCOUNTER — INFUSION (OUTPATIENT)
Dept: INFUSION THERAPY | Facility: HOSPITAL | Age: 62
End: 2022-11-08
Attending: INTERNAL MEDICINE
Payer: MEDICAID

## 2022-11-08 ENCOUNTER — LAB VISIT (OUTPATIENT)
Dept: LAB | Facility: HOSPITAL | Age: 62
End: 2022-11-08
Attending: INTERNAL MEDICINE
Payer: MEDICAID

## 2022-11-08 ENCOUNTER — TELEPHONE (OUTPATIENT)
Dept: HEMATOLOGY/ONCOLOGY | Facility: CLINIC | Age: 62
End: 2022-11-08
Payer: MEDICAID

## 2022-11-08 ENCOUNTER — OFFICE VISIT (OUTPATIENT)
Dept: HEMATOLOGY/ONCOLOGY | Facility: CLINIC | Age: 62
End: 2022-11-08
Payer: MEDICAID

## 2022-11-08 VITALS
HEIGHT: 67 IN | OXYGEN SATURATION: 99 % | OXYGEN SATURATION: 100 % | TEMPERATURE: 98 F | DIASTOLIC BLOOD PRESSURE: 70 MMHG | RESPIRATION RATE: 16 BRPM | HEART RATE: 101 BPM | HEART RATE: 83 BPM | SYSTOLIC BLOOD PRESSURE: 142 MMHG | SYSTOLIC BLOOD PRESSURE: 136 MMHG | WEIGHT: 205.56 LBS | TEMPERATURE: 98 F | DIASTOLIC BLOOD PRESSURE: 93 MMHG | RESPIRATION RATE: 17 BRPM | BODY MASS INDEX: 32.26 KG/M2

## 2022-11-08 DIAGNOSIS — C90.00 MULTIPLE MYELOMA NOT HAVING ACHIEVED REMISSION: Primary | ICD-10-CM

## 2022-11-08 DIAGNOSIS — C90.00 MULTIPLE MYELOMA NOT HAVING ACHIEVED REMISSION: ICD-10-CM

## 2022-11-08 DIAGNOSIS — R29.898 WEAKNESS OF BOTH LOWER EXTREMITIES: ICD-10-CM

## 2022-11-08 LAB
ABO + RH BLD: ABNORMAL
ALBUMIN SERPL BCP-MCNC: 3.8 G/DL (ref 3.5–5.2)
ALP SERPL-CCNC: 271 U/L (ref 55–135)
ALT SERPL W/O P-5'-P-CCNC: 49 U/L (ref 10–44)
ANION GAP SERPL CALC-SCNC: 9 MMOL/L (ref 8–16)
AST SERPL-CCNC: 33 U/L (ref 10–40)
BASOPHILS # BLD AUTO: 0.03 K/UL (ref 0–0.2)
BASOPHILS NFR BLD: 0.5 % (ref 0–1.9)
BILIRUB SERPL-MCNC: 0.3 MG/DL (ref 0.1–1)
BLD GP AB SCN CELLS X3 SERPL QL: ABNORMAL
BUN SERPL-MCNC: 17 MG/DL (ref 6–20)
CALCIUM SERPL-MCNC: 9.4 MG/DL (ref 8.7–10.5)
CHLORIDE SERPL-SCNC: 105 MMOL/L (ref 95–110)
CO2 SERPL-SCNC: 25 MMOL/L (ref 23–29)
CREAT SERPL-MCNC: 0.9 MG/DL (ref 0.5–1.4)
DIFFERENTIAL METHOD: ABNORMAL
EOSINOPHIL # BLD AUTO: 0.1 K/UL (ref 0–0.5)
EOSINOPHIL NFR BLD: 1.2 % (ref 0–8)
ERYTHROCYTE [DISTWIDTH] IN BLOOD BY AUTOMATED COUNT: 16 % (ref 11.5–14.5)
EST. GFR  (NO RACE VARIABLE): >60 ML/MIN/1.73 M^2
GLUCOSE SERPL-MCNC: 88 MG/DL (ref 70–110)
HCT VFR BLD AUTO: 37.3 % (ref 37–48.5)
HGB BLD-MCNC: 11.8 G/DL (ref 12–16)
IMM GRANULOCYTES # BLD AUTO: 0.02 K/UL (ref 0–0.04)
IMM GRANULOCYTES NFR BLD AUTO: 0.4 % (ref 0–0.5)
LYMPHOCYTES # BLD AUTO: 1.3 K/UL (ref 1–4.8)
LYMPHOCYTES NFR BLD: 23.3 % (ref 18–48)
MCH RBC QN AUTO: 28.4 PG (ref 27–31)
MCHC RBC AUTO-ENTMCNC: 31.6 G/DL (ref 32–36)
MCV RBC AUTO: 90 FL (ref 82–98)
MONOCYTES # BLD AUTO: 0.4 K/UL (ref 0.3–1)
MONOCYTES NFR BLD: 6.5 % (ref 4–15)
NEUTROPHILS # BLD AUTO: 3.9 K/UL (ref 1.8–7.7)
NEUTROPHILS NFR BLD: 68.1 % (ref 38–73)
NRBC BLD-RTO: 0 /100 WBC
PLATELET # BLD AUTO: 178 K/UL (ref 150–450)
PMV BLD AUTO: 12 FL (ref 9.2–12.9)
POTASSIUM SERPL-SCNC: 4.6 MMOL/L (ref 3.5–5.1)
PROT SERPL-MCNC: 6.4 G/DL (ref 6–8.4)
RBC # BLD AUTO: 4.16 M/UL (ref 4–5.4)
SODIUM SERPL-SCNC: 139 MMOL/L (ref 136–145)
WBC # BLD AUTO: 5.66 K/UL (ref 3.9–12.7)

## 2022-11-08 PROCEDURE — 99999 PR PBB SHADOW E&M-EST. PATIENT-LVL IV: CPT | Mod: PBBFAC,,, | Performed by: INTERNAL MEDICINE

## 2022-11-08 PROCEDURE — 85025 COMPLETE CBC W/AUTO DIFF WBC: CPT | Performed by: INTERNAL MEDICINE

## 2022-11-08 PROCEDURE — 36415 COLL VENOUS BLD VENIPUNCTURE: CPT | Performed by: INTERNAL MEDICINE

## 2022-11-08 PROCEDURE — 99214 OFFICE O/P EST MOD 30 MIN: CPT | Mod: PBBFAC | Performed by: INTERNAL MEDICINE

## 2022-11-08 PROCEDURE — 99999 PR PBB SHADOW E&M-EST. PATIENT-LVL IV: ICD-10-PCS | Mod: PBBFAC,,, | Performed by: INTERNAL MEDICINE

## 2022-11-08 PROCEDURE — 86901 BLOOD TYPING SEROLOGIC RH(D): CPT | Performed by: INTERNAL MEDICINE

## 2022-11-08 PROCEDURE — 96401 CHEMO ANTI-NEOPL SQ/IM: CPT

## 2022-11-08 PROCEDURE — 63600175 PHARM REV CODE 636 W HCPCS: Mod: JG | Performed by: INTERNAL MEDICINE

## 2022-11-08 PROCEDURE — 99215 OFFICE O/P EST HI 40 MIN: CPT | Mod: S$PBB,,, | Performed by: INTERNAL MEDICINE

## 2022-11-08 PROCEDURE — 80053 COMPREHEN METABOLIC PANEL: CPT | Performed by: INTERNAL MEDICINE

## 2022-11-08 PROCEDURE — 25000003 PHARM REV CODE 250: Performed by: INTERNAL MEDICINE

## 2022-11-08 PROCEDURE — 99215 PR OFFICE/OUTPT VISIT, EST, LEVL V, 40-54 MIN: ICD-10-PCS | Mod: S$PBB,,, | Performed by: INTERNAL MEDICINE

## 2022-11-08 RX ORDER — EPINEPHRINE 0.3 MG/.3ML
0.3 INJECTION SUBCUTANEOUS ONCE AS NEEDED
Status: DISCONTINUED | OUTPATIENT
Start: 2022-11-08 | End: 2022-11-08 | Stop reason: HOSPADM

## 2022-11-08 RX ORDER — HEPARIN 100 UNIT/ML
500 SYRINGE INTRAVENOUS
Status: CANCELLED | OUTPATIENT
Start: 2022-11-08

## 2022-11-08 RX ORDER — ACETAMINOPHEN 325 MG/1
650 TABLET ORAL
Status: COMPLETED | OUTPATIENT
Start: 2022-11-08 | End: 2022-11-08

## 2022-11-08 RX ORDER — ALENDRONATE SODIUM 70 MG/1
TABLET ORAL
COMMUNITY
End: 2022-12-29

## 2022-11-08 RX ORDER — DIPHENHYDRAMINE HCL 50 MG
50 CAPSULE ORAL
Status: CANCELLED
Start: 2022-11-22

## 2022-11-08 RX ORDER — BORTEZOMIB 3.5 MG/1
1.3 INJECTION, POWDER, LYOPHILIZED, FOR SOLUTION INTRAVENOUS; SUBCUTANEOUS
Status: CANCELLED | OUTPATIENT
Start: 2022-11-15

## 2022-11-08 RX ORDER — SODIUM CHLORIDE 0.9 % (FLUSH) 0.9 %
10 SYRINGE (ML) INJECTION
Status: CANCELLED | OUTPATIENT
Start: 2022-11-22

## 2022-11-08 RX ORDER — HEPARIN 100 UNIT/ML
500 SYRINGE INTRAVENOUS
Status: CANCELLED | OUTPATIENT
Start: 2022-11-22

## 2022-11-08 RX ORDER — BORTEZOMIB 3.5 MG/1
1.3 INJECTION, POWDER, LYOPHILIZED, FOR SOLUTION INTRAVENOUS; SUBCUTANEOUS
Status: CANCELLED | OUTPATIENT
Start: 2022-11-29

## 2022-11-08 RX ORDER — DIPHENHYDRAMINE HCL 50 MG
50 CAPSULE ORAL
Status: CANCELLED
Start: 2022-11-08

## 2022-11-08 RX ORDER — BORTEZOMIB 3.5 MG/1
1.3 INJECTION, POWDER, LYOPHILIZED, FOR SOLUTION INTRAVENOUS; SUBCUTANEOUS
Status: CANCELLED | OUTPATIENT
Start: 2022-11-22

## 2022-11-08 RX ORDER — HEPARIN 100 UNIT/ML
500 SYRINGE INTRAVENOUS
Status: CANCELLED | OUTPATIENT
Start: 2022-11-29

## 2022-11-08 RX ORDER — DIPHENHYDRAMINE HYDROCHLORIDE 50 MG/ML
50 INJECTION INTRAMUSCULAR; INTRAVENOUS ONCE AS NEEDED
Status: DISCONTINUED | OUTPATIENT
Start: 2022-11-08 | End: 2022-11-08 | Stop reason: HOSPADM

## 2022-11-08 RX ORDER — ACETAMINOPHEN 325 MG/1
650 TABLET ORAL
Status: CANCELLED | OUTPATIENT
Start: 2022-11-22

## 2022-11-08 RX ORDER — ALLOPURINOL 100 MG/1
TABLET ORAL
COMMUNITY
End: 2022-12-29

## 2022-11-08 RX ORDER — ACETAMINOPHEN 325 MG/1
650 TABLET ORAL
Status: CANCELLED | OUTPATIENT
Start: 2022-11-08

## 2022-11-08 RX ORDER — MELOXICAM 7.5 MG/1
TABLET ORAL
COMMUNITY
End: 2022-12-29

## 2022-11-08 RX ORDER — SODIUM CHLORIDE 0.9 % (FLUSH) 0.9 %
10 SYRINGE (ML) INJECTION
Status: CANCELLED | OUTPATIENT
Start: 2022-11-15

## 2022-11-08 RX ORDER — SODIUM CHLORIDE 0.9 % (FLUSH) 0.9 %
10 SYRINGE (ML) INJECTION
Status: CANCELLED | OUTPATIENT
Start: 2022-11-08

## 2022-11-08 RX ORDER — BORTEZOMIB 3.5 MG/1
1.3 INJECTION, POWDER, LYOPHILIZED, FOR SOLUTION INTRAVENOUS; SUBCUTANEOUS
Status: COMPLETED | OUTPATIENT
Start: 2022-11-08 | End: 2022-11-08

## 2022-11-08 RX ORDER — DIPHENHYDRAMINE HYDROCHLORIDE 50 MG/ML
50 INJECTION INTRAMUSCULAR; INTRAVENOUS ONCE AS NEEDED
Status: CANCELLED | OUTPATIENT
Start: 2022-11-08

## 2022-11-08 RX ORDER — EPINEPHRINE 0.3 MG/.3ML
0.3 INJECTION SUBCUTANEOUS ONCE AS NEEDED
Status: CANCELLED | OUTPATIENT
Start: 2022-11-08

## 2022-11-08 RX ORDER — HEPARIN 100 UNIT/ML
500 SYRINGE INTRAVENOUS
Status: CANCELLED | OUTPATIENT
Start: 2022-11-15

## 2022-11-08 RX ORDER — BORTEZOMIB 3.5 MG/1
1.3 INJECTION, POWDER, LYOPHILIZED, FOR SOLUTION INTRAVENOUS; SUBCUTANEOUS
Status: CANCELLED | OUTPATIENT
Start: 2022-11-08

## 2022-11-08 RX ORDER — SODIUM CHLORIDE 0.9 % (FLUSH) 0.9 %
10 SYRINGE (ML) INJECTION
Status: CANCELLED | OUTPATIENT
Start: 2022-11-29

## 2022-11-08 RX ORDER — DIPHENHYDRAMINE HCL 50 MG
50 CAPSULE ORAL
Status: COMPLETED | OUTPATIENT
Start: 2022-11-08 | End: 2022-11-08

## 2022-11-08 RX ADMIN — ACETAMINOPHEN 650 MG: 325 TABLET ORAL at 10:11

## 2022-11-08 RX ADMIN — BORTEZOMIB 2.7 MG: 3.5 INJECTION, POWDER, LYOPHILIZED, FOR SOLUTION INTRAVENOUS; SUBCUTANEOUS at 11:11

## 2022-11-08 RX ADMIN — DARATUMUMAB AND HYALURONIDASE-FIHJ (HUMAN RECOMBINANT) 1800 MG: 1800; 30000 INJECTION SUBCUTANEOUS at 11:11

## 2022-11-08 RX ADMIN — DIPHENHYDRAMINE HYDROCHLORIDE 50 MG: 50 CAPSULE ORAL at 10:11

## 2022-11-08 NOTE — PLAN OF CARE
Pt here for Velcade and Daratumumab injections. Assessment complete and labs reviewed. VSS. Pt endorses taking dexamethasone today at 7:30 am. Administered both injections in the abdomen. No questions or concerns. Pt ambulated out of unit using a cane.

## 2022-11-09 ENCOUNTER — CLINICAL SUPPORT (OUTPATIENT)
Dept: REHABILITATION | Facility: HOSPITAL | Age: 62
End: 2022-11-09
Payer: MEDICAID

## 2022-11-09 DIAGNOSIS — R26.89 BALANCE PROBLEM: ICD-10-CM

## 2022-11-09 DIAGNOSIS — Z74.09 IMPAIRED FUNCTIONAL MOBILITY, BALANCE, GAIT, AND ENDURANCE: Primary | ICD-10-CM

## 2022-11-09 DIAGNOSIS — R29.898 WEAKNESS OF BOTH LOWER EXTREMITIES: ICD-10-CM

## 2022-11-09 PROCEDURE — 97110 THERAPEUTIC EXERCISES: CPT | Mod: PN

## 2022-11-09 NOTE — PROGRESS NOTES
[facility]   Physical Therapy Treatment Note    Name: Raquel Houston  Clinic Number: [id number]    Therapy Diagnosis:   Encounter Diagnoses   Name Primary?    Impaired functional mobility, balance, gait, and endurance Yes    Weakness of both lower extremities     Balance problem      Physician: Oneyda, Provider    Visit Date: 11/9/2022    Physician Orders: PT Eval and Treat  Medical Diagnosis from Referral: Sepsis, due to unspecified organism, unspecified whether acute organ dysfunction present [A41.9]  Evaluation Date: 8/8/2022  Authorization Period Expiration: 12/31/2022  Plan of Care Expiration: 12/7/2022 (Updated)  Progress Note Due: 11/12/2022 (Reassessed 10/12/2022)  Visit # / Visits authorized: 26/40 auth (1/1 eval)  FOTO: 3 / 3  PTA Visit #: 0 / 5     Precautions: WBAT 6 weeks, sx 7/18/2022 ORIF    Time In: 0922 am  Time Out: 1000 am  Total Billable Time: 38 minutes    SUBJECTIVE     Pt reports: she continues to improve and presents to PT without an assistive device.     She was compliant with home exercise program.  Response to previous treatment: mild soreness that improved over time  Functional change: Improved walking ability without AD    Pain: 0/10  Location: low back     OBJECTIVE     Objective Measures updated at progress report unless specified.     Treatment     Raquel received the treatments listed below:    Therapeutic exercises to develop strength, endurance, ROM, flexibility, posture and core stabilization for 38 minutes including:    Recumbent bike lvl 2, seat 5, 6 min  TRX squats x30 reps  Bilateral shuttle 2 min 2.5 bands  Single leg shuttle 2 band x20 reps LLE  Hamstring stretch with strap 2 min each  Side stepping on crosswalk, 3 laps RTB  Bridging x30 reps  Straight leg raises 3x10 3# LLE  Side lying clamshells 2x10 reps green loop  Side lying hip abduction 2x10 reps     Not performed today due to time:  Supine ball squeezes 2 min  LAQ 5# 2 min  BLE  HSC RTB 2x10 each  Standing hip extension BLE x20 each  Standing hip abduction BLE x20 each  Stair climbing 4 laps  Seated Marching x20 ea (alternating LE's) 3#  Seated Hip Adduction 3s x2 min  Seated Hip Abduction GTB x2 min  Mini squats w walker 2 x 10   Seated low back stretch x3 min (reaching and with ball)  Seated Hamstring Stretch x2 min ea + LE's on stool  Sit to Stands x5 reps - no UE assist/hands in lap, table elevated  Standing hip flexion/marching LLE x20 reps  SAQ x30 reps 3#      Patient Education and Home Exercises     Education provided:   - Gait training without AD  - Weight bearing precaution  - Safety awareness  - Use of assistive devices  - Transfers  - HEP reviewed  - Anatomy and Physiology pertaining to current condition  - Possible response to exercise  - Importance of PT compliance    Written Home Exercises Provided: Patient instructed to cont prior HEP. Exercises were reviewed and Raquel was able to demonstrate them prior to the end of the session. Raquel demonstrated good  understanding of the education provided. See EMR under Patient Instructions for exercises provided during therapy sessions.    ASSESSMENT   Pt progresses strengthening exercises by increasing resistance. She is making progress toward PT goals and demonstrates improved functional mobility/independence.     Raquel Is progressing well towards her goals.   Pt prognosis is Fair.     Pt will continue to benefit from skilled outpatient physical therapy to address the deficits listed in the problem list box on initial evaluation, provide pt/family education and to maximize pt's level of independence in the home and community environment.     Pt's spiritual, cultural and educational needs considered and pt agreeable to plan of care and goals.     Anticipated barriers to physical therapy: WBAT    Goals:  Short Term Goals: 4 weeks   1. Pt will be independent with HEP to facilitate healing and improve outcome measures.  (met)  2. Pt will increased hip ROM by 5-10 degrees without pain. (met)  3. Pt will be able to demonstrate full WB in LLE and walk greater than 50 feet with least restrictive AD. (met)  4. Pt will increase LLE MMT values by at least 1/2 grade. (met)     Long Term Goals: 8 weeks   1. Pt will demonstrate mod [place] with household transfers and gait. (met)  2. Pt will demonstrate LLE gross strength to 4/5 or greater. (progressing, not met)  3. Pt will demonstrate full L hip ROM and flexibility. (met)    PLAN     Continue with PT POC to address functional deficits.    Evan Tesfaye, PT, DPT

## 2022-11-14 ENCOUNTER — TELEPHONE (OUTPATIENT)
Dept: HEMATOLOGY/ONCOLOGY | Facility: CLINIC | Age: 62
End: 2022-11-14
Payer: MEDICAID

## 2022-11-14 ENCOUNTER — CLINICAL SUPPORT (OUTPATIENT)
Dept: REHABILITATION | Facility: HOSPITAL | Age: 62
End: 2022-11-14
Payer: MEDICAID

## 2022-11-14 ENCOUNTER — PATIENT MESSAGE (OUTPATIENT)
Dept: INFECTIOUS DISEASES | Facility: CLINIC | Age: 62
End: 2022-11-14
Payer: MEDICAID

## 2022-11-14 DIAGNOSIS — Z76.82 STEM CELL TRANSPLANT CANDIDATE: Primary | ICD-10-CM

## 2022-11-14 DIAGNOSIS — Z74.09 IMPAIRED FUNCTIONAL MOBILITY, BALANCE, GAIT, AND ENDURANCE: Primary | ICD-10-CM

## 2022-11-14 DIAGNOSIS — R29.898 WEAKNESS OF BOTH LOWER EXTREMITIES: ICD-10-CM

## 2022-11-14 DIAGNOSIS — C90.00 MULTIPLE MYELOMA NOT HAVING ACHIEVED REMISSION: ICD-10-CM

## 2022-11-14 DIAGNOSIS — R26.89 BALANCE PROBLEM: ICD-10-CM

## 2022-11-14 PROCEDURE — 97110 THERAPEUTIC EXERCISES: CPT | Mod: PN

## 2022-11-14 NOTE — PROGRESS NOTES
[facility]   Physical Therapy Treatment Note    Name: Raquel Houston  Clinic Number: [id number]    Therapy Diagnosis:   Encounter Diagnoses   Name Primary?    Impaired functional mobility, balance, gait, and endurance Yes    Weakness of both lower extremities     Balance problem      Physician: Oneyda, Provider    Visit Date: 11/14/2022    Physician Orders: PT Eval and Treat  Medical Diagnosis from Referral: Sepsis, due to unspecified organism, unspecified whether acute organ dysfunction present [A41.9]  Evaluation Date: 8/8/2022  Authorization Period Expiration: 12/31/2022  Plan of Care Expiration: 12/7/2022 (Updated)  Progress Note Due: 11/12/2022 (Reassessed 10/12/2022)  Visit # / Visits authorized: 27/40 auth (1/1 eval)  FOTO: 3 / 3  PTA Visit #: 0 / 5     Precautions: WBAT 6 weeks, sx 7/18/2022 ORIF    Time In: 0920 am  Time Out: 1000 am  Total Billable Time: 40 minutes    SUBJECTIVE     Pt reports: her neck is stiff an tight but no complaints with the hip.     She was compliant with home exercise program.  Response to previous treatment: mild soreness that improved over time  Functional change: Improved walking ability without AD    Pain: 0/10  Location: low back     OBJECTIVE     Objective Measures updated at progress report unless specified.     Treatment     Raquel received the treatments listed below:    Therapeutic exercises to develop strength, endurance, ROM, flexibility, posture and core stabilization for 32 minutes including:    Recumbent bike lvl 3, seat 5, 6 min  Hamstring stretch with strap 2 min each  Side stepping on crosswalk, 3 laps RTB  Bridging x30 reps  Straight leg raises 3x10 3# LLE  Side lying clamshells 2x10 reps green loop  Side lying hip abduction 2x10 reps       Raquel participated in dynamic functional therapeutic activities to improve functional performance for 8 minutes, including:    TRX squats x30 reps  Bilateral shuttle 2 min 3  bands  Single leg shuttle 2 band x20 reps LLE      Not performed today due to time:  Supine ball squeezes 2 min  LAQ 5# 2 min BLE  HSC RTB 2x10 each  Standing hip extension BLE x20 each  Standing hip abduction BLE x20 each  Stair climbing 4 laps  Seated Marching x20 ea (alternating LE's) 3#  Seated Hip Adduction 3s x2 min  Seated Hip Abduction GTB x2 min  Mini squats w walker 2 x 10   Seated low back stretch x3 min (reaching and with ball)  Seated Hamstring Stretch x2 min ea + LE's on stool  Sit to Stands x5 reps - no UE assist/hands in lap, table elevated  Standing hip flexion/marching LLE x20 reps  SAQ x30 reps 3#    Patient Education and Home Exercises     Education provided:   - Gait training without AD  - Weight bearing precaution  - Safety awareness  - Use of assistive devices  - Transfers  - HEP reviewed  - Anatomy and Physiology pertaining to current condition  - Possible response to exercise  - Importance of PT compliance    Written Home Exercises Provided: Patient instructed to cont prior HEP. Exercises were reviewed and Raquel was able to demonstrate them prior to the end of the session. Raquel demonstrated good  understanding of the education provided. See EMR under Patient Instructions for exercises provided during therapy sessions.    ASSESSMENT   Pt is able to tolerate increased bike and shuttle resistance without increased fatigue or discomfort. She continues to progress toward strength and mobility goals.     Raquel Is progressing well towards her goals.   Pt prognosis is Fair.     Pt will continue to benefit from skilled outpatient physical therapy to address the deficits listed in the problem list box on initial evaluation, provide pt/family education and to maximize pt's level of independence in the home and community environment.     Pt's spiritual, cultural and educational needs considered and pt agreeable to plan of care and goals.     Anticipated barriers to physical therapy:  WBAT    Goals:  Short Term Goals: 4 weeks   1. Pt will be independent with HEP to facilitate healing and improve outcome measures. (met)  2. Pt will increased hip ROM by 5-10 degrees without pain. (met)  3. Pt will be able to demonstrate full WB in LLE and walk greater than 50 feet with least restrictive AD. (met)  4. Pt will increase LLE MMT values by at least 1/2 grade. (met)     Long Term Goals: 8 weeks   1. Pt will demonstrate mod [place] with household transfers and gait. (met)  2. Pt will demonstrate LLE gross strength to 4/5 or greater. (progressing, not met)  3. Pt will demonstrate full L hip ROM and flexibility. (met)    PLAN     Continue with PT POC to address functional deficits.    Evan Tesfaye, PT, DPT

## 2022-11-14 NOTE — TELEPHONE ENCOUNTER
Attempted to reach out to pt to discuss stem cell transplant. No answer. Left  with callback number offering 10:15 AM apt tomorrow 11/15 while pt will be here for treatment. Awaiting call back.

## 2022-11-15 ENCOUNTER — LAB VISIT (OUTPATIENT)
Dept: LAB | Facility: HOSPITAL | Age: 62
End: 2022-11-15
Payer: MEDICAID

## 2022-11-15 ENCOUNTER — INFUSION (OUTPATIENT)
Dept: INFUSION THERAPY | Facility: HOSPITAL | Age: 62
End: 2022-11-15
Payer: MEDICAID

## 2022-11-15 VITALS
HEART RATE: 84 BPM | SYSTOLIC BLOOD PRESSURE: 141 MMHG | DIASTOLIC BLOOD PRESSURE: 68 MMHG | RESPIRATION RATE: 16 BRPM | TEMPERATURE: 98 F

## 2022-11-15 DIAGNOSIS — C90.00 MULTIPLE MYELOMA NOT HAVING ACHIEVED REMISSION: Primary | ICD-10-CM

## 2022-11-15 DIAGNOSIS — C90.00 MULTIPLE MYELOMA NOT HAVING ACHIEVED REMISSION: ICD-10-CM

## 2022-11-15 LAB
ABO + RH BLD: ABNORMAL
ALBUMIN SERPL BCP-MCNC: 3.6 G/DL (ref 3.5–5.2)
ALP SERPL-CCNC: 241 U/L (ref 55–135)
ALT SERPL W/O P-5'-P-CCNC: 50 U/L (ref 10–44)
ANION GAP SERPL CALC-SCNC: 6 MMOL/L (ref 8–16)
AST SERPL-CCNC: 33 U/L (ref 10–40)
BASOPHILS # BLD AUTO: 0.03 K/UL (ref 0–0.2)
BASOPHILS NFR BLD: 0.6 % (ref 0–1.9)
BILIRUB SERPL-MCNC: 0.4 MG/DL (ref 0.1–1)
BLD GP AB SCN CELLS X3 SERPL QL: ABNORMAL
BLOOD GROUP ANTIBODIES SERPL: NORMAL
BUN SERPL-MCNC: 13 MG/DL (ref 6–20)
CALCIUM SERPL-MCNC: 8.6 MG/DL (ref 8.7–10.5)
CHLORIDE SERPL-SCNC: 109 MMOL/L (ref 95–110)
CO2 SERPL-SCNC: 24 MMOL/L (ref 23–29)
CREAT SERPL-MCNC: 0.8 MG/DL (ref 0.5–1.4)
DAT IGG-SP REAG RBC-IMP: NORMAL
DIFFERENTIAL METHOD: ABNORMAL
EOSINOPHIL # BLD AUTO: 0.2 K/UL (ref 0–0.5)
EOSINOPHIL NFR BLD: 3.2 % (ref 0–8)
ERYTHROCYTE [DISTWIDTH] IN BLOOD BY AUTOMATED COUNT: 15.9 % (ref 11.5–14.5)
EST. GFR  (NO RACE VARIABLE): >60 ML/MIN/1.73 M^2
GLUCOSE SERPL-MCNC: 81 MG/DL (ref 70–110)
HCT VFR BLD AUTO: 37 % (ref 37–48.5)
HGB BLD-MCNC: 11.7 G/DL (ref 12–16)
IMM GRANULOCYTES # BLD AUTO: 0.01 K/UL (ref 0–0.04)
IMM GRANULOCYTES NFR BLD AUTO: 0.2 % (ref 0–0.5)
LYMPHOCYTES # BLD AUTO: 1.9 K/UL (ref 1–4.8)
LYMPHOCYTES NFR BLD: 36.1 % (ref 18–48)
MCH RBC QN AUTO: 28.2 PG (ref 27–31)
MCHC RBC AUTO-ENTMCNC: 31.6 G/DL (ref 32–36)
MCV RBC AUTO: 89 FL (ref 82–98)
MONOCYTES # BLD AUTO: 0.9 K/UL (ref 0.3–1)
MONOCYTES NFR BLD: 16.2 % (ref 4–15)
NEUTROPHILS # BLD AUTO: 2.3 K/UL (ref 1.8–7.7)
NEUTROPHILS NFR BLD: 43.7 % (ref 38–73)
NRBC BLD-RTO: 0 /100 WBC
PLATELET # BLD AUTO: 184 K/UL (ref 150–450)
PMV BLD AUTO: 13 FL (ref 9.2–12.9)
POTASSIUM SERPL-SCNC: 3.8 MMOL/L (ref 3.5–5.1)
PROT SERPL-MCNC: 6.2 G/DL (ref 6–8.4)
RBC # BLD AUTO: 4.15 M/UL (ref 4–5.4)
SODIUM SERPL-SCNC: 139 MMOL/L (ref 136–145)
WBC # BLD AUTO: 5.26 K/UL (ref 3.9–12.7)

## 2022-11-15 PROCEDURE — 86870 RBC ANTIBODY IDENTIFICATION: CPT | Performed by: INTERNAL MEDICINE

## 2022-11-15 PROCEDURE — 36415 COLL VENOUS BLD VENIPUNCTURE: CPT | Performed by: INTERNAL MEDICINE

## 2022-11-15 PROCEDURE — 63600175 PHARM REV CODE 636 W HCPCS: Performed by: INTERNAL MEDICINE

## 2022-11-15 PROCEDURE — 86880 COOMBS TEST DIRECT: CPT | Performed by: INTERNAL MEDICINE

## 2022-11-15 PROCEDURE — 86901 BLOOD TYPING SEROLOGIC RH(D): CPT | Performed by: INTERNAL MEDICINE

## 2022-11-15 PROCEDURE — 63600175 PHARM REV CODE 636 W HCPCS: Mod: JG | Performed by: INTERNAL MEDICINE

## 2022-11-15 PROCEDURE — 80053 COMPREHEN METABOLIC PANEL: CPT | Performed by: INTERNAL MEDICINE

## 2022-11-15 PROCEDURE — 96401 CHEMO ANTI-NEOPL SQ/IM: CPT

## 2022-11-15 PROCEDURE — 85025 COMPLETE CBC W/AUTO DIFF WBC: CPT | Performed by: INTERNAL MEDICINE

## 2022-11-15 RX ORDER — BORTEZOMIB 3.5 MG/1
1.3 INJECTION, POWDER, LYOPHILIZED, FOR SOLUTION INTRAVENOUS; SUBCUTANEOUS
Status: COMPLETED | OUTPATIENT
Start: 2022-11-15 | End: 2022-11-15

## 2022-11-15 RX ORDER — DEXAMETHASONE 4 MG/1
40 TABLET ORAL
Status: COMPLETED | OUTPATIENT
Start: 2022-11-15 | End: 2022-11-15

## 2022-11-15 RX ADMIN — BORTEZOMIB 2.7 MG: 3.5 INJECTION, POWDER, LYOPHILIZED, FOR SOLUTION INTRAVENOUS; SUBCUTANEOUS at 11:11

## 2022-11-15 RX ADMIN — DEXAMETHASONE 40 MG: 4 TABLET ORAL at 11:11

## 2022-11-16 ENCOUNTER — CLINICAL SUPPORT (OUTPATIENT)
Dept: REHABILITATION | Facility: HOSPITAL | Age: 62
End: 2022-11-16
Payer: MEDICAID

## 2022-11-16 DIAGNOSIS — R29.898 WEAKNESS OF BOTH LOWER EXTREMITIES: ICD-10-CM

## 2022-11-16 DIAGNOSIS — R26.89 BALANCE PROBLEM: ICD-10-CM

## 2022-11-16 DIAGNOSIS — Z74.09 IMPAIRED FUNCTIONAL MOBILITY, BALANCE, GAIT, AND ENDURANCE: Primary | ICD-10-CM

## 2022-11-16 PROCEDURE — 97110 THERAPEUTIC EXERCISES: CPT | Mod: PN

## 2022-11-16 NOTE — PLAN OF CARE
[facility]   Physical Therapy Treatment Note, POC Update and Reassessment    Name: Raquel Houston  Clinic Number: [id number]    Therapy Diagnosis:   Encounter Diagnoses   Name Primary?    Impaired functional mobility, balance, gait, and endurance Yes    Weakness of both lower extremities     Balance problem      Physician: Oneyda, Provider    Visit Date: 11/16/2022    Physician Orders: PT Eval and Treat  Medical Diagnosis from Referral: Sepsis, due to unspecified organism, unspecified whether acute organ dysfunction present [A41.9]  Evaluation Date: 8/8/2022  Authorization Period Expiration: 12/31/2022  Plan of Care Expiration: 1/4/2023 (Updated)  Progress Note Due: 12/16/2022 (Reassessed 11/16/2022)  Visit # / Visits authorized: 28/40 auth (1/1 eval)  FOTO: 3 / 3  PTA Visit #: 0 / 5     Precautions: WBAT 6 weeks, sx 7/18/2022 ORIF    Time In: 0917 am  Time Out: 1000 am  Total Billable Time: 43 minutes    SUBJECTIVE     Pt reports: no pain this morning. She has been without an assistive device recently and has been doing well.     She was compliant with home exercise program.  Response to previous treatment: mild soreness that improved over time  Functional change: Improved walking ability without AD    Pain: 0/10  Location: low back     OBJECTIVE     CMS Impairment/Limitation/Restriction for FOTO Leg Survey     Therapist reviewed FOTO scores for Raquel Houston on 11/16/2022.   FOTO documents entered into [employer] - see Media section.     Limitation Score: 36%         Lower Extremity Strength  Right LE   Left LE     Hip Flexion: 5/5 Hip Flexion: 5/5   Hip Extension:  4/5 Hip Extension: 3+/5   Hip Abduction: 5/5 Hip Abduction: 4+/5   Hip Adduction: 5/5 Hip Adduction 5/5   Knee Extension: 5/5 Knee Extension: 5/5   Knee Flexion: 5/5 Knee Flexion: 5/5   Ankle Dorsiflexion: 5/5 Ankle Dorsiflexion: 5/5   Ankle Plantarflexion: 5/5 Ankle Plantarflexion: 5/5      Abdominal Strength:  poor     Special Tests      Transfers: Mod independent, using cane     Balance Assessment:    Evaluation   Single Limb Stance R LE 20+ sec with UE, 10-15 with no UE  (<10 sec = HIGH FALL RISK)   Single Limb Stance L LE 20+sec with UE, 0-5 with no UE  (<10 sec = HIGH FALL RISK)        Evaluation   30 second Chair Rise  (adults > 59 y/o) 14 completed with arms   5 times sit-stand  (adults 18-63 y/o) 10 seconds  >12 sec= fall risk for general elderly  >16 sec= fall risk for Parkinson's disease  >10 sec= balance/vestibular dysfunction (<59 y/o)  >14.2 sec= balance/vestibular dysfunction (>59 y/o)  >12 sec= fall risk for CVA      Gait Analysis:   Assistive device:  [x]None []Crutches []Straight Cane []Rolling Walker  [] Other:   Gait abnormalities:   []No significant gait deviations noted  []Increased HARRY  []Anterior Trunk Lean  []Increased Knee Flexion in Stance  []Knee Hyperextension in stance  []Foot Drop/Drag  []Decreased toe off  []Decreased heel strike  []Trendelenburg: bilateral   []Decreased speed     Endurance Assessment:    Evaluation   Timed Up and Go 10.00 sec no AD  < 20 sec safe for independent transfers, < 30 sec safe for dependent transfers/assist required      Table: Population Norms for TUG    Age  Average TUG    60 - 69 years  8.1 seconds    70 - 79 years  9.2 seconds    80 - 99 years  11.3 seconds       Palpation:  Min TTP to low back     HS flexibility: WNL    Objective Measures updated at progress report unless specified.     Treatment     Raquel received the treatments listed below:    Therapeutic exercises to develop strength, endurance, ROM, flexibility, posture and core stabilization for 35 minutes including:    (Remaining therex time used for reassessment purposes)    Recumbent bike lvl 3, seat 5, 6 min  Hamstring stretch with strap 2 min each  Side stepping on crosswalk, 3 laps RTB  Side lying hip abduction 2x10 reps   Prone hip extension x10 reps  Single leg stance on foam, UE assist as  needed, 2x30 sec LLE  Standing donkey kicks x20 reps LLE    Raquel participated in dynamic functional therapeutic activities to improve functional performance for 8 minutes, including:    TRX squats x30 reps  Bilateral shuttle 2 min 3 bands  Single leg shuttle 2 band x20 reps LLE    Not performed today due to time:  Bridging x30 reps  Straight leg raises 3x10 3# LLE  Side lying clamshells 2x10 reps green loop  Supine ball squeezes 2 min  LAQ 5# 2 min BLE  HSC RTB 2x10 each  Standing hip extension BLE x20 each  Standing hip abduction BLE x20 each  Stair climbing 4 laps  Seated Marching x20 ea (alternating LE's) 3#  Seated Hip Adduction 3s x2 min  Seated Hip Abduction GTB x2 min  Mini squats w walker 2 x 10   Seated low back stretch x3 min (reaching and with ball)  Seated Hamstring Stretch x2 min ea + LE's on stool  Sit to Stands x5 reps - no UE assist/hands in lap, table elevated  Standing hip flexion/marching LLE x20 reps  SAQ x30 reps 3#    Patient Education and Home Exercises     Education provided:   - Gait training without AD  - Weight bearing precaution  - Safety awareness  - Use of assistive devices  - Transfers  - HEP reviewed  - Anatomy and Physiology pertaining to current condition  - Possible response to exercise  - Importance of PT compliance    Written Home Exercises Provided: Patient instructed to cont prior HEP. Exercises were reviewed and Raquel was able to demonstrate them prior to the end of the session. Raquel demonstrated good  understanding of the education provided. See EMR under Patient Instructions for exercises provided during therapy sessions.    ASSESSMENT   Upon assessment, pt continues to demonstrate improved gait, functional mobility, LE strength, and balance. She remains limited limited in LLE hip strength, core strength, and LLE single leg balance. She would benefit from continuance of skilled PT with an updated POC in order to further progression and increase independence with  "ADLs.     Raquel Is progressing well towards her goals.   Pt prognosis is Fair.     Pt will continue to benefit from skilled outpatient physical therapy to address the deficits listed in the problem list box on initial evaluation, provide pt/family education and to maximize pt's level of independence in the home and community environment.     Pt's spiritual, cultural and educational needs considered and pt agreeable to plan of care and goals.     Anticipated barriers to physical therapy: WBAT    Goals:  Short Term Goals: 4 weeks   1. Pt will be independent with HEP to facilitate healing and improve outcome measures. (met)  2. Pt will increased hip ROM by 5-10 degrees without pain. (met)  3. Pt will be able to demonstrate full WB in LLE and walk greater than 50 feet with least restrictive AD. (met)  4. Pt will increase LLE MMT values by at least 1/2 grade. (met)     Long Term Goals: 8 weeks   1. Pt will demonstrate mod [place] with household transfers and gait. (met)  2. Pt will demonstrate LLE gross strength to 4/5 or greater. (met)  3. Pt will demonstrate full L hip ROM and flexibility. (met)  4. Pt will improve single leg balance and core strength to "good" to assist with daily activities and reduce onset of pain with activities.     PLAN     Continue with PT POC to address functional deficits.    Plan of Care Expiration: 1/4/2023 (Updated)    Evan Tesfaye, PT, DPT    "

## 2022-11-16 NOTE — PROGRESS NOTES
[facility]   Physical Therapy Treatment Note, POC Update and Reassessment    Name: Raquel Houston  Clinic Number: [id number]    Therapy Diagnosis:   Encounter Diagnoses   Name Primary?    Impaired functional mobility, balance, gait, and endurance Yes    Weakness of both lower extremities     Balance problem      Physician: Oneyda, Provider    Visit Date: 11/16/2022    Physician Orders: PT Eval and Treat  Medical Diagnosis from Referral: Sepsis, due to unspecified organism, unspecified whether acute organ dysfunction present [A41.9]  Evaluation Date: 8/8/2022  Authorization Period Expiration: 12/31/2022  Plan of Care Expiration: 1/4/2023 (Updated)  Progress Note Due: 12/16/2022 (Reassessed 11/16/2022)  Visit # / Visits authorized: 28/40 auth (1/1 eval)  FOTO: 3 / 3  PTA Visit #: 0 / 5     Precautions: WBAT 6 weeks, sx 7/18/2022 ORIF    Time In: 0917 am  Time Out: 1000 am  Total Billable Time: 43 minutes    SUBJECTIVE     Pt reports: no pain this morning. She has been without an assistive device recently and has been doing well.     She was compliant with home exercise program.  Response to previous treatment: mild soreness that improved over time  Functional change: Improved walking ability without AD    Pain: 0/10  Location: low back     OBJECTIVE     CMS Impairment/Limitation/Restriction for FOTO Leg Survey     Therapist reviewed FOTO scores for Raquel Houston on 11/16/2022.   FOTO documents entered into [employer] - see Media section.     Limitation Score: 36%         Lower Extremity Strength  Right LE   Left LE     Hip Flexion: 5/5 Hip Flexion: 5/5   Hip Extension:  4/5 Hip Extension: 3+/5   Hip Abduction: 5/5 Hip Abduction: 4+/5   Hip Adduction: 5/5 Hip Adduction 5/5   Knee Extension: 5/5 Knee Extension: 5/5   Knee Flexion: 5/5 Knee Flexion: 5/5   Ankle Dorsiflexion: 5/5 Ankle Dorsiflexion: 5/5   Ankle Plantarflexion: 5/5 Ankle Plantarflexion: 5/5      Abdominal Strength:  poor     Special Tests      Transfers: Mod independent, using cane     Balance Assessment:    Evaluation   Single Limb Stance R LE 20+ sec with UE, 10-15 with no UE  (<10 sec = HIGH FALL RISK)   Single Limb Stance L LE 20+sec with UE, 0-5 with no UE  (<10 sec = HIGH FALL RISK)        Evaluation   30 second Chair Rise  (adults > 59 y/o) 14 completed with arms   5 times sit-stand  (adults 18-63 y/o) 10 seconds  >12 sec= fall risk for general elderly  >16 sec= fall risk for Parkinson's disease  >10 sec= balance/vestibular dysfunction (<59 y/o)  >14.2 sec= balance/vestibular dysfunction (>59 y/o)  >12 sec= fall risk for CVA      Gait Analysis:   Assistive device:  [x]None []Crutches []Straight Cane []Rolling Walker  [] Other:   Gait abnormalities:   []No significant gait deviations noted  []Increased HARRY  []Anterior Trunk Lean  []Increased Knee Flexion in Stance  []Knee Hyperextension in stance  []Foot Drop/Drag  []Decreased toe off  []Decreased heel strike  []Trendelenburg: bilateral   []Decreased speed     Endurance Assessment:    Evaluation   Timed Up and Go 10.00 sec no AD  < 20 sec safe for independent transfers, < 30 sec safe for dependent transfers/assist required      Table: Population Norms for TUG    Age  Average TUG    60 - 69 years  8.1 seconds    70 - 79 years  9.2 seconds    80 - 99 years  11.3 seconds       Palpation:  Min TTP to low back     HS flexibility: WNL    Objective Measures updated at progress report unless specified.     Treatment     Raquel received the treatments listed below:    Therapeutic exercises to develop strength, endurance, ROM, flexibility, posture and core stabilization for 35 minutes including:    (Remaining therex time used for reassessment purposes)    Recumbent bike lvl 3, seat 5, 6 min  Hamstring stretch with strap 2 min each  Side stepping on crosswalk, 3 laps RTB  Side lying hip abduction 2x10 reps   Prone hip extension x10 reps  Single leg stance on foam, UE assist as  needed, 2x30 sec LLE  Standing donkey kicks x20 reps LLE    Raquel participated in dynamic functional therapeutic activities to improve functional performance for 8 minutes, including:    TRX squats x30 reps  Bilateral shuttle 2 min 3 bands  Single leg shuttle 2 band x20 reps LLE    Not performed today due to time:  Bridging x30 reps  Straight leg raises 3x10 3# LLE  Side lying clamshells 2x10 reps green loop  Supine ball squeezes 2 min  LAQ 5# 2 min BLE  HSC RTB 2x10 each  Standing hip extension BLE x20 each  Standing hip abduction BLE x20 each  Stair climbing 4 laps  Seated Marching x20 ea (alternating LE's) 3#  Seated Hip Adduction 3s x2 min  Seated Hip Abduction GTB x2 min  Mini squats w walker 2 x 10   Seated low back stretch x3 min (reaching and with ball)  Seated Hamstring Stretch x2 min ea + LE's on stool  Sit to Stands x5 reps - no UE assist/hands in lap, table elevated  Standing hip flexion/marching LLE x20 reps  SAQ x30 reps 3#    Patient Education and Home Exercises     Education provided:   - Gait training without AD  - Weight bearing precaution  - Safety awareness  - Use of assistive devices  - Transfers  - HEP reviewed  - Anatomy and Physiology pertaining to current condition  - Possible response to exercise  - Importance of PT compliance    Written Home Exercises Provided: Patient instructed to cont prior HEP. Exercises were reviewed and Raquel was able to demonstrate them prior to the end of the session. Raquel demonstrated good  understanding of the education provided. See EMR under Patient Instructions for exercises provided during therapy sessions.    ASSESSMENT   Upon assessment, pt continues to demonstrate improved gait, functional mobility, LE strength, and balance. She remains limited limited in LLE hip strength, core strength, and LLE single leg balance. She would benefit from continuance of skilled PT with an updated POC in order to further progression and increase independence with  "ADLs.     Raquel Is progressing well towards her goals.   Pt prognosis is Fair.     Pt will continue to benefit from skilled outpatient physical therapy to address the deficits listed in the problem list box on initial evaluation, provide pt/family education and to maximize pt's level of independence in the home and community environment.     Pt's spiritual, cultural and educational needs considered and pt agreeable to plan of care and goals.     Anticipated barriers to physical therapy: WBAT    Goals:  Short Term Goals: 4 weeks   1. Pt will be independent with HEP to facilitate healing and improve outcome measures. (met)  2. Pt will increased hip ROM by 5-10 degrees without pain. (met)  3. Pt will be able to demonstrate full WB in LLE and walk greater than 50 feet with least restrictive AD. (met)  4. Pt will increase LLE MMT values by at least 1/2 grade. (met)     Long Term Goals: 8 weeks   1. Pt will demonstrate mod [place] with household transfers and gait. (met)  2. Pt will demonstrate LLE gross strength to 4/5 or greater. (met)  3. Pt will demonstrate full L hip ROM and flexibility. (met)  4. Pt will improve single leg balance and core strength to "good" to assist with daily activities and reduce onset of pain with activities.     PLAN     Continue with PT POC to address functional deficits.    Plan of Care Expiration: 1/4/2023 (Updated)    Evan Tesfaye, PT, DPT    "

## 2022-11-21 ENCOUNTER — RESEARCH ENCOUNTER (OUTPATIENT)
Dept: RESEARCH | Facility: HOSPITAL | Age: 62
End: 2022-11-21
Payer: MEDICAID

## 2022-11-21 ENCOUNTER — PATIENT MESSAGE (OUTPATIENT)
Dept: INFECTIOUS DISEASES | Facility: CLINIC | Age: 62
End: 2022-11-21
Payer: MEDICAID

## 2022-11-21 ENCOUNTER — CLINICAL SUPPORT (OUTPATIENT)
Dept: REHABILITATION | Facility: HOSPITAL | Age: 62
End: 2022-11-21
Payer: MEDICAID

## 2022-11-21 DIAGNOSIS — R29.898 WEAKNESS OF BOTH LOWER EXTREMITIES: ICD-10-CM

## 2022-11-21 DIAGNOSIS — Z00.6 RESEARCH STUDY PATIENT: Primary | ICD-10-CM

## 2022-11-21 DIAGNOSIS — R26.89 BALANCE PROBLEM: ICD-10-CM

## 2022-11-21 DIAGNOSIS — Z74.09 IMPAIRED FUNCTIONAL MOBILITY, BALANCE, GAIT, AND ENDURANCE: Primary | ICD-10-CM

## 2022-11-21 PROCEDURE — 97110 THERAPEUTIC EXERCISES: CPT | Mod: PN

## 2022-11-21 NOTE — PROGRESS NOTES
[facility]   Physical Therapy Treatment Note, POC Update and Reassessment    Name: Raquel Houston  Clinic Number: [id number]    Therapy Diagnosis:   Encounter Diagnoses   Name Primary?    Impaired functional mobility, balance, gait, and endurance Yes    Weakness of both lower extremities     Balance problem      Physician: Oneyda, Provider    Visit Date: 11/21/2022    Physician Orders: PT Eval and Treat  Medical Diagnosis from Referral: Sepsis, due to unspecified organism, unspecified whether acute organ dysfunction present [A41.9]  Evaluation Date: 8/8/2022  Authorization Period Expiration: 12/31/2022  Plan of Care Expiration: 1/4/2023 (Updated)  Progress Note Due: 12/16/2022 (Reassessed 11/16/2022)  Visit # / Visits authorized: 29/40 auth (1/1 eval)  FOTO: 3 / 3    Precautions: WBAT 6 weeks, sx 7/18/2022 ORIF    Time In: 0940 am (patient arrived early)  Time Out: 1000 am (continued treatment with PT)  Total Billable Time: 20 minutes    SUBJECTIVE     Pt reports: she's still feeling good about how well she is doing since initial eval.    She was compliant with home exercise program.  Response to previous treatment: mild soreness that improved over time  Functional change: Improved walking ability without AD    Pain: 0/10  Location: low back     OBJECTIVE     Objective Measures updated at progress report unless specified.     Treatment     Raquel received the treatments listed below:    Therapeutic exercises to develop strength, endurance, ROM, flexibility, posture and core stabilization for 20 minutes including:    Recumbent bike lvl 3, seat 5, 6 min  Seated hamstring stretch 2 min each  Side lying hip abduction 2x10 reps   Prone hip extension x10 reps    See PT, Evan Tesfaye, note for the rest of treatment provided this date.    Patient Education and Home Exercises     Education provided:   - Gait training without AD  - Weight bearing precaution  - Safety awareness  -  "Use of assistive devices  - Transfers  - HEP reviewed  - Anatomy and Physiology pertaining to current condition  - Possible response to exercise  - Importance of PT compliance    Written Home Exercises Provided: Patient instructed to cont prior HEP. Exercises were reviewed and Raquel was able to demonstrate them prior to the end of the session. Raquel demonstrated good  understanding of the education provided. See EMR under Patient Instructions for exercises provided during therapy sessions.    ASSESSMENT   Patient did well with seated hamstring stretch reporting no provocation of back pain. See PT, Evan Tesfaye, note for the rest of treatment provided this date.    Raquel Is progressing well towards her goals.   Pt prognosis is Fair.     Pt will continue to benefit from skilled outpatient physical therapy to address the deficits listed in the problem list box on initial evaluation, provide pt/family education and to maximize pt's level of independence in the home and community environment.     Pt's spiritual, cultural and educational needs considered and pt agreeable to plan of care and goals.     Anticipated barriers to physical therapy: WBAT    Goals:  Short Term Goals: 4 weeks   1. Pt will be independent with HEP to facilitate healing and improve outcome measures. (met)  2. Pt will increased hip ROM by 5-10 degrees without pain. (met)  3. Pt will be able to demonstrate full WB in LLE and walk greater than 50 feet with least restrictive AD. (met)  4. Pt will increase LLE MMT values by at least 1/2 grade. (met)     Long Term Goals: 8 weeks   1. Pt will demonstrate mod [place] with household transfers and gait. (met)  2. Pt will demonstrate LLE gross strength to 4/5 or greater. (met)  3. Pt will demonstrate full L hip ROM and flexibility. (met)  4. Pt will improve single leg balance and core strength to "good" to assist with daily activities and reduce onset of pain with activities.     PLAN "     Continue with PT POC to address functional deficits.    Adiel Garrett, PTA

## 2022-11-21 NOTE — PROGRESS NOTES
Ms. Houston was called today regarding her participation in (IRB #[id number] PI: Dulce).   The Verbal Informed Consent was read and discussed by the consenter. The following was discussed:  Types of specimens to be collected  All medical information released to researchers will be stripped of identifiers and no patient information will be given to anyone outside of this research project.   Participating in a research study is not the same as getting regular medical care and will not improve the patient's health. The purpose of a research study is to gather information.  Being in this study does not interfere with your regular medical care.  The patient does not have to participate in this study. If they do not join, their care at [facility] will not be affected.  The person granting permission was provided adequate time to ask questions regarding the scope and purpose of the study.  Permission was obtained by telephone.   The above statements were read by the person obtaining permission to the person granting permission and witnessed by Cesar Knight. The witness information was documented on the verbal consent form as well.  This Verbal Informed Consent process was conducted prior to initiation of any study procedures.

## 2022-11-21 NOTE — PROGRESS NOTES
[facility]   Physical Therapy Treatment Note    Name: Raquel Houston  Clinic Number: [id number]    Therapy Diagnosis:   Encounter Diagnoses   Name Primary?    Impaired functional mobility, balance, gait, and endurance Yes    Weakness of both lower extremities     Balance problem      Physician: Oneyda, Provider    Visit Date: 11/21/2022    Physician Orders: PT Eval and Treat  Medical Diagnosis from Referral: Sepsis, due to unspecified organism, unspecified whether acute organ dysfunction present [A41.9]  Evaluation Date: 8/8/2022  Authorization Period Expiration: 12/31/2022  Plan of Care Expiration: 1/4/2023 (Updated)  Progress Note Due: 12/16/2022 (Reassessed 11/16/2022)  Visit # / Visits authorized: 29/40 auth (1/1 eval)  FOTO: 3 / 3  PTA Visit #: 0 / 5     Precautions: WBAT 6 weeks, sx 7/18/2022 ORIF    Time In: 0940 am  Time Out: 1025 am  Total Billable Time: 45 minutes    SUBJECTIVE     Pt reports: no new complaints. She is very happy with her progress.    She was compliant with home exercise program.  Response to previous treatment: mild soreness that improved over time  Functional change: Improved walking ability without AD    Pain: 0/10  Location: low back     OBJECTIVE     Objective Measures updated at progress report unless specified.     Treatment     Raquel received the treatments listed below:    Therapeutic exercises to develop strength, endurance, ROM, flexibility, posture and core stabilization for 30 minutes including:    Recumbent bike lvl 3, seat 5, 6 min  Seated hamstring stretch 2 min each  Side lying hip abduction 2x10 reps  Prone hip extension x10 reps  Side stepping on crosswalk, 3 laps RTB  Standing donkey kicks x20 reps LLE  Modified planks 5x10 sec    Raquel participated in dynamic functional therapeutic activities to improve functional performance for 15 minutes, including:    TRX squats x30 reps  Bilateral shuttle 2 min 3 bands  Single leg shuttle  2 band x30 reps LLE  Single leg stance on foam, UE assist as needed, 2x30 sec LLE    Patient Education and Home Exercises     Education provided:   - Gait training without AD  - Weight bearing precaution  - Safety awareness  - Use of assistive devices  - Transfers  - HEP reviewed  - Anatomy and Physiology pertaining to current condition  - Possible response to exercise  - Importance of PT compliance    Written Home Exercises Provided: Patient instructed to cont prior HEP. Exercises were reviewed and Raquel was able to demonstrate them prior to the end of the session. Raquel demonstrated good  understanding of the education provided. See EMR under Patient Instructions for exercises provided during therapy sessions.    ASSESSMENT   Raquel is able to progress therex including core strengthening, She is abl to perform a modified plank with no increased pain/discomfort.     Raquel Is progressing well towards her goals.   Pt prognosis is Fair.     Pt will continue to benefit from skilled outpatient physical therapy to address the deficits listed in the problem list box on initial evaluation, provide pt/family education and to maximize pt's level of independence in the home and community environment.     Pt's spiritual, cultural and educational needs considered and pt agreeable to plan of care and goals.     Anticipated barriers to physical therapy: WBAT    Goals:  Short Term Goals: 4 weeks   1. Pt will be independent with HEP to facilitate healing and improve outcome measures. (met)  2. Pt will increased hip ROM by 5-10 degrees without pain. (met)  3. Pt will be able to demonstrate full WB in LLE and walk greater than 50 feet with least restrictive AD. (met)  4. Pt will increase LLE MMT values by at least 1/2 grade. (met)     Long Term Goals: 8 weeks   1. Pt will demonstrate mod [place] with household transfers and gait. (met)  2. Pt will demonstrate LLE gross strength to 4/5 or greater. (met)  3. Pt will  "demonstrate full L hip ROM and flexibility. (met)  4. Pt will improve single leg balance and core strength to "good" to assist with daily activities and reduce onset of pain with activities.     PLAN     Continue with PT POC to address functional deficits.    Evan Tesfaye, PT, DPT    "

## 2022-11-22 ENCOUNTER — INFUSION (OUTPATIENT)
Dept: INFUSION THERAPY | Facility: HOSPITAL | Age: 62
End: 2022-11-22
Attending: INTERNAL MEDICINE
Payer: MEDICAID

## 2022-11-22 VITALS
BODY MASS INDEX: 32.56 KG/M2 | TEMPERATURE: 99 F | HEIGHT: 67 IN | RESPIRATION RATE: 16 BRPM | DIASTOLIC BLOOD PRESSURE: 69 MMHG | OXYGEN SATURATION: 98 % | WEIGHT: 207.44 LBS | SYSTOLIC BLOOD PRESSURE: 141 MMHG | HEART RATE: 80 BPM

## 2022-11-22 DIAGNOSIS — C90.00 MULTIPLE MYELOMA NOT HAVING ACHIEVED REMISSION: ICD-10-CM

## 2022-11-22 DIAGNOSIS — C90.00 MULTIPLE MYELOMA NOT HAVING ACHIEVED REMISSION: Primary | ICD-10-CM

## 2022-11-22 PROCEDURE — 63600175 PHARM REV CODE 636 W HCPCS: Mod: TB | Performed by: INTERNAL MEDICINE

## 2022-11-22 PROCEDURE — 96401 CHEMO ANTI-NEOPL SQ/IM: CPT

## 2022-11-22 PROCEDURE — 25000003 PHARM REV CODE 250: Performed by: INTERNAL MEDICINE

## 2022-11-22 RX ORDER — SODIUM CHLORIDE 0.9 % (FLUSH) 0.9 %
10 SYRINGE (ML) INJECTION
Status: DISCONTINUED | OUTPATIENT
Start: 2022-11-22 | End: 2022-11-22 | Stop reason: HOSPADM

## 2022-11-22 RX ORDER — LENALIDOMIDE 25 MG/1
25 CAPSULE ORAL SEE ADMIN INSTRUCTIONS
Qty: 21 CAPSULE | Refills: 0 | Status: SHIPPED | OUTPATIENT
Start: 2022-11-22 | End: 2022-12-28 | Stop reason: SDUPTHER

## 2022-11-22 RX ORDER — BORTEZOMIB 3.5 MG/1
1.3 INJECTION, POWDER, LYOPHILIZED, FOR SOLUTION INTRAVENOUS; SUBCUTANEOUS
Status: COMPLETED | OUTPATIENT
Start: 2022-11-22 | End: 2022-11-22

## 2022-11-22 RX ORDER — DIPHENHYDRAMINE HCL 50 MG
50 CAPSULE ORAL
Status: COMPLETED | OUTPATIENT
Start: 2022-11-22 | End: 2022-11-22

## 2022-11-22 RX ORDER — ACETAMINOPHEN 325 MG/1
650 TABLET ORAL
Status: COMPLETED | OUTPATIENT
Start: 2022-11-22 | End: 2022-11-22

## 2022-11-22 RX ORDER — HEPARIN 100 UNIT/ML
500 SYRINGE INTRAVENOUS
Status: DISCONTINUED | OUTPATIENT
Start: 2022-11-22 | End: 2022-11-22 | Stop reason: HOSPADM

## 2022-11-22 RX ADMIN — DARATUMUMAB AND HYALURONIDASE-FIHJ (HUMAN RECOMBINANT) 1800 MG: 1800; 30000 INJECTION SUBCUTANEOUS at 02:11

## 2022-11-22 RX ADMIN — DIPHENHYDRAMINE HYDROCHLORIDE 50 MG: 50 CAPSULE ORAL at 12:11

## 2022-11-22 RX ADMIN — ACETAMINOPHEN 650 MG: 325 TABLET ORAL at 12:11

## 2022-11-22 RX ADMIN — BORTEZOMIB 2.7 MG: 3.5 INJECTION, POWDER, LYOPHILIZED, FOR SOLUTION INTRAVENOUS; SUBCUTANEOUS at 02:11

## 2022-11-22 NOTE — PLAN OF CARE
1420- Pt tolerated Sofie SQ and Velcade SQ well, no complications or side effects, POC and meds discussed with pt, pt aware of upcoming appts, pt knows to call MD with any questions or concerns. Pt ambulated off unit, no distress noted.

## 2022-11-28 ENCOUNTER — CLINICAL SUPPORT (OUTPATIENT)
Dept: REHABILITATION | Facility: HOSPITAL | Age: 62
End: 2022-11-28
Payer: MEDICAID

## 2022-11-28 DIAGNOSIS — R29.898 WEAKNESS OF BOTH LOWER EXTREMITIES: ICD-10-CM

## 2022-11-28 DIAGNOSIS — R26.89 BALANCE PROBLEM: ICD-10-CM

## 2022-11-28 DIAGNOSIS — Z74.09 IMPAIRED FUNCTIONAL MOBILITY, BALANCE, GAIT, AND ENDURANCE: Primary | ICD-10-CM

## 2022-11-28 PROCEDURE — 97110 THERAPEUTIC EXERCISES: CPT | Mod: PN | Performed by: PHYSICAL THERAPIST

## 2022-11-28 NOTE — PROGRESS NOTES
[facility] OUTPATIENT THERAPY AND WELLNESS   Physical Therapy Treatment Note    Name: Raquel Houston  Clinic Number: [id number]    Therapy Diagnosis:   Encounter Diagnoses   Name Primary?    Impaired functional mobility, balance, gait, and endurance Yes    Weakness of both lower extremities     Balance problem      Physician: Everett Boogie MD    Visit Date: 11/28/2022    Physician Orders: PT Eval and Treat  Medical Diagnosis from Referral: Sepsis, due to unspecified organism, unspecified whether acute organ dysfunction present [A41.9]  Evaluation Date: 8/8/2022  Authorization Period Expiration: 12/31/2022  Plan of Care Expiration: 1/4/2023 (Updated)  Progress Note Due: 12/16/2022 (Reassessed 11/16/2022)  Visit # / Visits authorized: 29/40 auth (1/1 eval)  FOTO: 3 / 3  PTA Visit #: 0 / 5     Precautions: WBAT 6 weeks, sx 7/18/2022 ORIF    Time In: 1040 am  Time Out: 1042 am  Total Billable Time: 40 minutes    SUBJECTIVE     Pt reports: no new complaints. She is very happy with her progress.    She was compliant with home exercise program.  Response to previous treatment: mild soreness that improved over time  Functional change: Improved walking ability without AD    Pain: 0/10  Location: low back     OBJECTIVE     Objective Measures updated at progress report unless specified.     Treatment     Raquel received the treatments listed below:    Therapeutic exercises to develop strength, endurance, ROM, flexibility, posture and core stabilization for 25 minutes including:    Recumbent bike lvl 3, seat 5, 6 min  Seated hamstring stretch 2 min each  Side lying hip abduction 2x10 reps  Prone hip extension x10 reps  Side stepping on crosswalk, 3 laps RTB  Standing donkey kicks x20 reps LLE  Modified planks 5x10 sec  Shuttle donkey kicks 2 x 10 1/2 band     Raquel participated in dynamic functional therapeutic activities to improve functional performance for 15 minutes, including:    TRX squats x30 reps  Bilateral  "shuttle 2 min 3 bands  Single leg shuttle 2 band x30 reps LLE  Single leg stance on foam, UE assist as needed, 2x30 sec LLE  Box squats 24" 3 x 10     Patient Education and Home Exercises     Education provided:   - Gait training without AD  - Safety awareness  - Possible response to exercise  - Importance of PT compliance    Written Home Exercises Provided: Patient instructed to cont prior HEP. Exercises were reviewed and Raquel was able to demonstrate them prior to the end of the session. Raquel demonstrated good  understanding of the education provided. See EMR under Patient Instructions for exercises provided during therapy sessions.    ASSESSMENT   Raquel was able to perform new exercises targeting gluteal strength and funciton. Evident fatigue following new exercises today. She will benefit from continued care to maximize function    Raquel Is progressing well towards her goals.   Pt prognosis is Good.     Pt will continue to benefit from skilled outpatient physical therapy to address the deficits listed in the problem list box on initial evaluation, provide pt/family education and to maximize pt's level of independence in the home and community environment.     Pt's spiritual, cultural and educational needs considered and pt agreeable to plan of care and goals.     Anticipated barriers to physical therapy: WBAT    Goals:  Short Term Goals: 4 weeks   1. Pt will be independent with HEP to facilitate healing and improve outcome measures. (met)  2. Pt will increased hip ROM by 5-10 degrees without pain. (met)  3. Pt will be able to demonstrate full WB in LLE and walk greater than 50 feet with least restrictive AD. (met)  4. Pt will increase LLE MMT values by at least 1/2 grade. (met)     Long Term Goals: 8 weeks   1. Pt will demonstrate mod   with household transfers and gait. (met)  2. Pt will demonstrate LLE gross strength to 4/5 or greater. (met)  3. Pt will demonstrate full L hip ROM and flexibility. " "(met)  4. Pt will improve single leg balance and core strength to "good" to assist with daily activities and reduce onset of pain with activities.     PLAN     Continue with PT POC to address functional deficits.    Taurus Arreaga, PT, DPT      "

## 2022-11-29 ENCOUNTER — TELEPHONE (OUTPATIENT)
Dept: HEMATOLOGY/ONCOLOGY | Facility: CLINIC | Age: 62
End: 2022-11-29
Payer: MEDICAID

## 2022-11-29 ENCOUNTER — INFUSION (OUTPATIENT)
Dept: INFUSION THERAPY | Facility: HOSPITAL | Age: 62
End: 2022-11-29
Attending: INTERNAL MEDICINE
Payer: MEDICAID

## 2022-11-29 VITALS
BODY MASS INDEX: 33.7 KG/M2 | RESPIRATION RATE: 16 BRPM | DIASTOLIC BLOOD PRESSURE: 71 MMHG | SYSTOLIC BLOOD PRESSURE: 130 MMHG | TEMPERATURE: 98 F | HEART RATE: 77 BPM | WEIGHT: 215.19 LBS

## 2022-11-29 DIAGNOSIS — C90.00 MULTIPLE MYELOMA NOT HAVING ACHIEVED REMISSION: Primary | ICD-10-CM

## 2022-11-29 PROCEDURE — 96401 CHEMO ANTI-NEOPL SQ/IM: CPT

## 2022-11-29 PROCEDURE — 63600175 PHARM REV CODE 636 W HCPCS: Mod: JG | Performed by: INTERNAL MEDICINE

## 2022-11-29 RX ORDER — BORTEZOMIB 3.5 MG/1
1.3 INJECTION, POWDER, LYOPHILIZED, FOR SOLUTION INTRAVENOUS; SUBCUTANEOUS
Status: COMPLETED | OUTPATIENT
Start: 2022-11-29 | End: 2022-11-29

## 2022-11-29 RX ADMIN — BORTEZOMIB 2.7 MG: 3.5 INJECTION, POWDER, LYOPHILIZED, FOR SOLUTION INTRAVENOUS; SUBCUTANEOUS at 11:11

## 2022-11-29 NOTE — TELEPHONE ENCOUNTER
"----- Message from Everett Boogie MD sent at 11/29/2022 11:34 AM CST -----  Regarding: FW: Consult/Advisory:    ----- Message -----  From: Raya Vanessa  Sent: 11/29/2022  11:07 AM CST  To: [facility] Staff  Subject: Consult/Advisory:                                    Name Of Caller:   Sybil w/ [employer] [facility]      Contact Preference?:   [phone]      What is the nature of the call?: Calling to speak w/ nurse. Pharmacy needs a prior authorization for the pt's Revlimid. CoverMyMeds code: [id number]      Additional Notes:  "Thank you for all that you do for our patients"          "

## 2022-11-30 ENCOUNTER — CLINICAL SUPPORT (OUTPATIENT)
Dept: REHABILITATION | Facility: HOSPITAL | Age: 62
End: 2022-11-30
Payer: MEDICAID

## 2022-11-30 DIAGNOSIS — Z74.09 IMPAIRED FUNCTIONAL MOBILITY, BALANCE, GAIT, AND ENDURANCE: Primary | ICD-10-CM

## 2022-11-30 DIAGNOSIS — R29.898 WEAKNESS OF BOTH LOWER EXTREMITIES: ICD-10-CM

## 2022-11-30 DIAGNOSIS — R26.89 BALANCE PROBLEM: ICD-10-CM

## 2022-11-30 PROCEDURE — 97110 THERAPEUTIC EXERCISES: CPT | Mod: PN

## 2022-11-30 NOTE — PROGRESS NOTES
[facility]   Physical Therapy Treatment Note    Name: Raquel Houston  Clinic Number: [id number]    Therapy Diagnosis:   Encounter Diagnoses   Name Primary?    Impaired functional mobility, balance, gait, and endurance Yes    Weakness of both lower extremities     Balance problem      Physician: Oneyda, Provider    Visit Date: 11/30/2022    Physician Orders: PT Eval and Treat  Medical Diagnosis from Referral: Sepsis, due to unspecified organism, unspecified whether acute organ dysfunction present [A41.9]  Evaluation Date: 8/8/2022  Authorization Period Expiration: 12/31/2022  Plan of Care Expiration: 1/4/2023 (Updated)  Progress Note Due: 12/16/2022 (Reassessed 11/16/2022)  Visit # / Visits authorized: 31 / 40 auth (1/1 eval)  FOTO: 3 / 3  PTA Visit #: 0 / 5     Precautions: WBAT 6 weeks, sx 7/18/2022 ORIF    Time In: 9:20 AM (pt arrived late)  Time Out: 10:00 AM  Total Billable Time: 40 minutes    SUBJECTIVE     Pt reports: that she is feeling good this morning, no real pain - just muscle soreness.    She was compliant with home exercise program.  Response to previous treatment: mild soreness that improved over time  Functional change: Improved walking ability without AD    Pain: 0/10  Location: low back     OBJECTIVE     Objective Measures updated at progress report unless specified.     Treatment     Raquel received the treatments listed below:    Therapeutic exercises to develop strength, endurance, ROM, flexibility, posture and core stabilization for 25 minutes including:    Recumbent Bike lvl 3, seat 5, x6 min  Seated hamstring stretch 2 min each  Side stepping on crosswalk, 3 laps RTB  Standing donkey kicks x20 reps LLE  Modified planks 5x10 sec  Shuttle donkey kicks 2 x 10 1/2 band     Raquel participated in dynamic functional therapeutic activities to improve functional performance for 15 minutes, including:    TRX squats x30 reps  Bilateral shuttle 2 min 3  "bands  Single leg shuttle 2 band x30 reps LLE  Single leg stance on foam, UE assist as needed, 2x30 sec LLE  Box squats 24" 3 x 10 reps    Patient Education and Home Exercises     Education provided:   - Gait training without AD  - Safety awareness  - Possible response to exercise  - Importance of PT compliance    Written Home Exercises Provided: Patient instructed to cont prior HEP. Exercises were reviewed and Raquel was able to demonstrate them prior to the end of the session. Raquel demonstrated good  understanding of the education provided. See EMR under Patient Instructions for exercises provided during therapy sessions.    ASSESSMENT   Patient tolerated therapy well overall today without provocation of pain/symptoms. Focus placed on LE strengthening and progression of functional activity training. Appropriate squat mechanics observed. Single leg balance activities may prove beneficial moving forward to assist with balance training and return to PLOF.     Raquel Is progressing well towards her goals.   Pt prognosis is Good.     Pt will continue to benefit from skilled outpatient physical therapy to address the deficits listed in the problem list box on initial evaluation, provide pt/family education and to maximize pt's level of independence in the home and community environment.     Pt's spiritual, cultural and educational needs considered and pt agreeable to plan of care and goals.     Anticipated barriers to physical therapy: WBAT    Goals:  Short Term Goals: 4 weeks   1. Pt will be independent with HEP to facilitate healing and improve outcome measures. (met)  2. Pt will increased hip ROM by 5-10 degrees without pain. (met)  3. Pt will be able to demonstrate full WB in LLE and walk greater than 50 feet with least restrictive AD. (met)  4. Pt will increase LLE MMT values by at least 1/2 grade. (met)     Long Term Goals: 8 weeks   1. Pt will demonstrate mod   with household transfers and gait. (met)  2. Pt " "will demonstrate LLE gross strength to 4/5 or greater. (met)  3. Pt will demonstrate full L hip ROM and flexibility. (met)  4. Pt will improve single leg balance and core strength to "good" to assist with daily activities and reduce onset of pain with activities. (Progressing, not met)    PLAN     Continue with PT POC to address functional deficits.    Shellie Thomas, PT, DPT, Cert. DN        "

## 2022-12-04 NOTE — PROGRESS NOTES
PATIENT: Raquel Houston  MRN: [id number]  DATE: 12/6/2022      Diagnosis:   1. Multiple myeloma not having achieved remission          Chief Complaint: Multiple Myeloma    Oncologic History:     07/14/21: Presented with several months of worsening lethargy, generalized pain, 40-60lbs weight loss, and encephalopathy. Labs at time of presentation: hgb 7.6, plts 82, wbc 12, sodium 120, creatinine 1.55, corrected calcium 9.7, transaminitis, M-spike 6.4 g/dL. Skeletal survey showed lesions of left/right humerus, left/right proximal radius, pelvis, and femurs with a significant left femur lytic lesion. Multiple lucent lesions in calvarium. Given two doses of velcade while hospitalized.  07/18/21: Underwent left trochanteric femoral nail fixation. Pathology consistent with plasma cell neoplasm  07/19/22: BMBx showing 90% plasma cells with 100% cellularity, gain 1q21, t(4;14), and monosomy 13.  08/17/22: Cycle 1 Sofie-VRD for IgG Kappa Multiple Myeloma  09/13/22: Cycle 2 Sofie-VRD  10/11/22: Cycle 3 Sofie-VRD  11/08/22: Cycle 4 Sofie-VRD  12/06/22: Cycle 5 VRD    Subjective:    Initial History: Ms. Houston is a 62 y.o. female with medical history of OA, gout, and osteoporosis presenting for follow up of multiple myeloma. History as above. Arrived alone today. Continues to do very well and is having no issues tolerating therapy. Has not been able to set up dental appointment yet. Denies any complaints including neuropathy. Appetite is doing great. Does not need to use a cane any longer.      Smoked 1ppd for 20 years, quit April 2022  Quit alcohol Apirl 2022  Denies recreational drug use  Father has a history of a diffuse bone cancer that he did not want to be treated for      Past Medical History:   Past Medical History:   Diagnosis Date    Arthritis     Multiple myeloma     Osteoporosis        Past Surgical HIstory:   Past Surgical History:   Procedure Laterality Date    FEMUR SURGERY         Family History:    Family History   Problem Relation Age of Onset    Cancer Father        Social History:  reports that she has quit smoking. Her smoking use included cigarettes. She has quit using smokeless tobacco. She reports that she does not currently use alcohol. She reports that she does not use drugs.    Allergies:  Review of patient's allergies indicates:  No Known Allergies    Medications:  Current Outpatient Medications   Medication Sig Dispense Refill    acyclovir (ZOVIRAX) 400 MG tablet Take 1 tablet (400 mg total) by mouth 2 (two) times daily. 60 tablet 11    amLODIPine (NORVASC) 10 MG tablet Take 1 tablet (10 mg total) by mouth once daily. 30 tablet 11    aspirin (CIERRA ASPIRIN) 325 MG tablet Take 1 tablet (325 mg total) by mouth once daily. 100 tablet 3    dexAMETHasone (DECADRON) 4 MG Tab Take 10 tablets (40 mg total) by mouth As instructed (Take on days 1, 8, 15, and 22 of chemotherapy.). 40 tablet 1    lenalidomide 25 mg Cap Take 1 capsule (25 mg total) by mouth As instructed (Take by mouth once daily on days 1-21 of each 28 day cycle). 21 capsule 0    oxyCODONE (ROXICODONE) 5 MG immediate release tablet Take 2 tablets (10 mg total) by mouth every 6 (six) hours as needed for Pain. 56 tablet 0    pantoprazole (PROTONIX) 40 MG tablet Take 1 tablet (40 mg total) by mouth once daily. 30 tablet 3    alendronate (FOSAMAX) 70 MG tablet alendronate 70 mg tablet   Take 1 tablet every week by oral route.      allopurinoL (ZYLOPRIM) 100 MG tablet allopurinol 100 mg tablet   Take 1 tablet every day by oral route.      meloxicam (MOBIC) 7.5 MG tablet Mobic 7.5 mg tablet   Take 1 tablet every day by oral route with meals for 40 days.       No current facility-administered medications for this visit.     Facility-Administered Medications Ordered in Other Visits   Medication Dose Route Frequency Provider Last Rate Last Admin    alteplase injection 2 mg  2 mg Intra-Catheter PRN Janice Casas MD        bortezomib (VELCADE)  "injection 2.8 mg  1.3 mg/m2 Subcutaneous 1 time in Clinic/HOD Janice Casas MD        diphenhydrAMINE injection 50 mg  50 mg Intravenous Once PRN Janice Casas MD        EPINEPHrine (EPIPEN) 0.3 mg/0.3 mL pen injection 0.3 mg  0.3 mg Intramuscular Once PRN Janice Casas MD        heparin, porcine (PF) 100 unit/mL injection flush 500 Units  500 Units Intravenous PRN Janice Casas MD        hydrocortisone sodium succinate injection 100 mg  100 mg Intravenous Once PRN Janice Casas MD        sodium chloride 0.9% 250 mL flush bag   Intravenous 1 time in Clinic/HOD Janice Casas MD        sodium chloride 0.9% flush 10 mL  10 mL Intravenous PRN Janice Casas MD           Review of Systems   Constitutional:  Negative for chills, fatigue and fever.   HENT:  Negative for dental problem and trouble swallowing.    Eyes:  Negative for photophobia and visual disturbance.   Respiratory:  Negative for cough and shortness of breath.    Cardiovascular:  Negative for chest pain and leg swelling.   Gastrointestinal:  Negative for abdominal pain, diarrhea, nausea and vomiting.   Musculoskeletal:  Negative for arthralgias and myalgias.   Skin:  Negative for rash and wound.   Neurological:  Negative for light-headedness and headaches.   Hematological:  Negative for adenopathy. Does not bruise/bleed easily.   Psychiatric/Behavioral:  Negative for confusion. The patient is not nervous/anxious.      ECOG Performance Status: 1  Objective:      Vitals:   Vitals:    12/06/22 0846   BP: 132/67   Pulse: 76   Resp: 16   SpO2: 97%   Weight: 98.6 kg (217 lb 6 oz)   Height: 5' 7" (1.702 m)       Physical Exam  Vitals reviewed.   Constitutional:       Appearance: Normal appearance.   HENT:      Head: Normocephalic and atraumatic.   Eyes:      Extraocular Movements: Extraocular movements intact.      Conjunctiva/sclera: Conjunctivae normal.   Cardiovascular:      Rate and Rhythm: Normal rate and regular rhythm.   Pulmonary:      " Effort: Pulmonary effort is normal. No respiratory distress.   Abdominal:      General: There is no distension.      Palpations: Abdomen is soft.      Tenderness: There is no abdominal tenderness.   Musculoskeletal:      Right lower leg: No edema.      Left lower leg: No edema.   Skin:     General: Skin is warm and dry.   Neurological:      General: No focal deficit present.      Mental Status: She is alert and oriented to person, place, and time.   Psychiatric:         Mood and Affect: Mood normal.         Behavior: Behavior normal.       Laboratory Data:  Lab Visit on 12/06/2022   Component Date Value Ref Range Status    WBC 12/06/2022 5.29  3.90 - 12.70 K/uL Final    RBC 12/06/2022 4.05  4.00 - 5.40 M/uL Final    Hemoglobin 12/06/2022 11.5 (L)  12.0 - 16.0 g/dL Final    Hematocrit 12/06/2022 36.3 (L)  37.0 - 48.5 % Final    MCV 12/06/2022 90  82 - 98 fL Final    MCH 12/06/2022 28.4  27.0 - 31.0 pg Final    MCHC 12/06/2022 31.7 (L)  32.0 - 36.0 g/dL Final    RDW 12/06/2022 16.7 (H)  11.5 - 14.5 % Final    Platelets 12/06/2022 191  150 - 450 K/uL Final    MPV 12/06/2022 12.7  9.2 - 12.9 fL Final    Immature Granulocytes 12/06/2022 0.4  0.0 - 0.5 % Final    Gran # (ANC) 12/06/2022 2.3  1.8 - 7.7 K/uL Final    Immature Grans (Abs) 12/06/2022 0.02  0.00 - 0.04 K/uL Final    Comment: Mild elevation in immature granulocytes is non specific and   can be seen in a variety of conditions including stress response,   acute inflammation, trauma and pregnancy. Correlation with other   laboratory and clinical findings is essential.      Lymph # 12/06/2022 1.8  1.0 - 4.8 K/uL Final    Mono # 12/06/2022 1.0  0.3 - 1.0 K/uL Final    Eos # 12/06/2022 0.2  0.0 - 0.5 K/uL Final    Baso # 12/06/2022 0.05  0.00 - 0.20 K/uL Final    nRBC 12/06/2022 0  0 /100 WBC Final    Gran % 12/06/2022 42.9  38.0 - 73.0 % Final    Lymph % 12/06/2022 33.1  18.0 - 48.0 % Final    Mono % 12/06/2022 18.9 (H)  4.0 - 15.0 % Final    Eosinophil % 12/06/2022  3.8  0.0 - 8.0 % Final    Basophil % 12/06/2022 0.9  0.0 - 1.9 % Final    Differential Method 12/06/2022 Automated   Final    Sodium 12/06/2022 140  136 - 145 mmol/L Final    Potassium 12/06/2022 4.0  3.5 - 5.1 mmol/L Final    Chloride 12/06/2022 109  95 - 110 mmol/L Final    CO2 12/06/2022 24  23 - 29 mmol/L Final    Glucose 12/06/2022 80  70 - 110 mg/dL Final    BUN 12/06/2022 19  6 - 20 mg/dL Final    Creatinine 12/06/2022 0.8  0.5 - 1.4 mg/dL Final    Calcium 12/06/2022 9.2  8.7 - 10.5 mg/dL Final    Total Protein 12/06/2022 6.0  6.0 - 8.4 g/dL Final    Albumin 12/06/2022 3.4 (L)  3.5 - 5.2 g/dL Final    Total Bilirubin 12/06/2022 0.4  0.1 - 1.0 mg/dL Final    Comment: For infants and newborns, interpretation of results should be based  on gestational age, weight and in agreement with clinical  observations.    Premature Infant recommended reference ranges:  Up to 24 hours.............<8.0 mg/dL  Up to 48 hours............<12.0 mg/dL  3-5 days..................<15.0 mg/dL  6-29 days.................<15.0 mg/dL      Alkaline Phosphatase 12/06/2022 226 (H)  55 - 135 U/L Final    AST 12/06/2022 22  10 - 40 U/L Final    ALT 12/06/2022 48 (H)  10 - 44 U/L Final    Anion Gap 12/06/2022 7 (L)  8 - 16 mmol/L Final    eGFR 12/06/2022 >60.0  >60 mL/min/1.73 m^2 Final    Group & Rh 12/06/2022 B POS   Final    Indirect Jd 12/06/2022 POS (A)   Final         Imaging:   Assessment:       1. Multiple myeloma not having achieved remission           Plan:     Multiple Myeloma  -- Complex cytogenetics with gain 1q21, t(4;14), and monosomy 13  -- Diffuse lytics lesions  -- M-spike 6.4g/dL  -- R-ISS stage III (elevated beta-2 microglobulin, elevated LDH, decreased albumin, and high risk cytogenetics)  -- Cycle 5 VRD today, completed 4 cycles of Daratumumab; consent obtained 08/16/22  -- Has had significant clinical improvement as well as reduction in IgG and M-spike showing excellent response  -- Has met with transplant  coordinators. Transplant workup scheduled for end of December December with collection date 1/26 and plan for admission 2/6. Will hold treatment 6 weeks prior to collection date.  -- Will need bisphosphonate pending dental appointment, won't start until after transplant process is complete  -- Acyclovir 400mg BID, aspirin daily      RTC as scheduled. Pt knows to call or use [facility] in the interim with any questions or concerns.    Everett Boogie, PGY- VI  Hematology/Oncology Fellow     Route Chart for Scheduling    BMT Chart Routing  Urgent    Follow up with physician . As scheduled   Follow up with SERGIO    Provider visit type Malignant hem   Infusion scheduling note    Injection scheduling note please cancel 12/20 and 12/27 velcade injection and labs   Labs   Lab interval:  CBC, CMP, serum protein electropheresis, immunofixation, free light chains, immunoglobulins 12/13   Imaging    Pharmacy appointment    Other referrals        Treatment Plan Information   OP D-VRD DARATUMUMAB + BORTEZOMIB LENALIDOMIDE DEXAMETHASONE   Janice Casas MD   Upcoming Treatment Dates - OP D-VRD DARATUMUMAB + BORTEZOMIB LENALIDOMIDE DEXAMETHASONE    12/13/2022       Chemotherapy       bortezomib (VELCADE) injection 2.8 mg  1/3/2023       Pre-Medications       acetaminophen tablet 650 mg       diphenhydrAMINE capsule 50 mg       Chemotherapy       bortezomib (VELCADE) injection  1/10/2023       Chemotherapy       bortezomib (VELCADE) injection

## 2022-12-05 ENCOUNTER — CLINICAL SUPPORT (OUTPATIENT)
Dept: REHABILITATION | Facility: HOSPITAL | Age: 62
End: 2022-12-05
Payer: MEDICAID

## 2022-12-05 DIAGNOSIS — Z74.09 IMPAIRED FUNCTIONAL MOBILITY, BALANCE, GAIT, AND ENDURANCE: Primary | ICD-10-CM

## 2022-12-05 DIAGNOSIS — R29.898 WEAKNESS OF BOTH LOWER EXTREMITIES: ICD-10-CM

## 2022-12-05 DIAGNOSIS — R26.89 BALANCE PROBLEM: ICD-10-CM

## 2022-12-05 PROCEDURE — 97530 THERAPEUTIC ACTIVITIES: CPT | Mod: PN

## 2022-12-05 PROCEDURE — 97110 THERAPEUTIC EXERCISES: CPT | Mod: PN

## 2022-12-05 NOTE — PROGRESS NOTES
[facility]   Physical Therapy Treatment Note    Name: Raquel Houston  Clinic Number: [id number]    Therapy Diagnosis:   Encounter Diagnoses   Name Primary?    Impaired functional mobility, balance, gait, and endurance Yes    Weakness of both lower extremities     Balance problem      Physician: Oneyda, Provider    Visit Date: 12/5/2022    Physician Orders: PT Eval and Treat  Medical Diagnosis from Referral: Sepsis, due to unspecified organism, unspecified whether acute organ dysfunction present [A41.9]  Evaluation Date: 8/8/2022  Authorization Period Expiration: 12/31/2022  Plan of Care Expiration: 1/4/2023 (Updated)  Progress Note Due: 12/16/2022 (Reassessed 11/16/2022)  Visit # / Visits authorized: 32 / 40 auth (1/1 eval)  FOTO: 3 / 3  PTA Visit #: 0 / 5     Precautions: WBAT 6 weeks, sx 7/18/2022 ORIF    Time In: 9:19 AM (pt arrived late)  Time Out: 09:59 AM  Total Billable Time: 40 minutes    SUBJECTIVE     Pt reports: no new pain just minimal soreness.     She was compliant with home exercise program.  Response to previous treatment: mild soreness that improved over time  Functional change: Improved walking ability without AD    Pain: 0/10  Location: low back     OBJECTIVE     Objective Measures updated at progress report unless specified.     Treatment     Raquel received the treatments listed below:    Therapeutic exercises to develop strength, endurance, ROM, flexibility, posture and core stabilization for 25 minutes including:    Recumbent Bike lvl 3, seat 5, x6 min  Seated hamstring stretch 2 min each  Side lying hip abduction 2x10 reps  Prone hip extension x10 reps  Side stepping on crosswalk, 3 laps RTB  Standing donkey kicks x20 reps LLE  Modified planks 5x10 sec  Shuttle donkey kicks 2 x 10 1/2 band     Raquel participated in dynamic functional therapeutic activities to improve functional performance for 15 minutes, including:    TRX squats x30 reps  Bilateral  "shuttle 2 min 3 bands  Single leg shuttle 2 band x30 reps LLE  Single leg stance on foam, UE assist as needed, 2x30 sec LLE  Box squats 24" 3 x 10 reps    Patient Education and Home Exercises     Education provided:   - Gait training without AD  - Safety awareness  - Possible response to exercise  - Importance of PT compliance    Written Home Exercises Provided: Patient instructed to cont prior HEP. Exercises were reviewed and Raquel was able to demonstrate them prior to the end of the session. Raquel demonstrated good  understanding of the education provided. See EMR under Patient Instructions for exercises provided during therapy sessions.    ASSESSMENT   Glute strength is progressing in recent weeks and she is able to demonstrate improved muscle recruitment. Gait is WNL and she is able to perform increased functional activities.     Raquel Is progressing well towards her goals.   Pt prognosis is Good.     Pt will continue to benefit from skilled outpatient physical therapy to address the deficits listed in the problem list box on initial evaluation, provide pt/family education and to maximize pt's level of independence in the home and community environment.     Pt's spiritual, cultural and educational needs considered and pt agreeable to plan of care and goals.     Anticipated barriers to physical therapy: WBAT    Goals:  Short Term Goals: 4 weeks   1. Pt will be independent with HEP to facilitate healing and improve outcome measures. (met)  2. Pt will increased hip ROM by 5-10 degrees without pain. (met)  3. Pt will be able to demonstrate full WB in LLE and walk greater than 50 feet with least restrictive AD. (met)  4. Pt will increase LLE MMT values by at least 1/2 grade. (met)     Long Term Goals: 8 weeks   1. Pt will demonstrate mod   with household transfers and gait. (met)  2. Pt will demonstrate LLE gross strength to 4/5 or greater. (met)  3. Pt will demonstrate full L hip ROM and flexibility. (met)  4. " "Pt will improve single leg balance and core strength to "good" to assist with daily activities and reduce onset of pain with activities. (Progressing, not met)    PLAN     Continue with PT POC to address functional deficits.    Evan Tesfaye, PT, DPT, Cert. DN    "

## 2022-12-06 ENCOUNTER — LAB VISIT (OUTPATIENT)
Dept: LAB | Facility: HOSPITAL | Age: 62
End: 2022-12-06
Attending: INTERNAL MEDICINE
Payer: MEDICAID

## 2022-12-06 ENCOUNTER — INFUSION (OUTPATIENT)
Dept: INFUSION THERAPY | Facility: HOSPITAL | Age: 62
End: 2022-12-06
Attending: INTERNAL MEDICINE
Payer: MEDICAID

## 2022-12-06 ENCOUNTER — OFFICE VISIT (OUTPATIENT)
Dept: HEMATOLOGY/ONCOLOGY | Facility: CLINIC | Age: 62
End: 2022-12-06
Payer: MEDICAID

## 2022-12-06 VITALS
DIASTOLIC BLOOD PRESSURE: 67 MMHG | SYSTOLIC BLOOD PRESSURE: 132 MMHG | HEART RATE: 76 BPM | BODY MASS INDEX: 34.12 KG/M2 | RESPIRATION RATE: 16 BRPM | WEIGHT: 217.38 LBS | HEIGHT: 67 IN | OXYGEN SATURATION: 97 %

## 2022-12-06 VITALS
SYSTOLIC BLOOD PRESSURE: 138 MMHG | DIASTOLIC BLOOD PRESSURE: 73 MMHG | HEART RATE: 73 BPM | TEMPERATURE: 99 F | OXYGEN SATURATION: 99 % | RESPIRATION RATE: 17 BRPM

## 2022-12-06 DIAGNOSIS — C90.00 MULTIPLE MYELOMA NOT HAVING ACHIEVED REMISSION: ICD-10-CM

## 2022-12-06 DIAGNOSIS — C90.00 MULTIPLE MYELOMA NOT HAVING ACHIEVED REMISSION: Primary | ICD-10-CM

## 2022-12-06 LAB
ABO + RH BLD: ABNORMAL
ALBUMIN SERPL BCP-MCNC: 3.4 G/DL (ref 3.5–5.2)
ALP SERPL-CCNC: 226 U/L (ref 55–135)
ALT SERPL W/O P-5'-P-CCNC: 48 U/L (ref 10–44)
ANION GAP SERPL CALC-SCNC: 7 MMOL/L (ref 8–16)
AST SERPL-CCNC: 22 U/L (ref 10–40)
BASOPHILS # BLD AUTO: 0.05 K/UL (ref 0–0.2)
BASOPHILS NFR BLD: 0.9 % (ref 0–1.9)
BILIRUB SERPL-MCNC: 0.4 MG/DL (ref 0.1–1)
BLD GP AB SCN CELLS X3 SERPL QL: ABNORMAL
BUN SERPL-MCNC: 19 MG/DL (ref 6–20)
CALCIUM SERPL-MCNC: 9.2 MG/DL (ref 8.7–10.5)
CHLORIDE SERPL-SCNC: 109 MMOL/L (ref 95–110)
CO2 SERPL-SCNC: 24 MMOL/L (ref 23–29)
CREAT SERPL-MCNC: 0.8 MG/DL (ref 0.5–1.4)
DIFFERENTIAL METHOD: ABNORMAL
EOSINOPHIL # BLD AUTO: 0.2 K/UL (ref 0–0.5)
EOSINOPHIL NFR BLD: 3.8 % (ref 0–8)
ERYTHROCYTE [DISTWIDTH] IN BLOOD BY AUTOMATED COUNT: 16.7 % (ref 11.5–14.5)
EST. GFR  (NO RACE VARIABLE): >60 ML/MIN/1.73 M^2
GLUCOSE SERPL-MCNC: 80 MG/DL (ref 70–110)
HCT VFR BLD AUTO: 36.3 % (ref 37–48.5)
HGB BLD-MCNC: 11.5 G/DL (ref 12–16)
IMM GRANULOCYTES # BLD AUTO: 0.02 K/UL (ref 0–0.04)
IMM GRANULOCYTES NFR BLD AUTO: 0.4 % (ref 0–0.5)
LYMPHOCYTES # BLD AUTO: 1.8 K/UL (ref 1–4.8)
LYMPHOCYTES NFR BLD: 33.1 % (ref 18–48)
MCH RBC QN AUTO: 28.4 PG (ref 27–31)
MCHC RBC AUTO-ENTMCNC: 31.7 G/DL (ref 32–36)
MCV RBC AUTO: 90 FL (ref 82–98)
MONOCYTES # BLD AUTO: 1 K/UL (ref 0.3–1)
MONOCYTES NFR BLD: 18.9 % (ref 4–15)
NEUTROPHILS # BLD AUTO: 2.3 K/UL (ref 1.8–7.7)
NEUTROPHILS NFR BLD: 42.9 % (ref 38–73)
NRBC BLD-RTO: 0 /100 WBC
PLATELET # BLD AUTO: 191 K/UL (ref 150–450)
PMV BLD AUTO: 12.7 FL (ref 9.2–12.9)
POTASSIUM SERPL-SCNC: 4 MMOL/L (ref 3.5–5.1)
PROT SERPL-MCNC: 6 G/DL (ref 6–8.4)
RBC # BLD AUTO: 4.05 M/UL (ref 4–5.4)
SODIUM SERPL-SCNC: 140 MMOL/L (ref 136–145)
WBC # BLD AUTO: 5.29 K/UL (ref 3.9–12.7)

## 2022-12-06 PROCEDURE — 3078F PR MOST RECENT DIASTOLIC BLOOD PRESSURE < 80 MM HG: ICD-10-PCS | Mod: CPTII,,, | Performed by: INTERNAL MEDICINE

## 2022-12-06 PROCEDURE — 86850 RBC ANTIBODY SCREEN: CPT | Performed by: INTERNAL MEDICINE

## 2022-12-06 PROCEDURE — 80053 COMPREHEN METABOLIC PANEL: CPT | Performed by: INTERNAL MEDICINE

## 2022-12-06 PROCEDURE — 96401 CHEMO ANTI-NEOPL SQ/IM: CPT

## 2022-12-06 PROCEDURE — 99213 OFFICE O/P EST LOW 20 MIN: CPT | Mod: 25,PBBFAC | Performed by: INTERNAL MEDICINE

## 2022-12-06 PROCEDURE — 25000003 PHARM REV CODE 250: Performed by: INTERNAL MEDICINE

## 2022-12-06 PROCEDURE — 3008F BODY MASS INDEX DOCD: CPT | Mod: CPTII,,, | Performed by: INTERNAL MEDICINE

## 2022-12-06 PROCEDURE — 99215 OFFICE O/P EST HI 40 MIN: CPT | Mod: S$PBB,,, | Performed by: INTERNAL MEDICINE

## 2022-12-06 PROCEDURE — 99999 PR PBB SHADOW E&M-EST. PATIENT-LVL III: CPT | Mod: PBBFAC,,, | Performed by: INTERNAL MEDICINE

## 2022-12-06 PROCEDURE — 85025 COMPLETE CBC W/AUTO DIFF WBC: CPT | Performed by: INTERNAL MEDICINE

## 2022-12-06 PROCEDURE — 36415 COLL VENOUS BLD VENIPUNCTURE: CPT | Performed by: INTERNAL MEDICINE

## 2022-12-06 PROCEDURE — 3075F SYST BP GE 130 - 139MM HG: CPT | Mod: CPTII,,, | Performed by: INTERNAL MEDICINE

## 2022-12-06 PROCEDURE — 3078F DIAST BP <80 MM HG: CPT | Mod: CPTII,,, | Performed by: INTERNAL MEDICINE

## 2022-12-06 PROCEDURE — 3008F PR BODY MASS INDEX (BMI) DOCUMENTED: ICD-10-PCS | Mod: CPTII,,, | Performed by: INTERNAL MEDICINE

## 2022-12-06 PROCEDURE — 99999 PR PBB SHADOW E&M-EST. PATIENT-LVL III: ICD-10-PCS | Mod: PBBFAC,,, | Performed by: INTERNAL MEDICINE

## 2022-12-06 PROCEDURE — 3075F PR MOST RECENT SYSTOLIC BLOOD PRESS GE 130-139MM HG: ICD-10-PCS | Mod: CPTII,,, | Performed by: INTERNAL MEDICINE

## 2022-12-06 PROCEDURE — 63600175 PHARM REV CODE 636 W HCPCS: Mod: JG | Performed by: INTERNAL MEDICINE

## 2022-12-06 PROCEDURE — 99215 PR OFFICE/OUTPT VISIT, EST, LEVL V, 40-54 MIN: ICD-10-PCS | Mod: S$PBB,,, | Performed by: INTERNAL MEDICINE

## 2022-12-06 RX ORDER — BORTEZOMIB 3.5 MG/1
1.3 INJECTION, POWDER, LYOPHILIZED, FOR SOLUTION INTRAVENOUS; SUBCUTANEOUS
Status: COMPLETED | OUTPATIENT
Start: 2022-12-06 | End: 2022-12-06

## 2022-12-06 RX ORDER — EPINEPHRINE 0.3 MG/.3ML
0.3 INJECTION SUBCUTANEOUS ONCE AS NEEDED
Status: CANCELLED | OUTPATIENT
Start: 2022-12-06

## 2022-12-06 RX ORDER — HEPARIN 100 UNIT/ML
500 SYRINGE INTRAVENOUS
Status: DISCONTINUED | OUTPATIENT
Start: 2022-12-06 | End: 2022-12-06 | Stop reason: HOSPADM

## 2022-12-06 RX ORDER — SODIUM CHLORIDE 0.9 % (FLUSH) 0.9 %
10 SYRINGE (ML) INJECTION
Status: DISCONTINUED | OUTPATIENT
Start: 2022-12-06 | End: 2022-12-06 | Stop reason: HOSPADM

## 2022-12-06 RX ORDER — DIPHENHYDRAMINE HYDROCHLORIDE 50 MG/ML
50 INJECTION INTRAMUSCULAR; INTRAVENOUS ONCE AS NEEDED
Status: DISCONTINUED | OUTPATIENT
Start: 2022-12-06 | End: 2022-12-06 | Stop reason: HOSPADM

## 2022-12-06 RX ORDER — HEPARIN 100 UNIT/ML
500 SYRINGE INTRAVENOUS
Status: CANCELLED | OUTPATIENT
Start: 2022-12-06

## 2022-12-06 RX ORDER — EPINEPHRINE 0.3 MG/.3ML
0.3 INJECTION SUBCUTANEOUS ONCE AS NEEDED
Status: DISCONTINUED | OUTPATIENT
Start: 2022-12-06 | End: 2022-12-06 | Stop reason: HOSPADM

## 2022-12-06 RX ORDER — HEPARIN 100 UNIT/ML
500 SYRINGE INTRAVENOUS
Status: CANCELLED | OUTPATIENT
Start: 2022-12-13

## 2022-12-06 RX ORDER — ACETAMINOPHEN 325 MG/1
650 TABLET ORAL
Status: CANCELLED | OUTPATIENT
Start: 2022-12-06

## 2022-12-06 RX ORDER — BORTEZOMIB 3.5 MG/1
1.3 INJECTION, POWDER, LYOPHILIZED, FOR SOLUTION INTRAVENOUS; SUBCUTANEOUS
Status: CANCELLED | OUTPATIENT
Start: 2022-12-13

## 2022-12-06 RX ORDER — DIPHENHYDRAMINE HCL 50 MG
50 CAPSULE ORAL
Status: CANCELLED
Start: 2022-12-06

## 2022-12-06 RX ORDER — DIPHENHYDRAMINE HCL 50 MG
50 CAPSULE ORAL
Status: COMPLETED | OUTPATIENT
Start: 2022-12-06 | End: 2022-12-06

## 2022-12-06 RX ORDER — ACETAMINOPHEN 325 MG/1
650 TABLET ORAL
Status: COMPLETED | OUTPATIENT
Start: 2022-12-06 | End: 2022-12-06

## 2022-12-06 RX ORDER — SODIUM CHLORIDE 0.9 % (FLUSH) 0.9 %
10 SYRINGE (ML) INJECTION
Status: CANCELLED | OUTPATIENT
Start: 2022-12-13

## 2022-12-06 RX ORDER — BORTEZOMIB 3.5 MG/1
1.3 INJECTION, POWDER, LYOPHILIZED, FOR SOLUTION INTRAVENOUS; SUBCUTANEOUS
Status: CANCELLED | OUTPATIENT
Start: 2022-12-06

## 2022-12-06 RX ORDER — SODIUM CHLORIDE 0.9 % (FLUSH) 0.9 %
10 SYRINGE (ML) INJECTION
Status: CANCELLED | OUTPATIENT
Start: 2022-12-06

## 2022-12-06 RX ORDER — DIPHENHYDRAMINE HYDROCHLORIDE 50 MG/ML
50 INJECTION INTRAMUSCULAR; INTRAVENOUS ONCE AS NEEDED
Status: CANCELLED | OUTPATIENT
Start: 2022-12-06

## 2022-12-06 RX ADMIN — ACETAMINOPHEN 650 MG: 325 TABLET ORAL at 09:12

## 2022-12-06 RX ADMIN — DIPHENHYDRAMINE HYDROCHLORIDE 50 MG: 50 CAPSULE ORAL at 09:12

## 2022-12-06 RX ADMIN — BORTEZOMIB 2.8 MG: 3.5 INJECTION, POWDER, LYOPHILIZED, FOR SOLUTION INTRAVENOUS; SUBCUTANEOUS at 11:12

## 2022-12-06 NOTE — PLAN OF CARE
1115  Patient completed velcade subq injection, tolerated well.  Patient ambulated off floor independently, NAD.

## 2022-12-06 NOTE — PLAN OF CARE
0920  Patient seated in chair, vss, assessment done.  No IV access require for subq Darzalex/ Velcade.  WCTM for safety

## 2022-12-07 ENCOUNTER — CLINICAL SUPPORT (OUTPATIENT)
Dept: REHABILITATION | Facility: HOSPITAL | Age: 62
End: 2022-12-07
Payer: MEDICAID

## 2022-12-07 DIAGNOSIS — C90.00 MULTIPLE MYELOMA NOT HAVING ACHIEVED REMISSION: Primary | ICD-10-CM

## 2022-12-07 DIAGNOSIS — R26.89 BALANCE PROBLEM: ICD-10-CM

## 2022-12-07 DIAGNOSIS — R29.898 WEAKNESS OF BOTH LOWER EXTREMITIES: ICD-10-CM

## 2022-12-07 DIAGNOSIS — Z74.09 IMPAIRED FUNCTIONAL MOBILITY, BALANCE, GAIT, AND ENDURANCE: Primary | ICD-10-CM

## 2022-12-07 PROCEDURE — 97110 THERAPEUTIC EXERCISES: CPT | Mod: PN

## 2022-12-07 PROCEDURE — 97530 THERAPEUTIC ACTIVITIES: CPT | Mod: PN

## 2022-12-07 NOTE — PROGRESS NOTES
[facility]   Physical Therapy Treatment Note    Name: Raquel Houston  Clinic Number: [id number]    Therapy Diagnosis:   Encounter Diagnoses   Name Primary?    Impaired functional mobility, balance, gait, and endurance Yes    Weakness of both lower extremities     Balance problem      Physician: Oneyda, Provider    Visit Date: 12/7/2022    Physician Orders: PT Eval and Treat  Medical Diagnosis from Referral: Sepsis, due to unspecified organism, unspecified whether acute organ dysfunction present [A41.9]  Evaluation Date: 8/8/2022  Authorization Period Expiration: 12/31/2022  Plan of Care Expiration: 1/4/2023 (Updated)  Progress Note Due: 12/16/2022 (Reassessed 11/16/2022)  Visit # / Visits authorized: 32 / 40 auth (1/1 eval)  FOTO: 3 / 3  PTA Visit #: 0 / 5     Precautions: WBAT 6 weeks, sx 7/18/2022 ORIF    Time In: 9:15 AM  Time Out: 10:00 AM  Total Billable Time: 45 minutes    SUBJECTIVE     Pt reports: she is preparing for upcoming procedure. She can tell her strength and balance in legs are improving.     She was compliant with home exercise program.  Response to previous treatment: mild soreness that improved over time  Functional change: Improved walking ability without AD    Pain: 0/10  Location: low back     OBJECTIVE     Objective Measures updated at progress report unless specified.     Treatment     Raquel received the treatments listed below:    Therapeutic exercises to develop strength, endurance, ROM, flexibility, posture and core stabilization for (30) minutes including:    Recumbent Bike lvl 4, seat 5, x6 min  Supine hamstring stretch with strap 2 min each  Side lying hip abduction 2x10 reps  Prone hip extension x10 reps  Side stepping on crosswalk, 3 laps green loop  Modified planks 5x15 sec  Bent over hip extensions, hands on plinth 2 x 10 reps    Raquel participated in dynamic functional therapeutic activities to improve functional performance for (15)  "minutes, including:    TRX squats x30 reps  Bilateral shuttle 2 min 3 bands  Single leg shuttle 2 band x30 reps LLE  Single leg stance on foam, UE assist as needed, 2x30 sec LLE  Box squats 24" 3 x 10 reps    Patient Education and Home Exercises     Education provided:   - Gait training without AD  - Safety awareness  - Possible response to exercise  - Importance of PT compliance    Written Home Exercises Provided: Patient instructed to cont prior HEP. Exercises were reviewed and Raquel was able to demonstrate them prior to the end of the session. Raquel demonstrated good  understanding of the education provided. See EMR under Patient Instructions for exercises provided during therapy sessions.    ASSESSMENT   Some cueing was required to improve glute activation and avoid compensation with lower back muscles. She is also able to engage core muscles more efficiently during planks extending her time to 15 seconds. She would benefit from continuance until her upcoming procedure.     Raquel Is progressing well towards her goals.   Pt prognosis is Good.     Pt will continue to benefit from skilled outpatient physical therapy to address the deficits listed in the problem list box on initial evaluation, provide pt/family education and to maximize pt's level of independence in the home and community environment.     Pt's spiritual, cultural and educational needs considered and pt agreeable to plan of care and goals.     Anticipated barriers to physical therapy: WBAT    Goals:  Short Term Goals: 4 weeks   1. Pt will be independent with HEP to facilitate healing and improve outcome measures. (met)  2. Pt will increased hip ROM by 5-10 degrees without pain. (met)  3. Pt will be able to demonstrate full WB in LLE and walk greater than 50 feet with least restrictive AD. (met)  4. Pt will increase LLE MMT values by at least 1/2 grade. (met)     Long Term Goals: 8 weeks   1. Pt will demonstrate mod   with household transfers " "and gait. (met)  2. Pt will demonstrate LLE gross strength to 4/5 or greater. (met)  3. Pt will demonstrate full L hip ROM and flexibility. (met)  4. Pt will improve single leg balance and core strength to "good" to assist with daily activities and reduce onset of pain with activities. (Progressing, not met)    PLAN     Continue with PT POC to address functional deficits.    Evan Tesfaye, PT, DPT, Cert. DN    "

## 2022-12-12 ENCOUNTER — CLINICAL SUPPORT (OUTPATIENT)
Dept: REHABILITATION | Facility: HOSPITAL | Age: 62
End: 2022-12-12
Payer: MEDICAID

## 2022-12-12 DIAGNOSIS — R29.898 WEAKNESS OF BOTH LOWER EXTREMITIES: ICD-10-CM

## 2022-12-12 DIAGNOSIS — Z74.09 IMPAIRED FUNCTIONAL MOBILITY, BALANCE, GAIT, AND ENDURANCE: Primary | ICD-10-CM

## 2022-12-12 DIAGNOSIS — R26.89 BALANCE PROBLEM: ICD-10-CM

## 2022-12-12 PROCEDURE — 97110 THERAPEUTIC EXERCISES: CPT | Mod: PN

## 2022-12-12 NOTE — PROGRESS NOTES
[facility]   Physical Therapy Treatment Note    Name: Raquel Houston  Clinic Number: [id number]    Therapy Diagnosis:   Encounter Diagnoses   Name Primary?    Impaired functional mobility, balance, gait, and endurance Yes    Weakness of both lower extremities     Balance problem      Physician: Oneyda, Provider    Visit Date: 12/12/2022    Physician Orders: PT Eval and Treat  Medical Diagnosis from Referral: Sepsis, due to unspecified organism, unspecified whether acute organ dysfunction present [A41.9]  Evaluation Date: 8/8/2022  Authorization Period Expiration: 12/31/2022  Plan of Care Expiration: 1/4/2023 (Updated)  Progress Note Due: 12/16/2022 (Reassessed 11/16/2022)  Visit # / Visits authorized: 34 / 40 auth (1/1 eval)  FOTO: 3 / 3  PTA Visit #: 0 / 5     Precautions: WBAT 6 weeks, sx 7/18/2022 ORIF    Time In: 9:15 AM  Time Out: 10:00 AM  Total Billable Time: 45 minutes    SUBJECTIVE     Pt reports: she experiences soreness the days after a therapy session but this is expected. She states it feels good to exercise.     She was compliant with home exercise program.  Response to previous treatment: mild soreness that improved over time  Functional change: Improved walking ability without AD    Pain: 0/10  Location: low back     OBJECTIVE     Objective Measures updated at progress report unless specified.     Treatment     Raquel received the treatments listed below:    Therapeutic exercises to develop strength, endurance, ROM, flexibility, posture and core stabilization for (35) minutes including:    Recumbent Bike lvl 4, seat 5, x6 min  Supine hamstring stretch with strap 2 min each  Side lying hip abduction 2x10 reps  Prone hip extension x10 reps  Side stepping on crosswalk, 3 laps green loop  Modified planks 5x15 sec  Bent over hip extensions, hands on plinth 2 x 10 reps  Bird dog leg extensions, knee straight x10 reps each    Raquel participated in dynamic functional  therapeutic activities to improve functional performance for (10) minutes, including:    TRX squats x30 reps  Bilateral shuttle 2 min 3 bands  Single leg shuttle 2 band x30 reps LLE    Patient Education and Home Exercises     Education provided:   - Gait training without AD  - Safety awareness  - Possible response to exercise  - Importance of PT compliance    Written Home Exercises Provided: Patient instructed to cont prior HEP. Exercises were reviewed and Raquel was able to demonstrate them prior to the end of the session. Raquel demonstrated good  understanding of the education provided. See EMR under Patient Instructions for exercises provided during therapy sessions.    ASSESSMENT   Raquel was able to progress glute and core strengthening activities in varying positions. She requires less cueing to correct compensations. Progress is being made overall toward goals.     Raquel Is progressing well towards her goals.   Pt prognosis is Good.     Pt will continue to benefit from skilled outpatient physical therapy to address the deficits listed in the problem list box on initial evaluation, provide pt/family education and to maximize pt's level of independence in the home and community environment.     Pt's spiritual, cultural and educational needs considered and pt agreeable to plan of care and goals.     Anticipated barriers to physical therapy: WBAT    Goals:  Short Term Goals: 4 weeks   1. Pt will be independent with HEP to facilitate healing and improve outcome measures. (met)  2. Pt will increased hip ROM by 5-10 degrees without pain. (met)  3. Pt will be able to demonstrate full WB in LLE and walk greater than 50 feet with least restrictive AD. (met)  4. Pt will increase LLE MMT values by at least 1/2 grade. (met)     Long Term Goals: 8 weeks   1. Pt will demonstrate mod   with household transfers and gait. (met)  2. Pt will demonstrate LLE gross strength to 4/5 or greater. (met)  3. Pt will demonstrate  "full L hip ROM and flexibility. (met)  4. Pt will improve single leg balance and core strength to "good" to assist with daily activities and reduce onset of pain with activities. (Progressing, not met)    PLAN     Continue with PT POC to address functional deficits.    Evan Tesfaye, PT, DPT, Cert. DN    "

## 2022-12-13 ENCOUNTER — LAB VISIT (OUTPATIENT)
Dept: LAB | Facility: HOSPITAL | Age: 62
End: 2022-12-13
Payer: MEDICAID

## 2022-12-13 ENCOUNTER — INFUSION (OUTPATIENT)
Dept: INFUSION THERAPY | Facility: HOSPITAL | Age: 62
End: 2022-12-13
Payer: MEDICAID

## 2022-12-13 VITALS
TEMPERATURE: 99 F | HEART RATE: 84 BPM | DIASTOLIC BLOOD PRESSURE: 68 MMHG | RESPIRATION RATE: 16 BRPM | SYSTOLIC BLOOD PRESSURE: 136 MMHG

## 2022-12-13 DIAGNOSIS — C90.00 MULTIPLE MYELOMA NOT HAVING ACHIEVED REMISSION: ICD-10-CM

## 2022-12-13 DIAGNOSIS — C90.00 MULTIPLE MYELOMA NOT HAVING ACHIEVED REMISSION: Primary | ICD-10-CM

## 2022-12-13 LAB
ABO + RH BLD: ABNORMAL
ALBUMIN SERPL BCP-MCNC: 3.6 G/DL (ref 3.5–5.2)
ALP SERPL-CCNC: 206 U/L (ref 55–135)
ALT SERPL W/O P-5'-P-CCNC: 73 U/L (ref 10–44)
ANION GAP SERPL CALC-SCNC: 9 MMOL/L (ref 8–16)
AST SERPL-CCNC: 25 U/L (ref 10–40)
BASOPHILS # BLD AUTO: 0.03 K/UL (ref 0–0.2)
BASOPHILS NFR BLD: 0.5 % (ref 0–1.9)
BILIRUB SERPL-MCNC: 0.4 MG/DL (ref 0.1–1)
BLD GP AB SCN CELLS X3 SERPL QL: ABNORMAL
BLOOD GROUP ANTIBODIES SERPL: NORMAL
BUN SERPL-MCNC: 14 MG/DL (ref 6–20)
CALCIUM SERPL-MCNC: 9.2 MG/DL (ref 8.7–10.5)
CHLORIDE SERPL-SCNC: 105 MMOL/L (ref 95–110)
CO2 SERPL-SCNC: 26 MMOL/L (ref 23–29)
CREAT SERPL-MCNC: 0.8 MG/DL (ref 0.5–1.4)
DAT IGG-SP REAG RBC-IMP: NORMAL
DIFFERENTIAL METHOD: ABNORMAL
EOSINOPHIL # BLD AUTO: 0.2 K/UL (ref 0–0.5)
EOSINOPHIL NFR BLD: 2.9 % (ref 0–8)
ERYTHROCYTE [DISTWIDTH] IN BLOOD BY AUTOMATED COUNT: 16.5 % (ref 11.5–14.5)
EST. GFR  (NO RACE VARIABLE): >60 ML/MIN/1.73 M^2
GIANT PLATELETS BLD QL SMEAR: PRESENT
GLUCOSE SERPL-MCNC: 79 MG/DL (ref 70–110)
HCT VFR BLD AUTO: 37.6 % (ref 37–48.5)
HGB BLD-MCNC: 11.8 G/DL (ref 12–16)
IMM GRANULOCYTES # BLD AUTO: 0.02 K/UL (ref 0–0.04)
IMM GRANULOCYTES NFR BLD AUTO: 0.3 % (ref 0–0.5)
LYMPHOCYTES # BLD AUTO: 2.3 K/UL (ref 1–4.8)
LYMPHOCYTES NFR BLD: 37.6 % (ref 18–48)
MCH RBC QN AUTO: 27.7 PG (ref 27–31)
MCHC RBC AUTO-ENTMCNC: 31.4 G/DL (ref 32–36)
MCV RBC AUTO: 88 FL (ref 82–98)
MONOCYTES # BLD AUTO: 0.6 K/UL (ref 0.3–1)
MONOCYTES NFR BLD: 9.1 % (ref 4–15)
NEUTROPHILS # BLD AUTO: 3.1 K/UL (ref 1.8–7.7)
NEUTROPHILS NFR BLD: 49.6 % (ref 38–73)
NRBC BLD-RTO: 0 /100 WBC
PLATELET # BLD AUTO: 171 K/UL (ref 150–450)
PLATELET BLD QL SMEAR: ABNORMAL
PMV BLD AUTO: 12.3 FL (ref 9.2–12.9)
POTASSIUM SERPL-SCNC: 4 MMOL/L (ref 3.5–5.1)
PROT SERPL-MCNC: 6.2 G/DL (ref 6–8.4)
RBC # BLD AUTO: 4.26 M/UL (ref 4–5.4)
SODIUM SERPL-SCNC: 140 MMOL/L (ref 136–145)
WBC # BLD AUTO: 6.15 K/UL (ref 3.9–12.7)

## 2022-12-13 PROCEDURE — 96401 CHEMO ANTI-NEOPL SQ/IM: CPT

## 2022-12-13 PROCEDURE — 86901 BLOOD TYPING SEROLOGIC RH(D): CPT | Performed by: INTERNAL MEDICINE

## 2022-12-13 PROCEDURE — 86880 COOMBS TEST DIRECT: CPT | Performed by: INTERNAL MEDICINE

## 2022-12-13 PROCEDURE — 36415 COLL VENOUS BLD VENIPUNCTURE: CPT | Performed by: INTERNAL MEDICINE

## 2022-12-13 PROCEDURE — 86870 RBC ANTIBODY IDENTIFICATION: CPT | Performed by: INTERNAL MEDICINE

## 2022-12-13 PROCEDURE — 85025 COMPLETE CBC W/AUTO DIFF WBC: CPT | Performed by: INTERNAL MEDICINE

## 2022-12-13 PROCEDURE — 80053 COMPREHEN METABOLIC PANEL: CPT | Performed by: INTERNAL MEDICINE

## 2022-12-13 PROCEDURE — 63600175 PHARM REV CODE 636 W HCPCS: Mod: JG | Performed by: INTERNAL MEDICINE

## 2022-12-13 RX ORDER — BORTEZOMIB 3.5 MG/1
1.3 INJECTION, POWDER, LYOPHILIZED, FOR SOLUTION INTRAVENOUS; SUBCUTANEOUS
Status: COMPLETED | OUTPATIENT
Start: 2022-12-13 | End: 2022-12-13

## 2022-12-13 RX ADMIN — BORTEZOMIB 2.8 MG: 3.5 INJECTION, POWDER, LYOPHILIZED, FOR SOLUTION INTRAVENOUS; SUBCUTANEOUS at 12:12

## 2022-12-14 ENCOUNTER — CLINICAL SUPPORT (OUTPATIENT)
Dept: REHABILITATION | Facility: HOSPITAL | Age: 62
End: 2022-12-14
Payer: MEDICAID

## 2022-12-14 DIAGNOSIS — Z74.09 IMPAIRED FUNCTIONAL MOBILITY, BALANCE, GAIT, AND ENDURANCE: Primary | ICD-10-CM

## 2022-12-14 DIAGNOSIS — R29.898 WEAKNESS OF BOTH LOWER EXTREMITIES: ICD-10-CM

## 2022-12-14 DIAGNOSIS — R26.89 BALANCE PROBLEM: ICD-10-CM

## 2022-12-14 PROCEDURE — 97110 THERAPEUTIC EXERCISES: CPT | Mod: PN

## 2022-12-14 NOTE — PROGRESS NOTES
[facility]   Physical Therapy Treatment Note    Name: Raquel Houston  Clinic Number: [id number]    Therapy Diagnosis:   Encounter Diagnoses   Name Primary?    Impaired functional mobility, balance, gait, and endurance Yes    Weakness of both lower extremities     Balance problem      Physician: Oneyda, Provider    Visit Date: 12/14/2022    Physician Orders: PT Eval and Treat  Medical Diagnosis from Referral: Sepsis, due to unspecified organism, unspecified whether acute organ dysfunction present [A41.9]  Evaluation Date: 8/8/2022  Authorization Period Expiration: 12/31/2022  Plan of Care Expiration: 1/4/2023 (Updated)  Progress Note Due: 12/16/2022 (Reassessed 11/16/2022)  Visit # / Visits authorized: 35 / 40 auth (1/1 eval)  FOTO: 3 / 3  PTA Visit #: 0 / 5     Precautions: WBAT 6 weeks, sx 7/18/2022 ORIF    Time In: 9:15 AM  Time Out: 9:55 AM  Total Billable Time: 40 minutes    SUBJECTIVE     Pt reports: no new complaints this morning.     She was compliant with home exercise program.  Response to previous treatment: mild soreness that improved over time  Functional change: Improved walking ability without AD    Pain: 0/10  Location: low back     OBJECTIVE     Objective Measures updated at progress report unless specified.     Treatment     Raquel received the treatments listed below:    Therapeutic exercises to develop strength, endurance, ROM, flexibility, posture and core stabilization for (30) minutes including:    Recumbent Bike lvl 4, seat 5, x6 min  Supine hamstring stretch with strap 2 min each  Side lying hip abduction 2x10 reps  Prone hip extension x10 reps  Side stepping on crosswalk, 3 laps green loop  Modified planks 5x10 sec  Bent over hip extensions, hands on plinth 2 x 10 reps  Bird dog leg extensions, knee straight x10 reps each    Raquel participated in dynamic functional therapeutic activities to improve functional performance for (10) minutes,  "including:    TRX squats x30 reps  Bilateral shuttle 2 min 3 bands  Single leg shuttle 2 band x30 reps LLE    Patient Education and Home Exercises     Education provided:   - Gait training without AD  - Safety awareness  - Possible response to exercise  - Importance of PT compliance    Written Home Exercises Provided: Patient instructed to cont prior HEP. Exercises were reviewed and Raquel was able to demonstrate them prior to the end of the session. Raquel demonstrated good  understanding of the education provided. See EMR under Patient Instructions for exercises provided during therapy sessions.    ASSESSMENT   Pt has a doctor appointment next week and will update us on the plan with PT. She has been responding very well to strengthening activities.     Raquel Is progressing well towards her goals.   Pt prognosis is Good.     Pt will continue to benefit from skilled outpatient physical therapy to address the deficits listed in the problem list box on initial evaluation, provide pt/family education and to maximize pt's level of independence in the home and community environment.     Pt's spiritual, cultural and educational needs considered and pt agreeable to plan of care and goals.     Anticipated barriers to physical therapy: WBAT    Goals:  Short Term Goals: 4 weeks   1. Pt will be independent with HEP to facilitate healing and improve outcome measures. (met)  2. Pt will increased hip ROM by 5-10 degrees without pain. (met)  3. Pt will be able to demonstrate full WB in LLE and walk greater than 50 feet with least restrictive AD. (met)  4. Pt will increase LLE MMT values by at least 1/2 grade. (met)     Long Term Goals: 8 weeks   1. Pt will demonstrate mod   with household transfers and gait. (met)  2. Pt will demonstrate LLE gross strength to 4/5 or greater. (met)  3. Pt will demonstrate full L hip ROM and flexibility. (met)  4. Pt will improve single leg balance and core strength to "good" to assist with " daily activities and reduce onset of pain with activities. (Progressing, not met)    PLAN     Continue with PT POC to address functional deficits.    Evan Tesfaye, PT, DPT, Cert. DN

## 2022-12-19 ENCOUNTER — PROCEDURE VISIT (OUTPATIENT)
Dept: HEMATOLOGY/ONCOLOGY | Facility: CLINIC | Age: 62
End: 2022-12-19
Payer: MEDICAID

## 2022-12-19 ENCOUNTER — HOSPITAL ENCOUNTER (OUTPATIENT)
Dept: RADIOLOGY | Facility: HOSPITAL | Age: 62
Discharge: HOME | End: 2022-12-19
Attending: INTERNAL MEDICINE
Payer: MEDICAID

## 2022-12-19 VITALS
SYSTOLIC BLOOD PRESSURE: 149 MMHG | RESPIRATION RATE: 17 BRPM | DIASTOLIC BLOOD PRESSURE: 77 MMHG | OXYGEN SATURATION: 98 % | HEART RATE: 70 BPM

## 2022-12-19 DIAGNOSIS — C90.00 MULTIPLE MYELOMA NOT HAVING ACHIEVED REMISSION: ICD-10-CM

## 2022-12-19 DIAGNOSIS — Z76.82 STEM CELL TRANSPLANT CANDIDATE: ICD-10-CM

## 2022-12-19 DIAGNOSIS — C90.00 MULTIPLE MYELOMA NOT HAVING ACHIEVED REMISSION: Primary | ICD-10-CM

## 2022-12-19 LAB — POCT GLUCOSE: 86 MG/DL (ref 70–110)

## 2022-12-19 PROCEDURE — 88311 DECALCIFY TISSUE: CPT | Mod: 26,,, | Performed by: PATHOLOGY

## 2022-12-19 PROCEDURE — 85097 PR  BONE MARROW,SMEAR INTERPRETATION: ICD-10-PCS | Mod: ,,, | Performed by: PATHOLOGY

## 2022-12-19 PROCEDURE — 88364 CHG INSITU HYBRIDIZATION (FISH: ICD-10-PCS | Mod: 26,,, | Performed by: PATHOLOGY

## 2022-12-19 PROCEDURE — 88313 PR  SPECIAL STAINS,GROUP II: ICD-10-PCS | Mod: 26,,, | Performed by: PATHOLOGY

## 2022-12-19 PROCEDURE — 88342 IMHCHEM/IMCYTCHM 1ST ANTB: CPT | Mod: 26,59,, | Performed by: PATHOLOGY

## 2022-12-19 PROCEDURE — 88342 IMHCHEM/IMCYTCHM 1ST ANTB: CPT | Mod: 59 | Performed by: PATHOLOGY

## 2022-12-19 PROCEDURE — A9698 NON-RAD CONTRAST MATERIALNOC: HCPCS | Performed by: INTERNAL MEDICINE

## 2022-12-19 PROCEDURE — 88342 CHG IMMUNOCYTOCHEMISTRY: ICD-10-PCS | Mod: 26,59,, | Performed by: PATHOLOGY

## 2022-12-19 PROCEDURE — 88311 DECALCIFY TISSUE: CPT | Performed by: PATHOLOGY

## 2022-12-19 PROCEDURE — 85097 BONE MARROW INTERPRETATION: CPT | Mod: ,,, | Performed by: PATHOLOGY

## 2022-12-19 PROCEDURE — 88185 FLOWCYTOMETRY/TC ADD-ON: CPT | Mod: 91 | Performed by: NURSE PRACTITIONER

## 2022-12-19 PROCEDURE — 88305 TISSUE EXAM BY PATHOLOGIST: CPT | Mod: 26,,, | Performed by: PATHOLOGY

## 2022-12-19 PROCEDURE — 88365 INSITU HYBRIDIZATION (FISH): CPT | Mod: 26,,, | Performed by: PATHOLOGY

## 2022-12-19 PROCEDURE — 88184 FLOWCYTOMETRY/ TC 1 MARKER: CPT

## 2022-12-19 PROCEDURE — 88185 FLOWCYTOMETRY/TC ADD-ON: CPT | Mod: 59 | Performed by: NURSE PRACTITIONER

## 2022-12-19 PROCEDURE — 88364 INSITU HYBRIDIZATION (FISH): CPT | Mod: 26,,, | Performed by: PATHOLOGY

## 2022-12-19 PROCEDURE — 25500020 PHARM REV CODE 255: Performed by: INTERNAL MEDICINE

## 2022-12-19 PROCEDURE — 88185 FLOWCYTOMETRY/TC ADD-ON: CPT | Mod: 59 | Performed by: PATHOLOGY

## 2022-12-19 PROCEDURE — 88184 FLOWCYTOMETRY/ TC 1 MARKER: CPT | Mod: 59 | Performed by: PATHOLOGY

## 2022-12-19 PROCEDURE — 88237 TISSUE CULTURE BONE MARROW: CPT | Performed by: NURSE PRACTITIONER

## 2022-12-19 PROCEDURE — 78816 PET IMAGE W/CT FULL BODY: CPT | Mod: 26,PS,, | Performed by: RADIOLOGY

## 2022-12-19 PROCEDURE — 78816 NM PET CT WHOLE BODY: ICD-10-PCS | Mod: 26,PS,, | Performed by: RADIOLOGY

## 2022-12-19 PROCEDURE — 88365 PR  TISSUE HYBRIDIZATION: ICD-10-PCS | Mod: 26,,, | Performed by: PATHOLOGY

## 2022-12-19 PROCEDURE — 88189 FLOWCYTOMETRY/READ 16 & >: CPT | Mod: ,,, | Performed by: PATHOLOGY

## 2022-12-19 PROCEDURE — 38222 DX BONE MARROW BX & ASPIR: CPT | Mod: PBBFAC | Performed by: NURSE PRACTITIONER

## 2022-12-19 PROCEDURE — 88274 CYTOGENETICS 25-99: CPT | Performed by: NURSE PRACTITIONER

## 2022-12-19 PROCEDURE — 38222 DX BONE MARROW BX & ASPIR: CPT | Mod: S$PBB,LT,, | Performed by: NURSE PRACTITIONER

## 2022-12-19 PROCEDURE — 88184 FLOWCYTOMETRY/ TC 1 MARKER: CPT | Performed by: NURSE PRACTITIONER

## 2022-12-19 PROCEDURE — 88189 PR  FLOWCYTOMETRY/READ, 16 & > MARKERS: ICD-10-PCS | Mod: ,,, | Performed by: PATHOLOGY

## 2022-12-19 PROCEDURE — 88299 UNLISTED CYTOGENETIC STUDY: CPT | Performed by: NURSE PRACTITIONER

## 2022-12-19 PROCEDURE — 88188 FLOWCYTOMETRY/READ 9-15: CPT

## 2022-12-19 PROCEDURE — 88313 SPECIAL STAINS GROUP 2: CPT | Mod: 26,,, | Performed by: PATHOLOGY

## 2022-12-19 PROCEDURE — 88364 INSITU HYBRIDIZATION (FISH): CPT | Mod: 59 | Performed by: PATHOLOGY

## 2022-12-19 PROCEDURE — 88311 PR  DECALCIFY TISSUE: ICD-10-PCS | Mod: 26,,, | Performed by: PATHOLOGY

## 2022-12-19 PROCEDURE — 78816 PET IMAGE W/CT FULL BODY: CPT | Mod: TC

## 2022-12-19 PROCEDURE — 88365 INSITU HYBRIDIZATION (FISH): CPT | Performed by: PATHOLOGY

## 2022-12-19 PROCEDURE — 88313 SPECIAL STAINS GROUP 2: CPT | Performed by: PATHOLOGY

## 2022-12-19 PROCEDURE — 38222 BONE MARROW: ICD-10-PCS | Mod: S$PBB,LT,, | Performed by: NURSE PRACTITIONER

## 2022-12-19 PROCEDURE — 88305 TISSUE EXAM BY PATHOLOGIST: CPT | Performed by: PATHOLOGY

## 2022-12-19 PROCEDURE — 88305 TISSUE EXAM BY PATHOLOGIST: ICD-10-PCS | Mod: 26,,, | Performed by: PATHOLOGY

## 2022-12-19 RX ORDER — LIDOCAINE HYDROCHLORIDE 20 MG/ML
10 INJECTION, SOLUTION EPIDURAL; INFILTRATION; INTRACAUDAL; PERINEURAL ONCE
Status: COMPLETED | OUTPATIENT
Start: 2022-12-19 | End: 2022-12-19

## 2022-12-19 RX ADMIN — LIDOCAINE HYDROCHLORIDE 8 ML: 20 INJECTION, SOLUTION EPIDURAL; INFILTRATION; INTRACAUDAL; PERINEURAL at 03:12

## 2022-12-19 RX ADMIN — BARIUM SULFATE 450 ML: 20 SUSPENSION ORAL at 01:12

## 2022-12-19 NOTE — PROCEDURES
Bone marrow    Date/Time: 12/19/2022 2:30 PM  Performed by: Ruth Bains NP  Authorized by: Ruth Bains NP     Aspiration?: Yes   Biopsy?: Yes    PROCEDURE NOTE:  Date of Procedure: 12/19/2022   Bone Marrow Biopsy and Aspiration  Indication: MM restaging   Consent: Informed consent was obtained from patient.  Timeout: Done and documented.  Position: Prone  Site: Left posterior illiac crest.  Prep: Betadine.  Needle used: 11 gauge Jamshidi needle.  Anesthetic: 2% lidocaine 7 cc.  Biopsy: The biopsy needle was introduced into the marrow cavity and an aspirate was obtained without complications and sent for flow cytometry,PCPD FISH, MRDMM and cytogenetics. Core biopsy obtained without difficulty and sent for routine histologic examination.  Complications: None.  Disposition: Left patient with primary nurse,instructed to lay on back for 20 minutes. Advised not to take shower and keep dressing clean, dry & intact for 24 hours.  Blood loss: Minimal.     Ruth Bains NP  Hematology/Oncology/BMT

## 2022-12-19 NOTE — PROCEDURES
Patient with Multiple Myeloma presented at BMT clinic for restaging bone marrow biopsy. Tolerated procedure well. Not in any distress.  See 's note for full history. Reviewed medications, allergies and labs.   Consider pre medications including pain and anxiety medication prior procedure.     Ruth Bains NP  Hematology/Oncology/BMT

## 2022-12-20 LAB
CHROM BANDING METHOD: NORMAL
CHROMOSOME ANALYSIS BM ADDITIONAL INFORMATION: NORMAL
CHROMOSOME ANALYSIS BM RELEASED BY: NORMAL
CHROMOSOME ANALYSIS BM RESULT SUMMARY: NORMAL
CLINICAL CYTOGENETICIST REVIEW: NORMAL
KARYOTYP MAR: NORMAL
PCPDS FINAL DIAGNOSIS: NORMAL
PCPDS PRE-ANALYSIS PRE-SORT: NORMAL
REASON FOR REFERRAL (NARRATIVE): NORMAL
REF LAB TEST METHOD: NORMAL
SPECIMEN SOURCE: NORMAL
SPECIMEN: NORMAL

## 2022-12-21 LAB
GENETICIST REVIEW: NORMAL
MAYO MISCELLANEOUS RESULT (REF): NORMAL
PLASMA CELL PROLIF RELEASED BY: NORMAL
PLASMA CELL PROLIF RESULT SUMMARY: NORMAL
PLASMA CELL PROLIF RESULT TABLE: NORMAL
REASON FOR REFERRAL, PLASMA CELL PROLIF (PCPD), FISH: NORMAL
REF LAB TEST METHOD: NORMAL
RESULTS, PLASMA CELL PROLIF (PCPD), FISH: NORMAL
SERVICE CMNT-IMP: NORMAL
SERVICE CMNT-IMP: NORMAL
SPECIMEN SOURCE: NORMAL
SPECIMEN, PLASMA CELL PROLIF (PCPD), FISH: NORMAL

## 2022-12-22 LAB
BODY SITE - BONE MARROW: NORMAL
CLINICAL DIAGNOSIS - BONE MARROW: NORMAL
COMMENT: NORMAL
FINAL PATHOLOGIC DIAGNOSIS: NORMAL
FLOW CYTOMETRY ANTIBODIES ANALYZED - BONE MARROW: NORMAL
FLOW CYTOMETRY COMMENT - BONE MARROW: NORMAL
FLOW CYTOMETRY INTERPRETATION - BONE MARROW: NORMAL
GROSS: NORMAL
Lab: NORMAL
MICROSCOPIC EXAM: NORMAL

## 2022-12-23 LAB
DNA/RNA EXTRACT AND HOLD RESULT: NORMAL
DNA/RNA EXTRACTION: NORMAL
EXHR SPECIMEN TYPE: NORMAL

## 2022-12-27 ENCOUNTER — LAB VISIT (OUTPATIENT)
Dept: LAB | Facility: HOSPITAL | Age: 62
End: 2022-12-27
Payer: MEDICAID

## 2022-12-27 DIAGNOSIS — C90.00 MULTIPLE MYELOMA NOT HAVING ACHIEVED REMISSION: ICD-10-CM

## 2022-12-27 DIAGNOSIS — Z76.82 STEM CELL TRANSPLANT CANDIDATE: ICD-10-CM

## 2022-12-27 LAB
ALBUMIN SERPL BCP-MCNC: 3.4 G/DL (ref 3.5–5.2)
ALP SERPL-CCNC: 188 U/L (ref 55–135)
ALT SERPL W/O P-5'-P-CCNC: 27 U/L (ref 10–44)
ANION GAP SERPL CALC-SCNC: 10 MMOL/L (ref 8–16)
AST SERPL-CCNC: 19 U/L (ref 10–40)
BASOPHILS # BLD AUTO: 0.02 K/UL (ref 0–0.2)
BASOPHILS NFR BLD: 0.5 % (ref 0–1.9)
BILIRUB SERPL-MCNC: 0.5 MG/DL (ref 0.1–1)
BUN SERPL-MCNC: 14 MG/DL (ref 6–20)
CALCIUM SERPL-MCNC: 8.5 MG/DL (ref 8.7–10.5)
CHLORIDE SERPL-SCNC: 109 MMOL/L (ref 95–110)
CO2 SERPL-SCNC: 23 MMOL/L (ref 23–29)
CREAT SERPL-MCNC: 0.8 MG/DL (ref 0.5–1.4)
DIFFERENTIAL METHOD: ABNORMAL
EOSINOPHIL # BLD AUTO: 0.2 K/UL (ref 0–0.5)
EOSINOPHIL NFR BLD: 4.9 % (ref 0–8)
ERYTHROCYTE [DISTWIDTH] IN BLOOD BY AUTOMATED COUNT: 16.1 % (ref 11.5–14.5)
EST. GFR  (NO RACE VARIABLE): >60 ML/MIN/1.73 M^2
GLUCOSE SERPL-MCNC: 97 MG/DL (ref 70–110)
HCT VFR BLD AUTO: 37.4 % (ref 37–48.5)
HGB BLD-MCNC: 11.5 G/DL (ref 12–16)
IMM GRANULOCYTES # BLD AUTO: 0.01 K/UL (ref 0–0.04)
IMM GRANULOCYTES NFR BLD AUTO: 0.3 % (ref 0–0.5)
LYMPHOCYTES # BLD AUTO: 1.7 K/UL (ref 1–4.8)
LYMPHOCYTES NFR BLD: 44.7 % (ref 18–48)
MAGNESIUM SERPL-MCNC: 1.6 MG/DL (ref 1.6–2.6)
MCH RBC QN AUTO: 27.1 PG (ref 27–31)
MCHC RBC AUTO-ENTMCNC: 30.7 G/DL (ref 32–36)
MCV RBC AUTO: 88 FL (ref 82–98)
MONOCYTES # BLD AUTO: 0.7 K/UL (ref 0.3–1)
MONOCYTES NFR BLD: 17.4 % (ref 4–15)
NEUTROPHILS # BLD AUTO: 1.2 K/UL (ref 1.8–7.7)
NEUTROPHILS NFR BLD: 32.2 % (ref 38–73)
NRBC BLD-RTO: 0 /100 WBC
PHOSPHATE SERPL-MCNC: 3.5 MG/DL (ref 2.7–4.5)
PLATELET # BLD AUTO: 201 K/UL (ref 150–450)
PMV BLD AUTO: 11.4 FL (ref 9.2–12.9)
POTASSIUM SERPL-SCNC: 4.3 MMOL/L (ref 3.5–5.1)
PROT SERPL-MCNC: 5.9 G/DL (ref 6–8.4)
RBC # BLD AUTO: 4.25 M/UL (ref 4–5.4)
SODIUM SERPL-SCNC: 142 MMOL/L (ref 136–145)
WBC # BLD AUTO: 3.85 K/UL (ref 3.9–12.7)

## 2022-12-27 PROCEDURE — 80053 COMPREHEN METABOLIC PANEL: CPT | Performed by: INTERNAL MEDICINE

## 2022-12-27 PROCEDURE — 85025 COMPLETE CBC W/AUTO DIFF WBC: CPT | Performed by: INTERNAL MEDICINE

## 2022-12-27 PROCEDURE — 83735 ASSAY OF MAGNESIUM: CPT | Performed by: INTERNAL MEDICINE

## 2022-12-27 PROCEDURE — 84100 ASSAY OF PHOSPHORUS: CPT | Performed by: INTERNAL MEDICINE

## 2022-12-27 PROCEDURE — 36415 COLL VENOUS BLD VENIPUNCTURE: CPT | Performed by: INTERNAL MEDICINE

## 2022-12-28 RX ORDER — PANTOPRAZOLE SODIUM 40 MG/1
40 TABLET, DELAYED RELEASE ORAL DAILY
Qty: 30 TABLET | Refills: 3 | Status: SHIPPED | OUTPATIENT
Start: 2022-12-28 | End: 2023-02-16 | Stop reason: SDUPTHER

## 2022-12-28 RX ORDER — LENALIDOMIDE 25 MG/1
25 CAPSULE ORAL SEE ADMIN INSTRUCTIONS
Qty: 21 CAPSULE | Refills: 0 | Status: ON HOLD | OUTPATIENT
Start: 2022-12-28 | End: 2023-02-15 | Stop reason: HOSPADM

## 2022-12-29 ENCOUNTER — SOCIAL WORK (OUTPATIENT)
Dept: HEMATOLOGY/ONCOLOGY | Facility: CLINIC | Age: 62
End: 2022-12-29

## 2022-12-29 ENCOUNTER — OFFICE VISIT (OUTPATIENT)
Dept: PSYCHIATRY | Facility: CLINIC | Age: 62
End: 2022-12-29
Payer: MEDICAID

## 2022-12-29 ENCOUNTER — HOSPITAL ENCOUNTER (OUTPATIENT)
Dept: PULMONOLOGY | Facility: CLINIC | Age: 62
Discharge: HOME | End: 2022-12-29
Payer: MEDICAID

## 2022-12-29 ENCOUNTER — HOSPITAL ENCOUNTER (OUTPATIENT)
Dept: CARDIOLOGY | Facility: HOSPITAL | Age: 62
Discharge: HOME | End: 2022-12-29
Attending: INTERNAL MEDICINE
Payer: MEDICAID

## 2022-12-29 ENCOUNTER — TELEPHONE (OUTPATIENT)
Dept: ENDOSCOPY | Facility: HOSPITAL | Age: 62
End: 2022-12-29
Payer: MEDICAID

## 2022-12-29 ENCOUNTER — DOCUMENTATION ONLY (OUTPATIENT)
Dept: ONCOLOGY | Facility: HOSPITAL | Age: 62
End: 2022-12-29
Payer: MEDICAID

## 2022-12-29 ENCOUNTER — HOSPITAL ENCOUNTER (OUTPATIENT)
Dept: CARDIOLOGY | Facility: CLINIC | Age: 62
Discharge: HOME | End: 2022-12-29
Payer: MEDICAID

## 2022-12-29 ENCOUNTER — HOSPITAL ENCOUNTER (OUTPATIENT)
Dept: RADIOLOGY | Facility: HOSPITAL | Age: 62
Discharge: HOME | End: 2022-12-29
Attending: INTERNAL MEDICINE
Payer: MEDICAID

## 2022-12-29 ENCOUNTER — CLINICAL SUPPORT (OUTPATIENT)
Dept: HEMATOLOGY/ONCOLOGY | Facility: CLINIC | Age: 62
End: 2022-12-29
Payer: MEDICAID

## 2022-12-29 ENCOUNTER — TELEPHONE (OUTPATIENT)
Dept: GASTROENTEROLOGY | Facility: CLINIC | Age: 62
End: 2022-12-29
Payer: MEDICAID

## 2022-12-29 VITALS
BODY MASS INDEX: 34.06 KG/M2 | DIASTOLIC BLOOD PRESSURE: 72 MMHG | SYSTOLIC BLOOD PRESSURE: 118 MMHG | HEIGHT: 67 IN | WEIGHT: 217 LBS | HEART RATE: 81 BPM

## 2022-12-29 DIAGNOSIS — C90.00 MULTIPLE MYELOMA NOT HAVING ACHIEVED REMISSION: ICD-10-CM

## 2022-12-29 DIAGNOSIS — Z76.82 STEM CELL TRANSPLANT CANDIDATE: ICD-10-CM

## 2022-12-29 DIAGNOSIS — Z76.82 STEM CELL TRANSPLANT CANDIDATE: Primary | ICD-10-CM

## 2022-12-29 DIAGNOSIS — Z01.818 PRE-TRANSPLANT EVALUATION FOR STEM CELL TRANSPLANT: ICD-10-CM

## 2022-12-29 DIAGNOSIS — Z01.818 PRE-TRANSPLANT EVALUATION FOR STEM CELL TRANSPLANT: Primary | ICD-10-CM

## 2022-12-29 DIAGNOSIS — C90.00 MULTIPLE MYELOMA, REMISSION STATUS UNSPECIFIED: Primary | ICD-10-CM

## 2022-12-29 DIAGNOSIS — Z71.3 NUTRITIONAL COUNSELING: ICD-10-CM

## 2022-12-29 DIAGNOSIS — C90.00 MULTIPLE MYELOMA, REMISSION STATUS UNSPECIFIED: ICD-10-CM

## 2022-12-29 DIAGNOSIS — Z12.11 SPECIAL SCREENING FOR MALIGNANT NEOPLASMS, COLON: Primary | ICD-10-CM

## 2022-12-29 DIAGNOSIS — C90.00 MULTIPLE MYELOMA NOT HAVING ACHIEVED REMISSION: Primary | ICD-10-CM

## 2022-12-29 LAB
AMPHET+METHAMPHET UR QL: NEGATIVE
ASCENDING AORTA: 3.11 CM
AV INDEX (PROSTH): 0.78
AV MEAN GRADIENT: 7 MMHG
AV PEAK GRADIENT: 14 MMHG
AV VALVE AREA: 2.68 CM2
AV VELOCITY RATIO: 0.74
BACTERIA #/AREA URNS AUTO: ABNORMAL /HPF
BARBITURATES UR QL SCN>200 NG/ML: NEGATIVE
BENZODIAZ UR QL SCN>200 NG/ML: NEGATIVE
BILIRUB UR QL STRIP: NEGATIVE
BSA FOR ECHO PROCEDURE: 2.16 M2
BZE UR QL SCN: NEGATIVE
CANNABINOIDS UR QL SCN: NEGATIVE
CLARITY UR REFRACT.AUTO: ABNORMAL
COLOR UR AUTO: YELLOW
CREAT UR-MCNC: 142 MG/DL (ref 15–325)
CV ECHO LV RWT: 0.45 CM
DLCO ADJ PRE: 14.84 ML/(MIN*MMHG) (ref 17.63–29.1)
DLCO SINGLE BREATH LLN: 17.63
DLCO SINGLE BREATH PRE REF: 60.1 %
DLCO SINGLE BREATH REF: 23.37
DLCOC SBVA LLN: 3.14
DLCOC SBVA PRE REF: 85.6 %
DLCOC SBVA REF: 4.58
DLCOC SINGLE BREATH LLN: 17.63
DLCOC SINGLE BREATH PRE REF: 63.5 %
DLCOC SINGLE BREATH REF: 23.37
DLCOCSBVAULN: 6.01
DLCOCSINGLEBREATHULN: 29.1
DLCOSINGLEBREATHULN: 29.1
DLCOVA LLN: 3.14
DLCOVA PRE REF: 81 %
DLCOVA PRE: 3.71 ML/(MIN*MMHG*L) (ref 3.14–6.01)
DLCOVA REF: 4.58
DLCOVAULN: 6.01
DLVAADJ PRE: 3.92 ML/(MIN*MMHG*L) (ref 3.14–6.01)
DOP CALC AO PEAK VEL: 1.86 M/S
DOP CALC AO VTI: 36.21 CM
DOP CALC LVOT AREA: 3.5 CM2
DOP CALC LVOT DIAMETER: 2.1 CM
DOP CALC LVOT PEAK VEL: 1.38 M/S
DOP CALC LVOT STROKE VOLUME: 97.17 CM3
DOP CALCLVOT PEAK VEL VTI: 28.07 CM
E WAVE DECELERATION TIME: 248.84 MSEC
E/A RATIO: 0.85
E/E' RATIO: 7.67 M/S
ECHO LV POSTERIOR WALL: 0.95 CM (ref 0.6–1.1)
EJECTION FRACTION: 60 %
ETHANOL UR-MCNC: <10 MG/DL
FEF 25 75 LLN: 0.87
FEF 25 75 PRE REF: 118.1 %
FEF 25 75 REF: 2.03
FEV05 LLN: 1.04
FEV05 REF: 1.89
FEV1 FVC LLN: 68
FEV1 FVC PRE REF: 100.6 %
FEV1 FVC REF: 80
FEV1 LLN: 1.61
FEV1 PRE REF: 105.6 %
FEV1 REF: 2.21
FRACTIONAL SHORTENING: 45 % (ref 28–44)
FVC LLN: 2.06
FVC PRE REF: 104.4 %
FVC REF: 2.8
GLUCOSE UR QL STRIP: NEGATIVE
HGB UR QL STRIP: NEGATIVE
INTERVENTRICULAR SEPTUM: 1.06 CM (ref 0.6–1.1)
IVC PRE: 2.95 L (ref 2.06–3.57)
IVC SINGLE BREATH LLN: 2.06
IVC SINGLE BREATH PRE REF: 105.5 %
IVC SINGLE BREATH REF: 2.8
IVCSINGLEBREATHULN: 3.57
KETONES UR QL STRIP: NEGATIVE
LA MAJOR: 4.62 CM
LA MINOR: 4.94 CM
LA WIDTH: 3.08 CM
LEFT ATRIUM SIZE: 3.17 CM
LEFT ATRIUM VOLUME INDEX MOD: 20 ML/M2
LEFT ATRIUM VOLUME INDEX: 19 ML/M2
LEFT ATRIUM VOLUME MOD: 41.8 CM3
LEFT ATRIUM VOLUME: 39.63 CM3
LEFT INTERNAL DIMENSION IN SYSTOLE: 2.32 CM (ref 2.1–4)
LEFT VENTRICLE DIASTOLIC VOLUME INDEX: 37.68 ML/M2
LEFT VENTRICLE DIASTOLIC VOLUME: 78.76 ML
LEFT VENTRICLE MASS INDEX: 66 G/M2
LEFT VENTRICLE SYSTOLIC VOLUME INDEX: 8.8 ML/M2
LEFT VENTRICLE SYSTOLIC VOLUME: 18.49 ML
LEFT VENTRICULAR INTERNAL DIMENSION IN DIASTOLE: 4.2 CM (ref 3.5–6)
LEFT VENTRICULAR MASS: 138.21 G
LEUKOCYTE ESTERASE UR QL STRIP: ABNORMAL
LV LATERAL E/E' RATIO: 5.75 M/S
LV SEPTAL E/E' RATIO: 11.5 M/S
METHADONE UR QL SCN>300 NG/ML: NEGATIVE
MICROSCOPIC COMMENT: ABNORMAL
MV A" WAVE DURATION": 10.85 MSEC
MV PEAK A VEL: 0.81 M/S
MV PEAK E VEL: 0.69 M/S
MV STENOSIS PRESSURE HALF TIME: 72.16 MS
MV VALVE AREA P 1/2 METHOD: 3.05 CM2
NITRITE UR QL STRIP: NEGATIVE
OPIATES UR QL SCN: NEGATIVE
PCP UR QL SCN>25 NG/ML: NEGATIVE
PEF LLN: 3.7
PEF PRE REF: 100.4 %
PEF REF: 5.77
PH UR STRIP: 7 [PH] (ref 5–8)
PHYSICIAN COMMENT: ABNORMAL
PISA TR MAX VEL: 2.69 M/S
PRE DLCO: 14.05 ML/(MIN*MMHG) (ref 17.63–29.1)
PRE FEF 25 75: 2.4 L/S (ref 0.87–3.69)
PRE FET 100: 5.96 SEC
PRE FEV05 REF: 104 %
PRE FEV1 FVC: 80.06 % (ref 67.88–89.63)
PRE FEV1: 2.34 L (ref 1.61–2.79)
PRE FEV5: 1.97 L (ref 1.04–2.75)
PRE FVC: 2.92 L (ref 2.06–3.57)
PRE PEF: 5.79 L/S (ref 3.7–7.84)
PROT UR QL STRIP: ABNORMAL
PULM VEIN S/D RATIO: 1.82
PV PEAK D VEL: 0.33 M/S
PV PEAK S VEL: 0.6 M/S
QEF: 60 %
RA MAJOR: 4.82 CM
RA PRESSURE: 3 MMHG
RA WIDTH: 3.3 CM
RBC #/AREA URNS AUTO: 30 /HPF (ref 0–4)
RIGHT VENTRICULAR END-DIASTOLIC DIMENSION: 3.48 CM
RV TISSUE DOPPLER FREE WALL SYSTOLIC VELOCITY 1 (APICAL 4 CHAMBER VIEW): 15.94 CM/S
SINUS: 3.07 CM
SP GR UR STRIP: 1.02 (ref 1–1.03)
SQUAMOUS #/AREA URNS AUTO: 8 /HPF
STJ: 2.67 CM
TDI LATERAL: 0.12 M/S
TDI SEPTAL: 0.06 M/S
TDI: 0.09 M/S
TOXICOLOGY INFORMATION: NORMAL
TR MAX PG: 29 MMHG
TRICUSPID ANNULAR PLANE SYSTOLIC EXCURSION: 2.17 CM
TV REST PULMONARY ARTERY PRESSURE: 32 MMHG
URN SPEC COLLECT METH UR: ABNORMAL
VA PRE: 3.79 L (ref 4.96–4.96)
VA SINGLE BREATH LLN: 4.96
VA SINGLE BREATH PRE REF: 76.5 %
VA SINGLE BREATH REF: 4.96
VASINGLEBREATHULN: 4.96
WBC #/AREA URNS AUTO: 49 /HPF (ref 0–5)

## 2022-12-29 PROCEDURE — 99211 OFF/OP EST MAY X REQ PHY/QHP: CPT | Mod: PBBFAC,25 | Performed by: DIETITIAN, REGISTERED

## 2022-12-29 PROCEDURE — 99999 PR PBB SHADOW E&M-EST. PATIENT-LVL II: CPT | Mod: PBBFAC,AF,HB, | Performed by: PSYCHOLOGIST

## 2022-12-29 PROCEDURE — 93005 ELECTROCARDIOGRAM TRACING: CPT | Mod: PBBFAC | Performed by: INTERNAL MEDICINE

## 2022-12-29 PROCEDURE — 90791 PR PSYCHIATRIC DIAGNOSTIC EVALUATION: ICD-10-PCS | Mod: AF,HB,, | Performed by: PSYCHOLOGIST

## 2022-12-29 PROCEDURE — 96136 PR PSYCH/NEUROPSYCH TEST ADMIN/SCORING, 2+ TESTS, 1ST 30 MIN: ICD-10-PCS | Mod: AF,HB,, | Performed by: PSYCHOLOGIST

## 2022-12-29 PROCEDURE — 97802 MEDICAL NUTRITION INDIV IN: CPT | Mod: PBBFAC | Performed by: DIETITIAN, REGISTERED

## 2022-12-29 PROCEDURE — 93306 ECHO (CUPID ONLY): ICD-10-PCS | Mod: 26,,, | Performed by: INTERNAL MEDICINE

## 2022-12-29 PROCEDURE — 81001 URINALYSIS AUTO W/SCOPE: CPT | Performed by: INTERNAL MEDICINE

## 2022-12-29 PROCEDURE — 93356 ECHO (CUPID ONLY): ICD-10-PCS | Mod: ,,, | Performed by: INTERNAL MEDICINE

## 2022-12-29 PROCEDURE — 94729 DIFFUSING CAPACITY: CPT | Mod: 26,S$PBB,, | Performed by: INTERNAL MEDICINE

## 2022-12-29 PROCEDURE — 99212 OFFICE O/P EST SF 10 MIN: CPT | Mod: PBBFAC,27

## 2022-12-29 PROCEDURE — 99212 OFFICE O/P EST SF 10 MIN: CPT | Mod: PBBFAC,25,27 | Performed by: PSYCHOLOGIST

## 2022-12-29 PROCEDURE — 99999 PR PBB SHADOW E&M-EST. PATIENT-LVL II: ICD-10-PCS | Mod: PBBFAC,,,

## 2022-12-29 PROCEDURE — 94010 BREATHING CAPACITY TEST: CPT | Mod: PBBFAC | Performed by: INTERNAL MEDICINE

## 2022-12-29 PROCEDURE — 71046 X-RAY EXAM CHEST 2 VIEWS: CPT | Mod: 26,,, | Performed by: RADIOLOGY

## 2022-12-29 PROCEDURE — 93356 MYOCRD STRAIN IMG SPCKL TRCK: CPT

## 2022-12-29 PROCEDURE — 94010 BREATHING CAPACITY TEST: CPT | Mod: 26,S$PBB,, | Performed by: INTERNAL MEDICINE

## 2022-12-29 PROCEDURE — 96146 PR PSYCH/NEUROPSYCH TEST ADMIN, ELEC PLATFORM, AUTO RESULT ONLY: ICD-10-PCS | Mod: AH,HB,S$PBB, | Performed by: PSYCHOLOGIST

## 2022-12-29 PROCEDURE — 90791 PSYCH DIAGNOSTIC EVALUATION: CPT | Mod: AF,HB,, | Performed by: PSYCHOLOGIST

## 2022-12-29 PROCEDURE — 94729 DIFFUSING CAPACITY: CPT | Mod: PBBFAC | Performed by: INTERNAL MEDICINE

## 2022-12-29 PROCEDURE — 71046 XR CHEST PA AND LATERAL: ICD-10-PCS | Mod: 26,,, | Performed by: RADIOLOGY

## 2022-12-29 PROCEDURE — 99211 OFF/OP EST MAY X REQ PHY/QHP: CPT | Mod: PBBFAC,25,27

## 2022-12-29 PROCEDURE — 96146 PSYCL/NRPSYC TST AUTO RESULT: CPT | Mod: AH,HB,S$PBB, | Performed by: PSYCHOLOGIST

## 2022-12-29 PROCEDURE — 93010 EKG 12-LEAD: ICD-10-PCS | Mod: S$PBB,,, | Performed by: INTERNAL MEDICINE

## 2022-12-29 PROCEDURE — 96130 PR PSYCHOLOGIC TEST EVAL SVCS, 1ST HR: ICD-10-PCS | Mod: AF,HB,, | Performed by: PSYCHOLOGIST

## 2022-12-29 PROCEDURE — 99999 PR PBB SHADOW E&M-EST. PATIENT-LVL I: ICD-10-PCS | Mod: PBBFAC,,,

## 2022-12-29 PROCEDURE — 71046 X-RAY EXAM CHEST 2 VIEWS: CPT | Mod: TC,FY

## 2022-12-29 PROCEDURE — 99999 PR PBB SHADOW E&M-EST. PATIENT-LVL II: ICD-10-PCS | Mod: PBBFAC,AF,HB, | Performed by: PSYCHOLOGIST

## 2022-12-29 PROCEDURE — 93010 ELECTROCARDIOGRAM REPORT: CPT | Mod: S$PBB,,, | Performed by: INTERNAL MEDICINE

## 2022-12-29 PROCEDURE — 96130 PSYCL TST EVAL PHYS/QHP 1ST: CPT | Mod: AF,HB,, | Performed by: PSYCHOLOGIST

## 2022-12-29 PROCEDURE — 99999 PR PBB SHADOW E&M-EST. PATIENT-LVL I: CPT | Mod: PBBFAC,,, | Performed by: DIETITIAN, REGISTERED

## 2022-12-29 PROCEDURE — 99999 PR PBB SHADOW E&M-EST. PATIENT-LVL I: ICD-10-PCS | Mod: PBBFAC,,, | Performed by: DIETITIAN, REGISTERED

## 2022-12-29 PROCEDURE — 93306 TTE W/DOPPLER COMPLETE: CPT | Mod: 26,,, | Performed by: INTERNAL MEDICINE

## 2022-12-29 PROCEDURE — 96146 PSYCL/NRPSYC TST AUTO RESULT: CPT | Mod: PBBFAC

## 2022-12-29 PROCEDURE — 94010 BREATHING CAPACITY TEST: ICD-10-PCS | Mod: 26,S$PBB,, | Performed by: INTERNAL MEDICINE

## 2022-12-29 PROCEDURE — 99999 PR PBB SHADOW E&M-EST. PATIENT-LVL I: CPT | Mod: PBBFAC,,,

## 2022-12-29 PROCEDURE — 99999 PR PBB SHADOW E&M-EST. PATIENT-LVL II: CPT | Mod: PBBFAC,,,

## 2022-12-29 PROCEDURE — 96136 PSYCL/NRPSYC TST PHY/QHP 1ST: CPT | Mod: AF,HB,, | Performed by: PSYCHOLOGIST

## 2022-12-29 PROCEDURE — 94729 PR C02/MEMBANE DIFFUSE CAPACITY: ICD-10-PCS | Mod: 26,S$PBB,, | Performed by: INTERNAL MEDICINE

## 2022-12-29 PROCEDURE — 80307 DRUG TEST PRSMV CHEM ANLYZR: CPT | Performed by: INTERNAL MEDICINE

## 2022-12-29 PROCEDURE — 93356 MYOCRD STRAIN IMG SPCKL TRCK: CPT | Mod: ,,, | Performed by: INTERNAL MEDICINE

## 2022-12-29 RX ORDER — POLYETHYLENE GLYCOL 3350, SODIUM SULFATE ANHYDROUS, SODIUM BICARBONATE, SODIUM CHLORIDE, POTASSIUM CHLORIDE 236; 22.74; 6.74; 5.86; 2.97 G/4L; G/4L; G/4L; G/4L; G/4L
4 POWDER, FOR SOLUTION ORAL ONCE
Qty: 4000 ML | Refills: 0 | Status: SHIPPED | OUTPATIENT
Start: 2022-12-29 | End: 2022-12-29

## 2022-12-29 NOTE — LETTER
December 31, 2022        Janice Casas MD  [address] [address]             [facility] - Psychiatry  [address] [address]  Phone: [phone]  Fax: [phone]   Patient: Raquel Houston   MR Number: [id number]   YOB: 1960   Date of Visit: 12/29/2022       Dear Dr. Casas:    Thank you for referring Raquel Houston to me for evaluation. Below are the relevant portions of my assessment and plan of care.       Raquel Houston is a  62 y.o. female referred by Dr. Casas for psychological evaluation prior to stem cell transplantation.  The patient appears absent of disabling psychopathology or disabilities which would prevent understanding and compliance with medical treatment.  There is no evidence of suicidality.   The patient has good knowledge about HSCT, appropriate expectations for health and illness following transplantation, adequate  understanding of the possible risks and complications of this treatment option, and a high willingness to sustain effort for lifestyle changes and health adaptations which will be required of her. She is aware of the 30 day 1 hour lodging requirement.  She reports adequate compliance with previous medical treatment.  Raquel Houston has good social support from her family (especially daughter in law, Claudine).   The patient exhibits a high degree of social stability.  The patient acknowledges no stressors expected to limit her ability to cope with the demands of HSCT and recovery.  The patient reports limited caffeine consumption, no tobacco use, no alcohol use, and no illicit drug use  She demonstrates adequate health literacy.    She demonstrates no impairment in cognitive functioning and has the ability to consent to HSCT based upon knowledge   and understanding of the procedure.      IMPRESSIONS AND RECOMMENDATIONS  Raquel Houston is an acceptable HSCT  "candidate from a psychological perspective.   There are no overt psychological contraindications for proceeding with the procedure.  She has no significant mental health history, and reports no current psychiatric problems or major adjustment issues. She may be likely to underreport difficulties to be an "ideal patient." The patient has reasonable expectations for the procedure, good social support, and has already begun making appropriate life plans in anticipation of the procedure. The patient has verbalized appropriate awareness and commitment to the necessary behavioral changes associated with HSCTand appears willing to adjust to long-term lifestyle challenges and medical follow-up. The patient will be seen by me while inpatient to facilitate adjustment.     If you have questions, please do not hesitate to call me. I look forward to following Raquel along with you.    Sincerely,      Howard Belle, PhD           CC  Thang Mims RN       "

## 2022-12-29 NOTE — PROGRESS NOTES
Comprehensive list of financial assistance that pt received.  $100 LLS patient  assistance fund  $500 LLS Urgent need fund  $276 of [employer] assistance for dental care services needed before transplant. Vendor ([facility])  $23 Cost transfer for a rolling walker from [facility]  $29 Cost transfer for a bath chair from [facility]  $25- Gas card # - [id number]  [place] [employer]         Jaclyn Byrd, [facility]  Oncology Social Worker License # [id number]  [facility]  [email or web address]  P. [phone]; F. [phone]

## 2022-12-29 NOTE — PROGRESS NOTES
"Oncology Nutrition Assessment for Medical Nutrition Therapy  Initial Visit    Raquel Houston   1960    Referring Provider: Janice Casas MD      Reason for Visit: nutrition counseling and education    PMHx:   Past Medical History:   Diagnosis Date    Arthritis     Multiple myeloma     Osteoporosis        Nutrition Assessment    This is a 62 y.o.female with a medical diagnosis of multiple myeloma, plan for auto ACT. She reports that her appetite is good and she has been regaining her weight. She is eating 2 meals/day and at least a snack. She has protein with most meals, usually chicken, fish, or shrimp. She does well with drinking water, 3 28oz cups/day.     Weight: 98.6 kg (217 lb 6 oz)  Height: 5' 7" (1.702 m)  BMI: 34.05    Usual BW: 155lb  Weight Change: ~60lb gain over the past 4-5 months    Allergies: Patient has no known allergies.    Current Medications:  Current Outpatient Medications:     acyclovir (ZOVIRAX) 400 MG tablet, Take 1 tablet (400 mg total) by mouth 2 (two) times daily., Disp: 60 tablet, Rfl: 11    alendronate (FOSAMAX) 70 MG tablet, alendronate 70 mg tablet  Take 1 tablet every week by oral route., Disp: , Rfl:     allopurinoL (ZYLOPRIM) 100 MG tablet, allopurinol 100 mg tablet  Take 1 tablet every day by oral route., Disp: , Rfl:     amLODIPine (NORVASC) 10 MG tablet, Take 1 tablet (10 mg total) by mouth once daily., Disp: 30 tablet, Rfl: 11    aspirin (CIERRA ASPIRIN) 325 MG tablet, Take 1 tablet (325 mg total) by mouth once daily., Disp: 100 tablet, Rfl: 3    dexAMETHasone (DECADRON) 4 MG Tab, Take 10 tablets (40 mg total) by mouth As instructed (Take on days 1, 8, 15, and 22 of chemotherapy.)., Disp: 40 tablet, Rfl: 1    lenalidomide 25 mg Cap, Take 1 capsule (25 mg total) by mouth As instructed (Take by mouth once daily on days 1-21 of each 28 day cycle)., Disp: 21 capsule, Rfl: 0    meloxicam (MOBIC) 7.5 MG tablet, Mobic 7.5 mg tablet  Take 1 tablet every day by oral route " with meals for 40 days., Disp: , Rfl:     oxyCODONE (ROXICODONE) 5 MG immediate release tablet, Take 2 tablets (10 mg total) by mouth every 6 (six) hours as needed for Pain., Disp: 56 tablet, Rfl: 0    pantoprazole (PROTONIX) 40 MG tablet, Take 1 tablet (40 mg total) by mouth once daily., Disp: 30 tablet, Rfl: 3    Food/medication interactions noted: avoid grapefruit    Vitamins/Supplements: none    Labs: Reviewed    Nutrition Diagnosis    Problem: nutrition-related knowledge deficit  Etiology (related to): lack of prior need for nutrition education  Signs/Symptoms (as evidenced by): referral for SCT evaluation    Nutrition Intervention    Nutrition Prescription   1972 kcals (20kcal/kg)  79g protein (0.8g/kg)   1972mL fluid (20mL/kg)    Recommendations:  Continue to include protein at all meals/snacks - examples reviewed  Drink at least 64oz fluid/day  Educated patient on food safety and healthy diet pre- and post-transplant. Answered all nutrition related questions.    Materials Provided/Reviewed: Nutrition Therapy for Bone Marrow Transplant Patients booklet    Nutrition Monitoring and Evaluation    Monitor: diet education needs, energy intake, and weight status    Goals: weight maintenance    Follow up: Patient provided with dietitian contact information and advised to call/message with questions or to make future appointment if further intervention is needed.    Communication to referring provider/care team: note available in chart    Counseling time: 20 minutes    Janice Stephens MS, RD, LDN  [phone]

## 2022-12-29 NOTE — PROGRESS NOTES
[facility]   Bone Marrow Transplant Psychosocial Assessment   Date: 2022       Demographic Information     Name: Raquel Houston    : 1960    Age: 62 y.o.    Sex: female    Race: Black or     Marital Status: Single   SS #:     Phone Number(s): [phone] (home)     Home Address: [facility] [address] [place] [address]    Mailing Address: [facility] [address] [place] [address]    Are you a [place] Citizen? Yes   If no, please explain:        Contact Information     Next of Kin: Fahad Fordfritz Houston   Relationship: Son   Phone Number(s): [phone]   Emergency Contact: Extended Emergency Contact Information  Primary Emergency Contact: RosemarieFahad  Mobile Phone: [phone]  Relation: Son        Living Arrangements   Household Composition:  Patient currently resides with: Other   If patient resides with spouse, please explain the marital relationship: Son's home.   Does the patient currently own his/her own home? Yes   Does the patient currently rent home/apartment: No   Are current living arrangements permanent? Yes   If no, please explain:        Children's Names     Name Sex Age   1.  Male 41   2.  Female 39   3. Female 40   4. Male 44   5. N/A N/A     Who will be the primary caregiver for the children when patient is admitted to the hospital? All of the patient's children are adults.   Name: N/A   Phone Number:  N//A       Support System   Primary Caregiver:     Name: Fahad Houston   Relationship: Son   Cell #: [phone]   Address: [facility] [address]   Home #: [phone]   City: [facility]: [place]   ZIP: [address]       Secondary Caregiver:     Name: Claudine Shellie   Relationship: Daughter-in-law   Cell #: [phone]   Address: [facility] [address]   Home #: [phone]   City: [facility] [place] [facility]: [place]   ZIP: [address]     Will patient's caregiver be available full-time? Yes   What is the patient's Mormonism? Moravian    Is patient currently practicing or non-practicing? No      Does patient have any other sources for support? Yes      If yes, please explain: Family and friends       Post BMT Plans      Does patient have full understanding of recovery from BMT? Yes   Does patient understand risk associated with BMT? Yes   What are patient's housing plans post BMT? [place]   Does patient have a Living Will? No   Does patient have a Power of ?  No   If yes, please give name of POA:  Name: N/A    Phone #: N/A       Employment Information     Is patient currently employed? No   Employer: Unemployed   Phone #: N/A   Position: N/A   Full Time/Part Time? N/A   YRS: N/A       Secondary Employment Information     Employer: None   Phone #: N/A   Position: N/A   Full Time/Part Time? N/A   YRS: N/A       Significant Other Employment Information     Employer: N/A   Phone #: N/A   Position: N/A   Full Time/Part Time? N/A   YRS: N/A         Financial Information     Monthly Income: 00.00   Yearly Income: 00.00   Source of Income:  N/A   Do you have any financial concerns? Yes   If yes, please explain:  Auto payment, Auto insurance, Utilities, Home owner tax, Copays, Transportation.       Insurance Information      Do you have health insurance?  Yes   Insurance Carrier [employer]/[facility] [facility]   Policy #: [id number]   Group #: [id number]   Policy Greco: Raquel Houston   Medicare: No   Medicare Part D: No   Medicaid: Yes   Do you have Disability Insurance? No      Do you have a Cancer Policy? No   Do you have medication/prescription coverage? Yes   Are you a ? No       Medical Information     Diagnosis: Multiple Myeloma   Date of Diagnosis: 07/13/2022   Is this a new diagnosis? Yes         If no, please explain: N/A   Past Medical History: Past Medical History:   Diagnosis Date    Arthritis     Multiple myeloma     Osteoporosis       Infusion Services: No   Home Health: No   Durable Medical Equipment: Wheelchair,  Hospital Bed, Walker and a Bedside commode.   Activities of Daily Living: Self sufficient   Patient's Family Cancer History: Cancer-related family history includes Cancer in her father.       Cognitive Functioning     Cognitive State: Patient is alert and orientated to time, place, self, and situation.   Does patient have any concerns that may affect medical follow up and full understanding of treatment? No   Does patient have any concerns that may impact medication compliance? No   Education Level: Some College 2-years.   Does the patient have any learning disabilities? No   If yes, please explain: N/A   Can the patient read English? Yes   Can the patient write in English? Yes      Is the patient Literate? Yes   What is the patient's primary language? English   Does the patient need interpretation services? No        Psychosocial History     Does patient have any emotional issues? No emotional issues; patient reported that she is coping well. No report of crying spells, social withdrawal, anger outbursts, low energy, low motivation, low energy, hopelessness, low self-esteem or helplessness. Patient has no thoughts of harm to self or others. Patient is emotionally stable.   If, yes please explain:  N/A   Does patient have a psychiatric history? No   If, yes please explain:  N/A   Is patient currently taking any psychiatric medication? No   If yes, please list medications: N/A   Is patient currently in therapy or attending support groups? No   Where is the patient currently in therapy? N/A   Therapist/Counselor Name: N/A   Therapist/Counselor Phone #: N/A       Alcohol/Drug Use/Abuse History     Alcohol Use: Social History     Substance and Sexual Activity   Alcohol Use Not Currently      Tobacco Use: Social History     Tobacco Use   Smoking Status Former    Types: Cigarettes   Smokeless Tobacco Former      Drug Use: Social History     Substance and Sexual Activity   Drug Use Never          Coping Skills     How is  the patient currently coping with their diagnosis? Patient reports that she is coping well. Patient reported that her coping mechanism is prayer.   Is the patient open/receptive to psychosocial intervention? Yes   Has the patient experienced any significant losses in his/her life? Yes      If yes, please explain: Patient reported that she lost her nephew near the time of her diagnosis.   What are the patient's identified needs? Patient expressed the need of support for dental expense, utilities, fuel, and copays.   Goals: Patient would like to have 10 more years of life to spend with her family, and would like to have her independence return to the level of functioning prior to her diagnosis.   Interview Behavior: Patient's interview behavior was engaging, cooperative, and calm with congruent mood, and affect. Patient's speech was clear ,and goal directed, and patient had appropriate questions regarding her treatment.    Suitability for Transplant: Patient is suitable for transplant as evidenced by her plan for her caregivers, making sure that the caregiver are, as committed as, she is during the treatment process, and by making sure that there is nothing to interfere or interrupt her treatment, and recovery.   Additional Comments: Patient has a positive altitude regarding the treatment process, and her goals for recovery.

## 2022-12-29 NOTE — PROGRESS NOTES
BMT Pharmacist Evaluation    Current Outpatient Medications:     acyclovir (ZOVIRAX) 400 MG tablet, Take 1 tablet (400 mg total) by mouth 2 (two) times daily., Disp: 60 tablet, Rfl: 11    amLODIPine (NORVASC) 10 MG tablet, Take 1 tablet (10 mg total) by mouth once daily., Disp: 30 tablet, Rfl: 11    aspirin (CIERRA ASPIRIN) 325 MG tablet, Take 1 tablet (325 mg total) by mouth once daily., Disp: 100 tablet, Rfl: 3    dexAMETHasone (DECADRON) 4 MG Tab, Take 10 tablets (40 mg total) by mouth As instructed (Take on days 1, 8, 15, and 22 of chemotherapy.)., Disp: 40 tablet, Rfl: 1    lenalidomide 25 mg Cap, Take 1 capsule (25 mg total) by mouth As instructed (Take by mouth once daily on days 1-21 of each 28 day cycle)., Disp: 21 capsule, Rfl: 0    oxyCODONE (ROXICODONE) 5 MG immediate release tablet, Take 2 tablets (10 mg total) by mouth every 6 (six) hours as needed for Pain., Disp: 56 tablet, Rfl: 0    pantoprazole (PROTONIX) 40 MG tablet, Take 1 tablet (40 mg total) by mouth once daily., Disp: 30 tablet, Rfl: 3      Review of patient's allergies indicates:  No Known Allergies      Estimated Creatinine Clearance: 90.2 mL/min (based on SCr of 0.8 mg/dL).       Medication adherence: pretty good memory  Medication-related problems: NSAIDs     Planned conditioning regimen:  Melphalan on Day -1    Antimicrobial Prophylaxis:  Acyclovir starting on Day -1  Levofloxacin starting on Day -1  Fluconazole starting on Day -1    Growth Factor Support:  Neupogen starting on Day +7      Caregiver: daughter in law and son  Post-transplant discharge plans: Patricia Alanis     Notes:  Reviewed and reconciled the medication list with the patient. Patient was able to confirm all medications she currently takes including schedule and indication. Patient demonstrates excellent medication adherence and understands the importance of this through the transplant process.     Reviewed the planned high-dose chemotherapy regimen, including schedule and  possible side effects. Provided the patient with drug information handouts for chemotherapy. Reviewed possible side effects of transplant including: neutropenia, thrombocytopenia, anemia, infection, infusion reactions, nausea/vomiting, mucositis, loss of appetite, taste changes, diarrhea,hair loss, liver and/or renal dysfunction. Also discussed the rare side effect of neurotoxicity. Reviewed prophylactic antimicrobials, as well as prophylactic and as needed antiemetics. Encouraged the patient to report all possible side effects/new symptoms and to ask for supportive care medications if needed. Patient verbalized understanding and all questions were answered.    Pharmacy Notes:   - Patient is not aware of any cardiac history she has for needing aspirin. This medication can be stopped upon admission as it was most likely for DVT ppx with Revlimid.    - No hx Cdiff or shingles   - Patient understands that she will need to continue acyclovir for one year post-transplant.     Drug Interactions: none       Proposed recommendations:  The patient demonstrates good medication adherence and understanding of the chemotherapy and transplant plan. BMT/Hematology Oncology PharmD will continue to follow the patient while admitted to the inpatient unit.       Luz Elena Durham, Pharm.D., BCOP  Clinical Pharmacy Specialist, Bone Marrow Transplant  [facility]  SpectraLink: [phone]

## 2022-12-29 NOTE — TELEPHONE ENCOUNTER
----- Message from Thang Mims RN sent at 12/29/2022  9:14 AM CST -----  Regarding: Colonoscopy needed - Stem Cell Trasnplant  Good morning,    MsKavon Houston is currently being evaluated for stem cell transplant for her diagnosis of multiple myeloma. She will need a colonoscopy done as a part of her pre-screenings prior to beginning stem cell collection on 1/22/23. Dr. Casas has placed both a case request as well as referral to gastro if needed. Is it possible for her to be scheduled for a colonoscopy in the next 1-2 weeks?    Thank you in advance for your help with this urgent matter!    -Thang

## 2022-12-31 NOTE — PROGRESS NOTES
Psycho-Oncology Pre-Transplant Evaluation  Psychiatry Initial Visit (PhD)  Psychological Intake and Assessment    Date:  12/29/2022     CPT Code:   55230 (1hr) , Psychiatric Diagnostic Evaluation  41604 (1hr), Psychological testing evaluation services by physician or other qualified health care professional  97929 (1st 30 minutes), Psychological or neuropsychological test administration/scoring by physician or other qualified healthcare professional, two or more tests    Evaluation Length (direct face-to-face time):  1.5 hours   Total Time including report writing, test scoring, chart review, integration of data and feedback: 2.5 hours       Referred by:  Oncologist: PAUL Casas MD.     Chief complaint/reason for encounter:  Psychological Evaluation prior to stem cell transplantation    Clinical status of patient: Outpatient    Raquel Houston, a 62 y.o. female, was seen for initial evaluation visit.  Met with patient. Her primary care physician is Everett Boogie MD.      Psychological Intake  Medical/Surgical History:   Patient Active Problem List   Diagnosis    Multiple myeloma not having achieved remission    Impaired functional mobility, balance, gait, and endurance    Weakness of both lower extremities    Balance problem        Health Behaviors:       ETOH Use: No (past social and within NIAAA healthy use limits for age and gender)      Tobacco Use: No (quit April 2022)   THC use: No  (distant past)   Non-prescribed/Illicit Drug Use:  No     Prescription Misuse:No   Caffeine: minimal (1 c/day)   Exercise: Current PT exercises at home, prior walking. The patient engaged in regular activity prior to illness The patient is actively working to return to baseline exercise tolerance.   Advanced Care Planning: Has ACP documents on file in Epic, has talked to her children about her wishes    Family History:   Psychiatric illness: No     Alcohol/Drug Abuse: Yes (nephew substance use  disorder)     Suicide: No      Past Psychiatric History:   Inpatient treatment: No     Outpatient treatment: No     Prior substance abuse treatment: No     Suicide Attempts: No      Psychotropic Medications:  Current: none       Past: none    Current medications as per below, allergies reviewed in chart.  Current Outpatient Medications   Medication    acyclovir (ZOVIRAX) 400 MG tablet    amLODIPine (NORVASC) 10 MG tablet    aspirin (CIERRA ASPIRIN) 325 MG tablet    dexAMETHasone (DECADRON) 4 MG Tab    lenalidomide 25 mg Cap    oxyCODONE (ROXICODONE) 5 MG immediate release tablet    pantoprazole (PROTONIX) 40 MG tablet     No current facility-administered medications for this visit.         Social situation/Stressors:Raquel Houston is an 62 y.o. female referred by PAUL Casas MD for pre-transplant evaluation.  Raquel Houston is currently staying with her son and daughter in law in [place], [place]. She normally lives in [place], [place]. 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  She usually finds other work for the 3 months the factory is not in operation.  She has been in her job for 30 years. The patient reports that she does have adequate availability of time off for the procedure and recovery.  Raquel Houston has never been  and has 4 adult children (Fahad[place], Eddie- [facility], Natali- [place], [facility]), 12 grandchildren and 1 great grandchild.   She reports being in a long-distance relationship with a suitor from [place]. The patient reports good social support from her mother and children.  Her daughter in law (Claudine Hensley) will be present and available to assist the patient during her recovery period.   Raquel Houston is an active member of the [employer]. Raquel Houston's hobbies include spending time with neighbors and grandchildren. The patient has no  history.    Additional stressors:  financial  strain    Nephew and former boyfriend  around the time of patient's diagnosis (possible opioid overdoses)     Strengths: Housing stability, Able to vocalize needs, Values and traditions, Motivation, readiness for change, and Setting and pursuing goals, hopes, dreams, aspirations   Liabilities: Complicated medical illness and Other: financial strain    History of present illness:   Oncology History   Multiple myeloma not having achieved remission   2022 Initial Diagnosis    Multiple myeloma not having achieved remission     2022 -  Chemotherapy    Treatment Summary   Plan Name: OP D-VRD DARATUMUMAB + BORTEZOMIB LENALIDOMIDE DEXAMETHASONE  Treatment Goal: Palliative  Status: Active  Start Date: 2022  End Date: 1/10/2023 (Planned)  Provider: Janice Casas MD  Chemotherapy: bortezomib (VELCADE) injection 2.4 mg, 1.3 mg/m2 = 2.4 mg, Subcutaneous, Clinic/HOD 1 time, 5 of 6 cycles  Administration: 2.4 mg (2022), 2.5 mg (2022), 2.4 mg (2022), 2.4 mg (2022), 2.7 mg (10/11/2022), 2.7 mg (2022), 2.5 mg (2022), 2.5 mg (2022), 2.7 mg (2022), 2.7 mg (10/4/2022), 2.7 mg (10/18/2022), 2.7 mg (10/25/2022), 2.7 mg (2022), 2.7 mg (11/15/2022), 2.7 mg (2022), 2.7 mg (2022), 2.8 mg (2022), 2.8 mg (2022)  lenalidomide 25 mg Cap, 25 mg, Oral, Daily, 1 of 1 cycle, Start date: --, End date: --  dar[facility] 1,800 mg, 1,800 mg (100 % of original dose 1,800 mg), Subcutaneous, Clinic/HOD 1 time, 4 of 4 cycles  Dose modification: 1,800 mg (original dose 1,800 mg, Cycle 1)  Administration: 1,800 mg (2022), 1,800 mg (2022), 1,800 mg (2022), 1,800 mg (2022), 1,800 mg (2022), 1,800 mg (2022), 1,800 mg (2022), 1,800 mg (10/4/2022), 1,800 mg (10/11/2022), 1,800 mg (10/25/2022), 1,800 mg (2022), 1,800 mg (2022)           Raquel Houston has adjusted to illness well primarily through active  coping strategies, passive coping strategies, and prayer. She has engaged in appropriate information gathering.  The patient has good family support.  Her family is coping well with the diagnosis/treatment/prognosis.    Raquel Houston reports using prayer, music, and time with family and friends as her primary methods of coping with general stressors.  Illness-related psychosocial stressors include financial strain , absence from work, and absence from home. These stressors will not prevent patient from adhering to post-transplant requirements.  The patient has a good partnership with her [facility] oncology treatment team. The patient reports the following barriers to cancer care:none.    Stem Cell Transplantation (SCT):  Raquel Houston possesses an good level of knowledge about autologous SCT gleaned from materials provided by her clinicians, discussions with her clinical team, and the internet.  Raquel Houston is knowledgeable about the possible costs, risks, and complications of the procedure and the behavioral changes which will be required of her.  She has anticipated her recovery needs and has planned adequate time away from work, assistance from [facility], and lodging at [facility] to facilitate healing. Raquel Houston is aware of the requirement that autologous HSCT patients must stay within 1 hour of the hospital for their first 30 days post-transplant.  Raquel Houston knows she must commit to careful monitoring of symptoms, the possibility of a complex long-term multiple drug treatment regimen, and long term follow-up visits with her oncologist (as required) following the procedure.  She is aware she must be re-administered her vaccinations post-transplant. She is aware of the following necessary behavioral changes:changes in food selection, preparation, and storage, increased vigilance with home cleanliness , careful personal and dental hygiene , changes to modes of pet care ,  and rapid return to physical activity. The patient reports excellent adherence to guidelines/requirements/treatment in the past, which is supported by review of her medical chart. (She is very proud of the PT work she has done since surgery to return to mobility.) Raquel Houston has realistic expectations of health and illness possibilities following SCT. She is aware of possible medical side effects including infection, organ toxicity/failure , hair loss, GI difficulties , loss of appetite, fatigue , and neurocognitive changes during/following treatment. She is aware of the risk of mortality.  She also anticipates social strains including decreased ability to participate in some leisure and social activities for some time and the strains of care-giving demands on friends/family.      Collateral Information: The only concern expressed by the BMT team was lack of confidence in patient's knowledge about the transplant.    Current Symptoms:  Mood: denied;  no prior and no SI/HI;   Allison: Denies   Psychosis: Denies  Anxiety: denied; no prior;   Generalized anxiety: Denies       Panic Disorder: Denies  Social/specific phobia: Denies   OCD: Denies  Substance abuse: denied  Is the patient willing to submit to a random drug screen?  Yes  Cognitive functioning: denied  Health behaviors: noncontributory  Can the patient identify own medications and describe purpose and proper dosing? N/A  Does the patient appropriately manage chronic conditions which need close monitoring (such as diabetes)? N/A  Does the patient use complementary or alternative medications, remedies, procedures, or interventions? No   Sleep:  The patient reports approximately 6 hours of uninterrupted sleep per night. restful sleep  and no concerns, no sleep onset difficulty  and no sleep maintenance difficulty, no EDS , no reported apneic events, no naps , and no restless legs, AM caffeine and no sleep hygiene considerations ; no use of  OTC/melatonin/hypnotics/benzodiazepines    Pain: Ms. Houston reports 5/10 pain in her leg most of the time since surgery. Pain is adequately controlled on her current medication regimen.   Trauma: Sister was shot in 1992; Denies post-traumatic sequelae  Head Injury History: Denies  Personality Functioning: The patient does not display any personality characteristics which would be an impediment to receiving BMT.      Psychological Assessment/Testing:     Distress thermometer:   DISTRESS SCREENING 11/8/2022 10/11/2022 9/13/2022 8/9/2022 8/2/2022 8/1/2022   Distress Score 0 - No Distress 0 - No Distress 0 - No Distress 1 0 - No Distress 0 - No Distress   Practical Problems None of these None of these - Insurance/Financial - None of these   Family Problems None of these None of these - None of these - None of these   Emotional Problems None of these None of these - None of these - None of these   Spiritual / Anabaptist Concerns No No - No - No   Physical Problems None of these None of these - None of these - Getting Around            PHQ-9  The patient completed the Patient Health Questionnaire-9 item (PHQ-9) Depression Screen and received a score of 0, indicating no depression symptoms presently impacting current functioning.      MORIAH-7      The patient completed the General Anxiety Disorder, 7 Item Questionnaire (GAD7) and received a score of 0, indicating no anxiety symptoms presently impacting current functioning.    PCS: Pain catastrophizing: There were no elevations on PCS total score (0; 2nd%ile), helplessness (0; 6th%ile), magnification (0;  14th%ile), or rumination (0; 3rd%ile) which suggests adequate pain coping.     SLUMS  The [employer] Mental Status Examination (UMS), a cognitive screener, was administered to assess the Patient's current mental status and cognitive capacity. Patient obtained a score of 27/30. This score does fall within normal limits as compared to those within similar age  and level of education, and suggests no impairments in neurocognitive functioning.     REALM-R:   The Rapid Estimate of Adult Literacy in Medicine, Revised (REALM-R) is a brief screening instrument used to assess an adult patient's ability to read common medical words. It is designed to assist medical professionals in identifying patients at risk for poor understanding of written healthcare communication.  Results:  Sufficient health literacy      [facility] BEHAVIORAL MEDICINE DIAGNOSTIC (MBMD)                                    The MBMD provides an assessment of the potential role of psychiatric factors in a patient's disease and treatment. Raquel Houston produced a valid MBMD profile. Validity indices suggested some concern in the area of Disclosure and significant concern in the area of Desirablity, though not enough to invalidate results. The elevation of the desirability scale suggests that Raquel Houston may be reporting to ensure that she is presented in a positive light. Elevation in the area of disclosure may indicate that the patient is hesitant to share some information. All additional scales will be interpreted with this information in mind.    The patient's profile suggests the presence of no depression,  no anxiety, no cognitive issues, no difficulty with moodiness or mood swings, and mild apprehension to being open with others about these issues. There are no indications she may struggle with overeating, overuse of EtOH, overuse of caffeine, use of substances, inactivity, or tobacco use when under stress.     In regard to COPING STYLES, several key scales show response patterns that would be expected to POSITIVELY influence elizabeth-procedural course (Sociable, Confident, and Respectful). Raquel Houston's responses indicate that she is outgoing and cooperative following treatment, which suggests that she will be compliant with recommendations by providers. Additionally, the profile  "suggests that the patient is self-assured but may need to be informed in more detailed of medical suggestions by providers. Scorers with similar profiles tend to be responsible and cooperative, but may keep their feelings to themselves (performing the role of "ideal patient").       The STRESS MODERATORS scales assess factors which have the potential to influence patient responses to treatment.  Raquel Houston obtained mild elevations on the Stress Moderators scales Illness Apprehension and Functional Deficits, and moderate elevation on the Pain Sensitivity scale. Profiles similar to Raquel Houston indicate the patient may have high awareness of changes in her physical functioning, which may be beneficial to her with regard to being proactive in seeking medical treatment, but may cause her some worry about her physical health. She may require more emotional support from team members than other patients. Pain is a source of worry for her and will preoccupy her when facing procedures. Her low scores on the Social Isolation, Future Pessimism, and Spiritual Absence scales suggest high levels of family support ,strong spiritual coping mechanisms, and a positive outlook about her future    Scores on the TREATMENT PROGNOSTIC scales reveal that Raquel Houston is likely to be very stoic in the face of medical intervention, will likely be diligent in adhering to medical recommendations, and is receptive to information about her health and comfortable learning about details of her illness.      Scores on the MANAGEMENT GUIDES indices reveal potential need for assistance from staff to cope with her elizabeth-procedural course.  The patient's responses indicate that she is likely to be an adequate partner with her medical team in working toward wellness. It should be noted that, given Raquel Houston's significant medical problems and her reluctance to provide information which might paint her in a negative " light, future, unanticipated distress may arise.         Mental Status Exam:    General appearance:  appears stated age, neatly dressed, well groomed  Level of cooperation:  cooperative  Thought processes:  logical, goal-directed   Speech: normal in rate, volume, and tone    Mood: euthymic  Affect: euthymic  Thought content:  no illusions, no visual hallucinations, no auditory hallucinations, no delusions, no active or passive homicidal thoughts, no active or passive suicidal ideation, no obsessions, no compulsions, no violence  Orientation:  oriented to person, place, and time  Memory:  Recent memory:  4 of 5 objects after brief delay.    Remote memory - intact  Attention span and concentration:  spelled WORLD forwards and backwards; SAVEAHAART without difficulty  Abstract reasoning:    Similarities: abstract.    Proverbs: concrete  Judgment and insight: fair   Language:  Intact      SUMMARY:  Raquel Houston is a  62 y.o. female referred by Dr. Casas for psychological evaluation prior to stem cell transplantation.  The patient appears absent of disabling psychopathology or disabilities which would prevent understanding and compliance with medical treatment.  There is no evidence of suicidality.   The patient has good knowledge about HSCT, appropriate expectations for health and illness following transplantation, adequate  understanding of the possible risks and complications of this treatment option, and a high willingness to sustain effort for lifestyle changes and health adaptations which will be required of her. She is aware of the 30 day 1 hour lodging requirement.  She reports adequate compliance with previous medical treatment.  Raquel Houston has good social support from her family (especially daughter in law, Claudine).   The patient exhibits a high degree of social stability.  The patient acknowledges no stressors expected to limit her ability to cope with the demands of HSCT and  "recovery.  The patient reports  limited caffeine consumption, no tobacco use, no alcohol use, and no illicit drug use  She demonstrates adequate health literacy.    She demonstrates no impairment in cognitive functioning and has the ability to consent to HSCT based upon knowledge   and understanding of the procedure.      IMPRESSIONS AND RECOMMENDATIONS  Raquel Houston is an acceptable HSCT candidate from a psychological perspective.   There are no overt psychological contraindications for proceeding with the procedure.  She has no significant mental health history, and reports no current psychiatric problems or major adjustment issues. She may be likely to underreport difficulties to be an "ideal patient." The patient has reasonable expectations for the procedure, good social support, and has already begun making appropriate life plans in anticipation of the procedure. The patient has verbalized appropriate awareness and commitment to the necessary behavioral changes associated with HSCTand appears willing to adjust to long-term lifestyle challenges and medical follow-up. The patient will be seen by me while inpatient to facilitate adjustment.     1. Multiple myeloma not having achieved remission    2. Stem cell transplant candidate        Howard Belle, PhD  Clinical Psychologist  [place] License #[id number]  [place] License #[id number]            "

## 2023-01-03 ENCOUNTER — TELEPHONE (OUTPATIENT)
Dept: INFUSION THERAPY | Facility: HOSPITAL | Age: 63
End: 2023-01-03
Payer: MEDICAID

## 2023-01-04 ENCOUNTER — TELEPHONE (OUTPATIENT)
Dept: HEMATOLOGY/ONCOLOGY | Facility: CLINIC | Age: 63
End: 2023-01-04
Payer: MEDICAID

## 2023-01-04 PROBLEM — Z01.818 PRE-TRANSPLANT EVALUATION FOR STEM CELL TRANSPLANT: Status: ACTIVE | Noted: 2023-01-04

## 2023-01-04 NOTE — TELEPHONE ENCOUNTER
Called pt to confirm appt on 01/09/2023 with Dr Casas. Pt didn't answer, left vm to call office if need to reschedule appt

## 2023-01-04 NOTE — PROGRESS NOTES
Computer Administered Psychological Testing (90087)  Date:  12/29/2022     CPT Code: 96682 Evaluation Length:  1 hour      Referred by:  BMT Team/ Oncologist: PAUL Casas MD.     Chief complaint/reason for encounter:  Psychological Evaluation prior to stem cell transplantation      Raquel Houston is an 62 y.o. female referred by PAUL Casas MD for pre-transplant evaluation.     Raquel Houston arrived promptly for the scheduled appointment during which computer-administered psychological testing of the [facility] Behavioral Medicine Diagnostic (MBMD) was conducted. Patient informed of the risks and benefits of testing, was instructed how to navigate the computer program, and was informed of the importance of accurate responding.  Patient expressed understanding and willingly engaged in the assessment.      Risk Questions on the MBMD (Q49, Q86) were negative indicating the patient denied thoughts of suicide and denied that he cannot find things in life that were interesting/pleasurable.    Raquel Houston 's complete assessment report will be included in the medical record with any additional testing instruments  compiled with appropriate   interview data, summary, and recommendations.  Patient will be provided feedback on the assessment results at BMT Evaluation visit with the psychologist.       ICD-10-CM ICD-9-CM   1. Pre-transplant evaluation for stem cell transplant  Z01.818 V72.83       Howard Belle, PhD  Clinical Psychologist  [place] License #[id number]  [place] License #[id number]

## 2023-01-05 ENCOUNTER — ANESTHESIA (OUTPATIENT)
Dept: ENDOSCOPY | Facility: HOSPITAL | Age: 63
End: 2023-01-05
Payer: MEDICAID

## 2023-01-05 ENCOUNTER — HOSPITAL ENCOUNTER (OUTPATIENT)
Facility: HOSPITAL | Age: 63
Discharge: HOME | End: 2023-01-05
Attending: INTERNAL MEDICINE | Admitting: INTERNAL MEDICINE
Payer: MEDICAID

## 2023-01-05 ENCOUNTER — ANESTHESIA EVENT (OUTPATIENT)
Dept: ENDOSCOPY | Facility: HOSPITAL | Age: 63
End: 2023-01-05
Payer: MEDICAID

## 2023-01-05 ENCOUNTER — TELEPHONE (OUTPATIENT)
Dept: FAMILY MEDICINE | Facility: CLINIC | Age: 63
End: 2023-01-05
Payer: MEDICAID

## 2023-01-05 VITALS
BODY MASS INDEX: 35.84 KG/M2 | WEIGHT: 223 LBS | HEIGHT: 66 IN | HEART RATE: 65 BPM | SYSTOLIC BLOOD PRESSURE: 142 MMHG | DIASTOLIC BLOOD PRESSURE: 95 MMHG | OXYGEN SATURATION: 100 % | TEMPERATURE: 98 F | RESPIRATION RATE: 16 BRPM

## 2023-01-05 DIAGNOSIS — Z12.11 SCREEN FOR COLON CANCER: ICD-10-CM

## 2023-01-05 PROCEDURE — 27201089 HC SNARE, DISP (ANY): Performed by: INTERNAL MEDICINE

## 2023-01-05 PROCEDURE — 88305 TISSUE EXAM BY PATHOLOGIST: ICD-10-PCS | Mod: 26,,, | Performed by: PATHOLOGY

## 2023-01-05 PROCEDURE — 45385 COLONOSCOPY W/LESION REMOVAL: CPT | Mod: ,,, | Performed by: INTERNAL MEDICINE

## 2023-01-05 PROCEDURE — 37000008 HC ANESTHESIA 1ST 15 MINUTES: Performed by: INTERNAL MEDICINE

## 2023-01-05 PROCEDURE — 25000003 PHARM REV CODE 250: Performed by: NURSE ANESTHETIST, CERTIFIED REGISTERED

## 2023-01-05 PROCEDURE — 37000009 HC ANESTHESIA EA ADD 15 MINS: Performed by: INTERNAL MEDICINE

## 2023-01-05 PROCEDURE — 25000003 PHARM REV CODE 250: Performed by: INTERNAL MEDICINE

## 2023-01-05 PROCEDURE — D9220A PRA ANESTHESIA: ICD-10-PCS | Mod: ANES,,, | Performed by: ANESTHESIOLOGY

## 2023-01-05 PROCEDURE — 63600175 PHARM REV CODE 636 W HCPCS: Performed by: NURSE ANESTHETIST, CERTIFIED REGISTERED

## 2023-01-05 PROCEDURE — 45385 COLONOSCOPY W/LESION REMOVAL: CPT | Performed by: INTERNAL MEDICINE

## 2023-01-05 PROCEDURE — 45385 PR COLONOSCOPY,REMV LESN,SNARE: ICD-10-PCS | Mod: ,,, | Performed by: INTERNAL MEDICINE

## 2023-01-05 PROCEDURE — D9220A PRA ANESTHESIA: ICD-10-PCS | Mod: CRNA,,, | Performed by: NURSE ANESTHETIST, CERTIFIED REGISTERED

## 2023-01-05 PROCEDURE — D9220A PRA ANESTHESIA: Mod: CRNA,,, | Performed by: NURSE ANESTHETIST, CERTIFIED REGISTERED

## 2023-01-05 PROCEDURE — D9220A PRA ANESTHESIA: Mod: ANES,,, | Performed by: ANESTHESIOLOGY

## 2023-01-05 PROCEDURE — 88305 TISSUE EXAM BY PATHOLOGIST: CPT | Mod: 26,,, | Performed by: PATHOLOGY

## 2023-01-05 PROCEDURE — 88305 TISSUE EXAM BY PATHOLOGIST: CPT | Performed by: PATHOLOGY

## 2023-01-05 RX ORDER — ONDANSETRON 2 MG/ML
4 INJECTION INTRAMUSCULAR; INTRAVENOUS ONCE AS NEEDED
Status: DISCONTINUED | OUTPATIENT
Start: 2023-01-05 | End: 2023-01-05 | Stop reason: HOSPADM

## 2023-01-05 RX ORDER — SODIUM CHLORIDE 9 MG/ML
INJECTION, SOLUTION INTRAVENOUS CONTINUOUS
Status: CANCELLED | OUTPATIENT
Start: 2023-01-05

## 2023-01-05 RX ORDER — SODIUM CHLORIDE 0.9 % (FLUSH) 0.9 %
10 SYRINGE (ML) INJECTION
Status: DISCONTINUED | OUTPATIENT
Start: 2023-01-05 | End: 2023-01-05 | Stop reason: HOSPADM

## 2023-01-05 RX ORDER — PROPOFOL 10 MG/ML
VIAL (ML) INTRAVENOUS
Status: DISCONTINUED | OUTPATIENT
Start: 2023-01-05 | End: 2023-01-05

## 2023-01-05 RX ORDER — SODIUM CHLORIDE 9 MG/ML
INJECTION, SOLUTION INTRAVENOUS CONTINUOUS
Status: DISCONTINUED | OUTPATIENT
Start: 2023-01-05 | End: 2023-01-05 | Stop reason: HOSPADM

## 2023-01-05 RX ORDER — LIDOCAINE HYDROCHLORIDE 20 MG/ML
INJECTION, SOLUTION EPIDURAL; INFILTRATION; INTRACAUDAL; PERINEURAL
Status: DISCONTINUED | OUTPATIENT
Start: 2023-01-05 | End: 2023-01-05

## 2023-01-05 RX ORDER — SODIUM CHLORIDE 0.9 % (FLUSH) 0.9 %
10 SYRINGE (ML) INJECTION
Status: CANCELLED | OUTPATIENT
Start: 2023-01-05

## 2023-01-05 RX ADMIN — PROPOFOL 40 MG: 10 INJECTION, EMULSION INTRAVENOUS at 08:01

## 2023-01-05 RX ADMIN — SODIUM CHLORIDE: 0.9 INJECTION, SOLUTION INTRAVENOUS at 07:01

## 2023-01-05 RX ADMIN — Medication 200 MCG/KG/MIN: at 08:01

## 2023-01-05 RX ADMIN — LIDOCAINE HYDROCHLORIDE 50 MG: 20 INJECTION, SOLUTION EPIDURAL; INFILTRATION; INTRACAUDAL; PERINEURAL at 08:01

## 2023-01-05 RX ADMIN — Medication 150 MCG/KG/MIN: at 08:01

## 2023-01-05 RX ADMIN — PROPOFOL 100 MG: 10 INJECTION, EMULSION INTRAVENOUS at 08:01

## 2023-01-05 NOTE — TRANSFER OF CARE
"Anesthesia Transfer of Care Note    Patient: Raquel Houston    Procedure(s) Performed: Procedure(s) (LRB):  COLONOSCOPY (N/A)    Patient location: [facility]    Anesthesia Type: general    Transport from OR: Transported from OR on room air with adequate spontaneous ventilation    Post pain: adequate analgesia    Post assessment: no apparent anesthetic complications and tolerated procedure well    Post vital signs: stable    Level of consciousness: awake and alert    Nausea/Vomiting: no nausea/vomiting    Complications: none    Transfer of care protocol was followed      Last vitals:   Visit Vitals  /79 (BP Location: Left arm, Patient Position: Lying)   Pulse 91   Temp 36.5 °C (97.7 °F) (Temporal)   Resp 16   Ht 5' 6" (1.676 m)   Wt 101.2 kg (223 lb)   SpO2 100%   Breastfeeding No   BMI 35.99 kg/m²     "

## 2023-01-05 NOTE — ANESTHESIA PREPROCEDURE EVALUATION
01/05/2023  Raquel Houston is a 62 y.o., female   Pre-operative evaluation for Procedure(s) (LRB):  COLONOSCOPY (N/A)    Raquel Houston is a 62 y.o. female     Patient Active Problem List   Diagnosis    Multiple myeloma not having achieved remission    Impaired functional mobility, balance, gait, and endurance    Weakness of both lower extremities    Balance problem    Pre-transplant evaluation for stem cell transplant       Review of patient's allergies indicates:  No Known Allergies    No current facility-administered medications on file prior to encounter.     Current Outpatient Medications on File Prior to Encounter   Medication Sig Dispense Refill    acyclovir (ZOVIRAX) 400 MG tablet Take 1 tablet (400 mg total) by mouth 2 (two) times daily. 60 tablet 11    amLODIPine (NORVASC) 10 MG tablet Take 1 tablet (10 mg total) by mouth once daily. 30 tablet 11    aspirin (CIERRA ASPIRIN) 325 MG tablet Take 1 tablet (325 mg total) by mouth once daily. 100 tablet 3    dexAMETHasone (DECADRON) 4 MG Tab Take 10 tablets (40 mg total) by mouth As instructed (Take on days 1, 8, 15, and 22 of chemotherapy.). 40 tablet 1    lenalidomide 25 mg Cap Take 1 capsule (25 mg total) by mouth As instructed (Take by mouth once daily on days 1-21 of each 28 day cycle). 21 capsule 0    oxyCODONE (ROXICODONE) 5 MG immediate release tablet Take 2 tablets (10 mg total) by mouth every 6 (six) hours as needed for Pain. 56 tablet 0    pantoprazole (PROTONIX) 40 MG tablet Take 1 tablet (40 mg total) by mouth once daily. 30 tablet 3       Past Surgical History:   Procedure Laterality Date    FEMUR SURGERY         Pre-op Assessment    I have reviewed the Patient Summary Reports.     I have reviewed the Nursing Notes. I have reviewed the NPO Status.   I have reviewed the Medications.     Review of Systems  Anesthesia  Hx:  No problems with previous Anesthesia Denies Hx of Anesthetic complications  History of prior surgery of interest to airway management or planning: Denies Family Hx of Anesthesia complications.   Denies Personal Hx of Anesthesia complications.   Social:  No Alcohol Use, Former Smoker    Hematology/Oncology:  Hematology Normal      Oncology Comments: Multiple myeloma, being evaluated for stem cell transplant    EENT/Dental:EENT/Dental Normal   Cardiovascular:  Cardiovascular Normal Exercise tolerance: good     Pulmonary:  Pulmonary Normal    Renal/:  Renal/ Normal     Hepatic/GI:  Hepatic/GI Normal Bowel Prep.    Musculoskeletal:   Impaired mobility   Neurological:  Neurology Normal    Endocrine:  Endocrine Normal  Obesity / BMI > 30  Psych:  Psychiatric Normal           Physical Exam  General: Well nourished, Cooperative, Alert and Oriented    Airway:  Mallampati: III   Mouth Opening: Normal  TM Distance: Normal  Tongue: Normal  Neck ROM: Normal ROM    Dental:  Intact    Chest/Lungs:  Normal Respiratory Rate, Clear to auscultation    Heart:  Rate: Normal  Rhythm: Regular Rhythm        Anesthesia Plan  Type of Anesthesia, risks & benefits discussed:    Anesthesia Type: Gen ETT, Gen Natural Airway  Intra-op Monitoring Plan: Standard ASA Monitors  Post Op Pain Control Plan: multimodal analgesia and IV/PO Opioids PRN  Induction:  IV  Airway Plan: Direct  Informed Consent: Informed consent signed with the Patient and all parties understand the risks and agree with anesthesia plan.  All questions answered. Patient consented to blood products? No  ASA Score: 3  Day of Surgery Review of History & Physical: H&P Update referred to the surgeon/provider.    Ready For Surgery From Anesthesia Perspective.     .       "my wife wanted me to check my brain"

## 2023-01-05 NOTE — PLAN OF CARE
Discharge instructions reviewed with patient and family member at bedside. Verbalized understanding. Packet given.

## 2023-01-05 NOTE — H&P
Short Stay Endoscopy History and Physical    PCP - Everett Boogie MD    Procedure - Colonoscopy  ASA - per anesthesia  Mallampati - per anesthesia  History of Anesthesia problems - no  Family history Anesthesia problems - no   Plan of anesthesia - General    HPI:  This is a 62 y.o. female here for evaluation of colon cancer screening before transplant    Medical History:  has a past medical history of Arthritis, Multiple myeloma, and Osteoporosis.    Surgical History:  has a past surgical history that includes Femur Surgery.    Family History: family history includes Cancer in her father.. Otherwise no colon cancer, inflammatory bowel disease, or GI malignancies.    Social History:  reports that she has quit smoking. Her smoking use included cigarettes. She has quit using smokeless tobacco. She reports that she does not currently use alcohol. She reports that she does not use drugs.    Review of patient's allergies indicates:  No Known Allergies    Medications:   Medications Prior to Admission   Medication Sig Dispense Refill Last Dose    acyclovir (ZOVIRAX) 400 MG tablet Take 1 tablet (400 mg total) by mouth 2 (two) times daily. 60 tablet 11 Past Week    amLODIPine (NORVASC) 10 MG tablet Take 1 tablet (10 mg total) by mouth once daily. 30 tablet 11 Past Week    aspirin (CIERRA ASPIRIN) 325 MG tablet Take 1 tablet (325 mg total) by mouth once daily. 100 tablet 3 Past Week    dexAMETHasone (DECADRON) 4 MG Tab Take 10 tablets (40 mg total) by mouth As instructed (Take on days 1, 8, 15, and 22 of chemotherapy.). 40 tablet 1 Past Week    lenalidomide 25 mg Cap Take 1 capsule (25 mg total) by mouth As instructed (Take by mouth once daily on days 1-21 of each 28 day cycle). 21 capsule 0 Past Month    pantoprazole (PROTONIX) 40 MG tablet Take 1 tablet (40 mg total) by mouth once daily. 30 tablet 3 Past Week    oxyCODONE (ROXICODONE) 5 MG immediate release tablet Take 2 tablets (10 mg total) by mouth every 6 (six)  hours as needed for Pain. 56 tablet 0          Physical Exam:    Vital Signs:   Vitals:    01/05/23 0723   BP: 118/69   Pulse: 92   Resp: 20   Temp: 98 °F (36.7 °C)       I have explained the risks and benefits of endoscopy procedures to the patient including but not limited to bleeding, perforation, infection, missed lesions.      Diana Huynh MD

## 2023-01-05 NOTE — PROVATION PATIENT INSTRUCTIONS
Discharge Summary/Instructions after an Endoscopic Procedure  Patient Name: Raquel Patterson  Patient MRN: [id number]  Patient YOB: 1960 Thursday, January 5, 2023  Diana Huynh MD  Dear patient,  As a result of recent federal legislation (The Federal Cures Act), you may   receive lab or pathology results from your procedure in your [facility]   account before your physician is able to contact you. Your physician or   their representative will relay the results to you with their   recommendations at their soonest availability.  Thank you,  RESTRICTIONS:  During your procedure today, you received medications for sedation.  These   medications may affect your judgment, balance and coordination.  Therefore,   for 24 hours, you have the following restrictions:   - DO NOT drive a car, operate machinery, make legal/financial decisions,   sign important papers or drink alcohol.    ACTIVITY:  Today: no heavy lifting, straining or running due to procedural   sedation/anesthesia.  The following day: return to full activity including work.  DIET:  Eat and drink normally unless instructed otherwise.     TREATMENT FOR COMMON SIDE EFFECTS:  - Mild abdominal pain, nausea, belching, bloating or excessive gas:  rest,   eat lightly and use a heating pad.  - Sore Throat: treat with throat lozenges and/or gargle with warm salt   water.  - Because air was used during the procedure, expelling large amounts of air   from your rectum or belching is normal.  - If a bowel prep was taken, you may not have a bowel movement for 1-3 days.    This is normal.  SYMPTOMS TO WATCH FOR AND REPORT TO YOUR PHYSICIAN:  1. Abdominal pain or bloating, other than gas cramps.  2. Chest pain.  3. Back pain.  4. Signs of infection such as: chills or fever occurring within 24 hours   after the procedure.  5. Rectal bleeding, which would show as bright red, maroon, or black stools.   (A tablespoon of blood from the rectum is not serious, especially  if   hemorrhoids are present.)  6. Vomiting.  7. Weakness or dizziness.  GO DIRECTLY TO THE NEAREST EMERGENCY ROOM IF YOU HAVE ANY OF THE FOLLOWING:      Difficulty breathing              Chills and/or fever over 101 F   Persistent vomiting and/or vomiting blood   Severe abdominal pain   Severe chest pain   Black, tarry stools   Bleeding- more than one tablespoon   Any other symptom or condition that you feel may need urgent attention  Your doctor recommends these additional instructions:  If any biopsies were taken, your doctors clinic will contact you in 1 to 2   weeks with any results.  - Discharge patient to home (with escort).   - Resume previous diet.   - Continue present medications.   - Await pathology results.   - The findings and recommendations were discussed with the patient.   - Patient has a contact number available for emergencies.  The signs and   symptoms of potential delayed complications were discussed with the   patient.  Return to normal activities tomorrow.  Written discharge   instructions were provided to the patient.   - Repeat colonoscopy in 5 years for surveillance.  For questions, problems or results please call your physician - Diana Huynh MD at Work:  [phone].  [facility], EMERGENCY ROOM PHONE NUMBER: [phone]  IF A COMPLICATION OR EMERGENCY SITUATION ARISES AND YOU ARE UNABLE TO REACH   YOUR PHYSICIAN - GO DIRECTLY TO THE EMERGENCY ROOM.  Diana Huynh MD  1/5/2023 8:40:17 AM  This report has been verified and signed electronically.  Dear patient,  As a result of recent federal legislation (The Federal Cures Act), you may   receive lab or pathology results from your procedure in your [facility]   account before your physician is able to contact you. Your physician or   their representative will relay the results to you with their   recommendations at their soonest availability.  Thank you,  [employer]OVATION

## 2023-01-06 ENCOUNTER — HOSPITAL ENCOUNTER (OUTPATIENT)
Dept: RADIOLOGY | Facility: HOSPITAL | Age: 63
Discharge: HOME | End: 2023-01-06
Attending: INTERNAL MEDICINE
Payer: MEDICAID

## 2023-01-06 VITALS — HEIGHT: 66 IN | WEIGHT: 223.13 LBS | BODY MASS INDEX: 35.86 KG/M2

## 2023-01-06 DIAGNOSIS — C90.00 MULTIPLE MYELOMA, REMISSION STATUS UNSPECIFIED: ICD-10-CM

## 2023-01-06 DIAGNOSIS — Z76.82 STEM CELL TRANSPLANT CANDIDATE: ICD-10-CM

## 2023-01-06 PROCEDURE — 77067 MAMMO DIGITAL SCREENING BILAT WITH TOMO: ICD-10-PCS | Mod: 26,,, | Performed by: RADIOLOGY

## 2023-01-06 PROCEDURE — 77067 SCR MAMMO BI INCL CAD: CPT | Mod: TC,PO

## 2023-01-06 PROCEDURE — 77063 MAMMO DIGITAL SCREENING BILAT WITH TOMO: ICD-10-PCS | Mod: 26,,, | Performed by: RADIOLOGY

## 2023-01-06 PROCEDURE — 77067 SCR MAMMO BI INCL CAD: CPT | Mod: 26,,, | Performed by: RADIOLOGY

## 2023-01-06 PROCEDURE — 77063 BREAST TOMOSYNTHESIS BI: CPT | Mod: 26,,, | Performed by: RADIOLOGY

## 2023-01-06 NOTE — ANESTHESIA POSTPROCEDURE EVALUATION
Anesthesia Post Evaluation    Patient: Raquel Houston    Procedure(s) Performed: Procedure(s) (LRB):  COLONOSCOPY (N/A)    Final Anesthesia Type: general      Patient location during evaluation: GI PACU  Patient participation: Yes- Able to Participate  Level of consciousness: awake and alert and oriented  Post-procedure vital signs: reviewed and stable  Pain management: adequate  Airway patency: patent    PONV status at discharge: No PONV  Anesthetic complications: no      Cardiovascular status: blood pressure returned to baseline and hemodynamically stable  Respiratory status: unassisted, spontaneous ventilation and room air  Hydration status: euvolemic  Follow-up not needed.          Vitals Value Taken Time   /95 01/05/23 0916   Temp 36.5 °C (97.7 °F) 01/05/23 0915   Pulse 66 01/05/23 0917   Resp 16 01/05/23 0915   SpO2 100 % 01/05/23 0917   Vitals shown include unvalidated device data.      No case tracking events are documented in the log.      Pain/Bree Score: Bree Score: 10 (1/5/2023  9:00 AM)

## 2023-01-11 ENCOUNTER — HOSPITAL ENCOUNTER (OUTPATIENT)
Dept: RADIOLOGY | Facility: HOSPITAL | Age: 63
Discharge: HOME | End: 2023-01-11
Attending: INTERNAL MEDICINE
Payer: MEDICAID

## 2023-01-11 DIAGNOSIS — Z76.82 STEM CELL TRANSPLANT CANDIDATE: ICD-10-CM

## 2023-01-11 DIAGNOSIS — C90.00 MULTIPLE MYELOMA NOT HAVING ACHIEVED REMISSION: ICD-10-CM

## 2023-01-11 LAB — POCT GLUCOSE: 94 MG/DL (ref 70–110)

## 2023-01-11 PROCEDURE — A9552 F18 FDG: HCPCS

## 2023-01-11 PROCEDURE — 78816 NM PET CT WHOLE BODY: ICD-10-PCS | Mod: 26,PS,, | Performed by: RADIOLOGY

## 2023-01-11 PROCEDURE — 78816 PET IMAGE W/CT FULL BODY: CPT | Mod: 26,PS,, | Performed by: RADIOLOGY

## 2023-01-11 PROCEDURE — 78816 PET IMAGE W/CT FULL BODY: CPT | Mod: TC

## 2023-01-11 PROCEDURE — A9698 NON-RAD CONTRAST MATERIALNOC: HCPCS | Performed by: INTERNAL MEDICINE

## 2023-01-11 PROCEDURE — 25500020 PHARM REV CODE 255: Performed by: INTERNAL MEDICINE

## 2023-01-11 RX ADMIN — BARIUM SULFATE 900 ML: 20 SUSPENSION ORAL at 08:01

## 2023-01-13 LAB
FINAL PATHOLOGIC DIAGNOSIS: NORMAL
GROSS: NORMAL
Lab: NORMAL

## 2023-01-17 DIAGNOSIS — Z76.82 STEM CELL TRANSPLANT CANDIDATE: Primary | ICD-10-CM

## 2023-01-17 DIAGNOSIS — C90.00 MULTIPLE MYELOMA, REMISSION STATUS UNSPECIFIED: ICD-10-CM

## 2023-01-18 ENCOUNTER — TELEPHONE (OUTPATIENT)
Dept: HEMATOLOGY/ONCOLOGY | Facility: CLINIC | Age: 63
End: 2023-01-18
Payer: MEDICAID

## 2023-01-18 ENCOUNTER — TELEPHONE (OUTPATIENT)
Dept: SURGERY | Facility: CLINIC | Age: 63
End: 2023-01-18
Payer: MEDICAID

## 2023-01-18 ENCOUNTER — DOCUMENTATION ONLY (OUTPATIENT)
Dept: HEMATOLOGY/ONCOLOGY | Facility: CLINIC | Age: 63
End: 2023-01-18
Payer: MEDICAID

## 2023-01-18 ENCOUNTER — PATIENT MESSAGE (OUTPATIENT)
Dept: SURGERY | Facility: CLINIC | Age: 63
End: 2023-01-18
Payer: MEDICAID

## 2023-01-18 DIAGNOSIS — C90.00 MULTIPLE MYELOMA NOT HAVING ACHIEVED REMISSION: Primary | ICD-10-CM

## 2023-01-18 NOTE — PROGRESS NOTES
[facility] LCSW followed up with Mile with [facility]. Mile reports that she can possibly schedule pt this this week if pt is not receiving bisphosphomate. LCSW provided Mile with clinical information via email. Mile is also requesting a call from a nurse confirming that pt has not been administered bisphosphomate. LCSW informed Thang Mims RN with [facility] of request.   LCSW informed Sofia Pyle with with Account payable that [facility] is reports that they did not receive the $276 [employer] assistance check that was sent via [place] mail to the dental office. Sofia reports that the check was not cashed and will send a new check to the dental office.   LCSW provided pt's son, Fahad Houston, with update regarding progress. Pt's son verbalized understanding.       Sofia Pyle -   - Accounts Payable - [facility] - [address] [place] [address]  [id number]; Phone:  [phone]; Fax:  [phone]  email:  [email or web address];   [email or web address]    [facility]- Phone: [phone]- Address: [address] [place] [address], [place] [address]    [facility]  Oncology Social Worker License # [id number]  [facility]  [email or web address]  P. [phone]; F. [phone]

## 2023-01-18 NOTE — TELEPHONE ENCOUNTER
Spoke with patient's son and scheduled Double Lumen BMT Line Placement with Dr. Monroy for 1/25/23.  Pre-Op instructions sent to her My Chart.  He verbalized understanding and is agreeable to this plan of care.

## 2023-01-18 NOTE — TELEPHONE ENCOUNTER
----- Message from Thang Mims RN sent at 1/17/2023  9:13 AM CST -----  Regarding: BMT Line Placement -Urgent - 1/23/23 or 1/24/23  Good morning Solange! Hope you had a good 3 day weekend! I apologize for the last minute request. Please let me know if surgery is unable to accommodate on either of these dates.  -Thang    Case Request    Case Request- (Auto Donor) Double-lumen Dialysis grade tunneled catheter without port    Requested Date- 1/23/23 or 1/24/23  Requested Time- AM    Diagnoses    Encounter for Autologous Stem Cell Transplant      Other considerations    Has patient had a prior iodine/contrast reaction? No    Is patient currently on a anticoagulant therapy? Yes; Instructed to hold ASPIRIN medication 5 days prior to procedure.    Is patient diabetic? no    Laterality  N/A- As per surgeon's discretion    Special Needs N/A    Any issues day of surgery [facility] can contact: BMT transplant coordinator: Thang Mims RN Ext: [phone] and BMT transplant physician: Dr. Janice Casas.

## 2023-01-20 ENCOUNTER — OFFICE VISIT (OUTPATIENT)
Dept: HEMATOLOGY/ONCOLOGY | Facility: CLINIC | Age: 63
End: 2023-01-20
Payer: MEDICAID

## 2023-01-20 VITALS
RESPIRATION RATE: 16 BRPM | DIASTOLIC BLOOD PRESSURE: 78 MMHG | BODY MASS INDEX: 35.5 KG/M2 | TEMPERATURE: 98 F | HEIGHT: 66 IN | WEIGHT: 220.88 LBS | HEART RATE: 90 BPM | SYSTOLIC BLOOD PRESSURE: 132 MMHG | OXYGEN SATURATION: 98 %

## 2023-01-20 DIAGNOSIS — Z01.818 PRE-TRANSPLANT EVALUATION FOR STEM CELL TRANSPLANT: ICD-10-CM

## 2023-01-20 DIAGNOSIS — Z74.09 IMPAIRED FUNCTIONAL MOBILITY, BALANCE, GAIT, AND ENDURANCE: ICD-10-CM

## 2023-01-20 DIAGNOSIS — C90.00 MULTIPLE MYELOMA NOT HAVING ACHIEVED REMISSION: Primary | ICD-10-CM

## 2023-01-20 PROCEDURE — 99215 OFFICE O/P EST HI 40 MIN: CPT | Mod: S$PBB,,, | Performed by: INTERNAL MEDICINE

## 2023-01-20 PROCEDURE — 99213 OFFICE O/P EST LOW 20 MIN: CPT | Mod: PBBFAC | Performed by: INTERNAL MEDICINE

## 2023-01-20 PROCEDURE — 99999 PR PBB SHADOW E&M-EST. PATIENT-LVL III: CPT | Mod: PBBFAC,,, | Performed by: INTERNAL MEDICINE

## 2023-01-20 PROCEDURE — 99215 PR OFFICE/OUTPT VISIT, EST, LEVL V, 40-54 MIN: ICD-10-PCS | Mod: S$PBB,,, | Performed by: INTERNAL MEDICINE

## 2023-01-20 PROCEDURE — 99999 PR PBB SHADOW E&M-EST. PATIENT-LVL III: ICD-10-PCS | Mod: PBBFAC,,, | Performed by: INTERNAL MEDICINE

## 2023-01-20 NOTE — PROGRESS NOTES
Hematology and Medical Oncology   Consent Signing     01/20/2023      Primary Oncologic Diagnosis: IgG Kappa Multiple Myeloma      History of Present Ilness:   Raquel Houston (Raquel) is a pleasant 62 y.o.female with medical history of OA, gout, and osteoporosis presenting for follow up of multiple myeloma. History as above. Arrived alone today. Continues to do very well and is having no issues tolerating therapy. Has not been able to set up dental appointment yet. Denies any complaints including neuropathy. Appetite is doing great. Does not need to use a cane any longer.        Smoked 1ppd for 20 years, quit April 2022  Quit alcohol Apirl 2022  Denies recreational drug use  Father has a history of a diffuse bone cancer that he did not want to be treated for    Treatment History:  07/14/21: Presented with several months of worsening lethargy, generalized pain, 40-60lbs weight loss, and encephalopathy. Labs at time of presentation: hgb 7.6, plts 82, wbc 12, sodium 120, creatinine 1.55, corrected calcium 9.7, transaminitis, M-spike 6.4 g/dL. Skeletal survey showed lesions of left/right humerus, left/right proximal radius, pelvis, and femurs with a significant left femur lytic lesion. Multiple lucent lesions in calvarium. Given two doses of velcade while hospitalized.  07/18/21: Underwent left trochanteric femoral nail fixation. Pathology consistent with plasma cell neoplasm  07/19/22: BMBx showing 90% plasma cells with 100% cellularity, gain 1q21, t(4;14), and monosomy 13.  08/17/22: Cycle 1 Sofie-VRD for IgG Kappa Multiple Myeloma  09/13/22: Cycle 2 Sofie-VRD  10/11/22: Cycle 3 Sofie-VRD  11/08/22: Cycle 4 Sofie-VRD  12/06/22: Cycle 5 VRD    Transplant Evaluation:  --Bone Marrow Biopsy on 12/19/22: Normocellular marrow (40-50% total cellularity) with <1% polytypic   plasma cells and trilineage hematopoiesis with mild granulocytic hyperplasia and mild relative monocytosis. No monotypic plasma cells detected by MRD  flow cytometric analysis   --Labs from 12/29/22:  Light Chains  Kappa: 1.48  Lambda: 0.59  K/L: 2.51  SPEP:  Borderline mildly decreased total protein. Normal gamma globulins are   decreased. Paraprotein peak in gamma = 0.1 g/dL (previously, 0.15   g/dL).   --Colonoscopy on 1/5/23:   --Mammogram on 1/6/23: Routine screening mammogram in 1 year is recommended.    --PET on 1/11/23: In the bones, there is heterogeneous bone marrow throughout the calvarium and to a lesser degree throughout the spine.  Compression fracture L5 with moderate to severe height loss progressed from outside radiograph 07/16/2022 and associated with mild hypermetabolic activity SUV max 3.8.      Interval History:   Ms. Fagan presents today with her son to discuss in detail her transplant evaluation and consent information.      PAST MEDICAL HISTORY:   Past Medical History:   Diagnosis Date    Arthritis     Multiple myeloma     Osteoporosis        PAST SURGICAL HISTORY:   Past Surgical History:   Procedure Laterality Date    COLONOSCOPY N/A 1/5/2023    Procedure: COLONOSCOPY;  Surgeon: Diana Huynh MD;  Location: [address];  Service: Endoscopy;  Laterality: N/A;  inst via email  pre call no answer-as    FEMUR SURGERY         PAST SOCIAL HISTORY:   reports that she has quit smoking. Her smoking use included cigarettes. She has quit using smokeless tobacco. She reports that she does not currently use alcohol. She reports that she does not use drugs.    FAMILY HISTORY:  Family History   Problem Relation Age of Onset    Cancer Father        CURRENT MEDICATIONS:   Current Outpatient Medications   Medication Sig    acyclovir (ZOVIRAX) 400 MG tablet Take 1 tablet (400 mg total) by mouth 2 (two) times daily.    amLODIPine (NORVASC) 10 MG tablet Take 1 tablet (10 mg total) by mouth once daily.    dexAMETHasone (DECADRON) 4 MG Tab Take 10 tablets (40 mg total) by mouth As instructed (Take on days 1, 8, 15, and 22 of  chemotherapy.).    oxyCODONE (ROXICODONE) 5 MG immediate release tablet Take 2 tablets (10 mg total) by mouth every 6 (six) hours as needed for Pain.    pantoprazole (PROTONIX) 40 MG tablet Take 1 tablet (40 mg total) by mouth once daily.    aspirin (CIERRA ASPIRIN) 325 MG tablet Take 1 tablet (325 mg total) by mouth once daily. (Patient not taking: Reported on 1/20/2023)    lenalidomide 25 mg Cap Take 1 capsule (25 mg total) by mouth As instructed (Take by mouth once daily on days 1-21 of each 28 day cycle). (Patient not taking: Reported on 1/20/2023.)     No current facility-administered medications for this visit.     ALLERGIES:   Review of patient's allergies indicates:  No Known Allergies      Review of Systems:     Review of Systems   Constitutional:  Positive for appetite change and fatigue. Negative for chills, diaphoresis, fever and unexpected weight change.   HENT:   Negative for hearing loss, mouth sores, nosebleeds, sore throat, trouble swallowing and voice change.    Eyes:  Negative for eye problems and icterus.   Respiratory:  Negative for chest tightness, cough, hemoptysis, shortness of breath and wheezing.    Cardiovascular:  Negative for chest pain, leg swelling and palpitations.   Gastrointestinal:  Negative for abdominal distention, abdominal pain, blood in stool, diarrhea, nausea and vomiting.   Endocrine: Negative for hot flashes.   Genitourinary:  Negative for bladder incontinence, difficulty urinating, dysuria and hematuria.    Musculoskeletal:  Negative for arthralgias, back pain, flank pain, gait problem, myalgias, neck pain and neck stiffness.   Skin:  Negative for itching, rash and wound.   Neurological:  Negative for dizziness, extremity weakness, gait problem, headaches, numbness, seizures and speech difficulty.   Hematological:  Negative for adenopathy. Does not bruise/bleed easily.   Psychiatric/Behavioral:  Negative for confusion, depression and sleep disturbance. The patient is  not nervous/anxious.         Physical Exam:     Vitals:    01/20/23 1346   BP: 132/78   Pulse: 90   Resp: 16   Temp: 98.3 °F (36.8 °C)     Physical Exam  Constitutional:       General: She is not in acute distress.     Appearance: She is well-developed. She is not diaphoretic.   HENT:      Head: Normocephalic and atraumatic.      Mouth/Throat:      Pharynx: No oropharyngeal exudate.   Eyes:      Conjunctiva/sclera: Conjunctivae normal.      Pupils: Pupils are equal, round, and reactive to light.   Neck:      Thyroid: No thyromegaly.      Vascular: No JVD.      Trachea: No tracheal deviation.   Cardiovascular:      Rate and Rhythm: Normal rate and regular rhythm.      Heart sounds: Normal heart sounds. No murmur heard.    No friction rub.   Pulmonary:      Effort: Pulmonary effort is normal. No respiratory distress.      Breath sounds: Normal breath sounds. No stridor. No wheezing or rales.   Chest:      Chest wall: No tenderness.   Abdominal:      General: Bowel sounds are normal. There is no distension.      Palpations: Abdomen is soft.      Tenderness: There is no abdominal tenderness. There is no guarding or rebound.   Musculoskeletal:         General: No tenderness or deformity. Normal range of motion.      Cervical back: Normal range of motion and neck supple.   Skin:     General: Skin is warm and dry.      Capillary Refill: Capillary refill takes less than 2 seconds.      Coloration: Skin is not pale.      Findings: No erythema or rash.   Neurological:      Mental Status: She is alert and oriented to person, place, and time.      Cranial Nerves: No cranial nerve deficit.      Sensory: No sensory deficit.      Motor: No abnormal muscle tone.      Coordination: Coordination normal.      Deep Tendon Reflexes: Reflexes normal.   Psychiatric:         Behavior: Behavior normal.         Thought Content: Thought content normal.         Judgment: Judgment normal.       ECOG Performance Status: (foot note - ECOG PS  provided by Eastern Cooperative Oncology Group) 1 - Symptomatic but completely ambulatory    Karnofsky Performance Score:  90%- Able to Carry on Normal Activity: Minor Symptoms of Disease    Labs:   Lab Results   Component Value Date    WBC 66.94 (HH) 01/26/2023    HGB 11.3 (L) 01/26/2023    HCT 36.2 (L) 01/26/2023     (L) 01/26/2023    ALT 18 01/26/2023    AST 23 01/26/2023     01/26/2023    K 3.5 01/26/2023     01/26/2023    CREATININE 0.9 01/26/2023    BUN 12 01/26/2023    CO2 21 (L) 01/26/2023    INR 1.0 01/25/2023         Imaging: Previous imaging has been reviewed     Assessment and Plan:     Ms. Houston is a pleasant 62 year old with Multiple Myeloma. She presents today with her son for transplant consent signing.      Multiple Myeloma  -- Complex cytogenetics with gain 1q21, t(4;14), and monosomy 13  -- Diffuse lytics lesions  -- M-spike 6.4g/dL  -- R-ISS stage III (elevated beta-2 microglobulin, elevated LDH, decreased albumin, and high risk cytogenetics)  -- Cycle 5 VRD today, completed 4 cycles of Daratumumab; consent obtained 08/16/22  -- Has had significant clinical improvement as well as reduction in IgG and M-spike showing excellent response  -- Has met with transplant coordinators. Transplant workup scheduled for end of December December with collection date 1/26 and plan for admission 2/6. Will hold treatment 6 weeks prior to collection date.  -- Will need bisphosphonate pending dental appointment, won't start until after transplant process is complete  -- Acyclovir 400mg BID, aspirin daily    Autologous Stem Cell Transplant Candidate  --Approved by the transplant selection committee for a G[address]  --Discussed transplant and risk including stem cell mobilization with GCSF and mozobil and risk of fever, arthralgias, and abdominal pain. Discussed central line placement and risk of bleeding or pain. Discussed stem cell collection and risk of hypotension, electrolyte changes, and  cytopenias. Discussed storage and DMSO and risk of DMSO reaction during infusion. Discussed chemotherapy and risk of alopecia, mucositis, diarrhea, fever, vital organ injury, sterility, heart failure, skin changes, anemias, bleeding, and secondary cancers such as skin cancer and leukemia. The risk of dying is less than 1%. We completed the chemotherapy consent, transfusion consent, storage agreement, and [facility] research consents.         60 minutes were spent face to face with the patient and her  family to discuss the disease, natural history, treatment options and survival statistics. I have provided the patient with an opportunity to ask questions and have all questions answered to her satisfaction.       she will return to clinic as per the transplant coordinator's schedule, but knows to call in the interim if symptoms change or should a problem arise.        Janice Casas MD  Hematology and Medical Oncology  Bone Marrow Transplant  [facility]      BMT Chart Routing      Follow up with physician Other. follow up as per Thang   Follow up with SERGIO    Provider visit type    Infusion scheduling note    Injection scheduling note    Labs    Imaging    Pharmacy appointment    Other referrals

## 2023-01-21 RX ORDER — DIPHENHYDRAMINE HCL 25 MG
25 CAPSULE ORAL ONCE AS NEEDED
Status: CANCELLED | OUTPATIENT
Start: 2023-01-24

## 2023-01-21 RX ORDER — POTASSIUM CHLORIDE 20 MEQ/1
40 TABLET, EXTENDED RELEASE ORAL ONCE AS NEEDED
Status: CANCELLED | OUTPATIENT
Start: 2023-01-25

## 2023-01-21 RX ORDER — SODIUM,POTASSIUM PHOSPHATES 280-250MG
2 POWDER IN PACKET (EA) ORAL ONCE AS NEEDED
Status: CANCELLED | OUTPATIENT
Start: 2023-01-22

## 2023-01-21 RX ORDER — LANOLIN ALCOHOL/MO/W.PET/CERES
800 CREAM (GRAM) TOPICAL ONCE AS NEEDED
Status: CANCELLED | OUTPATIENT
Start: 2023-01-23

## 2023-01-21 RX ORDER — POTASSIUM CHLORIDE 20 MEQ/1
40 TABLET, EXTENDED RELEASE ORAL ONCE AS NEEDED
Status: CANCELLED | OUTPATIENT
Start: 2023-01-24

## 2023-01-21 RX ORDER — ACETAMINOPHEN 325 MG/1
650 TABLET ORAL ONCE AS NEEDED
Status: CANCELLED | OUTPATIENT
Start: 2023-01-23

## 2023-01-21 RX ORDER — DIPHENHYDRAMINE HCL 25 MG
25 CAPSULE ORAL ONCE AS NEEDED
Status: CANCELLED | OUTPATIENT
Start: 2023-01-26

## 2023-01-21 RX ORDER — SODIUM,POTASSIUM PHOSPHATES 280-250MG
2 POWDER IN PACKET (EA) ORAL ONCE AS NEEDED
Status: CANCELLED | OUTPATIENT
Start: 2023-01-25

## 2023-01-21 RX ORDER — ACETAMINOPHEN 325 MG/1
650 TABLET ORAL ONCE AS NEEDED
Status: CANCELLED | OUTPATIENT
Start: 2023-01-27

## 2023-01-21 RX ORDER — DIPHENHYDRAMINE HCL 25 MG
25 CAPSULE ORAL ONCE AS NEEDED
Status: CANCELLED | OUTPATIENT
Start: 2023-01-28

## 2023-01-21 RX ORDER — ACETAMINOPHEN 325 MG/1
650 TABLET ORAL ONCE AS NEEDED
Status: CANCELLED | OUTPATIENT
Start: 2023-01-25

## 2023-01-21 RX ORDER — DIPHENHYDRAMINE HCL 25 MG
25 CAPSULE ORAL ONCE AS NEEDED
Status: CANCELLED | OUTPATIENT
Start: 2023-01-22

## 2023-01-21 RX ORDER — PLERIXAFOR 24 MG/1.2ML
0.24 SOLUTION SUBCUTANEOUS ONCE AS NEEDED
Status: CANCELLED | OUTPATIENT
Start: 2023-01-27 | End: 2034-06-25

## 2023-01-21 RX ORDER — LANOLIN ALCOHOL/MO/W.PET/CERES
800 CREAM (GRAM) TOPICAL ONCE AS NEEDED
Status: CANCELLED | OUTPATIENT
Start: 2023-01-25

## 2023-01-21 RX ORDER — LANOLIN ALCOHOL/MO/W.PET/CERES
800 CREAM (GRAM) TOPICAL ONCE AS NEEDED
Status: CANCELLED | OUTPATIENT
Start: 2023-01-27

## 2023-01-21 RX ORDER — POTASSIUM CHLORIDE 20 MEQ/1
40 TABLET, EXTENDED RELEASE ORAL ONCE AS NEEDED
Status: CANCELLED | OUTPATIENT
Start: 2023-01-23

## 2023-01-21 RX ORDER — LANOLIN ALCOHOL/MO/W.PET/CERES
800 CREAM (GRAM) TOPICAL ONCE AS NEEDED
Status: CANCELLED | OUTPATIENT
Start: 2023-01-26

## 2023-01-21 RX ORDER — SODIUM,POTASSIUM PHOSPHATES 280-250MG
2 POWDER IN PACKET (EA) ORAL ONCE AS NEEDED
Status: CANCELLED | OUTPATIENT
Start: 2023-01-23

## 2023-01-21 RX ORDER — SODIUM,POTASSIUM PHOSPHATES 280-250MG
2 POWDER IN PACKET (EA) ORAL ONCE AS NEEDED
Status: CANCELLED | OUTPATIENT
Start: 2023-01-26

## 2023-01-21 RX ORDER — LANOLIN ALCOHOL/MO/W.PET/CERES
800 CREAM (GRAM) TOPICAL ONCE AS NEEDED
Status: CANCELLED | OUTPATIENT
Start: 2023-01-22

## 2023-01-21 RX ORDER — DIPHENHYDRAMINE HCL 25 MG
25 CAPSULE ORAL ONCE AS NEEDED
Status: CANCELLED | OUTPATIENT
Start: 2023-01-27

## 2023-01-21 RX ORDER — DIPHENHYDRAMINE HCL 25 MG
25 CAPSULE ORAL ONCE AS NEEDED
Status: CANCELLED | OUTPATIENT
Start: 2023-01-23

## 2023-01-21 RX ORDER — PLERIXAFOR 24 MG/1.2ML
0.24 SOLUTION SUBCUTANEOUS ONCE AS NEEDED
Status: CANCELLED | OUTPATIENT
Start: 2023-01-28 | End: 2034-06-26

## 2023-01-21 RX ORDER — POTASSIUM CHLORIDE 20 MEQ/1
40 TABLET, EXTENDED RELEASE ORAL ONCE AS NEEDED
Status: CANCELLED | OUTPATIENT
Start: 2023-01-27

## 2023-01-21 RX ORDER — ACETAMINOPHEN 325 MG/1
650 TABLET ORAL ONCE AS NEEDED
Status: CANCELLED | OUTPATIENT
Start: 2023-01-24

## 2023-01-21 RX ORDER — LANOLIN ALCOHOL/MO/W.PET/CERES
800 CREAM (GRAM) TOPICAL ONCE AS NEEDED
Status: CANCELLED | OUTPATIENT
Start: 2023-01-24

## 2023-01-21 RX ORDER — POTASSIUM CHLORIDE 20 MEQ/1
40 TABLET, EXTENDED RELEASE ORAL ONCE AS NEEDED
Status: CANCELLED | OUTPATIENT
Start: 2023-01-22

## 2023-01-21 RX ORDER — PLERIXAFOR 24 MG/1.2ML
0.24 SOLUTION SUBCUTANEOUS ONCE AS NEEDED
Status: CANCELLED | OUTPATIENT
Start: 2023-01-25 | End: 2034-06-23

## 2023-01-21 RX ORDER — LANOLIN ALCOHOL/MO/W.PET/CERES
800 CREAM (GRAM) TOPICAL ONCE AS NEEDED
Status: CANCELLED | OUTPATIENT
Start: 2023-01-28

## 2023-01-21 RX ORDER — PLERIXAFOR 24 MG/1.2ML
0.24 SOLUTION SUBCUTANEOUS ONCE AS NEEDED
Status: CANCELLED | OUTPATIENT
Start: 2023-01-26 | End: 2034-06-24

## 2023-01-21 RX ORDER — SODIUM,POTASSIUM PHOSPHATES 280-250MG
2 POWDER IN PACKET (EA) ORAL ONCE AS NEEDED
Status: CANCELLED | OUTPATIENT
Start: 2023-01-24

## 2023-01-21 RX ORDER — POTASSIUM CHLORIDE 20 MEQ/1
40 TABLET, EXTENDED RELEASE ORAL ONCE AS NEEDED
Status: CANCELLED | OUTPATIENT
Start: 2023-01-28

## 2023-01-21 RX ORDER — SODIUM,POTASSIUM PHOSPHATES 280-250MG
2 POWDER IN PACKET (EA) ORAL ONCE AS NEEDED
Status: CANCELLED | OUTPATIENT
Start: 2023-01-27

## 2023-01-21 RX ORDER — SODIUM,POTASSIUM PHOSPHATES 280-250MG
2 POWDER IN PACKET (EA) ORAL ONCE AS NEEDED
Status: CANCELLED | OUTPATIENT
Start: 2023-01-28

## 2023-01-21 RX ORDER — ACETAMINOPHEN 325 MG/1
650 TABLET ORAL ONCE AS NEEDED
Status: CANCELLED | OUTPATIENT
Start: 2023-01-26

## 2023-01-21 RX ORDER — POTASSIUM CHLORIDE 20 MEQ/1
40 TABLET, EXTENDED RELEASE ORAL ONCE AS NEEDED
Status: CANCELLED | OUTPATIENT
Start: 2023-01-26

## 2023-01-21 RX ORDER — DIPHENHYDRAMINE HCL 25 MG
25 CAPSULE ORAL ONCE AS NEEDED
Status: CANCELLED | OUTPATIENT
Start: 2023-01-25

## 2023-01-21 RX ORDER — ACETAMINOPHEN 325 MG/1
650 TABLET ORAL ONCE AS NEEDED
Status: CANCELLED | OUTPATIENT
Start: 2023-01-28

## 2023-01-21 RX ORDER — ACETAMINOPHEN 325 MG/1
650 TABLET ORAL ONCE AS NEEDED
Status: CANCELLED | OUTPATIENT
Start: 2023-01-22

## 2023-01-22 ENCOUNTER — INFUSION (OUTPATIENT)
Dept: INFUSION THERAPY | Facility: HOSPITAL | Age: 63
End: 2023-01-22
Payer: MEDICAID

## 2023-01-22 ENCOUNTER — DOCUMENTATION ONLY (OUTPATIENT)
Dept: ONCOLOGY | Facility: HOSPITAL | Age: 63
End: 2023-01-22
Payer: MEDICAID

## 2023-01-22 ENCOUNTER — DOCUMENTATION ONLY (OUTPATIENT)
Dept: HEMATOLOGY/ONCOLOGY | Facility: CLINIC | Age: 63
End: 2023-01-22
Payer: MEDICAID

## 2023-01-22 DIAGNOSIS — C90.00 MULTIPLE MYELOMA NOT HAVING ACHIEVED REMISSION: Primary | ICD-10-CM

## 2023-01-22 PROCEDURE — 63600175 PHARM REV CODE 636 W HCPCS: Mod: JG | Performed by: INTERNAL MEDICINE

## 2023-01-22 PROCEDURE — 96372 THER/PROPH/DIAG INJ SC/IM: CPT

## 2023-01-22 RX ADMIN — FILGRASTIM 960 MCG: 480 INJECTION, SOLUTION INTRAVENOUS; SUBCUTANEOUS at 08:01

## 2023-01-22 NOTE — PROGRESS NOTES
Secure Chat received re: patient needing [facility] for stem cell collection. Contacted [facility] and spoke with Jaye, who informs that she is working to accommodate patient with stay. Pt. Will need reservation from 01/22/23 to 01/28/23. Emailed referral to [facility] and am awaiting confirmation. Spoke with Pts. Son Fahad/patient and informed of the above. They are in agreement with the plan. Will follow.

## 2023-01-23 ENCOUNTER — INFUSION (OUTPATIENT)
Dept: INFUSION THERAPY | Facility: HOSPITAL | Age: 63
End: 2023-01-23
Payer: MEDICAID

## 2023-01-23 DIAGNOSIS — C90.00 MULTIPLE MYELOMA NOT HAVING ACHIEVED REMISSION: Primary | ICD-10-CM

## 2023-01-23 PROCEDURE — 96372 THER/PROPH/DIAG INJ SC/IM: CPT

## 2023-01-23 PROCEDURE — 63600175 PHARM REV CODE 636 W HCPCS: Mod: JG | Performed by: INTERNAL MEDICINE

## 2023-01-23 RX ADMIN — FILGRASTIM 960 MCG: 480 INJECTION, SOLUTION INTRAVENOUS; SUBCUTANEOUS at 09:01

## 2023-01-23 NOTE — PROGRESS NOTES
[facility] LCSW received phone call from pt's son, Fahad Houston, and [facility] , SUDHEER Fahad is requesting Lodging starting on Sunday, Jan 22, 2023 to Saturday, January 28, 2023. LCSW completed request and submitted form to the [facility] via email.   Fahad reports that pt was able to go to her dentist appointment on Thursday, January 19, 2023 and was medically cleared for transplant. LCSW will follow and assist.    Jaclyn Byrd, [facility]  Oncology Social Worker License # [id number]  [facility]  [email or web address]  P. [phone]; F. [phone]

## 2023-01-23 NOTE — NURSING
Pt here for Neupogen injections. Administered injections in abdominal tissue. No questions or concerns. Pt ambulated out of unit unassisted.

## 2023-01-24 ENCOUNTER — TELEPHONE (OUTPATIENT)
Dept: SURGERY | Facility: CLINIC | Age: 63
End: 2023-01-24
Payer: MEDICAID

## 2023-01-24 ENCOUNTER — INFUSION (OUTPATIENT)
Dept: INFUSION THERAPY | Facility: HOSPITAL | Age: 63
End: 2023-01-24
Payer: MEDICAID

## 2023-01-24 DIAGNOSIS — C90.00 MULTIPLE MYELOMA NOT HAVING ACHIEVED REMISSION: Primary | ICD-10-CM

## 2023-01-24 PROCEDURE — 96372 THER/PROPH/DIAG INJ SC/IM: CPT

## 2023-01-24 PROCEDURE — 63600175 PHARM REV CODE 636 W HCPCS: Mod: JG | Performed by: INTERNAL MEDICINE

## 2023-01-24 RX ADMIN — FILGRASTIM 960 MCG: 480 INJECTION, SOLUTION INTRAVENOUS; SUBCUTANEOUS at 08:01

## 2023-01-24 NOTE — PRE-PROCEDURE INSTRUCTIONS
PreOp Instructions given: to pt's Fahad Houston (Son)   [phone] - Verbal medication information (what to hold and what to take)   - NPO guidelines   - Arrival place directions given; time to be given the day before procedure by the   Surgeon's Office 0615 DOS  - Bathing with antibacterial soap   - Don't wear any jewelry or bring any valuables AM of surgery   - No makeup or moisturizer to face   - No perfume/cologne, powder, lotions or aftershave   Pt. verbalized understanding.   Pt denies any h/o Anesthesia/Sedation complications or side effects.

## 2023-01-25 ENCOUNTER — ANESTHESIA (OUTPATIENT)
Dept: SURGERY | Facility: HOSPITAL | Age: 63
End: 2023-01-25
Payer: MEDICAID

## 2023-01-25 ENCOUNTER — ANESTHESIA EVENT (OUTPATIENT)
Dept: SURGERY | Facility: HOSPITAL | Age: 63
End: 2023-01-25
Payer: MEDICAID

## 2023-01-25 ENCOUNTER — HOSPITAL ENCOUNTER (OUTPATIENT)
Facility: HOSPITAL | Age: 63
Discharge: HOME | End: 2023-01-25
Attending: STUDENT IN AN ORGANIZED HEALTH CARE EDUCATION/TRAINING PROGRAM | Admitting: STUDENT IN AN ORGANIZED HEALTH CARE EDUCATION/TRAINING PROGRAM
Payer: MEDICAID

## 2023-01-25 ENCOUNTER — INFUSION (OUTPATIENT)
Dept: INFUSION THERAPY | Facility: HOSPITAL | Age: 63
End: 2023-01-25
Payer: MEDICAID

## 2023-01-25 VITALS
TEMPERATURE: 98 F | DIASTOLIC BLOOD PRESSURE: 84 MMHG | OXYGEN SATURATION: 96 % | SYSTOLIC BLOOD PRESSURE: 128 MMHG | HEART RATE: 71 BPM | WEIGHT: 218.69 LBS | HEIGHT: 66 IN | BODY MASS INDEX: 35.15 KG/M2 | RESPIRATION RATE: 16 BRPM

## 2023-01-25 DIAGNOSIS — C90.00 MULTIPLE MYELOMA NOT HAVING ACHIEVED REMISSION: Primary | ICD-10-CM

## 2023-01-25 DIAGNOSIS — Z01.818 PRE-TRANSPLANT EVALUATION FOR STEM CELL TRANSPLANT: ICD-10-CM

## 2023-01-25 LAB
ABO + RH BLD: ABNORMAL
ALBUMIN SERPL BCP-MCNC: 3.9 G/DL (ref 3.5–5.2)
ALP SERPL-CCNC: 297 U/L (ref 55–135)
ALT SERPL W/O P-5'-P-CCNC: 18 U/L (ref 10–44)
ANION GAP SERPL CALC-SCNC: 13 MMOL/L (ref 8–16)
APTT BLDCRRT: 22.3 SEC (ref 21–32)
AST SERPL-CCNC: 19 U/L (ref 10–40)
BASOPHILS # BLD AUTO: ABNORMAL K/UL (ref 0–0.2)
BASOPHILS NFR BLD: 0 % (ref 0–1.9)
BILIRUB SERPL-MCNC: 0.4 MG/DL (ref 0.1–1)
BLD GP AB SCN CELLS X3 SERPL QL: ABNORMAL
BLOOD GROUP ANTIBODIES SERPL: NORMAL
BUN SERPL-MCNC: 12 MG/DL (ref 6–20)
CA-I BLDV-SCNC: 1.23 MMOL/L (ref 1.06–1.42)
CALCIUM SERPL-MCNC: 9.2 MG/DL (ref 8.7–10.5)
CD34 %: 0.09 %
CD34 ABSOLUTE: 60.41 CELLS/UL
CD34 VIABILITY: 99.2 %
CHLORIDE SERPL-SCNC: 105 MMOL/L (ref 95–110)
CO2 SERPL-SCNC: 23 MMOL/L (ref 23–29)
CREAT SERPL-MCNC: 0.9 MG/DL (ref 0.5–1.4)
DAT IGG-SP REAG RBC-IMP: NORMAL
DIFFERENTIAL METHOD: ABNORMAL
EOSINOPHIL # BLD AUTO: ABNORMAL K/UL (ref 0–0.5)
EOSINOPHIL NFR BLD: 0 % (ref 0–8)
ERYTHROCYTE [DISTWIDTH] IN BLOOD BY AUTOMATED COUNT: 15.9 % (ref 11.5–14.5)
EST. GFR  (NO RACE VARIABLE): >60 ML/MIN/1.73 M^2
GLUCOSE SERPL-MCNC: 95 MG/DL (ref 70–110)
HCT VFR BLD AUTO: 38.8 % (ref 37–48.5)
HGB BLD-MCNC: 11.8 G/DL (ref 12–16)
IMM GRANULOCYTES # BLD AUTO: ABNORMAL K/UL (ref 0–0.04)
IMM GRANULOCYTES NFR BLD AUTO: ABNORMAL % (ref 0–0.5)
INR PPP: 1 (ref 0.8–1.2)
LYMPHOCYTES # BLD AUTO: ABNORMAL K/UL (ref 1–4.8)
LYMPHOCYTES NFR BLD: 6 % (ref 18–48)
MAGNESIUM SERPL-MCNC: 1.9 MG/DL (ref 1.6–2.6)
MCH RBC QN AUTO: 27.1 PG (ref 27–31)
MCHC RBC AUTO-ENTMCNC: 30.4 G/DL (ref 32–36)
MCV RBC AUTO: 89 FL (ref 82–98)
MONOCYTES # BLD AUTO: ABNORMAL K/UL (ref 0.3–1)
MONOCYTES NFR BLD: 4 % (ref 4–15)
NEUTROPHILS NFR BLD: 76 % (ref 38–73)
NEUTS BAND NFR BLD MANUAL: 13 %
NRBC BLD-RTO: 0 /100 WBC
PHOSPHATE SERPL-MCNC: 2.9 MG/DL (ref 2.7–4.5)
PLATELET # BLD AUTO: 155 K/UL (ref 150–450)
PLATELET BLD QL SMEAR: ABNORMAL
PMV BLD AUTO: 11.6 FL (ref 9.2–12.9)
POTASSIUM SERPL-SCNC: 3.8 MMOL/L (ref 3.5–5.1)
PROMYELOCYTES NFR BLD MANUAL: 1 %
PROT SERPL-MCNC: 6.9 G/DL (ref 6–8.4)
PROTHROMBIN TIME: 10.8 SEC (ref 9–12.5)
RBC # BLD AUTO: 4.36 M/UL (ref 4–5.4)
SODIUM SERPL-SCNC: 141 MMOL/L (ref 136–145)
WBC # BLD AUTO: 61.68 K/UL (ref 3.9–12.7)

## 2023-01-25 PROCEDURE — 85007 BL SMEAR W/DIFF WBC COUNT: CPT | Performed by: STUDENT IN AN ORGANIZED HEALTH CARE EDUCATION/TRAINING PROGRAM

## 2023-01-25 PROCEDURE — 37000008 HC ANESTHESIA 1ST 15 MINUTES: Performed by: STUDENT IN AN ORGANIZED HEALTH CARE EDUCATION/TRAINING PROGRAM

## 2023-01-25 PROCEDURE — D9220A PRA ANESTHESIA: ICD-10-PCS | Mod: CRNA,,, | Performed by: NURSE ANESTHETIST, CERTIFIED REGISTERED

## 2023-01-25 PROCEDURE — 63600175 PHARM REV CODE 636 W HCPCS: Performed by: NURSE ANESTHETIST, CERTIFIED REGISTERED

## 2023-01-25 PROCEDURE — 77001 CHG FLUOROGUIDE CNTRL VEN ACCESS,PLACE,REPLACE,REMOVE: ICD-10-PCS | Mod: 26,,, | Performed by: STUDENT IN AN ORGANIZED HEALTH CARE EDUCATION/TRAINING PROGRAM

## 2023-01-25 PROCEDURE — 80053 COMPREHEN METABOLIC PANEL: CPT | Performed by: STUDENT IN AN ORGANIZED HEALTH CARE EDUCATION/TRAINING PROGRAM

## 2023-01-25 PROCEDURE — 86900 BLOOD TYPING SEROLOGIC ABO: CPT | Performed by: STUDENT IN AN ORGANIZED HEALTH CARE EDUCATION/TRAINING PROGRAM

## 2023-01-25 PROCEDURE — C1750 CATH, HEMODIALYSIS,LONG-TERM: HCPCS | Performed by: STUDENT IN AN ORGANIZED HEALTH CARE EDUCATION/TRAINING PROGRAM

## 2023-01-25 PROCEDURE — 86870 RBC ANTIBODY IDENTIFICATION: CPT | Performed by: STUDENT IN AN ORGANIZED HEALTH CARE EDUCATION/TRAINING PROGRAM

## 2023-01-25 PROCEDURE — 96372 THER/PROPH/DIAG INJ SC/IM: CPT | Mod: 59

## 2023-01-25 PROCEDURE — D9220A PRA ANESTHESIA: ICD-10-PCS | Mod: ANES,,, | Performed by: ANESTHESIOLOGY

## 2023-01-25 PROCEDURE — 36000706: Performed by: STUDENT IN AN ORGANIZED HEALTH CARE EDUCATION/TRAINING PROGRAM

## 2023-01-25 PROCEDURE — 86367 STEM CELLS TOTAL COUNT: CPT | Performed by: STUDENT IN AN ORGANIZED HEALTH CARE EDUCATION/TRAINING PROGRAM

## 2023-01-25 PROCEDURE — 63600175 PHARM REV CODE 636 W HCPCS: Performed by: STUDENT IN AN ORGANIZED HEALTH CARE EDUCATION/TRAINING PROGRAM

## 2023-01-25 PROCEDURE — 25000003 PHARM REV CODE 250: Performed by: STUDENT IN AN ORGANIZED HEALTH CARE EDUCATION/TRAINING PROGRAM

## 2023-01-25 PROCEDURE — 86880 COOMBS TEST DIRECT: CPT | Performed by: STUDENT IN AN ORGANIZED HEALTH CARE EDUCATION/TRAINING PROGRAM

## 2023-01-25 PROCEDURE — 85610 PROTHROMBIN TIME: CPT | Performed by: STUDENT IN AN ORGANIZED HEALTH CARE EDUCATION/TRAINING PROGRAM

## 2023-01-25 PROCEDURE — 36558 PR INSERT TUNNELED CV CATH W/O PORT OR PUMP: ICD-10-PCS | Mod: RT,,, | Performed by: STUDENT IN AN ORGANIZED HEALTH CARE EDUCATION/TRAINING PROGRAM

## 2023-01-25 PROCEDURE — 83735 ASSAY OF MAGNESIUM: CPT | Performed by: STUDENT IN AN ORGANIZED HEALTH CARE EDUCATION/TRAINING PROGRAM

## 2023-01-25 PROCEDURE — 71000044 HC DOSC ROUTINE RECOVERY FIRST HOUR: Performed by: STUDENT IN AN ORGANIZED HEALTH CARE EDUCATION/TRAINING PROGRAM

## 2023-01-25 PROCEDURE — 71000015 HC POSTOP RECOV 1ST HR: Performed by: STUDENT IN AN ORGANIZED HEALTH CARE EDUCATION/TRAINING PROGRAM

## 2023-01-25 PROCEDURE — 82330 ASSAY OF CALCIUM: CPT | Performed by: STUDENT IN AN ORGANIZED HEALTH CARE EDUCATION/TRAINING PROGRAM

## 2023-01-25 PROCEDURE — 63600175 PHARM REV CODE 636 W HCPCS: Mod: JG | Performed by: INTERNAL MEDICINE

## 2023-01-25 PROCEDURE — 84100 ASSAY OF PHOSPHORUS: CPT | Performed by: STUDENT IN AN ORGANIZED HEALTH CARE EDUCATION/TRAINING PROGRAM

## 2023-01-25 PROCEDURE — 85027 COMPLETE CBC AUTOMATED: CPT | Performed by: STUDENT IN AN ORGANIZED HEALTH CARE EDUCATION/TRAINING PROGRAM

## 2023-01-25 PROCEDURE — D9220A PRA ANESTHESIA: Mod: CRNA,,, | Performed by: NURSE ANESTHETIST, CERTIFIED REGISTERED

## 2023-01-25 PROCEDURE — 00532 ANES ACCESS CTR VENOUS CRCJ: CPT | Performed by: STUDENT IN AN ORGANIZED HEALTH CARE EDUCATION/TRAINING PROGRAM

## 2023-01-25 PROCEDURE — 37000009 HC ANESTHESIA EA ADD 15 MINS: Performed by: STUDENT IN AN ORGANIZED HEALTH CARE EDUCATION/TRAINING PROGRAM

## 2023-01-25 PROCEDURE — 85730 THROMBOPLASTIN TIME PARTIAL: CPT | Performed by: STUDENT IN AN ORGANIZED HEALTH CARE EDUCATION/TRAINING PROGRAM

## 2023-01-25 PROCEDURE — 36000707: Performed by: STUDENT IN AN ORGANIZED HEALTH CARE EDUCATION/TRAINING PROGRAM

## 2023-01-25 PROCEDURE — 77001 FLUOROGUIDE FOR VEIN DEVICE: CPT | Mod: 26,,, | Performed by: STUDENT IN AN ORGANIZED HEALTH CARE EDUCATION/TRAINING PROGRAM

## 2023-01-25 PROCEDURE — 36558 INSERT TUNNELED CV CATH: CPT | Mod: RT,,, | Performed by: STUDENT IN AN ORGANIZED HEALTH CARE EDUCATION/TRAINING PROGRAM

## 2023-01-25 PROCEDURE — 25000003 PHARM REV CODE 250: Performed by: NURSE ANESTHETIST, CERTIFIED REGISTERED

## 2023-01-25 PROCEDURE — D9220A PRA ANESTHESIA: Mod: ANES,,, | Performed by: ANESTHESIOLOGY

## 2023-01-25 DEVICE — CATH HEMOSPLIT DL 14.5FR 24CM: Type: IMPLANTABLE DEVICE | Site: CHEST | Status: FUNCTIONAL

## 2023-01-25 RX ORDER — FENTANYL CITRATE 50 UG/ML
25 INJECTION, SOLUTION INTRAMUSCULAR; INTRAVENOUS EVERY 5 MIN PRN
Status: DISCONTINUED | OUTPATIENT
Start: 2023-01-25 | End: 2023-01-25 | Stop reason: HOSPADM

## 2023-01-25 RX ORDER — PROCHLORPERAZINE EDISYLATE 5 MG/ML
5 INJECTION INTRAMUSCULAR; INTRAVENOUS EVERY 30 MIN PRN
Status: DISCONTINUED | OUTPATIENT
Start: 2023-01-25 | End: 2023-01-25 | Stop reason: HOSPADM

## 2023-01-25 RX ORDER — SODIUM CHLORIDE 0.9 % (FLUSH) 0.9 %
3 SYRINGE (ML) INJECTION
Status: DISCONTINUED | OUTPATIENT
Start: 2023-01-25 | End: 2023-01-25 | Stop reason: HOSPADM

## 2023-01-25 RX ORDER — HEPARIN 100 UNIT/ML
SYRINGE INTRAVENOUS
Status: DISCONTINUED | OUTPATIENT
Start: 2023-01-25 | End: 2023-01-25 | Stop reason: HOSPADM

## 2023-01-25 RX ORDER — LIDOCAINE HYDROCHLORIDE 20 MG/ML
INJECTION INTRAVENOUS
Status: DISCONTINUED | OUTPATIENT
Start: 2023-01-25 | End: 2023-01-25

## 2023-01-25 RX ORDER — ONDANSETRON 2 MG/ML
INJECTION INTRAMUSCULAR; INTRAVENOUS
Status: DISCONTINUED | OUTPATIENT
Start: 2023-01-25 | End: 2023-01-25

## 2023-01-25 RX ORDER — SODIUM CHLORIDE 9 MG/ML
INJECTION, SOLUTION INTRAVENOUS CONTINUOUS
Status: DISCONTINUED | OUTPATIENT
Start: 2023-01-25 | End: 2023-01-25 | Stop reason: HOSPADM

## 2023-01-25 RX ORDER — PROPOFOL 10 MG/ML
VIAL (ML) INTRAVENOUS CONTINUOUS PRN
Status: DISCONTINUED | OUTPATIENT
Start: 2023-01-25 | End: 2023-01-25

## 2023-01-25 RX ORDER — BUPIVACAINE HYDROCHLORIDE 2.5 MG/ML
INJECTION, SOLUTION EPIDURAL; INFILTRATION; INTRACAUDAL
Status: DISCONTINUED | OUTPATIENT
Start: 2023-01-25 | End: 2023-01-25 | Stop reason: HOSPADM

## 2023-01-25 RX ORDER — FENTANYL CITRATE 50 UG/ML
INJECTION, SOLUTION INTRAMUSCULAR; INTRAVENOUS
Status: DISCONTINUED | OUTPATIENT
Start: 2023-01-25 | End: 2023-01-25

## 2023-01-25 RX ORDER — MIDAZOLAM HYDROCHLORIDE 1 MG/ML
INJECTION, SOLUTION INTRAMUSCULAR; INTRAVENOUS
Status: DISCONTINUED | OUTPATIENT
Start: 2023-01-25 | End: 2023-01-25

## 2023-01-25 RX ADMIN — ONDANSETRON 4 MG: 2 INJECTION INTRAMUSCULAR; INTRAVENOUS at 08:01

## 2023-01-25 RX ADMIN — FENTANYL CITRATE 50 MCG: 50 INJECTION, SOLUTION INTRAMUSCULAR; INTRAVENOUS at 08:01

## 2023-01-25 RX ADMIN — LIDOCAINE HYDROCHLORIDE 20 MG: 20 INJECTION INTRAVENOUS at 08:01

## 2023-01-25 RX ADMIN — FILGRASTIM 960 MCG: 480 INJECTION, SOLUTION INTRAVENOUS; SUBCUTANEOUS at 10:01

## 2023-01-25 RX ADMIN — FENTANYL CITRATE 50 MCG: 50 INJECTION, SOLUTION INTRAMUSCULAR; INTRAVENOUS at 07:01

## 2023-01-25 RX ADMIN — MIDAZOLAM HYDROCHLORIDE 2 MG: 1 INJECTION, SOLUTION INTRAMUSCULAR; INTRAVENOUS at 07:01

## 2023-01-25 RX ADMIN — PROPOFOL 75 MCG/KG/MIN: 10 INJECTION, EMULSION INTRAVENOUS at 08:01

## 2023-01-25 RX ADMIN — SODIUM CHLORIDE: 0.9 INJECTION, SOLUTION INTRAVENOUS at 07:01

## 2023-01-25 RX ADMIN — CEFAZOLIN 2 G: 2 INJECTION, POWDER, FOR SOLUTION INTRAMUSCULAR; INTRAVENOUS at 07:01

## 2023-01-25 NOTE — OP NOTE
[facility]  General Surgery  Operative Note    DATE: 01/25/2023    PREOPERATIVE DIAGNOSIS: Final diagnoses:  [C90.00] Multiple myeloma not having achieved remission (Primary)    POSTOPERATIVE DIAGNOSIS: Final diagnoses:  [C90.00] Multiple myeloma not having achieved remission (Primary)    PROCEDURE PERFORMED: Right internal jugular vein permacath placement under ultrasound and fluoroscopic guidance     ATTENDING SURGEON: Steven Monroy M.D.     HOUSESTAFF SURGEON: Ariel Young M.D. (PGY-1)     ANESTHESIA: Monitored anesthesia care plus local USING 0.25% bupivacaine.     ESTIMATED BLOOD LOSS: 10 mL     FINDINGS: A right internal jugular vein permacath placed with tip at junction of superior vena cava and right atrium.     SPECIMEN: None.     DRAINS: None.     COMPLICATIONS: None.     INDICATIONS: Raquel Houston is a 62 y.o. woman diagnosed with multiple myeloma not having achieved remission. General Surgery was consulted for dual lumen, large bore tunneled catheter placement for stem cell transplant. We recommended a right vs left internal jugular vein placement and the patient agreed to proceed. The patient did sign informed consent and expressed understanding of the risks and benefits of surgery.     OPERATIVE PROCEDURE: The patient was identified in Preoperative Holding, brought back to the Operating Room, and placed supine on the operating table and padded appropriately. Monitors were applied. Monitored anesthesia care was initiated. Both sides of the neck and chest was prepped and draped in the standard sterile surgical fashion. A timeout was performed and all team members present agreed this was the correct procedure on the correct patient. We also confirmed administration of appropriate preoperative antibiotics.     After administration of appropriate local anesthesia, the right internal jugular vein was cannulated on the first pass with a hollow-bore needle. A  guidewire was placed and confirmed to be in correct position by fluoroscopy. An appropriate area for the exit site was chosen, and the area was anesthetized with lidocaine. A small transverse skin incision was made. The access site was enlarged by making a small skin neck and subcutaneous tissues were dissected using a hemostat. The permacath was tunneled from the incision to the cannulation site with the tunneling device. The access site was then sequentially dilated using the dilators in the kit. Under fluoroscopic guidance, the split sheath and dilator were placed over the guidewire, and the guidewire and dilator were then removed. The catheter was placed down the split sheath and the sheath was split and peeled away. Fluoroscopy confirmed appropriate position of the catheter, which aspirated and flushed easily. Heparinized saline was instilled in the catheter. The catheter was secured to the skin using a 2-0 prolene suture. The cannulation incision was closed with a buried interrupted 4-0 Monocryl stitch. A sterile dressing was applied. The patient was transported to the Recovery Room in stable condition. All sponge, instrument and needle counts were correct at the end of the procedure. I was present and scrubbed for the entire case.     Patient tolerated the procedure well. A chest x-ray will be performed in the recovery room.

## 2023-01-25 NOTE — ANESTHESIA PREPROCEDURE EVALUATION
01/25/2023  Raquel Houston is a 62 y.o., female.  Pre-operative evaluation for Procedure(s) (LRB):  INSERTION, CATHETER, HEMODIALYSIS, DUAL LUMEN Bard 14.5 Fr Hemosplit Catheter Model [id number], Right Possible Left Chest (Right)    Raquel Houston is a 62 y.o. female     Patient Active Problem List   Diagnosis    Multiple myeloma not having achieved remission    Impaired functional mobility, balance, gait, and endurance    Weakness of both lower extremities    Balance problem    Pre-transplant evaluation for stem cell transplant       Review of patient's allergies indicates:  No Known Allergies    No current facility-administered medications on file prior to encounter.     Current Outpatient Medications on File Prior to Encounter   Medication Sig Dispense Refill    amLODIPine (NORVASC) 10 MG tablet Take 1 tablet (10 mg total) by mouth once daily. 30 tablet 11    pantoprazole (PROTONIX) 40 MG tablet Take 1 tablet (40 mg total) by mouth once daily. 30 tablet 3    acyclovir (ZOVIRAX) 400 MG tablet Take 1 tablet (400 mg total) by mouth 2 (two) times daily. 60 tablet 11    aspirin (CIERRA ASPIRIN) 325 MG tablet Take 1 tablet (325 mg total) by mouth once daily. (Patient not taking: Reported on 1/20/2023) 100 tablet 3    dexAMETHasone (DECADRON) 4 MG Tab Take 10 tablets (40 mg total) by mouth As instructed (Take on days 1, 8, 15, and 22 of chemotherapy.). 40 tablet 1    lenalidomide 25 mg Cap Take 1 capsule (25 mg total) by mouth As instructed (Take by mouth once daily on days 1-21 of each 28 day cycle). (Patient not taking: Reported on 1/20/2023.) 21 capsule 0    oxyCODONE (ROXICODONE) 5 MG immediate release tablet Take 2 tablets (10 mg total) by mouth every 6 (six) hours as needed for Pain. (Patient not taking: Reported on 1/24/2023) 56 tablet 0       Past Surgical History:   Procedure  Laterality Date    COLONOSCOPY N/A 2023    Procedure: COLONOSCOPY;  Surgeon: Diana Huynh MD;  Location: [facility] ([address] [place] [address]);  Service: Endoscopy;  Laterality: N/A;  inst via email  pre call no answer-as    FEMUR SURGERY         Social History     Socioeconomic History    Marital status: Single    Number of children: 4   Tobacco Use    Smoking status: Former     Types: Cigarettes    Smokeless tobacco: Former    Tobacco comments:     Quit 2022   Substance and Sexual Activity    Alcohol use: Not Currently     Comment: Quit 2022    Drug use: Never    Sexual activity: Not Currently     Partners: Male   Social History Narrative    From [place]    Son lives in [place]    4 children, 12 grandchildren         CBC: No results for input(s): WBC, RBC, HGB, HCT, PLT, MCV, MCH, MCHC in the last 72 hours.    CMP: No results for input(s): NA, K, CL, CO2, BUN, CREATININE, GLU, MG, PHOS, CALCIUM, ALBUMIN, PROT, ALKPHOS, ALT, AST, BILITOT in the last 72 hours.    INR  No results for input(s): PT, INR, PROTIME, APTT in the last 72 hours.        Diagnostic Studies:      EKD Echo:  No results found for this or any previous visit.        Pre-op Assessment    I have reviewed the Patient Summary Reports.    I have reviewed the NPO Status.      Review of Systems  Anesthesia Hx:  No problems with previous Anesthesia  History of prior surgery of interest to airway management or planning: Previous anesthesia: General Denies Family Hx of Anesthesia complications.   Denies Personal Hx of Anesthesia complications.   Hematology/Oncology:        Hematology Comments: Multiple myeloma   Cardiovascular:   Exercise tolerance: good        Physical Exam  General: Well nourished, Cooperative, Oriented and Alert    Airway:  Mallampati: II   Mouth Opening: Normal  TM Distance: Normal  Tongue: Normal  Neck ROM: Normal ROM    Dental:  Intact    Chest/Lungs:  Clear to auscultation, Normal Respiratory Rate    Heart:  Rate:  Normal  Rhythm: Regular Rhythm        Anesthesia Plan  Type of Anesthesia, risks & benefits discussed:    Anesthesia Type: Gen ETT, Gen Supraglottic Airway, Gen Natural Airway  Intra-op Monitoring Plan: Standard ASA Monitors  Post Op Pain Control Plan: multimodal analgesia  Induction:  IV  Informed Consent: Informed consent signed with the Patient and all parties understand the risks and agree with anesthesia plan.  All questions answered.   ASA Score: 3    Ready For Surgery From Anesthesia Perspective.     .

## 2023-01-25 NOTE — TRANSFER OF CARE
"Anesthesia Transfer of Care Note    Patient: Raquel Houston    Procedure(s) Performed: Procedure(s) (LRB):  INSERTION, CATHETER, HEMODIALYSIS, DUAL LUMEN Bard 14.5 Fr Hemosplit Catheter Model [id number], Right Possible Left Chest (Right)    Patient location: PACU    Anesthesia Type: general    Transport from OR: Transported from OR on 2-3 L/min O2 by NC with adequate spontaneous ventilation    Post pain: adequate analgesia    Post assessment: no apparent anesthetic complications    Post vital signs: stable    Level of consciousness: awake    Nausea/Vomiting: no nausea/vomiting    Complications: none    Transfer of care protocol was followed      Last vitals:   Visit Vitals  /72 (BP Location: Left arm, Patient Position: Lying)   Pulse 96   Temp 36.9 °C (98.4 °F) (Tympanic)   Resp 16   Ht 5' 6" (1.676 m)   Wt 99.2 kg (218 lb 11.1 oz)   SpO2 97%   Breastfeeding No   BMI 35.30 kg/m²     "

## 2023-01-25 NOTE — BRIEF OP NOTE
[facility] - [facility] (2nd Fl)  Brief Operative Note    Surgery Date: 1/25/2023     Surgeon(s) and Role:     * Steven Monroy MD - Primary     * Ariel Young MD - Resident - Assisting        Pre-op Diagnosis:  Multiple myeloma not having achieved remission [C90.00]    Post-op Diagnosis:  Post-Op Diagnosis Codes:     * Multiple myeloma not having achieved remission [C90.00]    Procedure(s) (LRB):  INSERTION, CATHETER, HEMODIALYSIS, DUAL LUMEN Bard 14.5 Fr Hemosplit Catheter Model [id number], Right Possible Left Chest (Right)    Anesthesia: Local MAC    Operative Findings: Successful placement of right internal jugular hemodialysis catheter.    Estimated Blood Loss: * No values recorded between 1/25/2023  8:25 AM and 1/25/2023  8:56 AM *         Specimens:   Specimen (24h ago, onward)      None              Discharge Note    OUTCOME: Patient tolerated treatment/procedure well without complication and is now ready for discharge.    DISPOSITION: Home or Self Care    FINAL DIAGNOSIS:  Multiple myeloma not having achieved remission    FOLLOWUP: None    DISCHARGE INSTRUCTIONS:    Discharge Procedure Orders   Lifting restrictions   Order Comments: No heavy lifting greater than 10 pounds (about the weight of a gallon of milk) for 6 week postoperatively. This is to prevent new/recurrent hernia at your incision sites while they heal.     No driving until:   Order Comments: While taking narcotic pain medications.     Notify your health care provider if you experience any of the following:  temperature >100.4     Notify your health care provider if you experience any of the following:  persistent nausea and vomiting or diarrhea     Notify your health care provider if you experience any of the following:  severe uncontrolled pain     Notify your health care provider if you experience any of the following:  redness, tenderness, or signs of infection (pain, swelling, redness, odor or green/yellow discharge around  "incision site)     Notify your health care provider if you experience any of the following:  difficulty breathing or increased cough     Notify your health care provider if you experience any of the following:  severe persistent headache     Notify your health care provider if you experience any of the following:  worsening rash     Notify your health care provider if you experience any of the following:  persistent dizziness, light-headedness, or visual disturbances     Notify your health care provider if you experience any of the following:  increased confusion or weakness     Shower on day dressing removed (No bath)   Order Comments: You may SHOWER 24 hours after surgery. If you have gauze/tape dressings in place, you should remove them prior to showering. If you have Dermabond (skin glue) or white "Steri strips" in place, these will eventually peel off on their own after 1-2 weeks. NO SUBMERSION of your incisions (bath, pool, hot tub, etc) until your postop appointment. This allows your incisions to heal and to avoid a wound infection. Cover the dressings over catheter.       "

## 2023-01-25 NOTE — H&P
[facility] ([facility])  General Surgery  History & Physical    Patient Name: Raquel Houston  MRN: [id number]  Admission Date: 1/25/2023  Attending Physician: Steven Monroy MD   Primary Care Provider: Everett Boogie MD    Patient information was obtained from patient, past medical records, and ER records.     Subjective:     Chief Complaint/Reason for Admission: tunneled line placement    History of Present Illness:  Patient is a 62 y.o. female with a history of multiple myeloma who presents for placement of tunneled line placement for future stem cell transplant. She has no history of central lines in her neck. She is hard of hearing. She has no known allergies.    No current facility-administered medications on file prior to encounter.     Current Outpatient Medications on File Prior to Encounter   Medication Sig    acyclovir (ZOVIRAX) 400 MG tablet Take 1 tablet (400 mg total) by mouth 2 (two) times daily.    amLODIPine (NORVASC) 10 MG tablet Take 1 tablet (10 mg total) by mouth once daily.    pantoprazole (PROTONIX) 40 MG tablet Take 1 tablet (40 mg total) by mouth once daily.    aspirin (CIERRA ASPIRIN) 325 MG tablet Take 1 tablet (325 mg total) by mouth once daily. (Patient not taking: Reported on 1/20/2023)    dexAMETHasone (DECADRON) 4 MG Tab Take 10 tablets (40 mg total) by mouth As instructed (Take on days 1, 8, 15, and 22 of chemotherapy.).    lenalidomide 25 mg Cap Take 1 capsule (25 mg total) by mouth As instructed (Take by mouth once daily on days 1-21 of each 28 day cycle). (Patient not taking: Reported on 1/20/2023.)    oxyCODONE (ROXICODONE) 5 MG immediate release tablet Take 2 tablets (10 mg total) by mouth every 6 (six) hours as needed for Pain. (Patient not taking: Reported on 1/24/2023)       Review of patient's allergies indicates:  No Known Allergies    Past Medical History:   Diagnosis Date    Arthritis     Multiple myeloma     Osteoporosis      Past Surgical History:    Procedure Laterality Date    COLONOSCOPY N/A 1/5/2023    Procedure: COLONOSCOPY;  Surgeon: Diana Huynh MD;  Location: [facility] ([address] [place] [address]);  Service: Endoscopy;  Laterality: N/A;  inst via email  pre call no answer-as    FEMUR SURGERY       Family History       Problem Relation (Age of Onset)    Cancer Father          Tobacco Use    Smoking status: Former     Types: Cigarettes    Smokeless tobacco: Former    Tobacco comments:     Quit April 2022   Substance and Sexual Activity    Alcohol use: Not Currently     Comment: Quit April 2022    Drug use: Never    Sexual activity: Not Currently     Partners: Male     Review of Systems   Constitutional:  Negative for activity change, fatigue and fever.   HENT:  Positive for hearing loss. Negative for congestion, rhinorrhea and trouble swallowing.    Eyes:  Negative for discharge.   Respiratory:  Negative for cough, chest tightness and shortness of breath.    Cardiovascular:  Negative for chest pain and palpitations.   Gastrointestinal:  Negative for anal bleeding, constipation, diarrhea, nausea and vomiting.   Endocrine: Negative for cold intolerance and heat intolerance.   Genitourinary:  Negative for decreased urine volume, difficulty urinating, dysuria and urgency.   Musculoskeletal:  Negative for back pain, joint swelling and neck pain.   Skin:  Negative for color change and wound.   Neurological:  Negative for syncope, weakness and light-headedness.   Hematological:  Negative for adenopathy. Does not bruise/bleed easily.   Objective:     Vital Signs (Most Recent):  Temp: 98.4 °F (36.9 °C) (01/25/23 0640)  Pulse: 96 (01/25/23 0640)  Resp: 16 (01/25/23 0640)  BP: 126/72 (01/25/23 0640)  SpO2: 97 % (01/25/23 0640) Vital Signs (24h Range):  Temp:  [98.4 °F (36.9 °C)] 98.4 °F (36.9 °C)  Pulse:  [96] 96  Resp:  [16] 16  SpO2:  [97 %] 97 %  BP: (126)/(72) 126/72     Weight: 99.2 kg (218 lb 11.1 oz)  Body mass index is 35.3 kg/m².    Physical Exam  Constitutional:        Appearance: Normal appearance. She is not ill-appearing or toxic-appearing.   HENT:      Head: Normocephalic and atraumatic.   Eyes:      General: Vision grossly intact. No scleral icterus.     Extraocular Movements: Extraocular movements intact.   Cardiovascular:      Rate and Rhythm: Normal rate and regular rhythm.      Heart sounds: Normal heart sounds. No murmur heard.  Pulmonary:      Effort: Pulmonary effort is normal.      Breath sounds: Normal breath sounds.   Abdominal:      General: Abdomen is flat. Bowel sounds are normal. There is no distension.      Palpations: Abdomen is soft. There is no fluid wave.   Musculoskeletal:         General: No swelling, tenderness or deformity.      Cervical back: Normal range of motion and neck supple. No muscular tenderness.   Lymphadenopathy:      Upper Body:      Right upper body: No supraclavicular adenopathy.      Left upper body: No supraclavicular adenopathy.   Skin:     General: Skin is warm and dry.      Coloration: Skin is not cyanotic or jaundiced.   Neurological:      General: No focal deficit present.      Mental Status: She is alert and oriented to person, place, and time.      Sensory: Sensation is intact.   Psychiatric:         Behavior: Behavior is cooperative.       Significant Labs:  I have reviewed all pertinent lab results within the past 24 hours.  CBC: No results for input(s): WBC, RBC, HGB, HCT, PLT, MCV, MCH, MCHC in the last 168 hours.  BMP: No results for input(s): GLU, NA, K, CL, CO2, BUN, CREATININE, CALCIUM, MG in the last 168 hours.    Significant Diagnostics:  I have reviewed all pertinent imaging results/findings within the past 24 hours.    Assessment/Plan:     Active Diagnoses:    Diagnosis Date Noted POA    PRINCIPAL PROBLEM:  Multiple myeloma not having achieved remission [C90.00] 08/02/2022 Yes      Problems Resolved During this Admission:     VTE Risk Mitigation (From admission, onward)           Ordered     IP VTE HIGH RISK PATIENT   Once         01/25/23 0613     Place sequential compression device  Until discontinued         01/25/23 0613                  -Reviewed risks and benefits of the procedure. She has agreed to proceed. Informed consent obtained.  -To OR for tunneled dual lumen catheter placement      Steven Monroy MD  General Surgery and Surgical Critical Care  [facility]

## 2023-01-25 NOTE — PLAN OF CARE
Patient stable and alert. Vital signs stable. Incision/HD catheter clean, dry and intact. Son at bedside. Discharge instructions given and understood.

## 2023-01-25 NOTE — PLAN OF CARE
Patient stable and alert. X-ray verified by MD. Patient is okay to be discharged. Preparing patient for discharge. No complications.

## 2023-01-25 NOTE — ANESTHESIA POSTPROCEDURE EVALUATION
Anesthesia Post Evaluation    Patient: Raquel Houston    Procedure(s) Performed: Procedure(s) (LRB):  INSERTION, CATHETER, HEMODIALYSIS, DUAL LUMEN Bard 14.5 Fr Hemosplit Catheter Model [id number], Right Possible Left Chest (Right)    Final Anesthesia Type: general      Patient location during evaluation: PACU  Patient participation: Yes- Able to Participate  Level of consciousness: awake and alert  Post-procedure vital signs: reviewed and stable  Pain management: adequate  Airway patency: patent    PONV status at discharge: No PONV  Anesthetic complications: no      Cardiovascular status: blood pressure returned to baseline  Respiratory status: unassisted  Hydration status: euvolemic  Follow-up not needed.          Vitals Value Taken Time   /78 01/25/23 0916   Temp 36.8 °C (98.3 °F) 01/25/23 0901   Pulse 71 01/25/23 0927   Resp 18 01/25/23 0927   SpO2 99 % 01/25/23 0927   Vitals shown include unvalidated device data.      No case tracking events are documented in the log.      Pain/Bree Score: Bree Score: 10 (1/25/2023  9:25 AM)

## 2023-01-25 NOTE — PATIENT INSTRUCTIONS
Surgery Post Op Instructions:    You had a right internal jugular permacath placement performed today.     Wound care:  Your incision is covered with Dermabond, a type of surgical super glue. You may shower tomorrow - let soapy water run over the incision but do not scrub the area. You must avoid submerging the incision in pools, bathtubs, etc until it is fully healed in 4-6 weeks. Catheter is covered with dressings, keep area as clean as possible.    No activity restrictions.     No dietary restrictions.    Medications:  You can take tylenol and ibuprofen for the pain, following instructions on container label.

## 2023-01-25 NOTE — PLAN OF CARE
Patient stable and alert. No complications. Vital signs stable. Son in waiting room. Belongings in locker.

## 2023-01-26 ENCOUNTER — INFUSION (OUTPATIENT)
Dept: INFUSION THERAPY | Facility: HOSPITAL | Age: 63
End: 2023-01-26
Payer: MEDICAID

## 2023-01-26 ENCOUNTER — HOSPITAL ENCOUNTER (OUTPATIENT)
Dept: TRANSFUSION MEDICINE | Facility: HOSPITAL | Age: 63
Discharge: HOME | End: 2023-01-26
Attending: INTERNAL MEDICINE
Payer: MEDICAID

## 2023-01-26 VITALS
WEIGHT: 218.69 LBS | BODY MASS INDEX: 35.15 KG/M2 | DIASTOLIC BLOOD PRESSURE: 82 MMHG | HEIGHT: 66 IN | HEART RATE: 91 BPM | SYSTOLIC BLOOD PRESSURE: 136 MMHG | RESPIRATION RATE: 17 BRPM | TEMPERATURE: 98 F

## 2023-01-26 DIAGNOSIS — C90.00 MULTIPLE MYELOMA NOT HAVING ACHIEVED REMISSION: Primary | ICD-10-CM

## 2023-01-26 LAB
ALBUMIN SERPL BCP-MCNC: 3.2 G/DL (ref 3.5–5.2)
ALBUMIN SERPL BCP-MCNC: 3.2 G/DL (ref 3.5–5.2)
ALBUMIN SERPL BCP-MCNC: 3.7 G/DL (ref 3.5–5.2)
ALP SERPL-CCNC: 319 U/L (ref 55–135)
ALP SERPL-CCNC: 345 U/L (ref 55–135)
ALP SERPL-CCNC: 398 U/L (ref 55–135)
ALT SERPL W/O P-5'-P-CCNC: 15 U/L (ref 10–44)
ALT SERPL W/O P-5'-P-CCNC: 18 U/L (ref 10–44)
ALT SERPL W/O P-5'-P-CCNC: 18 U/L (ref 10–44)
ANION GAP SERPL CALC-SCNC: 12 MMOL/L (ref 8–16)
ANION GAP SERPL CALC-SCNC: 13 MMOL/L (ref 8–16)
ANION GAP SERPL CALC-SCNC: 9 MMOL/L (ref 8–16)
AST SERPL-CCNC: 21 U/L (ref 10–40)
AST SERPL-CCNC: 21 U/L (ref 10–40)
AST SERPL-CCNC: 23 U/L (ref 10–40)
BASOPHILS NFR BLD: 0 % (ref 0–1.9)
BILIRUB SERPL-MCNC: 0.3 MG/DL (ref 0.1–1)
BILIRUB SERPL-MCNC: 0.3 MG/DL (ref 0.1–1)
BILIRUB SERPL-MCNC: 0.4 MG/DL (ref 0.1–1)
BUN SERPL-MCNC: 10 MG/DL (ref 6–20)
BUN SERPL-MCNC: 11 MG/DL (ref 6–20)
BUN SERPL-MCNC: 12 MG/DL (ref 6–20)
CA-I BLDV-SCNC: 1.06 MMOL/L (ref 1.06–1.42)
CA-I BLDV-SCNC: 1.2 MMOL/L (ref 1.06–1.42)
CA-I BLDV-SCNC: 1.21 MMOL/L (ref 1.06–1.42)
CALCIUM SERPL-MCNC: 10.2 MG/DL (ref 8.7–10.5)
CALCIUM SERPL-MCNC: 8.9 MG/DL (ref 8.7–10.5)
CALCIUM SERPL-MCNC: 9.3 MG/DL (ref 8.7–10.5)
CHLORIDE SERPL-SCNC: 104 MMOL/L (ref 95–110)
CHLORIDE SERPL-SCNC: 104 MMOL/L (ref 95–110)
CHLORIDE SERPL-SCNC: 105 MMOL/L (ref 95–110)
CO2 SERPL-SCNC: 21 MMOL/L (ref 23–29)
CO2 SERPL-SCNC: 26 MMOL/L (ref 23–29)
CO2 SERPL-SCNC: 28 MMOL/L (ref 23–29)
CREAT SERPL-MCNC: 0.8 MG/DL (ref 0.5–1.4)
CREAT SERPL-MCNC: 0.8 MG/DL (ref 0.5–1.4)
CREAT SERPL-MCNC: 0.9 MG/DL (ref 0.5–1.4)
DIFFERENTIAL METHOD: ABNORMAL
DOHLE BOD BLD QL SMEAR: PRESENT
EOSINOPHIL NFR BLD: 0 % (ref 0–8)
ERYTHROCYTE [DISTWIDTH] IN BLOOD BY AUTOMATED COUNT: 15.8 % (ref 11.5–14.5)
ERYTHROCYTE [DISTWIDTH] IN BLOOD BY AUTOMATED COUNT: 16 % (ref 11.5–14.5)
ERYTHROCYTE [DISTWIDTH] IN BLOOD BY AUTOMATED COUNT: 16.1 % (ref 11.5–14.5)
EST. GFR  (NO RACE VARIABLE): >60 ML/MIN/1.73 M^2
GLUCOSE SERPL-MCNC: 121 MG/DL (ref 70–110)
GLUCOSE SERPL-MCNC: 148 MG/DL (ref 70–110)
GLUCOSE SERPL-MCNC: 72 MG/DL (ref 70–110)
HCT VFR BLD AUTO: 32.7 % (ref 37–48.5)
HCT VFR BLD AUTO: 34 % (ref 37–48.5)
HCT VFR BLD AUTO: 36.2 % (ref 37–48.5)
HGB BLD-MCNC: 10.5 G/DL (ref 12–16)
HGB BLD-MCNC: 10.7 G/DL (ref 12–16)
HGB BLD-MCNC: 11.3 G/DL (ref 12–16)
IMM GRANULOCYTES # BLD AUTO: ABNORMAL K/UL (ref 0–0.04)
IMM GRANULOCYTES NFR BLD AUTO: ABNORMAL % (ref 0–0.5)
LYMPHOCYTES NFR BLD: 10 % (ref 18–48)
LYMPHOCYTES NFR BLD: 4 % (ref 18–48)
LYMPHOCYTES NFR BLD: 6 % (ref 18–48)
MAGNESIUM SERPL-MCNC: 1.7 MG/DL (ref 1.6–2.6)
MAGNESIUM SERPL-MCNC: 1.8 MG/DL (ref 1.6–2.6)
MAGNESIUM SERPL-MCNC: 1.8 MG/DL (ref 1.6–2.6)
MCH RBC QN AUTO: 27.6 PG (ref 27–31)
MCH RBC QN AUTO: 27.9 PG (ref 27–31)
MCH RBC QN AUTO: 28.3 PG (ref 27–31)
MCHC RBC AUTO-ENTMCNC: 31.2 G/DL (ref 32–36)
MCHC RBC AUTO-ENTMCNC: 31.5 G/DL (ref 32–36)
MCHC RBC AUTO-ENTMCNC: 32.1 G/DL (ref 32–36)
MCV RBC AUTO: 88 FL (ref 82–98)
MCV RBC AUTO: 88 FL (ref 82–98)
MCV RBC AUTO: 89 FL (ref 82–98)
METAMYELOCYTES NFR BLD MANUAL: 1 %
MONOCYTES NFR BLD: 0 % (ref 4–15)
MONOCYTES NFR BLD: 3 % (ref 4–15)
MONOCYTES NFR BLD: 4 % (ref 4–15)
MYELOCYTES NFR BLD MANUAL: 1 %
MYELOCYTES NFR BLD MANUAL: 1 %
NEUTROPHILS NFR BLD: 75 % (ref 38–73)
NEUTROPHILS NFR BLD: 82 % (ref 38–73)
NEUTROPHILS NFR BLD: 86 % (ref 38–73)
NEUTS BAND NFR BLD MANUAL: 10 %
NEUTS BAND NFR BLD MANUAL: 14 %
NEUTS BAND NFR BLD MANUAL: 3 %
NRBC BLD-RTO: 0 /100 WBC
PHOSPHATE SERPL-MCNC: 2.8 MG/DL (ref 2.7–4.5)
PHOSPHATE SERPL-MCNC: 3 MG/DL (ref 2.7–4.5)
PHOSPHATE SERPL-MCNC: 3.1 MG/DL (ref 2.7–4.5)
PLATELET # BLD AUTO: 104 K/UL (ref 150–450)
PLATELET # BLD AUTO: 110 K/UL (ref 150–450)
PLATELET # BLD AUTO: 147 K/UL (ref 150–450)
PLATELET BLD QL SMEAR: ABNORMAL
PMV BLD AUTO: 10.6 FL (ref 9.2–12.9)
PMV BLD AUTO: 11.3 FL (ref 9.2–12.9)
PMV BLD AUTO: 9.9 FL (ref 9.2–12.9)
POTASSIUM SERPL-SCNC: 3.2 MMOL/L (ref 3.5–5.1)
POTASSIUM SERPL-SCNC: 3.3 MMOL/L (ref 3.5–5.1)
POTASSIUM SERPL-SCNC: 3.5 MMOL/L (ref 3.5–5.1)
PROT SERPL-MCNC: 5.7 G/DL (ref 6–8.4)
PROT SERPL-MCNC: 5.7 G/DL (ref 6–8.4)
PROT SERPL-MCNC: 6.5 G/DL (ref 6–8.4)
RBC # BLD AUTO: 3.71 M/UL (ref 4–5.4)
RBC # BLD AUTO: 3.84 M/UL (ref 4–5.4)
RBC # BLD AUTO: 4.1 M/UL (ref 4–5.4)
SODIUM SERPL-SCNC: 139 MMOL/L (ref 136–145)
SODIUM SERPL-SCNC: 141 MMOL/L (ref 136–145)
SODIUM SERPL-SCNC: 142 MMOL/L (ref 136–145)
WBC # BLD AUTO: 50.13 K/UL (ref 3.9–12.7)
WBC # BLD AUTO: 54.93 K/UL (ref 3.9–12.7)
WBC # BLD AUTO: 66.94 K/UL (ref 3.9–12.7)

## 2023-01-26 PROCEDURE — 80053 COMPREHEN METABOLIC PANEL: CPT | Performed by: INTERNAL MEDICINE

## 2023-01-26 PROCEDURE — 85007 BL SMEAR W/DIFF WBC COUNT: CPT | Mod: 91 | Performed by: INTERNAL MEDICINE

## 2023-01-26 PROCEDURE — 38207 CRYOPRESERVE STEM CELLS: CPT

## 2023-01-26 PROCEDURE — 82330 ASSAY OF CALCIUM: CPT | Performed by: INTERNAL MEDICINE

## 2023-01-26 PROCEDURE — 38206 HARVEST AUTO STEM CELLS: CPT | Mod: ,,, | Performed by: PATHOLOGY

## 2023-01-26 PROCEDURE — 63600175 PHARM REV CODE 636 W HCPCS: Performed by: PATHOLOGY

## 2023-01-26 PROCEDURE — 38206 HARVEST AUTO STEM CELLS: CPT

## 2023-01-26 PROCEDURE — 84100 ASSAY OF PHOSPHORUS: CPT | Performed by: INTERNAL MEDICINE

## 2023-01-26 PROCEDURE — 96372 THER/PROPH/DIAG INJ SC/IM: CPT | Mod: 59

## 2023-01-26 PROCEDURE — 38206 PR PROG CELL HARVEST,TRANSPLANT,AUTOLOGOUS: ICD-10-PCS | Mod: ,,, | Performed by: PATHOLOGY

## 2023-01-26 PROCEDURE — 63600175 PHARM REV CODE 636 W HCPCS: Mod: JG | Performed by: INTERNAL MEDICINE

## 2023-01-26 PROCEDURE — 85027 COMPLETE CBC AUTOMATED: CPT | Mod: 91 | Performed by: INTERNAL MEDICINE

## 2023-01-26 PROCEDURE — 25000003 PHARM REV CODE 250: Performed by: INTERNAL MEDICINE

## 2023-01-26 PROCEDURE — 83735 ASSAY OF MAGNESIUM: CPT | Mod: 91 | Performed by: INTERNAL MEDICINE

## 2023-01-26 PROCEDURE — 25000003 PHARM REV CODE 250: Performed by: PATHOLOGY

## 2023-01-26 PROCEDURE — 38214 VOLUME DEPLETE OF HARVEST: CPT

## 2023-01-26 RX ORDER — ACETAMINOPHEN 325 MG/1
650 TABLET ORAL ONCE AS NEEDED
Status: COMPLETED | OUTPATIENT
Start: 2023-01-26 | End: 2023-01-26

## 2023-01-26 RX ORDER — HEPARIN SODIUM 1000 [USP'U]/ML
4000 INJECTION, SOLUTION INTRAVENOUS; SUBCUTANEOUS ONCE
Status: COMPLETED | OUTPATIENT
Start: 2023-01-26 | End: 2023-01-26

## 2023-01-26 RX ORDER — DIPHENHYDRAMINE HCL 25 MG
25 CAPSULE ORAL ONCE AS NEEDED
Status: DISCONTINUED | OUTPATIENT
Start: 2023-01-26 | End: 2023-01-26 | Stop reason: HOSPADM

## 2023-01-26 RX ORDER — SODIUM,POTASSIUM PHOSPHATES 280-250MG
2 POWDER IN PACKET (EA) ORAL ONCE AS NEEDED
Status: DISCONTINUED | OUTPATIENT
Start: 2023-01-26 | End: 2023-01-26 | Stop reason: HOSPADM

## 2023-01-26 RX ORDER — POTASSIUM CHLORIDE 20 MEQ/1
40 TABLET, EXTENDED RELEASE ORAL ONCE AS NEEDED
Status: COMPLETED | OUTPATIENT
Start: 2023-01-26 | End: 2023-01-26

## 2023-01-26 RX ORDER — LANOLIN ALCOHOL/MO/W.PET/CERES
800 CREAM (GRAM) TOPICAL ONCE AS NEEDED
Status: DISCONTINUED | OUTPATIENT
Start: 2023-01-26 | End: 2023-01-26 | Stop reason: HOSPADM

## 2023-01-26 RX ADMIN — FILGRASTIM 960 MCG: 480 INJECTION, SOLUTION INTRAVENOUS; SUBCUTANEOUS at 08:01

## 2023-01-26 RX ADMIN — POTASSIUM CHLORIDE 40 MEQ: 1500 TABLET, EXTENDED RELEASE ORAL at 01:01

## 2023-01-26 RX ADMIN — CALCIUM GLUCONATE 4 G: 98 INJECTION, SOLUTION INTRAVENOUS at 08:01

## 2023-01-26 RX ADMIN — ACETAMINOPHEN 650 MG: 325 TABLET ORAL at 10:01

## 2023-01-26 RX ADMIN — HEPARIN SODIUM 3100 UNITS: 1000 INJECTION, SOLUTION INTRAVENOUS; SUBCUTANEOUS at 01:01

## 2023-01-26 NOTE — PROCEDURES
[facility] - Apheresis  Transfusion Medicine  Procedure Note    SUMMARY   Stem Cell Collection Autologous    Date/Time: 1/26/2023 4:33 PM  Performed by: Reji Granger MD  Authorized by: Reji Granger MD       Date of Procedure: 1/26/2023     Procedure: Hematopoietic Progenitor Cell Collection    Provider: Kristal Granger MD     Assisting Provider: None    Pre-Procedure Diagnosis: Autologous donor of stem cells    Post-Procedure Diagnosis: Autologous donor of stem cells    Follow-up Assessment: Ms. Houston is collecting stem cells for autologous transplant. The mid-rum at two hours indicated that we had exceeded the goal of 2-5 million CD34 cells/kg, so the procedure was stopped. Final counts to follow.    Pertinent Laboratory Data: Complete Blood Count:   Lab Results   Component Value Date    HGB 10.7 (L) 01/26/2023    HCT 34.0 (L) 01/26/2023     (L) 01/26/2023    WBC 54.93 (HH) 01/26/2023     Basic Metabolic Panel:   Lab Results   Component Value Date     01/26/2023    K 3.2 (L) 01/26/2023     01/26/2023    CO2 26 01/26/2023    GLU 72 01/26/2023    BUN 10 01/26/2023    CREATININE 0.8 01/26/2023    CALCIUM 10.2 01/26/2023    ANIONGAP 12 01/26/2023     CD34 - quantitative:   Lab Results   Component Value Date    CD34 0.09 01/25/2023    LP46TCRZHRLK 60.41 01/25/2023    [id number] 99.2 01/25/2023       Pertinent Medications: None contraindicated for collection. Mobilized with GCSF    Review of patient's allergies indicates:  No Known Allergies    Anesthesia: None     Technical Procedures Used: Hematopoietic Progenitor Cell collection: The patient presented to the 5th floor Chemotherapy unit, details about the procedure reviewed. Any interim clinical changes from previous clinic visit - No. All pertinent labs reviewed. Human Progenitor (Stem) Cell Collection initiated by Apheresis Nurse. Current plan is to perform the procedure for 4 hours. Initial laboratory tests collected, results to  Oncology Nurse. Mid-run laboratory tests collected, results to Oncology Nurse. Included CD34 count on collection bag - results to Stem Cell Lab and BMT clinical team. Final laboratory tests collected, results to Oncology Nurse. Red blood cell or platelet transfusion - No.  Date of next procedure Complete.    Description of the Findings of the Procedure:     Please see Apheresis Nurse flowsheet for details.    The patient was evaluated and all clinical and laboratory data relevant to the treatment was reviewed, and a decision was made to proceed with the Apheresis procedure.    I was available to the clinical staff throughout the procedure.    Significant Surgical Tasks Conducted by the Assistant(s): Not applicable    Complications: None    Estimated Blood Loss (EBL): None    Implants: None     Specimens: None

## 2023-01-26 NOTE — PLAN OF CARE
Problem: Adult Inpatient Plan of Care  Goal: Plan of Care Review  Outcome: Ongoing, Progressing   Patient tolerated stem cell collection day 1 well today. Replacements administered per standing orders.  No need to RTC tomorrow. NAD noted. Discharged home and ambulates independently.

## 2023-01-26 NOTE — PROGRESS NOTES
Pt presented to out apheresis/chemo infusion dept ambulatory, accompanied by her son.  She is alert and oriented x4, states she only has pain around the right perm cath insertion site, she took tylenol and states it helped.  Time out performed by 2, RN's.  Autologous stem cell collection started at 08:40 without difficulty via right perm cath, cath patent, dressing changed today.  4 grams of calcium gluconate given over duration of collection.  Pre, mid and post labs drawn from right perm cath and tubed to lab.  Labs monitored and supplemented by chemo staff nurse OLAYINKA Llanes.  Mid product sample drawn from product bag and tubed to lab at 11:02, 190 mls of product.  Collection ended at early at 13:00 per Dr Granger as goal has been met.  Refreshments provided.  Pt tolerated collection well.  Pt discharged to the [facility], accompanied by her son.

## 2023-01-28 ENCOUNTER — HOSPITAL ENCOUNTER (EMERGENCY)
Facility: HOSPITAL | Age: 63
Discharge: HOME | End: 2023-01-29
Attending: EMERGENCY MEDICINE
Payer: MEDICAID

## 2023-01-28 DIAGNOSIS — Z51.89 VISIT FOR WOUND CHECK: ICD-10-CM

## 2023-01-28 DIAGNOSIS — M54.2 NECK PAIN: Primary | ICD-10-CM

## 2023-01-28 LAB
ALBUMIN SERPL BCP-MCNC: 3.6 G/DL (ref 3.5–5.2)
ALP SERPL-CCNC: 325 U/L (ref 55–135)
ALT SERPL W/O P-5'-P-CCNC: 23 U/L (ref 10–44)
ANION GAP SERPL CALC-SCNC: 13 MMOL/L (ref 8–16)
ANISOCYTOSIS BLD QL SMEAR: SLIGHT
AST SERPL-CCNC: 29 U/L (ref 10–40)
BASOPHILS # BLD AUTO: ABNORMAL K/UL (ref 0–0.2)
BASOPHILS NFR BLD: 0 % (ref 0–1.9)
BILIRUB SERPL-MCNC: 0.3 MG/DL (ref 0.1–1)
BUN SERPL-MCNC: 17 MG/DL (ref 6–20)
CALCIUM SERPL-MCNC: 9.3 MG/DL (ref 8.7–10.5)
CHLORIDE SERPL-SCNC: 108 MMOL/L (ref 95–110)
CO2 SERPL-SCNC: 19 MMOL/L (ref 23–29)
CREAT SERPL-MCNC: 1 MG/DL (ref 0.5–1.4)
DIFFERENTIAL METHOD: ABNORMAL
EOSINOPHIL # BLD AUTO: ABNORMAL K/UL (ref 0–0.5)
EOSINOPHIL NFR BLD: 0 % (ref 0–8)
ERYTHROCYTE [DISTWIDTH] IN BLOOD BY AUTOMATED COUNT: 16 % (ref 11.5–14.5)
EST. GFR  (NO RACE VARIABLE): >60 ML/MIN/1.73 M^2
GLUCOSE SERPL-MCNC: 106 MG/DL (ref 70–110)
HCT VFR BLD AUTO: 37.6 % (ref 37–48.5)
HGB BLD-MCNC: 11.9 G/DL (ref 12–16)
IMM GRANULOCYTES # BLD AUTO: ABNORMAL K/UL (ref 0–0.04)
IMM GRANULOCYTES NFR BLD AUTO: ABNORMAL % (ref 0–0.5)
LYMPHOCYTES # BLD AUTO: ABNORMAL K/UL (ref 1–4.8)
LYMPHOCYTES NFR BLD: 13 % (ref 18–48)
MCH RBC QN AUTO: 27.8 PG (ref 27–31)
MCHC RBC AUTO-ENTMCNC: 31.6 G/DL (ref 32–36)
MCV RBC AUTO: 88 FL (ref 82–98)
METAMYELOCYTES NFR BLD MANUAL: 1 %
MONOCYTES # BLD AUTO: ABNORMAL K/UL (ref 0.3–1)
MONOCYTES NFR BLD: 7 % (ref 4–15)
MYELOCYTES NFR BLD MANUAL: 3 %
NEUTROPHILS NFR BLD: 71 % (ref 38–73)
NEUTS BAND NFR BLD MANUAL: 4 %
NRBC BLD-RTO: 0 /100 WBC
OVALOCYTES BLD QL SMEAR: ABNORMAL
PLATELET # BLD AUTO: 79 K/UL (ref 150–450)
PLATELET BLD QL SMEAR: ABNORMAL
PMV BLD AUTO: 10.1 FL (ref 9.2–12.9)
POIKILOCYTOSIS BLD QL SMEAR: SLIGHT
POLYCHROMASIA BLD QL SMEAR: ABNORMAL
POTASSIUM SERPL-SCNC: 3.7 MMOL/L (ref 3.5–5.1)
PROMYELOCYTES NFR BLD MANUAL: 1 %
PROT SERPL-MCNC: 6.4 G/DL (ref 6–8.4)
RBC # BLD AUTO: 4.28 M/UL (ref 4–5.4)
SODIUM SERPL-SCNC: 140 MMOL/L (ref 136–145)
WBC # BLD AUTO: 21.73 K/UL (ref 3.9–12.7)

## 2023-01-28 PROCEDURE — 99284 PR EMERGENCY DEPT VISIT,LEVEL IV: ICD-10-PCS | Mod: ,,, | Performed by: EMERGENCY MEDICINE

## 2023-01-28 PROCEDURE — 25000003 PHARM REV CODE 250

## 2023-01-28 PROCEDURE — 85007 BL SMEAR W/DIFF WBC COUNT: CPT

## 2023-01-28 PROCEDURE — 85027 COMPLETE CBC AUTOMATED: CPT

## 2023-01-28 PROCEDURE — 80053 COMPREHEN METABOLIC PANEL: CPT

## 2023-01-28 PROCEDURE — 99284 EMERGENCY DEPT VISIT MOD MDM: CPT | Mod: ,,, | Performed by: EMERGENCY MEDICINE

## 2023-01-28 PROCEDURE — 99284 EMERGENCY DEPT VISIT MOD MDM: CPT | Mod: 25

## 2023-01-28 RX ORDER — LIDOCAINE 50 MG/G
1 PATCH TOPICAL DAILY PRN
Qty: 5 PATCH | Refills: 0 | Status: ON HOLD | OUTPATIENT
Start: 2023-01-28 | End: 2023-02-15

## 2023-01-28 RX ORDER — MORPHINE SULFATE 4 MG/ML
4 INJECTION, SOLUTION INTRAMUSCULAR; INTRAVENOUS
Status: DISCONTINUED | OUTPATIENT
Start: 2023-01-28 | End: 2023-01-28

## 2023-01-28 RX ORDER — LIDOCAINE 50 MG/G
1 PATCH TOPICAL
Status: DISCONTINUED | OUTPATIENT
Start: 2023-01-28 | End: 2023-01-29 | Stop reason: HOSPADM

## 2023-01-28 RX ORDER — LIDOCAINE HYDROCHLORIDE 10 MG/ML
1 INJECTION INFILTRATION; PERINEURAL ONCE
Status: DISCONTINUED | OUTPATIENT
Start: 2023-01-29 | End: 2023-01-28

## 2023-01-28 RX ORDER — OXYCODONE HYDROCHLORIDE 5 MG/1
5 TABLET ORAL
Status: COMPLETED | OUTPATIENT
Start: 2023-01-28 | End: 2023-01-28

## 2023-01-28 RX ADMIN — OXYCODONE 5 MG: 5 TABLET ORAL at 10:01

## 2023-01-28 RX ADMIN — LIDOCAINE 1 PATCH: 50 PATCH CUTANEOUS at 11:01

## 2023-01-29 VITALS
RESPIRATION RATE: 17 BRPM | DIASTOLIC BLOOD PRESSURE: 75 MMHG | TEMPERATURE: 99 F | SYSTOLIC BLOOD PRESSURE: 139 MMHG | HEART RATE: 74 BPM | OXYGEN SATURATION: 97 %

## 2023-01-29 NOTE — ED NOTES
Patient identifiers verified and correct for Raquel Houston   LOC: The patient is awake, alert and aware of environment with an appropriate affect, the patient is oriented x 3 and speaking appropriately.   APPEARANCE: Patient appears comfortable and in no acute distress, patient is clean and well groomed.  SKIN: The skin is warm and dry, color consistent with ethnicity, patient has normal skin turgor and moist mucus membranes, skin intact  MUSCULOSKELETAL: Patient moving all extremities spontaneously. Pt c/o pain around T line on right shoulder/neck.   RESPIRATORY: Airway is open and patent, respirations are spontaneous, patient has a normal effort and rate, no accessory muscle use noted, pt placed on continuous pulse ox with O2 sats noted at 100% on room air.  CARDIAC: Pt placed on cardiac monitor. Patient has a normal rate and regular rhythm, no edema noted, capillary refill < 3 seconds.   GASTRO: Soft and non tender to palpation, no distention noted, normoactive bowel sounds present in all four quadrants. Pt states bowel movements have been regular.  : Pt denies any pain or frequency with urination.  NEURO: Pt opens eyes spontaneously, behavior appropriate to situation, follows commands, facial expression symmetrical, bilateral hand grasp equal and even, purposeful motor response noted, normal sensation in all extremities when touched with a finger.

## 2023-01-29 NOTE — DISCHARGE INSTRUCTIONS
Follow-up with primary care physician.  Apply Lidoderm patch to affected area once every 24 hours as needed for pain.    Return to emergency department if you develop fevers, nausea vomiting, chills, mental status changes, swelling or drainage around the site of UR catheter site or any other new or concerning symptoms.

## 2023-01-29 NOTE — ED TRIAGE NOTES
Pt arriving to ED [facility] for right shoulder/neck pain. Pt had T-line placed to right side of chest on Wed and now having severe pain to the area for the last two hours that she states is unusual for her. History of multiple myeloma.

## 2023-01-29 NOTE — ED PROVIDER NOTES
Encounter Date: 1/28/2023       History     Chief Complaint   Patient presents with    Post-op Problem     Pt had T-line placed to right side of chest on Wed and now having severe pain to the area for the last two hours that she states is unusual for her. History of multiple myeloma      Patient is a 62-year-old female history of multiple myeloma, osteoporosis, arthritis who presents to the emergency department for pain near her catheter, neck and right shoulder.  Patient reports that she was sitting down in bed when she had sudden severe pain near the site of her catheter and her neck.  She reports that she had a catheter placed 3 days ago with plan for stem cell transplant next week.  Patient reports that port site has not caused any problems or pain.  Patient denies any trauma or acute events.  Patient denies any fevers, chills, nausea, vomiting.  Patient denies any active chest pain, shortness of breath.       Review of patient's allergies indicates:  No Known Allergies  Past Medical History:   Diagnosis Date    Arthritis     Multiple myeloma     Osteoporosis      Past Surgical History:   Procedure Laterality Date    COLONOSCOPY N/A 1/5/2023    Procedure: COLONOSCOPY;  Surgeon: Diana Huynh MD;  Location: [facility] ([address] [place] [address]);  Service: Endoscopy;  Laterality: N/A;  inst via email  pre call no answer-as    FEMUR SURGERY      INSERTION OF TUNNELED CENTRAL VENOUS HEMODIALYSIS CATHETER Right 1/25/2023    Procedure: INSERTION, CATHETER, HEMODIALYSIS, DUAL LUMEN Bard 14.5 Fr Hemosplit Catheter Model [id number], Right Possible Left Chest;  Surgeon: Steven Monroy MD;  Location: [address];  Service: General;  Laterality: Right;     Family History   Problem Relation Age of Onset    Cancer Father      Social History     Tobacco Use    Smoking status: Former     Types: Cigarettes    Smokeless tobacco: Former    Tobacco comments:     Quit April 2022   Substance Use Topics    Alcohol use: Not Currently      Comment: Quit April 2022    Drug use: Never     Review of Systems   Constitutional:  Negative for fever.   HENT:  Negative for congestion and sore throat.    Respiratory:  Positive for chest tightness. Negative for shortness of breath.    Cardiovascular:  Negative for chest pain and palpitations.   Gastrointestinal:  Negative for nausea.   Genitourinary:  Negative for dysuria.   Musculoskeletal:  Positive for myalgias and neck pain. Negative for back pain, joint swelling and neck stiffness.   Skin:  Negative for color change, rash and wound.   Neurological:  Negative for weakness.   Hematological:  Does not bruise/bleed easily.     Physical Exam     Initial Vitals [01/28/23 2157]   BP Pulse Resp Temp SpO2   136/82 104 18 98.6 °F (37 °C) 100 %      MAP       --         Physical Exam    Nursing note and vitals reviewed.  Constitutional: She appears well-developed and well-nourished.   HENT:   Head: Normocephalic and atraumatic.   Eyes: EOM are normal. Pupils are equal, round, and reactive to light.   Neck: Neck supple. No JVD present.   Normal range of motion.  Cardiovascular:  Normal rate, regular rhythm, normal heart sounds and intact distal pulses.           Pulmonary/Chest: Breath sounds normal.   Abdominal: Abdomen is soft. Bowel sounds are normal. She exhibits no distension.   Musculoskeletal:         General: No tenderness. Normal range of motion.      Cervical back: Normal range of motion and neck supple.     Lymphadenopathy:     She has no cervical adenopathy.   Neurological: She is alert and oriented to person, place, and time. She has normal strength and normal reflexes. GCS score is 15. GCS eye subscore is 4. GCS verbal subscore is 5. GCS motor subscore is 6.   Skin: Skin is warm and dry. Capillary refill takes less than 2 seconds.       ED Course   Procedures  Labs Reviewed   CBC W/ AUTO DIFFERENTIAL - Abnormal; Notable for the following components:       Result Value    WBC 21.73 (*)     Hemoglobin 11.9  (*)     MCHC 31.6 (*)     RDW 16.0 (*)     Platelets 79 (*)     Lymph % 13.0 (*)     Platelet Estimate Decreased (*)     All other components within normal limits   COMPREHENSIVE METABOLIC PANEL - Abnormal; Notable for the following components:    CO2 19 (*)     Alkaline Phosphatase 325 (*)     All other components within normal limits          Imaging Results              X-Ray Chest AP Portable (Final result)  Result time 01/28/23 22:52:25      Final result by Sreedhar Minaya MD (01/28/23 22:52:25)                   Impression:      There is no radiographic evidence for acute intrathoracic process.      Electronically signed by: Sreedhar Minaya  Date:    01/28/2023  Time:    22:52               Narrative:    EXAMINATION:  XR CHEST AP PORTABLE    CLINICAL HISTORY:  Other chest pain    TECHNIQUE:  Single frontal view of the chest was performed.    COMPARISON:  Chest radiograph January 25, 2023    FINDINGS:  Single portable chest view is submitted.  Right-sided central venous catheter again noted.  Monitoring leads overlie the patient.  The appearance of the cardiomediastinal silhouette is stable.    There is no evidence for confluent infiltrate or consolidation, significant pleural effusion or pneumothorax.  The visualized osseous structures appear intact.                                       Medications   LIDOcaine 5 % patch 1 patch (has no administration in time range)   oxyCODONE immediate release tablet 5 mg (5 mg Oral Given 1/28/23 2227)     Medical Decision Making:   Initial Assessment:   62-year-old female who presents emergency department for sudden onset of neck, right shoulder pain, pain near catheter around 2 hours prior to arrival to emergency department.  Attempts as patient is alert oriented in no acute distress.  Patient is afebrile vitals within normal limits.  Physical exam findings noted above.  Differential Diagnosis:   Catheter dislodgement  Musculoskeletal  Infection    Clinical Tests:   Lab  Tests: Ordered and Reviewed  Radiological Study: Ordered and Reviewed  ED Management:  Patient presented with right-sided posterior neck, pain superior to the catheter, shoulder pain.  No bony tenderness or signs of trauma.  Site of catheters dry intact with no signs of fluctuance, drainage.  Chest x-ray obtained to evaluate catheter placement.  X-ray displayed no signs of acute intrathoracic processes and catheter in place and stable from prior x-rays postop.  CBC, CMP obtained to evaluate for leukocytosis, anemia, metabolic derangements.  Patient has elevated white count at 21 however down trending from prior and patient has had previous steroid use and recent procedure.  No signs of infection and low suspicion at this time.  At this time discussed with patient that this is likely musculoskeletal as she has been tilting her head chronically and pain is paraspinal extending down into the shoulder reproducible with movement and tenderness.  Lidoderm patch placed and patient given oral oxycodone for pain relief.  At this time patient is stable for discharge.  Patient discharged return precautions discussed and understood and prescription for Lidoderm patches to use as needed for discomfort.  Patient is agreeable with plan.                        Clinical Impression:   Final diagnoses:  [M54.2] Neck pain (Primary)  [Z51.89] Visit for wound check        ED Disposition Condition    Discharge Stable          ED Prescriptions       Medication Sig Dispense Start Date End Date Auth. Provider    LIDOcaine (LIDODERM) 5 % Place 1 patch onto the skin daily as needed (pain). Remove & Discard patch within 12 hours or as directed by MD 5 patch 1/28/2023 2/2/2023 Kam Pink MD          Follow-up Information       Follow up With Specialties Details Why Contact Info    Everett Boogie MD Hematology and Oncology Go in 1 week  [address]  [facility] [id number] [phone]      [facility] - Emergency Dept Emergency  Medicine Go to  If symptoms worsen [address]  [facility] [id number]  [phone]             Kam Pink MD  Resident  01/28/23 1960

## 2023-02-01 ENCOUNTER — HOSPITAL ENCOUNTER (INPATIENT)
Facility: HOSPITAL | Age: 63
LOS: 14 days | Discharge: HOME | DRG: 016 | End: 2023-02-15
Attending: INTERNAL MEDICINE | Admitting: INTERNAL MEDICINE
Payer: MEDICAID

## 2023-02-01 ENCOUNTER — OFFICE VISIT (OUTPATIENT)
Dept: HEMATOLOGY/ONCOLOGY | Facility: CLINIC | Age: 63
End: 2023-02-01
Payer: MEDICAID

## 2023-02-01 ENCOUNTER — CLINICAL SUPPORT (OUTPATIENT)
Dept: HEMATOLOGY/ONCOLOGY | Facility: CLINIC | Age: 63
End: 2023-02-01
Payer: MEDICAID

## 2023-02-01 ENCOUNTER — INFUSION (OUTPATIENT)
Dept: INFUSION THERAPY | Facility: HOSPITAL | Age: 63
End: 2023-02-01
Attending: INTERNAL MEDICINE
Payer: MEDICAID

## 2023-02-01 VITALS
WEIGHT: 219.44 LBS | OXYGEN SATURATION: 98 % | DIASTOLIC BLOOD PRESSURE: 86 MMHG | SYSTOLIC BLOOD PRESSURE: 136 MMHG | RESPIRATION RATE: 16 BRPM | TEMPERATURE: 98 F | BODY MASS INDEX: 35.27 KG/M2 | HEIGHT: 66 IN | HEART RATE: 88 BPM

## 2023-02-01 DIAGNOSIS — I10 HYPERTENSION, UNSPECIFIED TYPE: ICD-10-CM

## 2023-02-01 DIAGNOSIS — C90.01 MULTIPLE MYELOMA IN REMISSION: ICD-10-CM

## 2023-02-01 DIAGNOSIS — Z76.82 STEM CELL TRANSPLANT CANDIDATE: Primary | ICD-10-CM

## 2023-02-01 DIAGNOSIS — C90.00 MULTIPLE MYELOMA NOT HAVING ACHIEVED REMISSION: ICD-10-CM

## 2023-02-01 DIAGNOSIS — Z51.11 ADMISSION FOR ANTINEOPLASTIC CHEMOTHERAPY: ICD-10-CM

## 2023-02-01 DIAGNOSIS — Z76.82 STEM CELL TRANSPLANT CANDIDATE: ICD-10-CM

## 2023-02-01 DIAGNOSIS — Z13.9 ENCOUNTER FOR SCREENING: Primary | ICD-10-CM

## 2023-02-01 DIAGNOSIS — C90.00 MULTIPLE MYELOMA, REMISSION STATUS UNSPECIFIED: ICD-10-CM

## 2023-02-01 DIAGNOSIS — Z01.818 PRE-TRANSPLANT EVALUATION FOR STEM CELL TRANSPLANT: ICD-10-CM

## 2023-02-01 DIAGNOSIS — Z94.84 HISTORY OF AUTOLOGOUS STEM CELL TRANSPLANT: ICD-10-CM

## 2023-02-01 DIAGNOSIS — C90.00 MULTIPLE MYELOMA NOT HAVING ACHIEVED REMISSION: Primary | ICD-10-CM

## 2023-02-01 PROBLEM — D70.9 NEUTROPENIA: Status: ACTIVE | Noted: 2023-02-01

## 2023-02-01 PROBLEM — D69.6 THROMBOCYTOPENIA: Status: ACTIVE | Noted: 2023-02-01

## 2023-02-01 LAB
ABO + RH BLD: ABNORMAL
ALBUMIN SERPL BCP-MCNC: 3.9 G/DL (ref 3.5–5.2)
ALP SERPL-CCNC: 238 U/L (ref 55–135)
ALT SERPL W/O P-5'-P-CCNC: 30 U/L (ref 10–44)
ANION GAP SERPL CALC-SCNC: 9 MMOL/L (ref 8–16)
AST SERPL-CCNC: 21 U/L (ref 10–40)
BASOPHILS # BLD AUTO: 0 K/UL (ref 0–0.2)
BASOPHILS NFR BLD: 0 % (ref 0–1.9)
BILIRUB SERPL-MCNC: 0.4 MG/DL (ref 0.1–1)
BLD GP AB SCN CELLS X3 SERPL QL: ABNORMAL
BUN SERPL-MCNC: 17 MG/DL (ref 6–20)
CALCIUM SERPL-MCNC: 9.3 MG/DL (ref 8.7–10.5)
CHLORIDE SERPL-SCNC: 107 MMOL/L (ref 95–110)
CO2 SERPL-SCNC: 23 MMOL/L (ref 23–29)
CREAT SERPL-MCNC: 0.9 MG/DL (ref 0.5–1.4)
DIFFERENTIAL METHOD: ABNORMAL
EOSINOPHIL # BLD AUTO: 0 K/UL (ref 0–0.5)
EOSINOPHIL NFR BLD: 0.6 % (ref 0–8)
ERYTHROCYTE [DISTWIDTH] IN BLOOD BY AUTOMATED COUNT: 15.7 % (ref 11.5–14.5)
EST. GFR  (NO RACE VARIABLE): >60 ML/MIN/1.73 M^2
GLUCOSE SERPL-MCNC: 112 MG/DL (ref 70–110)
HCT VFR BLD AUTO: 38.5 % (ref 37–48.5)
HGB BLD-MCNC: 12 G/DL (ref 12–16)
IMM GRANULOCYTES # BLD AUTO: 0.03 K/UL (ref 0–0.04)
IMM GRANULOCYTES NFR BLD AUTO: 0.9 % (ref 0–0.5)
LYMPHOCYTES # BLD AUTO: 1.7 K/UL (ref 1–4.8)
LYMPHOCYTES NFR BLD: 53.2 % (ref 18–48)
MCH RBC QN AUTO: 27.1 PG (ref 27–31)
MCHC RBC AUTO-ENTMCNC: 31.2 G/DL (ref 32–36)
MCV RBC AUTO: 87 FL (ref 82–98)
MONOCYTES # BLD AUTO: 0.4 K/UL (ref 0.3–1)
MONOCYTES NFR BLD: 13 % (ref 4–15)
NEUTROPHILS # BLD AUTO: 1 K/UL (ref 1.8–7.7)
NEUTROPHILS NFR BLD: 32.3 % (ref 38–73)
NRBC BLD-RTO: 0 /100 WBC
PLATELET # BLD AUTO: 92 K/UL (ref 150–450)
PMV BLD AUTO: 10.9 FL (ref 9.2–12.9)
POTASSIUM SERPL-SCNC: 3.9 MMOL/L (ref 3.5–5.1)
PROT SERPL-MCNC: 6.8 G/DL (ref 6–8.4)
RBC # BLD AUTO: 4.43 M/UL (ref 4–5.4)
SARS-COV-2 RDRP RESP QL NAA+PROBE: NEGATIVE
SODIUM SERPL-SCNC: 139 MMOL/L (ref 136–145)
WBC # BLD AUTO: 3.16 K/UL (ref 3.9–12.7)

## 2023-02-01 PROCEDURE — 99900031 HC PATIENT EDUCATION (STAT)

## 2023-02-01 PROCEDURE — U0002 COVID-19 LAB TEST NON-CDC: HCPCS | Performed by: INTERNAL MEDICINE

## 2023-02-01 PROCEDURE — 1160F RVW MEDS BY RX/DR IN RCRD: CPT | Mod: CPTII,,, | Performed by: PHYSICIAN ASSISTANT

## 2023-02-01 PROCEDURE — 1160F PR REVIEW ALL MEDS BY PRESCRIBER/CLIN PHARMACIST DOCUMENTED: ICD-10-PCS | Mod: CPTII,,, | Performed by: PHYSICIAN ASSISTANT

## 2023-02-01 PROCEDURE — 63600175 PHARM REV CODE 636 W HCPCS: Performed by: NURSE PRACTITIONER

## 2023-02-01 PROCEDURE — 25000003 PHARM REV CODE 250: Performed by: INTERNAL MEDICINE

## 2023-02-01 PROCEDURE — 1159F MED LIST DOCD IN RCRD: CPT | Mod: CPTII,,, | Performed by: PHYSICIAN ASSISTANT

## 2023-02-01 PROCEDURE — 99499 UNLISTED E&M SERVICE: CPT | Mod: S$PBB,,, | Performed by: PHYSICIAN ASSISTANT

## 2023-02-01 PROCEDURE — 3075F PR MOST RECENT SYSTOLIC BLOOD PRESS GE 130-139MM HG: ICD-10-PCS | Mod: CPTII,,, | Performed by: PHYSICIAN ASSISTANT

## 2023-02-01 PROCEDURE — 1159F PR MEDICATION LIST DOCUMENTED IN MEDICAL RECORD: ICD-10-PCS | Mod: CPTII,,, | Performed by: PHYSICIAN ASSISTANT

## 2023-02-01 PROCEDURE — 63600175 PHARM REV CODE 636 W HCPCS: Performed by: INTERNAL MEDICINE

## 2023-02-01 PROCEDURE — 3075F SYST BP GE 130 - 139MM HG: CPT | Mod: CPTII,,, | Performed by: PHYSICIAN ASSISTANT

## 2023-02-01 PROCEDURE — 36592 COLLECT BLOOD FROM PICC: CPT

## 2023-02-01 PROCEDURE — 3079F DIAST BP 80-89 MM HG: CPT | Mod: CPTII,,, | Performed by: PHYSICIAN ASSISTANT

## 2023-02-01 PROCEDURE — 99499 NO LOS: ICD-10-PCS | Mod: S$PBB,,, | Performed by: PHYSICIAN ASSISTANT

## 2023-02-01 PROCEDURE — 3079F PR MOST RECENT DIASTOLIC BLOOD PRESSURE 80-89 MM HG: ICD-10-PCS | Mod: CPTII,,, | Performed by: PHYSICIAN ASSISTANT

## 2023-02-01 PROCEDURE — 3008F BODY MASS INDEX DOCD: CPT | Mod: CPTII,,, | Performed by: PHYSICIAN ASSISTANT

## 2023-02-01 PROCEDURE — 99900035 HC TECH TIME PER 15 MIN (STAT)

## 2023-02-01 PROCEDURE — 99223 PR INITIAL HOSPITAL CARE,LEVL III: ICD-10-PCS | Mod: ,,, | Performed by: INTERNAL MEDICINE

## 2023-02-01 PROCEDURE — 80053 COMPREHEN METABOLIC PANEL: CPT | Performed by: INTERNAL MEDICINE

## 2023-02-01 PROCEDURE — 20600001 HC STEP DOWN PRIVATE ROOM

## 2023-02-01 PROCEDURE — 86900 BLOOD TYPING SEROLOGIC ABO: CPT | Performed by: INTERNAL MEDICINE

## 2023-02-01 PROCEDURE — 85025 COMPLETE CBC W/AUTO DIFF WBC: CPT | Performed by: INTERNAL MEDICINE

## 2023-02-01 PROCEDURE — 99999 PR PBB SHADOW E&M-EST. PATIENT-LVL III: CPT | Mod: PBBFAC,,, | Performed by: PHYSICIAN ASSISTANT

## 2023-02-01 PROCEDURE — A4216 STERILE WATER/SALINE, 10 ML: HCPCS | Performed by: INTERNAL MEDICINE

## 2023-02-01 PROCEDURE — 3008F PR BODY MASS INDEX (BMI) DOCUMENTED: ICD-10-PCS | Mod: CPTII,,, | Performed by: PHYSICIAN ASSISTANT

## 2023-02-01 PROCEDURE — 99999 PR PBB SHADOW E&M-EST. PATIENT-LVL III: ICD-10-PCS | Mod: PBBFAC,,, | Performed by: PHYSICIAN ASSISTANT

## 2023-02-01 PROCEDURE — 99213 OFFICE O/P EST LOW 20 MIN: CPT | Mod: PBBFAC | Performed by: PHYSICIAN ASSISTANT

## 2023-02-01 PROCEDURE — 25000003 PHARM REV CODE 250: Performed by: NURSE PRACTITIONER

## 2023-02-01 PROCEDURE — 99223 1ST HOSP IP/OBS HIGH 75: CPT | Mod: ,,, | Performed by: INTERNAL MEDICINE

## 2023-02-01 RX ORDER — SODIUM CHLORIDE 0.9 % (FLUSH) 0.9 %
10 SYRINGE (ML) INJECTION
Status: CANCELLED | OUTPATIENT
Start: 2023-02-01

## 2023-02-01 RX ORDER — PROCHLORPERAZINE EDISYLATE 5 MG/ML
10 INJECTION INTRAMUSCULAR; INTRAVENOUS EVERY 6 HOURS PRN
Status: CANCELLED | OUTPATIENT
Start: 2023-02-01

## 2023-02-01 RX ORDER — SODIUM CHLORIDE AND POTASSIUM CHLORIDE 150; 900 MG/100ML; MG/100ML
INJECTION, SOLUTION INTRAVENOUS CONTINUOUS
Status: DISCONTINUED | OUTPATIENT
Start: 2023-02-01 | End: 2023-02-04

## 2023-02-01 RX ORDER — DIPHENHYDRAMINE HCL 25 MG
25 CAPSULE ORAL
Status: DISCONTINUED | OUTPATIENT
Start: 2023-02-01 | End: 2023-02-15 | Stop reason: HOSPADM

## 2023-02-01 RX ORDER — ACYCLOVIR 200 MG/1
800 CAPSULE ORAL 2 TIMES DAILY
Status: DISCONTINUED | OUTPATIENT
Start: 2023-02-02 | End: 2023-02-15 | Stop reason: HOSPADM

## 2023-02-01 RX ORDER — SODIUM CHLORIDE 0.9 % (FLUSH) 0.9 %
10 SYRINGE (ML) INJECTION
Status: DISCONTINUED | OUTPATIENT
Start: 2023-02-01 | End: 2023-02-01 | Stop reason: HOSPADM

## 2023-02-01 RX ORDER — ENOXAPARIN SODIUM 100 MG/ML
40 INJECTION SUBCUTANEOUS EVERY 24 HOURS
Status: DISCONTINUED | OUTPATIENT
Start: 2023-02-01 | End: 2023-02-09

## 2023-02-01 RX ORDER — POTASSIUM CHLORIDE 20 MEQ/1
20 TABLET, EXTENDED RELEASE ORAL
Status: DISCONTINUED | OUTPATIENT
Start: 2023-02-01 | End: 2023-02-15 | Stop reason: HOSPADM

## 2023-02-01 RX ORDER — OLANZAPINE 10 MG/1
10 TABLET ORAL 2 TIMES DAILY
Status: CANCELLED | OUTPATIENT
Start: 2023-02-01

## 2023-02-01 RX ORDER — PROCHLORPERAZINE EDISYLATE 5 MG/ML
10 INJECTION INTRAMUSCULAR; INTRAVENOUS EVERY 6 HOURS PRN
Status: DISCONTINUED | OUTPATIENT
Start: 2023-02-01 | End: 2023-02-15 | Stop reason: HOSPADM

## 2023-02-01 RX ORDER — SODIUM CHLORIDE 0.9 % (FLUSH) 0.9 %
10 SYRINGE (ML) INJECTION
OUTPATIENT
Start: 2023-02-01

## 2023-02-01 RX ORDER — SODIUM CHLORIDE 0.9 % (FLUSH) 0.9 %
10 SYRINGE (ML) INJECTION
Status: DISCONTINUED | OUTPATIENT
Start: 2023-02-01 | End: 2023-02-15 | Stop reason: HOSPADM

## 2023-02-01 RX ORDER — LANOLIN ALCOHOL/MO/W.PET/CERES
800 CREAM (GRAM) TOPICAL EVERY 4 HOURS PRN
Status: DISCONTINUED | OUTPATIENT
Start: 2023-02-01 | End: 2023-02-15 | Stop reason: HOSPADM

## 2023-02-01 RX ORDER — LIDOCAINE 50 MG/G
1 PATCH TOPICAL DAILY PRN
Status: DISCONTINUED | OUTPATIENT
Start: 2023-02-01 | End: 2023-02-15 | Stop reason: HOSPADM

## 2023-02-01 RX ORDER — PANTOPRAZOLE SODIUM 40 MG/1
40 TABLET, DELAYED RELEASE ORAL DAILY
Status: DISCONTINUED | OUTPATIENT
Start: 2023-02-01 | End: 2023-02-15 | Stop reason: HOSPADM

## 2023-02-01 RX ORDER — OLANZAPINE 5 MG/1
10 TABLET ORAL 2 TIMES DAILY
Status: DISCONTINUED | OUTPATIENT
Start: 2023-02-01 | End: 2023-02-02

## 2023-02-01 RX ORDER — HEPARIN SODIUM 1000 [USP'U]/ML
1600 INJECTION, SOLUTION INTRAVENOUS; SUBCUTANEOUS
Status: DISCONTINUED | OUTPATIENT
Start: 2023-02-01 | End: 2023-02-15 | Stop reason: HOSPADM

## 2023-02-01 RX ORDER — FLUCONAZOLE 200 MG/1
400 TABLET ORAL DAILY
Status: DISCONTINUED | OUTPATIENT
Start: 2023-02-02 | End: 2023-02-15

## 2023-02-01 RX ORDER — HEPARIN 100 UNIT/ML
500 SYRINGE INTRAVENOUS
Status: DISCONTINUED | OUTPATIENT
Start: 2023-02-01 | End: 2023-02-01 | Stop reason: HOSPADM

## 2023-02-01 RX ORDER — DEXAMETHASONE 0.5 MG/1
12 TABLET ORAL
Status: CANCELLED | OUTPATIENT
Start: 2023-02-02

## 2023-02-01 RX ORDER — LEVOFLOXACIN 500 MG/1
500 TABLET, FILM COATED ORAL DAILY
Status: DISCONTINUED | OUTPATIENT
Start: 2023-02-02 | End: 2023-02-11

## 2023-02-01 RX ORDER — LANOLIN ALCOHOL/MO/W.PET/CERES
400 CREAM (GRAM) TOPICAL EVERY 4 HOURS PRN
Status: DISCONTINUED | OUTPATIENT
Start: 2023-02-01 | End: 2023-02-15 | Stop reason: HOSPADM

## 2023-02-01 RX ORDER — LEVOFLOXACIN 500 MG/1
500 TABLET, FILM COATED ORAL DAILY
Status: CANCELLED | OUTPATIENT
Start: 2023-02-02

## 2023-02-01 RX ORDER — ACYCLOVIR 200 MG/1
800 CAPSULE ORAL 2 TIMES DAILY
Status: CANCELLED | OUTPATIENT
Start: 2023-02-02

## 2023-02-01 RX ORDER — ONDANSETRON 4 MG/1
8 TABLET, ORALLY DISINTEGRATING ORAL
Status: CANCELLED | OUTPATIENT
Start: 2023-02-02

## 2023-02-01 RX ORDER — LORAZEPAM 2 MG/ML
1 INJECTION INTRAMUSCULAR EVERY 6 HOURS PRN
Status: DISCONTINUED | OUTPATIENT
Start: 2023-02-01 | End: 2023-02-15 | Stop reason: HOSPADM

## 2023-02-01 RX ORDER — MAG HYDROX/ALUMINUM HYD/SIMETH 200-200-20
30 SUSPENSION, ORAL (FINAL DOSE FORM) ORAL EVERY 6 HOURS PRN
Status: DISCONTINUED | OUTPATIENT
Start: 2023-02-01 | End: 2023-02-15 | Stop reason: HOSPADM

## 2023-02-01 RX ORDER — LORAZEPAM 2 MG/ML
1 INJECTION INTRAMUSCULAR EVERY 6 HOURS PRN
Status: CANCELLED | OUTPATIENT
Start: 2023-02-01

## 2023-02-01 RX ORDER — HEPARIN SODIUM 1000 [USP'U]/ML
1600 INJECTION, SOLUTION INTRAVENOUS; SUBCUTANEOUS
Status: CANCELLED | OUTPATIENT
Start: 2023-02-01

## 2023-02-01 RX ORDER — SODIUM CHLORIDE AND POTASSIUM CHLORIDE 150; 900 MG/100ML; MG/100ML
INJECTION, SOLUTION INTRAVENOUS CONTINUOUS
Status: CANCELLED | OUTPATIENT
Start: 2023-02-01

## 2023-02-01 RX ORDER — AMLODIPINE BESYLATE 10 MG/1
10 TABLET ORAL DAILY
Status: DISCONTINUED | OUTPATIENT
Start: 2023-02-01 | End: 2023-02-09

## 2023-02-01 RX ORDER — HEPARIN 100 UNIT/ML
500 SYRINGE INTRAVENOUS
OUTPATIENT
Start: 2023-02-01

## 2023-02-01 RX ORDER — DEXAMETHASONE 4 MG/1
12 TABLET ORAL
Status: COMPLETED | OUTPATIENT
Start: 2023-02-02 | End: 2023-02-02

## 2023-02-01 RX ORDER — FLUCONAZOLE 200 MG/1
400 TABLET ORAL DAILY
Status: CANCELLED | OUTPATIENT
Start: 2023-02-02

## 2023-02-01 RX ORDER — ONDANSETRON 8 MG/1
8 TABLET, ORALLY DISINTEGRATING ORAL
Status: COMPLETED | OUTPATIENT
Start: 2023-02-02 | End: 2023-02-04

## 2023-02-01 RX ADMIN — OLANZAPINE 10 MG: 5 TABLET, FILM COATED ORAL at 08:02

## 2023-02-01 RX ADMIN — AMLODIPINE BESYLATE 10 MG: 10 TABLET ORAL at 03:02

## 2023-02-01 RX ADMIN — Medication 1 DOSE: at 08:02

## 2023-02-01 RX ADMIN — ENOXAPARIN SODIUM 40 MG: 40 INJECTION SUBCUTANEOUS at 05:02

## 2023-02-01 RX ADMIN — Medication 1 DOSE: at 05:02

## 2023-02-01 RX ADMIN — SODIUM CHLORIDE AND POTASSIUM CHLORIDE: .9; .15 SOLUTION INTRAVENOUS at 08:02

## 2023-02-01 RX ADMIN — PANTOPRAZOLE SODIUM 40 MG: 40 TABLET, DELAYED RELEASE ORAL at 03:02

## 2023-02-01 RX ADMIN — Medication 10 ML: at 11:02

## 2023-02-01 RX ADMIN — HEPARIN SODIUM (PORCINE) LOCK FLUSH IV SOLN 100 UNIT/ML 500 UNITS: 100 SOLUTION at 11:02

## 2023-02-01 NOTE — ASSESSMENT & PLAN NOTE
- daily CBC  - transfuse for platelets <10 or <50k if bleeding/pre-procedure  - hold Lovenox when platelets <50k

## 2023-02-01 NOTE — ASSESSMENT & PLAN NOTE
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

## 2023-02-01 NOTE — HPI
Ms. Fagan  is a 62 y.o.female with medical history of Multiple Myeloma, OA, gout, and osteoporosis presents today with her son for admission for Melphalan Autologous stem cell transplant. Her collection process went smoothly. She was seen in the ED over the weekend due to pain at her line site, she was not found to have dislodgement nor any infectious concerns. She is feeling well today, no new symptoms/complaints, and eager for admission. CoVID negative in clinic today.

## 2023-02-01 NOTE — SUBJECTIVE & OBJECTIVE
Patient information was obtained from patient, relative(s), and past medical records.     Oncology History:   07/14/21: Presented with several months of worsening lethargy, generalized pain, 40-60lbs weight loss, and encephalopathy. Labs at time of presentation: hgb 7.6, plts 82, wbc 12, sodium 120, creatinine 1.55, corrected calcium 9.7, transaminitis, M-spike 6.4 g/dL. Skeletal survey showed lesions of left/right humerus, left/right proximal radius, pelvis, and femurs with a significant left femur lytic lesion. Multiple lucent lesions in calvarium. Given two doses of velcade while hospitalized.  07/18/21: Underwent left trochanteric femoral nail fixation. Pathology consistent with plasma cell neoplasm  07/19/22: BMBx showing 90% plasma cells with 100% cellularity, gain 1q21, t(4;14), and monosomy 13.  08/17/22: Cycle 1 Sofie-VRD for IgG Kappa Multiple Myeloma  09/13/22: Cycle 2 Sofie-VRD  10/11/22: Cycle 3 Sofie-VRD  11/08/22: Cycle 4 Sofie-VRD  12/06/22: Cycle 5 VRD       Transplant Evaluation:  --Bone Marrow Biopsy on 12/19/22: Normocellular marrow (40-50% total cellularity) with <1% polytypic   plasma cells and trilineage hematopoiesis with mild granulocytic hyperplasia and mild relative monocytosis. No monotypic plasma cells detected by MRD flow cytometric analysis   --Labs from 12/29/22:  Light Chains  Kappa: 1.48  Lambda: 0.59  K/L: 2.51  SPEP:  Borderline mildly decreased total protein. Normal gamma globulins are   decreased. Paraprotein peak in gamma = 0.1 g/dL (previously, 0.15   g/dL).   --Colonoscopy on 1/5/23:   --Mammogram on 1/6/23: Routine screening mammogram in 1 year is recommended.     --PET on 1/11/23: In the bones, there is heterogeneous bone marrow throughout the calvarium and to a lesser degree throughout the spine.  Compression fracture L5 with moderate to severe height loss progressed from outside radiograph 07/16/2022 and associated with mild hypermetabolic activity SUV max 3.8.         Medications Prior to Admission   Medication Sig Dispense Refill Last Dose    acyclovir (ZOVIRAX) 400 MG tablet Take 1 tablet (400 mg total) by mouth 2 (two) times daily. 60 tablet 11     amLODIPine (NORVASC) 10 MG tablet Take 1 tablet (10 mg total) by mouth once daily. 30 tablet 11     aspirin (CIERRA ASPIRIN) 325 MG tablet Take 1 tablet (325 mg total) by mouth once daily. 100 tablet 3     dexAMETHasone (DECADRON) 4 MG Tab Take 10 tablets (40 mg total) by mouth As instructed (Take on days 1, 8, 15, and 22 of chemotherapy.). 40 tablet 1     lenalidomide 25 mg Cap Take 1 capsule (25 mg total) by mouth As instructed (Take by mouth once daily on days 1-21 of each 28 day cycle). (Patient not taking: Reported on 1/20/2023.) 21 capsule 0     LIDOcaine (LIDODERM) 5 % Place 1 patch onto the skin daily as needed (pain). Remove & Discard patch within 12 hours or as directed by MD 5 patch 0     oxyCODONE (ROXICODONE) 5 MG immediate release tablet Take 2 tablets (10 mg total) by mouth every 6 (six) hours as needed for Pain. (Patient not taking: Reported on 1/24/2023) 56 tablet 0     pantoprazole (PROTONIX) 40 MG tablet Take 1 tablet (40 mg total) by mouth once daily. 30 tablet 3        Patient has no known allergies.     Past Medical History:   Diagnosis Date    Arthritis     Multiple myeloma     Osteoporosis      Past Surgical History:   Procedure Laterality Date    COLONOSCOPY N/A 1/5/2023    Procedure: COLONOSCOPY;  Surgeon: Diana Huynh MD;  Location: [facility] ([address] [place] [address]);  Service: Endoscopy;  Laterality: N/A;  inst via email  pre call no answer-as    FEMUR SURGERY      INSERTION OF TUNNELED CENTRAL VENOUS HEMODIALYSIS CATHETER Right 1/25/2023    Procedure: INSERTION, CATHETER, HEMODIALYSIS, DUAL LUMEN Bard 14.5 Fr Hemosplit Catheter Model [id number], Right Possible Left Chest;  Surgeon: Steven Monroy MD;  Location: [facility] OR [address] [place] [address];  Service: General;  Laterality: Right;     Family History       Problem Relation  (Age of Onset)    Cancer Father          Tobacco Use    Smoking status: Former     Types: Cigarettes    Smokeless tobacco: Former    Tobacco comments:     Quit April 2022   Substance and Sexual Activity    Alcohol use: Not Currently     Comment: Quit April 2022    Drug use: Never    Sexual activity: Not Currently     Partners: Male       Review of Systems   Constitutional:  Negative for activity change, appetite change, chills, diaphoresis, fatigue, fever and unexpected weight change.   HENT:  Negative for congestion, dental problem, mouth sores, nosebleeds, postnasal drip, rhinorrhea, sinus pressure, sore throat and trouble swallowing.    Eyes:  Negative for photophobia and visual disturbance.   Respiratory:  Negative for cough and shortness of breath.    Cardiovascular:  Negative for chest pain, palpitations and leg swelling.   Gastrointestinal:  Negative for abdominal distention, abdominal pain, blood in stool, constipation, diarrhea, nausea and vomiting.   Genitourinary:  Negative for difficulty urinating, dysuria, frequency, hematuria, menstrual problem, urgency and vaginal bleeding.   Musculoskeletal:  Negative for arthralgias, back pain and myalgias.   Skin:  Negative for pallor and rash.   Allergic/Immunologic: Negative for immunocompromised state.   Neurological:  Negative for dizziness, syncope, weakness, numbness and headaches.   Hematological:  Negative for adenopathy. Does not bruise/bleed easily.   Psychiatric/Behavioral:  Negative for confusion. The patient is not nervous/anxious.    Objective:     Vital Signs (Most Recent):    Vital Signs (24h Range):  Temp:  [98 °F (36.7 °C)] 98 °F (36.7 °C)  Pulse:  [88] 88  Resp:  [16] 16  SpO2:  [98 %] 98 %  BP: (136)/(86) 136/86        There is no height or weight on file to calculate BMI.  There is no height or weight on file to calculate BSA.    ECOG SCORE           KPS 80%    Lines/Drains/Airways       Central Venous Catheter Line       Name Duration     [facility] 01/25/23 0836 right internal jugular 7 days                    Physical Exam  Vitals reviewed.   Constitutional:       General: She is not in acute distress.     Appearance: She is well-developed.   HENT:      Mouth/Throat:      Pharynx: No oropharyngeal exudate or posterior oropharyngeal erythema.   Eyes:      General: No scleral icterus.     Conjunctiva/sclera: Conjunctivae normal.      Pupils: Pupils are equal, round, and reactive to light.   Neck:      Thyroid: No thyromegaly.   Cardiovascular:      Rate and Rhythm: Normal rate and regular rhythm.      Heart sounds: Normal heart sounds.   Pulmonary:      Effort: Pulmonary effort is normal. No respiratory distress.      Breath sounds: Normal breath sounds.   Abdominal:      General: Bowel sounds are normal. There is no distension.      Palpations: Abdomen is soft. There is no hepatomegaly or splenomegaly.      Tenderness: There is no abdominal tenderness.   Musculoskeletal:         General: No tenderness. Normal range of motion.      Cervical back: Normal range of motion and neck supple.   Lymphadenopathy:      Cervical: No cervical adenopathy.   Skin:     Coloration: Skin is not pale.      Findings: No rash.      Nails: There is no clubbing.      Comments: Vascath intact with no redness or drainage   Neurological:      Mental Status: She is alert and oriented to person, place, and time.      Cranial Nerves: No cranial nerve deficit.      Coordination: Coordination normal.   Psychiatric:         Behavior: Behavior normal.         Thought Content: Thought content normal.       Significant Labs:   CBC:   Recent Labs   Lab 02/01/23  1133   WBC 3.16*   HGB 12.0   HCT 38.5   PLT 92*    and CMP:   Recent Labs   Lab 02/01/23  1133      K 3.9      CO2 23   *   BUN 17   CREATININE 0.9   CALCIUM 9.3   PROT 6.8   ALBUMIN 3.9   BILITOT 0.4   ALKPHOS 238*   AST 21   ALT 30   ANIONGAP 9       Diagnostic Results:  None

## 2023-02-01 NOTE — H&P
Titus Baig - Oncology ([facility])  Hematology  Bone Marrow Transplant  H&P    Subjective:     Principal Problem: Stem cell transplant candidate    HPI: Ms. Fagan  is a 62 y.o.female with medical history of Multiple Myeloma, OA, gout, and osteoporosis presents today with her son for admission for Melphalan Autologous stem cell transplant. Her collection process went smoothly. She was seen in the ED over the weekend due to pain at her line site, she was not found to have dislodgement nor any infectious concerns. She is feeling well today, no new symptoms/complaints, and eager for admission. CoVID negative in clinic today.      Patient information was obtained from patient, relative(s), and past medical records.     Oncology History:   07/14/21: Presented with several months of worsening lethargy, generalized pain, 40-60lbs weight loss, and encephalopathy. Labs at time of presentation: hgb 7.6, plts 82, wbc 12, sodium 120, creatinine 1.55, corrected calcium 9.7, transaminitis, M-spike 6.4 g/dL. Skeletal survey showed lesions of left/right humerus, left/right proximal radius, pelvis, and femurs with a significant left femur lytic lesion. Multiple lucent lesions in calvarium. Given two doses of velcade while hospitalized.  07/18/21: Underwent left trochanteric femoral nail fixation. Pathology consistent with plasma cell neoplasm  07/19/22: BMBx showing 90% plasma cells with 100% cellularity, gain 1q21, t(4;14), and monosomy 13.  08/17/22: Cycle 1 Sofie-VRD for IgG Kappa Multiple Myeloma  09/13/22: Cycle 2 Sofie-VRD  10/11/22: Cycle 3 Sofie-VRD  11/08/22: Cycle 4 Sofie-VRD  12/06/22: Cycle 5 VRD       Transplant Evaluation:  --Bone Marrow Biopsy on 12/19/22: Normocellular marrow (40-50% total cellularity) with <1% polytypic   plasma cells and trilineage hematopoiesis with mild granulocytic hyperplasia and mild relative monocytosis. No monotypic plasma cells detected by MRD flow cytometric analysis   --Labs from  12/29/22:  Light Chains  Kappa: 1.48  Lambda: 0.59  K/L: 2.51  SPEP:  Borderline mildly decreased total protein. Normal gamma globulins are   decreased. Paraprotein peak in gamma = 0.1 g/dL (previously, 0.15   g/dL).   --Colonoscopy on 1/5/23:   --Mammogram on 1/6/23: Routine screening mammogram in 1 year is recommended.     --PET on 1/11/23: In the bones, there is heterogeneous bone marrow throughout the calvarium and to a lesser degree throughout the spine.  Compression fracture L5 with moderate to severe height loss progressed from outside radiograph 07/16/2022 and associated with mild hypermetabolic activity SUV max 3.8.        Medications Prior to Admission   Medication Sig Dispense Refill Last Dose    acyclovir (ZOVIRAX) 400 MG tablet Take 1 tablet (400 mg total) by mouth 2 (two) times daily. 60 tablet 11     amLODIPine (NORVASC) 10 MG tablet Take 1 tablet (10 mg total) by mouth once daily. 30 tablet 11     aspirin (CIERRA ASPIRIN) 325 MG tablet Take 1 tablet (325 mg total) by mouth once daily. 100 tablet 3     dexAMETHasone (DECADRON) 4 MG Tab Take 10 tablets (40 mg total) by mouth As instructed (Take on days 1, 8, 15, and 22 of chemotherapy.). 40 tablet 1     lenalidomide 25 mg Cap Take 1 capsule (25 mg total) by mouth As instructed (Take by mouth once daily on days 1-21 of each 28 day cycle). (Patient not taking: Reported on 1/20/2023.) 21 capsule 0     LIDOcaine (LIDODERM) 5 % Place 1 patch onto the skin daily as needed (pain). Remove & Discard patch within 12 hours or as directed by MD 5 patch 0     oxyCODONE (ROXICODONE) 5 MG immediate release tablet Take 2 tablets (10 mg total) by mouth every 6 (six) hours as needed for Pain. (Patient not taking: Reported on 1/24/2023) 56 tablet 0     pantoprazole (PROTONIX) 40 MG tablet Take 1 tablet (40 mg total) by mouth once daily. 30 tablet 3        Patient has no known allergies.     Past Medical History:   Diagnosis Date    Arthritis     Multiple myeloma      Osteoporosis      Past Surgical History:   Procedure Laterality Date    COLONOSCOPY N/A 1/5/2023    Procedure: COLONOSCOPY;  Surgeon: Diana Huynh MD;  [facility] ([facility]  Service: Endoscopy;  Laterality: N/A;  inst via email  pre call no answer-as    FEMUR SURGERY      INSERTION OF TUNNELED CENTRAL VENOUS HEMODIALYSIS CATHETER Right 1/25/2023    Procedure: INSERTION, CATHETER, HEMODIALYSIS, DUAL LUMEN Bard 14.5 Fr Hemosplit Catheter Model [id number], Right Possible Left Chest;  Surgeon: Steven Monroy MD;  [facility];  Service: General;  Laterality: Right;     Family History       Problem Relation (Age of Onset)    Cancer Father          Tobacco Use    Smoking status: Former     Types: Cigarettes    Smokeless tobacco: Former    Tobacco comments:     Quit April 2022   Substance and Sexual Activity    Alcohol use: Not Currently     Comment: Quit April 2022    Drug use: Never    Sexual activity: Not Currently     Partners: Male       Review of Systems   Constitutional:  Negative for activity change, appetite change, chills, diaphoresis, fatigue, fever and unexpected weight change.   HENT:  Negative for congestion, dental problem, mouth sores, nosebleeds, postnasal drip, rhinorrhea, sinus pressure, sore throat and trouble swallowing.    Eyes:  Negative for photophobia and visual disturbance.   Respiratory:  Negative for cough and shortness of breath.    Cardiovascular:  Negative for chest pain, palpitations and leg swelling.   Gastrointestinal:  Negative for abdominal distention, abdominal pain, blood in stool, constipation, diarrhea, nausea and vomiting.   Genitourinary:  Negative for difficulty urinating, dysuria, frequency, hematuria, menstrual problem, urgency and vaginal bleeding.   Musculoskeletal:  Negative for arthralgias, back pain and myalgias.   Skin:  Negative for pallor and rash.   Allergic/Immunologic: Negative for immunocompromised state.   Neurological:   Negative for dizziness, syncope, weakness, numbness and headaches.   Hematological:  Negative for adenopathy. Does not bruise/bleed easily.   Psychiatric/Behavioral:  Negative for confusion. The patient is not nervous/anxious.    Objective:     Vital Signs (Most Recent):    Vital Signs (24h Range):  Temp:  [98 °F (36.7 °C)] 98 °F (36.7 °C)  Pulse:  [88] 88  Resp:  [16] 16  SpO2:  [98 %] 98 %  BP: (136)/(86) 136/86        There is no height or weight on file to calculate BMI.  There is no height or weight on file to calculate BSA.    ECOG SCORE           KPS 80%    Lines/Drains/Airways       Central Venous Catheter Line       Name Duration    Permacath 01/25/23 0836 right internal jugular 7 days                    Physical Exam  Vitals reviewed.   Constitutional:       General: She is not in acute distress.     Appearance: She is well-developed.   HENT:      Mouth/Throat:      Pharynx: No oropharyngeal exudate or posterior oropharyngeal erythema.   Eyes:      General: No scleral icterus.     Conjunctiva/sclera: Conjunctivae normal.      Pupils: Pupils are equal, round, and reactive to light.   Neck:      Thyroid: No thyromegaly.   Cardiovascular:      Rate and Rhythm: Normal rate and regular rhythm.      Heart sounds: Normal heart sounds.   Pulmonary:      Effort: Pulmonary effort is normal. No respiratory distress.      Breath sounds: Normal breath sounds.   Abdominal:      General: Bowel sounds are normal. There is no distension.      Palpations: Abdomen is soft. There is no hepatomegaly or splenomegaly.      Tenderness: There is no abdominal tenderness.   Musculoskeletal:         General: No tenderness. Normal range of motion.      Cervical back: Normal range of motion and neck supple.   Lymphadenopathy:      Cervical: No cervical adenopathy.   Skin:     Coloration: Skin is not pale.      Findings: No rash.      Nails: There is no clubbing.      Comments: Vascath intact with no redness or drainage   Neurological:       Mental Status: She is alert and oriented to person, place, and time.      Cranial Nerves: No cranial nerve deficit.      Coordination: Coordination normal.   Psychiatric:         Behavior: Behavior normal.         Thought Content: Thought content normal.       Significant Labs:   CBC:   Recent Labs   Lab 02/01/23  1133   WBC 3.16*   HGB 12.0   HCT 38.5   PLT 92*    and CMP:   Recent Labs   Lab 02/01/23  1133      K 3.9      CO2 23   *   BUN 17   CREATININE 0.9   CALCIUM 9.3   PROT 6.8   ALBUMIN 3.9   BILITOT 0.4   ALKPHOS 238*   AST 21   ALT 30   ANIONGAP 9       Diagnostic Results:  None    Assessment/Plan:     * Stem cell transplant candidate  --Approved by the transplant selection committee for a Génesis 200  --Underwent consent signing with Dr. Casas 1/20/2023  --Admitted for Génesis 200 Auto today, day -2                Planned conditioning regimen:  Melphalan on Day -1     Antimicrobial Prophylaxis:  Acyclovir starting on Day -1  Levofloxacin starting on Day -1  Fluconazole starting on Day -1     Growth Factor Support:  Neupogen starting on Day +7      Caregiver: daughter in law and son  Post-transplant discharge plans: Hope Zeke          Multiple myeloma in remission  -- Complex cytogenetics with gain 1q21, t(4;14), and monosomy 13  -- R-ISS stage III (elevated beta-2 microglobulin, elevated LDH, decreased albumin, and high risk cytogenetics)  -- S/p 5 Cycles D-Vrd (discontinued jasen after cycle 4)  -- Has had significant clinical improvement as well as reduction in IgG and M-spike showing excellent response  -- see autologous stem cell transplant    Thrombocytopenia  - daily CBC  - transfuse for platelets <10 or <50k if bleeding/pre-procedure  - hold Lovenox when platelets <50k    Neutropenia  - expect pancytopenia post chemo and transplant  - daily CBC  - ppx antimicrobials per protocol    HTN (hypertension)  - continue home amlodipine         VTE Risk Mitigation (From admission, onward)          Ordered     enoxaparin injection 40 mg  Daily         02/01/23 1442     heparin (porcine) injection 1,600 Units  As needed (PRN)         02/01/23 1528     IP VTE HIGH RISK PATIENT  Once         02/01/23 1442     Place sequential compression device  Until discontinued         02/01/23 1442                Disposition: admit to inpatient    Natali Calle NP  Bone Marrow Transplant  Hematology  [facility] - Oncology ([facility])

## 2023-02-01 NOTE — PROGRESS NOTES
Hematology and Medical Oncology   Consent Signing     02/01/2023      Primary Oncologic Diagnosis: IgG Kappa Multiple Myeloma      History of Present Ilness:   Raquel Houston (Raquel) is a pleasant 62 y.o.female with medical history of OA, gout, and osteoporosis presenting for follow up of multiple myeloma. History as above. Arrived alone today. Continues to do very well and is having no issues tolerating therapy. Has not been able to set up dental appointment yet. Denies any complaints including neuropathy. Appetite is doing great. Does not need to use a cane any longer.       Smoked 1ppd for 20 years, quit April 2022  Quit alcohol Apirl 2022  Denies recreational drug use  Father has a history of a diffuse bone cancer that he did not want to be treated for    Treatment History:  07/14/21: Presented with several months of worsening lethargy, generalized pain, 40-60lbs weight loss, and encephalopathy. Labs at time of presentation: hgb 7.6, plts 82, wbc 12, sodium 120, creatinine 1.55, corrected calcium 9.7, transaminitis, M-spike 6.4 g/dL. Skeletal survey showed lesions of left/right humerus, left/right proximal radius, pelvis, and femurs with a significant left femur lytic lesion. Multiple lucent lesions in calvarium. Given two doses of velcade while hospitalized.  07/18/21: Underwent left trochanteric femoral nail fixation. Pathology consistent with plasma cell neoplasm  07/19/22: BMBx showing 90% plasma cells with 100% cellularity, gain 1q21, t(4;14), and monosomy 13.  08/17/22: Cycle 1 Sofie-VRD for IgG Kappa Multiple Myeloma  09/13/22: Cycle 2 Sofie-VRD  10/11/22: Cycle 3 Sofie-VRD  11/08/22: Cycle 4 Sofie-VRD  12/06/22: Cycle 5 VRD    Transplant Evaluation:  --Bone Marrow Biopsy on 12/19/22: Normocellular marrow (40-50% total cellularity) with <1% polytypic   plasma cells and trilineage hematopoiesis with mild granulocytic hyperplasia and mild relative monocytosis. No monotypic plasma cells detected by MRD  flow cytometric analysis   --Labs from 12/29/22:  Light Chains  Kappa: 1.48  Lambda: 0.59  K/L: 2.51  SPEP:  Borderline mildly decreased total protein. Normal gamma globulins are   decreased. Paraprotein peak in gamma = 0.1 g/dL (previously, 0.15   g/dL).   --Colonoscopy on 1/5/23:   --Mammogram on 1/6/23: Routine screening mammogram in 1 year is recommended.    --PET on 1/11/23: In the bones, there is heterogeneous bone marrow throughout the calvarium and to a lesser degree throughout the spine.  Compression fracture L5 with moderate to severe height loss progressed from outside radiograph 07/16/2022 and associated with mild hypermetabolic activity SUV max 3.8.      Interval History:   Ms. Fagan presents today with her son prior to admission for autologous stem cell transplant. Her collection process went smoothly. She was seen in the ED over the weekend due to pain at her line site, she was not found to have dislodgement nor any infectious concerns. She is feeling well today, no new symptoms/complaints, and eager for admission. We again reviewed the transplant process at length.       PAST MEDICAL HISTORY:   Past Medical History:   Diagnosis Date    Arthritis     Multiple myeloma     Osteoporosis        PAST SURGICAL HISTORY:   Past Surgical History:   Procedure Laterality Date    COLONOSCOPY N/A 1/5/2023    Procedure: COLONOSCOPY;  Surgeon: Diana Huynh MD;  Location: [facility] ([address] [place] [address]);  Service: Endoscopy;  Laterality: N/A;  inst via email  pre call no answer-as    FEMUR SURGERY      INSERTION OF TUNNELED CENTRAL VENOUS HEMODIALYSIS CATHETER Right 1/25/2023    Procedure: INSERTION, CATHETER, HEMODIALYSIS, DUAL LUMEN Bard 14.5 Fr Hemosplit Catheter Model [id number], Right Possible Left Chest;  Surgeon: Steven Monroy MD;  Location: [facility] OR [address] [place] [address];  Service: General;  Laterality: Right;       PAST SOCIAL HISTORY:   reports that she has quit smoking. Her smoking use included cigarettes. She has quit  using smokeless tobacco. She reports that she does not currently use alcohol. She reports that she does not use drugs.    FAMILY HISTORY:  Family History   Problem Relation Age of Onset    Cancer Father        CURRENT MEDICATIONS:   Current Outpatient Medications   Medication Sig    acyclovir (ZOVIRAX) 400 MG tablet Take 1 tablet (400 mg total) by mouth 2 (two) times daily.    amLODIPine (NORVASC) 10 MG tablet Take 1 tablet (10 mg total) by mouth once daily.    aspirin (CIERRA ASPIRIN) 325 MG tablet Take 1 tablet (325 mg total) by mouth once daily.    dexAMETHasone (DECADRON) 4 MG Tab Take 10 tablets (40 mg total) by mouth As instructed (Take on days 1, 8, 15, and 22 of chemotherapy.).    lenalidomide 25 mg Cap Take 1 capsule (25 mg total) by mouth As instructed (Take by mouth once daily on days 1-21 of each 28 day cycle). (Patient not taking: Reported on 1/20/2023.)    LIDOcaine (LIDODERM) 5 % Place 1 patch onto the skin daily as needed (pain). Remove & Discard patch within 12 hours or as directed by MD    oxyCODONE (ROXICODONE) 5 MG immediate release tablet Take 2 tablets (10 mg total) by mouth every 6 (six) hours as needed for Pain. (Patient not taking: Reported on 1/24/2023)    pantoprazole (PROTONIX) 40 MG tablet Take 1 tablet (40 mg total) by mouth once daily.     No current facility-administered medications for this visit.     ALLERGIES:   Review of patient's allergies indicates:  No Known Allergies      Review of Systems:     Review of Systems   Constitutional:  Negative for appetite change, chills, diaphoresis, fatigue, fever and unexpected weight change.   HENT:   Negative for hearing loss, mouth sores, nosebleeds, sore throat, trouble swallowing and voice change.    Eyes:  Negative for eye problems and icterus.   Respiratory:  Negative for chest tightness, cough, hemoptysis, shortness of breath and wheezing.    Cardiovascular:  Negative for chest pain, leg swelling and palpitations.   Gastrointestinal:   Negative for abdominal distention, abdominal pain, blood in stool, diarrhea, nausea and vomiting.   Endocrine: Negative for hot flashes.   Genitourinary:  Negative for bladder incontinence, difficulty urinating, dysuria and hematuria.    Musculoskeletal:  Negative for arthralgias, back pain, flank pain, gait problem, myalgias, neck pain and neck stiffness.   Skin:  Negative for itching, rash and wound.   Neurological:  Negative for dizziness, extremity weakness, gait problem, headaches, numbness, seizures and speech difficulty.   Hematological:  Negative for adenopathy. Does not bruise/bleed easily.   Psychiatric/Behavioral:  Negative for confusion, depression and sleep disturbance. The patient is not nervous/anxious.         Physical Exam:     Vitals:    02/01/23 1246   BP: 136/86   Pulse: 88   Resp: 16   Temp: 98 °F (36.7 °C)       Physical Exam  Constitutional:       General: She is not in acute distress.     Appearance: She is well-developed. She is not diaphoretic.   HENT:      Head: Normocephalic and atraumatic.      Mouth/Throat:      Pharynx: No oropharyngeal exudate.   Eyes:      Conjunctiva/sclera: Conjunctivae normal.      Pupils: Pupils are equal, round, and reactive to light.   Neck:      Thyroid: No thyromegaly.      Vascular: No JVD.      Trachea: No tracheal deviation.   Cardiovascular:      Rate and Rhythm: Normal rate and regular rhythm.      Heart sounds: Normal heart sounds. No murmur heard.    No friction rub.   Pulmonary:      Effort: Pulmonary effort is normal. No respiratory distress.      Breath sounds: Normal breath sounds. No stridor. No wheezing or rales.   Chest:      Chest wall: No tenderness.   Abdominal:      General: Bowel sounds are normal. There is no distension.      Palpations: Abdomen is soft.      Tenderness: There is no abdominal tenderness. There is no guarding or rebound.   Musculoskeletal:         General: No tenderness or deformity. Normal range of motion.      Cervical  back: Normal range of motion and neck supple.   Skin:     General: Skin is warm and dry.      Capillary Refill: Capillary refill takes less than 2 seconds.      Coloration: Skin is not pale.      Findings: No erythema or rash.      Comments: Right chest wall cath C/D/I   Neurological:      Mental Status: She is alert and oriented to person, place, and time.      Cranial Nerves: No cranial nerve deficit.      Sensory: No sensory deficit.      Motor: No abnormal muscle tone.      Coordination: Coordination normal.      Deep Tendon Reflexes: Reflexes normal.   Psychiatric:         Behavior: Behavior normal.         Thought Content: Thought content normal.         Judgment: Judgment normal.       ECOG Performance Status: (foot note - ECOG PS provided by Eastern Cooperative Oncology Group) 1 - Symptomatic but completely ambulatory    Karnofsky Performance Score:  90%- Able to Carry on Normal Activity: Minor Symptoms of Disease    Labs:   Lab Results   Component Value Date    WBC 21.73 (H) 01/28/2023    HGB 11.9 (L) 01/28/2023    HCT 37.6 01/28/2023    PLT 79 (L) 01/28/2023    ALT 23 01/28/2023    AST 29 01/28/2023     01/28/2023    K 3.7 01/28/2023     01/28/2023    CREATININE 1.0 01/28/2023    BUN 17 01/28/2023    CO2 19 (L) 01/28/2023    INR 1.0 01/25/2023         Imaging: Previous imaging has been reviewed     Assessment and Plan:     Ms. Houston is a pleasant 62 year old with Multiple Myeloma. She presents today with her son for transplant consent signing.      Multiple Myeloma  -- Complex cytogenetics with gain 1q21, t(4;14), and monosomy 13  -- R-ISS stage III (elevated beta-2 microglobulin, elevated LDH, decreased albumin, and high risk cytogenetics)  -- S/p 5 Cycles D-Vrd (discontinued jasen after cycle 4)  -- Has had significant clinical improvement as well as reduction in IgG and M-spike showing excellent response  -- Has met with transplant coordinators. Transplant workup scheduled for end of  December December with collection date 1/26 and plan for admission 2/6. Will hold treatment 6 weeks prior to collection date.  -- Will need bisphosphonate after transplant process is complete  -- Acyclovir 400mg BID. Can discontinue daily aspirin.    Autologous Stem Cell Transplant Candidate  --Approved by the transplant selection committee for a Génesis 200  --Underwent consent signing with Dr. Casas 1/20/2023  --Admit for Génesis 200 Auto today     Planned conditioning regimen:  Melphalan on Day -1     Antimicrobial Prophylaxis:  Acyclovir starting on Day -1  Levofloxacin starting on Day -1  Fluconazole starting on Day -1     Growth Factor Support:  Neupogen starting on Day +7      Caregiver: daughter in law and son  Post-transplant discharge plans: Patricia England PA-C  Malignant Hematology & Bone Marrow Transplant

## 2023-02-01 NOTE — ASSESSMENT & PLAN NOTE
--Approved by the transplant selection committee for a Génesis 200  --Underwent consent signing with Dr. Casas 1/20/2023  --Admitted for Génesis 200 Auto today, day -2                Planned conditioning regimen:  Melphalan on Day -1     Antimicrobial Prophylaxis:  Acyclovir starting on Day -1  Levofloxacin starting on Day -1  Fluconazole starting on Day -1     Growth Factor Support:  Neupogen starting on Day +7      Caregiver: daughter in law and son  Post-transplant discharge plans: Patricia Alanis

## 2023-02-01 NOTE — NURSING
Patient admitted to room 811 as direct admit; accompanied by son. Vital signs stable on room air. Oriented x4. Ambulatory; hat provided. Generalized bruising, otherwise no other skin issues noted. LDAs: R chest port accessed. Informed of unit routines. Addressed all needs for comfort and orientation to floor. Gloria Garvey DO notified of arrival to room. KENDELL.

## 2023-02-02 PROBLEM — E87.5 HYPERKALEMIA: Status: ACTIVE | Noted: 2023-02-02

## 2023-02-02 PROBLEM — D61.810 PANCYTOPENIA DUE TO CHEMOTHERAPY: Status: ACTIVE | Noted: 2023-02-02

## 2023-02-02 PROBLEM — R52 PAIN: Status: ACTIVE | Noted: 2023-02-02

## 2023-02-02 LAB
ABO + RH BLD: ABNORMAL
ALBUMIN SERPL BCP-MCNC: 3.4 G/DL (ref 3.5–5.2)
ALBUMIN SERPL BCP-MCNC: 3.4 G/DL (ref 3.5–5.2)
ALP SERPL-CCNC: 223 U/L (ref 55–135)
ALP SERPL-CCNC: 223 U/L (ref 55–135)
ALT SERPL W/O P-5'-P-CCNC: 23 U/L (ref 10–44)
ALT SERPL W/O P-5'-P-CCNC: 23 U/L (ref 10–44)
ANION GAP SERPL CALC-SCNC: 13 MMOL/L (ref 8–16)
ANION GAP SERPL CALC-SCNC: 5 MMOL/L (ref 8–16)
ANION GAP SERPL CALC-SCNC: 5 MMOL/L (ref 8–16)
AST SERPL-CCNC: 22 U/L (ref 10–40)
AST SERPL-CCNC: 22 U/L (ref 10–40)
BASOPHILS # BLD AUTO: 0 K/UL (ref 0–0.2)
BASOPHILS # BLD AUTO: 0 K/UL (ref 0–0.2)
BASOPHILS NFR BLD: 0 % (ref 0–1.9)
BASOPHILS NFR BLD: 0 % (ref 0–1.9)
BILIRUB SERPL-MCNC: 0.3 MG/DL (ref 0.1–1)
BILIRUB SERPL-MCNC: 0.3 MG/DL (ref 0.1–1)
BLD GP AB SCN CELLS X3 SERPL QL: ABNORMAL
BLOOD GROUP ANTIBODIES SERPL: NORMAL
BUN SERPL-MCNC: 13 MG/DL (ref 6–20)
BUN SERPL-MCNC: 15 MG/DL (ref 6–20)
BUN SERPL-MCNC: 15 MG/DL (ref 6–20)
CALCIUM SERPL-MCNC: 8.7 MG/DL (ref 8.7–10.5)
CALCIUM SERPL-MCNC: 8.7 MG/DL (ref 8.7–10.5)
CALCIUM SERPL-MCNC: 9 MG/DL (ref 8.7–10.5)
CHLORIDE SERPL-SCNC: 110 MMOL/L (ref 95–110)
CHLORIDE SERPL-SCNC: 111 MMOL/L (ref 95–110)
CHLORIDE SERPL-SCNC: 111 MMOL/L (ref 95–110)
CO2 SERPL-SCNC: 19 MMOL/L (ref 23–29)
CO2 SERPL-SCNC: 25 MMOL/L (ref 23–29)
CO2 SERPL-SCNC: 25 MMOL/L (ref 23–29)
CREAT SERPL-MCNC: 0.8 MG/DL (ref 0.5–1.4)
CREAT SERPL-MCNC: 0.8 MG/DL (ref 0.5–1.4)
CREAT SERPL-MCNC: 0.9 MG/DL (ref 0.5–1.4)
DAT IGG-SP REAG RBC-IMP: NORMAL
DIFFERENTIAL METHOD: ABNORMAL
DIFFERENTIAL METHOD: ABNORMAL
EOSINOPHIL # BLD AUTO: 0 K/UL (ref 0–0.5)
EOSINOPHIL # BLD AUTO: 0 K/UL (ref 0–0.5)
EOSINOPHIL NFR BLD: 1 % (ref 0–8)
EOSINOPHIL NFR BLD: 1 % (ref 0–8)
ERYTHROCYTE [DISTWIDTH] IN BLOOD BY AUTOMATED COUNT: 15.9 % (ref 11.5–14.5)
ERYTHROCYTE [DISTWIDTH] IN BLOOD BY AUTOMATED COUNT: 15.9 % (ref 11.5–14.5)
EST. GFR  (NO RACE VARIABLE): >60 ML/MIN/1.73 M^2
GLUCOSE SERPL-MCNC: 149 MG/DL (ref 70–110)
GLUCOSE SERPL-MCNC: 93 MG/DL (ref 70–110)
GLUCOSE SERPL-MCNC: 93 MG/DL (ref 70–110)
HCT VFR BLD AUTO: 33.8 % (ref 37–48.5)
HCT VFR BLD AUTO: 33.8 % (ref 37–48.5)
HGB BLD-MCNC: 10.5 G/DL (ref 12–16)
HGB BLD-MCNC: 10.5 G/DL (ref 12–16)
IMM GRANULOCYTES # BLD AUTO: 0.02 K/UL (ref 0–0.04)
IMM GRANULOCYTES # BLD AUTO: 0.02 K/UL (ref 0–0.04)
IMM GRANULOCYTES NFR BLD AUTO: 0.7 % (ref 0–0.5)
IMM GRANULOCYTES NFR BLD AUTO: 0.7 % (ref 0–0.5)
LYMPHOCYTES # BLD AUTO: 1.7 K/UL (ref 1–4.8)
LYMPHOCYTES # BLD AUTO: 1.7 K/UL (ref 1–4.8)
LYMPHOCYTES NFR BLD: 55.1 % (ref 18–48)
LYMPHOCYTES NFR BLD: 55.1 % (ref 18–48)
MAGNESIUM SERPL-MCNC: 1.9 MG/DL (ref 1.6–2.6)
MCH RBC QN AUTO: 27.7 PG (ref 27–31)
MCH RBC QN AUTO: 27.7 PG (ref 27–31)
MCHC RBC AUTO-ENTMCNC: 31.1 G/DL (ref 32–36)
MCHC RBC AUTO-ENTMCNC: 31.1 G/DL (ref 32–36)
MCV RBC AUTO: 89 FL (ref 82–98)
MCV RBC AUTO: 89 FL (ref 82–98)
MONOCYTES # BLD AUTO: 0.4 K/UL (ref 0.3–1)
MONOCYTES # BLD AUTO: 0.4 K/UL (ref 0.3–1)
MONOCYTES NFR BLD: 12.6 % (ref 4–15)
MONOCYTES NFR BLD: 12.6 % (ref 4–15)
NEUTROPHILS # BLD AUTO: 0.9 K/UL (ref 1.8–7.7)
NEUTROPHILS # BLD AUTO: 0.9 K/UL (ref 1.8–7.7)
NEUTROPHILS NFR BLD: 30.6 % (ref 38–73)
NEUTROPHILS NFR BLD: 30.6 % (ref 38–73)
NRBC BLD-RTO: 0 /100 WBC
NRBC BLD-RTO: 0 /100 WBC
PHOSPHATE SERPL-MCNC: 4.5 MG/DL (ref 2.7–4.5)
PLATELET # BLD AUTO: 76 K/UL (ref 150–450)
PLATELET # BLD AUTO: 76 K/UL (ref 150–450)
PLATELET BLD QL SMEAR: ABNORMAL
PLATELET BLD QL SMEAR: ABNORMAL
PMV BLD AUTO: 12.1 FL (ref 9.2–12.9)
PMV BLD AUTO: 12.1 FL (ref 9.2–12.9)
POTASSIUM SERPL-SCNC: 4.9 MMOL/L (ref 3.5–5.1)
POTASSIUM SERPL-SCNC: 5.6 MMOL/L (ref 3.5–5.1)
POTASSIUM SERPL-SCNC: 5.6 MMOL/L (ref 3.5–5.1)
PROT SERPL-MCNC: 6.1 G/DL (ref 6–8.4)
PROT SERPL-MCNC: 6.1 G/DL (ref 6–8.4)
RBC # BLD AUTO: 3.79 M/UL (ref 4–5.4)
RBC # BLD AUTO: 3.79 M/UL (ref 4–5.4)
SODIUM SERPL-SCNC: 141 MMOL/L (ref 136–145)
SODIUM SERPL-SCNC: 141 MMOL/L (ref 136–145)
SODIUM SERPL-SCNC: 142 MMOL/L (ref 136–145)
WBC # BLD AUTO: 3.01 K/UL (ref 3.9–12.7)
WBC # BLD AUTO: 3.01 K/UL (ref 3.9–12.7)

## 2023-02-02 PROCEDURE — 25000003 PHARM REV CODE 250: Performed by: INTERNAL MEDICINE

## 2023-02-02 PROCEDURE — 63600175 PHARM REV CODE 636 W HCPCS: Mod: JG | Performed by: INTERNAL MEDICINE

## 2023-02-02 PROCEDURE — 80053 COMPREHEN METABOLIC PANEL: CPT | Performed by: NURSE PRACTITIONER

## 2023-02-02 PROCEDURE — 99233 SBSQ HOSP IP/OBS HIGH 50: CPT | Mod: FS,,, | Performed by: INTERNAL MEDICINE

## 2023-02-02 PROCEDURE — 97530 THERAPEUTIC ACTIVITIES: CPT

## 2023-02-02 PROCEDURE — 63600175 PHARM REV CODE 636 W HCPCS: Performed by: NURSE PRACTITIONER

## 2023-02-02 PROCEDURE — 25000003 PHARM REV CODE 250: Performed by: NURSE PRACTITIONER

## 2023-02-02 PROCEDURE — 86900 BLOOD TYPING SEROLOGIC ABO: CPT | Performed by: NURSE PRACTITIONER

## 2023-02-02 PROCEDURE — 99233 PR SUBSEQUENT HOSPITAL CARE,LEVL III: ICD-10-PCS | Mod: FS,,, | Performed by: INTERNAL MEDICINE

## 2023-02-02 PROCEDURE — 97165 OT EVAL LOW COMPLEX 30 MIN: CPT

## 2023-02-02 PROCEDURE — 83735 ASSAY OF MAGNESIUM: CPT | Performed by: NURSE PRACTITIONER

## 2023-02-02 PROCEDURE — 86880 COOMBS TEST DIRECT: CPT | Performed by: NURSE PRACTITIONER

## 2023-02-02 PROCEDURE — 80048 BASIC METABOLIC PNL TOTAL CA: CPT | Mod: XB | Performed by: NURSE PRACTITIONER

## 2023-02-02 PROCEDURE — 20600001 HC STEP DOWN PRIVATE ROOM

## 2023-02-02 PROCEDURE — 97161 PT EVAL LOW COMPLEX 20 MIN: CPT

## 2023-02-02 PROCEDURE — 86870 RBC ANTIBODY IDENTIFICATION: CPT | Performed by: NURSE PRACTITIONER

## 2023-02-02 PROCEDURE — 85025 COMPLETE CBC W/AUTO DIFF WBC: CPT | Performed by: NURSE PRACTITIONER

## 2023-02-02 PROCEDURE — 84100 ASSAY OF PHOSPHORUS: CPT | Performed by: NURSE PRACTITIONER

## 2023-02-02 RX ORDER — DIPHENHYDRAMINE HYDROCHLORIDE 50 MG/ML
50 INJECTION INTRAMUSCULAR; INTRAVENOUS ONCE
Status: COMPLETED | OUTPATIENT
Start: 2023-02-03 | End: 2023-02-03

## 2023-02-02 RX ORDER — CLONIDINE HYDROCHLORIDE 0.1 MG/1
0.1 TABLET ORAL ONCE AS NEEDED
Status: COMPLETED | OUTPATIENT
Start: 2023-02-03 | End: 2023-02-03

## 2023-02-02 RX ORDER — OLANZAPINE 2.5 MG/1
5 TABLET ORAL 2 TIMES DAILY
Status: COMPLETED | OUTPATIENT
Start: 2023-02-02 | End: 2023-02-04

## 2023-02-02 RX ORDER — DIPHENHYDRAMINE HYDROCHLORIDE 50 MG/ML
50 INJECTION INTRAMUSCULAR; INTRAVENOUS ONCE AS NEEDED
Status: COMPLETED | OUTPATIENT
Start: 2023-02-03 | End: 2023-02-03

## 2023-02-02 RX ORDER — NITROGLYCERIN 0.4 MG/1
0.4 TABLET SUBLINGUAL ONCE AS NEEDED
Status: COMPLETED | OUTPATIENT
Start: 2023-02-03 | End: 2023-02-03

## 2023-02-02 RX ORDER — HYDROCODONE BITARTRATE AND ACETAMINOPHEN 500; 5 MG/1; MG/1
TABLET ORAL
Status: DISCONTINUED | OUTPATIENT
Start: 2023-02-03 | End: 2023-02-04

## 2023-02-02 RX ORDER — LORAZEPAM 2 MG/ML
1 INJECTION INTRAMUSCULAR ONCE
Status: DISCONTINUED | OUTPATIENT
Start: 2023-02-03 | End: 2023-02-02

## 2023-02-02 RX ORDER — EPINEPHRINE 1 MG/ML
0.5 INJECTION, SOLUTION, CONCENTRATE INTRAVENOUS ONCE AS NEEDED
Status: COMPLETED | OUTPATIENT
Start: 2023-02-03 | End: 2023-02-03

## 2023-02-02 RX ORDER — MEPERIDINE HYDROCHLORIDE 50 MG/ML
50 INJECTION INTRAMUSCULAR; INTRAVENOUS; SUBCUTANEOUS ONCE AS NEEDED
Status: COMPLETED | OUTPATIENT
Start: 2023-02-03 | End: 2023-02-03

## 2023-02-02 RX ORDER — LORAZEPAM 1 MG/1
1 TABLET ORAL ONCE
Status: COMPLETED | OUTPATIENT
Start: 2023-02-03 | End: 2023-02-03

## 2023-02-02 RX ORDER — SODIUM CHLORIDE AND POTASSIUM CHLORIDE 150; 900 MG/100ML; MG/100ML
INJECTION, SOLUTION INTRAVENOUS CONTINUOUS
Status: DISCONTINUED | OUTPATIENT
Start: 2023-02-03 | End: 2023-02-04

## 2023-02-02 RX ORDER — TRAMADOL HYDROCHLORIDE 50 MG/1
50 TABLET ORAL EVERY 6 HOURS PRN
Status: DISCONTINUED | OUTPATIENT
Start: 2023-02-02 | End: 2023-02-15 | Stop reason: HOSPADM

## 2023-02-02 RX ORDER — FUROSEMIDE 10 MG/ML
100 INJECTION INTRAMUSCULAR; INTRAVENOUS ONCE AS NEEDED
Status: COMPLETED | OUTPATIENT
Start: 2023-02-03 | End: 2023-02-03

## 2023-02-02 RX ADMIN — ACYCLOVIR 800 MG: 200 CAPSULE ORAL at 08:02

## 2023-02-02 RX ADMIN — ONDANSETRON 8 MG: 8 TABLET, ORALLY DISINTEGRATING ORAL at 04:02

## 2023-02-02 RX ADMIN — ONDANSETRON 8 MG: 8 TABLET, ORALLY DISINTEGRATING ORAL at 08:02

## 2023-02-02 RX ADMIN — AMLODIPINE BESYLATE 10 MG: 10 TABLET ORAL at 08:02

## 2023-02-02 RX ADMIN — ONDANSETRON 8 MG: 8 TABLET, ORALLY DISINTEGRATING ORAL at 01:02

## 2023-02-02 RX ADMIN — OLANZAPINE 5 MG: 5 TABLET, FILM COATED ORAL at 08:02

## 2023-02-02 RX ADMIN — DEXAMETHASONE 12 MG: 4 TABLET ORAL at 08:02

## 2023-02-02 RX ADMIN — MAGNESIUM OXIDE TAB 400 MG (241.3 MG ELEMENTAL MG) 400 MG: 400 (241.3 MG) TAB at 12:02

## 2023-02-02 RX ADMIN — LEVOFLOXACIN 500 MG: 500 TABLET, FILM COATED ORAL at 08:02

## 2023-02-02 RX ADMIN — PANTOPRAZOLE SODIUM 40 MG: 40 TABLET, DELAYED RELEASE ORAL at 08:02

## 2023-02-02 RX ADMIN — SODIUM CHLORIDE AND POTASSIUM CHLORIDE: .9; .15 SOLUTION INTRAVENOUS at 09:02

## 2023-02-02 RX ADMIN — MAGNESIUM OXIDE TAB 400 MG (241.3 MG ELEMENTAL MG) 400 MG: 400 (241.3 MG) TAB at 05:02

## 2023-02-02 RX ADMIN — TRAMADOL HYDROCHLORIDE 50 MG: 50 TABLET, COATED ORAL at 08:02

## 2023-02-02 RX ADMIN — SODIUM CHLORIDE AND POTASSIUM CHLORIDE 75 ML/HR: .9; .15 SOLUTION INTRAVENOUS at 11:02

## 2023-02-02 RX ADMIN — ENOXAPARIN SODIUM 40 MG: 40 INJECTION SUBCUTANEOUS at 05:02

## 2023-02-02 RX ADMIN — MELPHALAN 430 MG: 50 INJECTION, POWDER, LYOPHILIZED, FOR SOLUTION INTRAVENOUS at 12:02

## 2023-02-02 RX ADMIN — FLUCONAZOLE 400 MG: 200 TABLET ORAL at 08:02

## 2023-02-02 RX ADMIN — SODIUM CHLORIDE AND POTASSIUM CHLORIDE: .9; .15 SOLUTION INTRAVENOUS at 03:02

## 2023-02-02 RX ADMIN — Medication 1 DOSE: at 05:02

## 2023-02-02 RX ADMIN — SODIUM ZIRCONIUM CYCLOSILICATE 5 G: 5 POWDER, FOR SUSPENSION ORAL at 09:02

## 2023-02-02 RX ADMIN — Medication 1 DOSE: at 01:02

## 2023-02-02 RX ADMIN — SODIUM CHLORIDE AND POTASSIUM CHLORIDE: .9; .15 SOLUTION INTRAVENOUS at 05:02

## 2023-02-02 RX ADMIN — Medication 1 DOSE: at 08:02

## 2023-02-02 NOTE — ASSESSMENT & PLAN NOTE
- K+ 5.6 today  - Giving lokelma x 1 dose and will check BMT this afternoon  - Daily CMP while inpatient

## 2023-02-02 NOTE — PLAN OF CARE
Recommendations     1.) Recommend continuing with current Regular diet as tolerated.      2.) RD to monitor and f/u.        Goals: To meet % of EEN/EPN by next RD f/u  Nutrition Goal Status: other (comment) (New/Goal met)  Communication of RD Recs:  (POC)     Assessment and Plan     Nutrition Problem  Increased energy/ PRO needs     Related to (etiology):   Muti myeloma     Signs and Symptoms (as evidenced by):   Bone Marrow Transplant     Interventions/Recommendations (treatment strategy):  Collaboration of Nutrition Care with other providers.   RD provided diet education.     Nutrition Diagnosis Status:   New

## 2023-02-02 NOTE — CONSULTS
"Titus Baig - Oncology ([facility])  Adult Nutrition  Consult Note    SUMMARY     Recommendations    1.) Recommend continuing with current Regular diet as tolerated.     2.) RD to monitor and f/u.      Goals: To meet % of EEN/EPN by next RD f/u  Nutrition Goal Status: other (comment) (New/Goal met)  Communication of RD Recs:  (POC)    Assessment and Plan    Nutrition Problem  Increased energy/ PRO needs    Related to (etiology):   Muti myeloma    Signs and Symptoms (as evidenced by):   Bone Marrow Transplant    Interventions/Recommendations (treatment strategy):  Collaboration of Nutrition Care with other providers.   RD provided diet education.    Nutrition Diagnosis Status:   New     Reason for Assessment    Reason For Assessment: consult  Diagnosis:  (Bone marrow transplant; s/p Génesis 200 auto)  Relevant Medical History: Multi myeloma, OA, gout  Interdisciplinary Rounds: did not attend  General Information Comments: Consult: RD seen pt. Pt denies any n/v/d/c. Pt appears well nourished. Pt endorses good appetite, eating % of their meals.Trace edema noted to both ankles: pt at risk for wt flux d/t edema. RD dropped off Bone Marrow Transplant diet information as well as High Kcal/PRO diet information and went over with pt and caregiver. Questions answered, by RD. Education materials left with pt.  Nutrition Discharge Planning: Dietary information left with pt: Bone Marrow Transplant diet and High PRO/Kcal diet education materials left with pt.    Nutrition Risk Screen    Nutrition Risk Screen: no indicators present    Nutrition/Diet History    Food Allergies: NKFA    Anthropometrics    Temp: 97.8 °F (36.6 °C)  Height Method: Stated  Height: 5' 6" (167.6 cm)  Height (inches): 66 in  Weight Method: Standard Scale  Weight: 99 kg (218 lb 4.1 oz)  Weight (lb): 218.26 lb  Ideal Body Weight (IBW), Female: 130 lb  % Ideal Body Weight, Female (lb): 165.6 %  BMI (Calculated): 35.2  BMI Grade: 30 - 34.9- obesity - grade " I     Lab/Procedures/Meds    Pertinent Labs Reviewed: reviewed  Pertinent Labs Comments: 2/2: K: 5.6, Cl: 111, alk phos: 223, alb: 3.4  Pertinent Medications Reviewed: reviewed  Pertinent Medications Comments: evomela, pantoprazole, amlodipine, neupogen, abx, ondanstron, NaCl, K-phos, heparin    Estimated/Assessed Needs    Weight Used For Calorie Calculations: 99 kg (218 lb 4.1 oz)  Energy Calorie Requirements (kcal): 1980- 2475kcal (20-25kcal/kg)  Energy Need Method: Kcal/kg  Protein Requirements: 99- 119g (1.0-1.2g/kg)  Weight Used For Protein Calculations: 99 kg (218 lb 4.1 oz)  Fluid Requirements (mL): 1ml/1kcal or per MD  Estimated Fluid Requirement Method: RDA Method  RDA Method (mL): 1980     Nutrition Prescription Ordered    Current Diet Order: Regular diet    Evaluation of Received Nutrient/Fluid Intake    I/O: +50ml since admit  Energy Calories Required: meeting needs  Protein Required: meeting needs  Fluid Required:  (As per MD)  Comments: LBM: 2/1  Tolerance: tolerating  % Intake of Estimated Energy Needs: 75 - 100 %  % Meal Intake: 75 - 100 %    Nutrition Risk    Level of Risk/Frequency of Follow-up:  (RD to f/u x1/week)     Monitor and Evaluation    Food and Nutrient Intake: energy intake, food and beverage intake  Food and Nutrient Adminstration: diet order  Knowledge/Beliefs/Attitudes: food and nutrition knowledge/skill, beliefs and attitudes  Physical Activity and Function: factors affecting access to physical activity  Anthropometric Measurements: weight, weight change, body mass index  Biochemical Data, Medical Tests and Procedures: electrolyte and renal panel, gastrointestinal profile, glucose/endocrine profile, inflammatory profile, lipid profile  Nutrition-Focused Physical Findings: overall appearance, skin     Nutrition Follow-Up    RD Follow-up?: Yes

## 2023-02-02 NOTE — PROGRESS NOTES
Admit Assessment    Patient Identification  Raquel Houston   :  1960  Admit Date:  2023  Attending Provider:  Janice Casas MD              Referral:   Pt was admitted to Hematology and Oncology with a diagnosis of Stem cell transplant candidate, and was admitted this hospital stay due to Encounter for antineoplastic chemotherapy [Z51.11]  Stem cells transplant status [Z94.84]  Stem cell transplant candidate [Z76.82].   is involved was referred to the Social Work Department via Routine Referral.  Patient presents as a 62 y.o. year old not  female. Pt is currently living with her son, Fahad and his fiance in [place], [place].     Assessment was completed at bedside with pt and pt's adult son, Fahad.     Living Situation:      Resides at [address] [place] [address] [address] [place] [address] [place] [address], phone: [phone] (home).      (RETIRED) Functional Status Prior  Ambulation Prior: 0-->independent  Transferrin-->independent  Toiletin-->independent    Current or Past Agencies and Description of Services/Supplies    DME  Pt received DME from [facility]. If additional DME is ordered she would like to receive equipment from [facility].   Equipment Currently Used at Home: shower chair, bedside commode, wheelchair, walker, rolling, rollator, hospital bed    Home Health  Pt has no prior use of HH services. Pt has no preference regarding agency if HH if ordered.     IV Infusion  Pt has no prior use of home IV services. Pt has no preference regarding agency if home infusion is ordered.     Nutrition:   Regular diet- PO    Outpatient Pharmacy:     [facility] [address] [facility]  [address] [facility] [place] [address]  Phone: [phone] Fax: [phone]    [id number] - [place] - [address] AT SEC OF [facility] [address] [facility]  [address]  [place] [address]  Phone: [phone]  Fax: [phone]      Patient Preference of agencies include:  [facility] and [facility]     Patient informed of right to choose providers or agencies.  Patient provides permission to release any necessary information to [facility] and to [facility] agencies as needed to facilitate patient care, treatment planning, and patient discharge planning.  Written and verbal resources provided.    Coping  Coping well with support of family.      Adjustment to Diagnosis and Treatment  Adequate    Emotional/Behavioral/Cognitive Issues  None identified at this time.    History/Current Symptoms of Anxiety/Depression: No  History/Current Substance Use: No  Social History     Tobacco Use    Smoking status: Former     Types: Cigarettes    Smokeless tobacco: Former    Tobacco comments:     Quit 2022   Substance and Sexual Activity    Alcohol use: Not Currently     Comment: Quit 2022    Drug use: Never    Sexual activity: Not Currently     Partners: Male       Indications of Abuse/Neglect: No  Abuse Screen (yes response referral indicated)  Feels Unsafe at Home or Work/School: no  Physical Signs of Abuse Present: no    Financial:  Payer/Plan Subscr  Sex Relation Sub. Ins. ID Effective Group Num   1. [employer]* AYE MARCELINO* 1960 Female Self [id number]                                    [address] [address]   2. [employer] [place] [facility]* AYE MARCELINO* 1960 Female Self [id number] LABY                                   [address] [address]      Other identified concerns/needs:  Lodging at the [facility]. Additional needs are not determined at this time.     Plan:    Interventions/Referrals:   When medically cleared pt will check in the [facility]. Request was submitted via email. [place] Butler managerMathew, confirmed receipt. Check out date is 2023.     Patient engaged in treatment planning process.     providing psychosocial and supportive counseling, resources,  education, assistance and discharge planning as appropriate.  Patient state understanding of  available resources,  following, remains available.    Jaclyn Byrd, [facility]  Oncology Social Worker License # [id number]  [facility]  [email or web address]  P. [phone]; F. [phone]

## 2023-02-02 NOTE — PLAN OF CARE
Plan of Care:  PT evaluation completed- see note for details. Goals and POC established.     Please continue Progressive Mobility Protocol as appropriate.  Pt to be seen 1x/week during acute hospitalization to address listed goals below.     2023    Physical Therapy Treatment Goals:  Multidisciplinary Problems       Physical Therapy Goals          Problem: Physical Therapy    Goal Priority Disciplines Outcome Goal Variances Interventions   Physical Therapy Goal     PT, PT/OT Ongoing, Progressing     Description: Goals to be met by: 23     Patient will increase functional independence with mobility by performin. Pt will tolerate daily ambulation program 3x 100-200 ft. With no AD, with no LOB with supervision   2. Pt will demonstrate independence with BLE HEP therex per handout x 30 reps for BLE strengthening

## 2023-02-02 NOTE — SUBJECTIVE & OBJECTIVE
Subjective:     Interval History: Day -1 from a Génesis 200 auto SCT for MM. Will receive Melphalan today. Afebrile. VSS. Continues to complain of neck pain associated with central line. No edema or evidence of infection noted. Starting prn Tramadol. K+ 5.6. Giving lokelma.    Objective:     Vital Signs (Most Recent):  Temp: 97.8 °F (36.6 °C) (02/02/23 0732)  Pulse: 73 (02/02/23 0732)  Resp: 17 (02/02/23 0847)  BP: 120/75 (02/02/23 0732)  SpO2: 96 % (02/02/23 0732) Vital Signs (24h Range):  Temp:  [97.6 °F (36.4 °C)-98.8 °F (37.1 °C)] 97.8 °F (36.6 °C)  Pulse:  [] 73  Resp:  [16-18] 17  SpO2:  [95 %-98 %] 96 %  BP: (112-136)/(64-88) 120/75     Weight: 99 kg (218 lb 4.1 oz)  Body mass index is 35.23 kg/m².  Body surface area is 2.15 meters squared.    ECOG SCORE           [unfilled]    Intake/Output - Last 3 Shifts         01/31 0700  02/01 0659 02/01 0700 02/02 0659 02/02 0700  02/03 0659    P.O.  250 125    Total Intake(mL/kg)  250 (2.5) 125 (1.3)    Urine (mL/kg/hr)  200 1000 (5.1)    Total Output  200 1000    Net  +50 -875                   Physical Exam  Constitutional:       Appearance: She is well-developed.   HENT:      Head: Normocephalic and atraumatic.      Mouth/Throat:      Pharynx: No oropharyngeal exudate.   Eyes:      Conjunctiva/sclera: Conjunctivae normal.      Pupils: Pupils are equal, round, and reactive to light.   Cardiovascular:      Rate and Rhythm: Normal rate and regular rhythm.      Heart sounds: Normal heart sounds. No murmur heard.  Pulmonary:      Effort: Pulmonary effort is normal.      Breath sounds: Normal breath sounds.   Abdominal:      General: Bowel sounds are normal. There is no distension.      Palpations: Abdomen is soft.      Tenderness: There is no abdominal tenderness.   Musculoskeletal:         General: No deformity. Normal range of motion.      Cervical back: Normal range of motion and neck supple.   Skin:     General: Skin is warm and dry.      Findings: No erythema or  rash.   Neurological:      Mental Status: She is alert and oriented to person, place, and time.   Psychiatric:         Behavior: Behavior normal.         Thought Content: Thought content normal.         Judgment: Judgment normal.       Significant Labs:   CBC:   Recent Labs   Lab 02/01/23  1133 02/02/23  0337   WBC 3.16* 3.01*  3.01*   HGB 12.0 10.5*  10.5*   HCT 38.5 33.8*  33.8*   PLT 92* 76*  76*    and CMP:   Recent Labs   Lab 02/01/23  1133 02/02/23  0337    141  141   K 3.9 5.6*  5.6*    111*  111*   CO2 23 25  25   * 93  93   BUN 17 15  15   CREATININE 0.9 0.8  0.8   CALCIUM 9.3 8.7  8.7   PROT 6.8 6.1  6.1   ALBUMIN 3.9 3.4*  3.4*   BILITOT 0.4 0.3  0.3   ALKPHOS 238* 223*  223*   AST 21 22  22   ALT 30 23  23   ANIONGAP 9 5*  5*       Diagnostic Results:  I have reviewed all pertinent imaging results/findings within the past 24 hours.

## 2023-02-02 NOTE — ASSESSMENT & PLAN NOTE
--Approved by the transplant selection committee for a Génesis 200  --Underwent consent signing with Dr. Casas 1/20/2023  --Admitted for Génesis 200 Auto today, day -1. She will receive Melphalan today.                Planned conditioning regimen:  Melphalan on Day -1     Antimicrobial Prophylaxis:  Acyclovir starting on Day -1  Levofloxacin starting on Day -1  Fluconazole starting on Day -1     Growth Factor Support:  Neupogen starting on Day +7      Caregiver: daughter in law and son  Post-transplant discharge plans: Patricia Alanis

## 2023-02-02 NOTE — ASSESSMENT & PLAN NOTE
- Expect blood counts to drop further with chemo  - Transfuse for hgb < 7 or plts < 10K  - Continue antimicrobial ppx per protocol  - Daily CBC while inpatient

## 2023-02-02 NOTE — HOSPITAL COURSE
02/02/2023: Day -1 from a Génesis 200 auto SCT for MM. Will receive Melphalan today. Afebrile. VSS. Continues to complain of neck pain associated with central line. No edema or evidence of infection noted. Starting prn Tramadol. K+ 5.6. Giving lokelma.  02/03/2023: Day 0 for a Génesis 200 auto SCT for MM. She will receive 3 bags and a CD34 dose of 3.13 x 10^6 today at 1330. Received Melphalan yesterday and tolerated well. Documented weight 6 lbs greater than that of admission weight. Does not appear fluid overloaded on exam, and ~ 4 Liters of documented UOP in the last 24 hours. Will defer diuresis today. Afebrile. VSS. Neck pain associated with vas cath line improved.  02/04/2023 Day 1 for a Génesis 200 auto SCT for MM. No acute events overnight. Patient eating and drinking without issue, will discontinue fluids. Continue supportive care.  02/05/2023 Day 2 for a Génesis 200 auto SCT for MM. No acute events overnight. Continue supportive care  02/06/2023 Day +3 s/p Génesis 200 Auto SCT for MM. Patient feeling well overall. Reports change in taste which is affecting appetite. Denies any pain, n/v/d/c. Afebrile, VSS  02/07/2023: Day +4 from a Génesis 200 auto SCT for MM. Remains afebrile. VSS. Today's ANC pending. Up 3 lbs since admission but down 2.5 lbs from yesterday. Good UOP (2.1 L in last 24 hours). Good oral intake. Normal BMs. Overall doing well.  02/08/2023: Day +5 from a Génesis 200 auto SCT for MM. Remains afebrile. VSS. ANC down to 410. Started having liquid stools yesterday. Ruled out C-diff, so PRN imodium started. Appetite waning, but oral intake still adequate. Overall doing well.   02/09/2023: Day +6 from a Génesis 200 auto SCT for MM. Remains afebrile. VSS. BP low normal. Stopped amlodipine. Diarrhea improved with PRN imodium. Denies nausea, but appetite waning. Still managing good oral fluid intake at this point. Will resume IVF if she is unable to keep herself hydrated. ANC down to 220 today. Stopped lovenox with plt count of  43K.  02/10/2023: Day +7 from a Génesis 200 auto SCT for MM. Had unsustained temp of 100.4 this morning. VS otherwise stable. Monitoring closely for neutropenic fevers. ANC down to 35 today. Day 1 of neupogen. Oral intake less but still adequate. Oral fluid intake good. Will resume IVF if she is not longer able to hydrate orally.   02/11/2023 Day +8 from Génesis 200 auto SCT for MM. Febrile overnight to 102.3F, no complaints. No source of infection identified. Started cefepime. ANC 10  02/12/2023 Day +9 from Génesis 200 auto SCT for MM. Last fever at midnight, 100.9F. Day 2 cefepime. Cultures NGTD. Feels well. ANC 1  02/13/2023: Day +10 from a Génesis 200 auto SCT for MM. Temp of 101.7 overnight. HR 110s. BP stable. Vanc added. Infection w/u remains unremarkable. ANC up to 60 today, so may be engrafting. Transfusing 1 unit prbc for hgb of 6.9 today. Replacing phos.   02/14/2023 Day +11 from a Génesis 200 auto SCT for MM. Engrafted today with . Afebrile x 24 hours, BC NGTD, UC with coag negative staph, asymptomatic. Patient denies nausea, diarrhea or mouth pain this am. States she is feeling much better.    02/15/2023: Day +12 from a Génesis 200 auto SCT for MM. Day 2 of engraftment. ANC 6376 today. Remains afebrile. VSS. Denies diarrhea. Stopped IV abx and starting Keflex for coag neg staph UTI. [facility] following line removal, discharge teaching, and pharmacy teaching.

## 2023-02-02 NOTE — PLAN OF CARE
Patient AAOX4, up independently to the bathroom, and instructed to call for weakness or dizziness. Day -1 Génesis AUTO SCT. Cryotherapy completed with Melphalan infusion to vas cath with positive blood return, no issues. Potassium depletion administered and repeat BMP completed. Good appetite and oral hydration promoted. Central line dressing changed. Patient stable at this time.

## 2023-02-02 NOTE — ASSESSMENT & PLAN NOTE
- C/o pain to neck associated with vas cath  - Presented to ED with c/o pain on 1/28. No evidence of acute process on imaging.  - No edema or sign of infection to site  - Started PRN Tramadol

## 2023-02-02 NOTE — PROGRESS NOTES
[facility]itus Baig - Oncology ([facility])  Hematology  Bone Marrow Transplant  Progress Note    Patient Name: aRquel Houston  Admission Date: 2/1/2023  Hospital Length of Stay: 1 days  Code Status: Full Code    Subjective:     Interval History: Day -1 from a Génesis 200 auto SCT for MM. Will receive Melphalan today. Afebrile. VSS. Continues to complain of neck pain associated with central line. No edema or evidence of infection noted. Starting prn Tramadol. K+ 5.6. Giving lokelma.    Objective:     Vital Signs (Most Recent):  Temp: 97.8 °F (36.6 °C) (02/02/23 0732)  Pulse: 73 (02/02/23 0732)  Resp: 17 (02/02/23 0847)  BP: 120/75 (02/02/23 0732)  SpO2: 96 % (02/02/23 0732) Vital Signs (24h Range):  Temp:  [97.6 °F (36.4 °C)-98.8 °F (37.1 °C)] 97.8 °F (36.6 °C)  Pulse:  [] 73  Resp:  [16-18] 17  SpO2:  [95 %-98 %] 96 %  BP: (112-136)/(64-88) 120/75     Weight: 99 kg (218 lb 4.1 oz)  Body mass index is 35.23 kg/m².  Body surface area is 2.15 meters squared.    ECOG SCORE           [unfilled]    Intake/Output - Last 3 Shifts         01/31 0700  02/01 0659 02/01 0700 02/02 0659 02/02 0700  02/03 0659    P.O.  250 125    Total Intake(mL/kg)  250 (2.5) 125 (1.3)    Urine (mL/kg/hr)  200 1000 (5.1)    Total Output  200 1000    Net  +50 -875                   Physical Exam  Constitutional:       Appearance: She is well-developed.   HENT:      Head: Normocephalic and atraumatic.      Mouth/Throat:      Pharynx: No oropharyngeal exudate.   Eyes:      Conjunctiva/sclera: Conjunctivae normal.      Pupils: Pupils are equal, round, and reactive to light.   Cardiovascular:      Rate and Rhythm: Normal rate and regular rhythm.      Heart sounds: Normal heart sounds. No murmur heard.  Pulmonary:      Effort: Pulmonary effort is normal.      Breath sounds: Normal breath sounds.   Abdominal:      General: Bowel sounds are normal. There is no distension.      Palpations: Abdomen is soft.      Tenderness: There is no abdominal tenderness.    Musculoskeletal:         General: No deformity. Normal range of motion.      Cervical back: Normal range of motion and neck supple.   Skin:     General: Skin is warm and dry.      Findings: No erythema or rash.    Right chest wall vas cath. Dressing c/d/i. No sign of infection to site.   Neurological:      Mental Status: She is alert and oriented to person, place, and time.   Psychiatric:         Behavior: Behavior normal.         Thought Content: Thought content normal.         Judgment: Judgment normal.       Significant Labs:   CBC:   Recent Labs   Lab 02/01/23 1133 02/02/23 0337   WBC 3.16* 3.01*  3.01*   HGB 12.0 10.5*  10.5*   HCT 38.5 33.8*  33.8*   PLT 92* 76*  76*    and CMP:   Recent Labs   Lab 02/01/23 1133 02/02/23 0337    141  141   K 3.9 5.6*  5.6*    111*  111*   CO2 23 25  25   * 93  93   BUN 17 15  15   CREATININE 0.9 0.8  0.8   CALCIUM 9.3 8.7  8.7   PROT 6.8 6.1  6.1   ALBUMIN 3.9 3.4*  3.4*   BILITOT 0.4 0.3  0.3   ALKPHOS 238* 223*  223*   AST 21 22  22   ALT 30 23  23   ANIONGAP 9 5*  5*       Diagnostic Results:  I have reviewed all pertinent imaging results/findings within the past 24 hours.    Assessment/Plan:     * Stem cell transplant candidate  --Approved by the transplant selection committee for a Génesis 200  --Underwent consent signing with Dr. Casas 1/20/2023  --Admitted for Génesis 200 Auto today, day -1. She will receive Melphalan today.                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;14), and monosomy 13  - R-ISS stage III (elevated beta-2 microglobulin, elevated LDH, decreased  albumin, and high risk cytogenetics)  - S/p 5 Cycles D-Vrd (discontinued jasen after cycle 4)  - Has had significant clinical improvement as well as reduction in IgG and M-spike showing excellent response  - see autologous stem cell transplant    Pancytopenia due to chemotherapy  - Expect blood counts to drop further with chemo  - Transfuse for hgb < 7 or plts < 10K  - Continue antimicrobial ppx per protocol  - Daily CBC while inpatient    Pain  - C/o pain to neck associated with vas cath  - Presented to ED with c/o pain on 1/28. No evidence of acute process on imaging.  - No edema or sign of infection to site  - Started PRN Tramadol    Hyperkalemia  - K+ 5.6 today  - Giving lokelma x 1 dose and will check BMT this afternoon  - Daily CMP while inpatient    Neutropenia  - Expect pancytopenia post chemo and transplant  - Daily CBC while inppatient  - Continue ppx antimicrobials per protocol    Thrombocytopenia  - Daily CBC  - Transfuse for platelets <10 or <50k if bleeding/pre-procedure  - Hold Lovenox when platelets <50k    HTN (hypertension)  - Continue home amlodipine         VTE Risk Mitigation (From admission, onward)           Ordered     enoxaparin injection 40 mg  Daily         02/01/23 1442     heparin (porcine) injection 1,600 Units  As needed (PRN)         02/01/23 1528     IP VTE HIGH RISK PATIENT  Once         02/01/23 1442     Place sequential compression device  Until discontinued         02/01/23 1442                    Disposition: Inpatient for SCT.    Wendy Vazquez, NP  Bone Marrow Transplant  [facility]itus Baig - Oncology ([facility])

## 2023-02-02 NOTE — ASSESSMENT & PLAN NOTE
- Approved by the transplant selection committee for a Génesis 200  - Underwent consent signing with Dr. Casas 1/20/2023  - Admitted for Génesis 200 Auto today, day -1. She will receive Melphalan today.  - She will receive 3 bags with a total CD34 dose of 3.13 x10^6/kg on today at 1330.                Planned conditioning regimen:  Melphalan on Day -1     Antimicrobial Prophylaxis:  Acyclovir starting on Day -1  Levofloxacin starting on Day -1  Fluconazole starting on Day -1     Growth Factor Support:  Neupogen starting on Day +7      Caregiver: daughter in law and son  Post-transplant discharge plans: Patricia Alanis

## 2023-02-02 NOTE — ASSESSMENT & PLAN NOTE
- Expect pancytopenia post chemo and transplant  - Daily CBC while inppatient  - Continue ppx antimicrobials per protocol

## 2023-02-02 NOTE — PT/OT/SLP EVAL
"Physical Therapy Evaluation and Treatment     Patient Name:  Raquel Houston  MRN: [id number]    Admit Date: 2/1/2023  Admitting Diagnosis:  Stem cell transplant candidate  Length of Stay: 1 days  Recent Surgery: * No surgery found *      Recommendations:     Discharge Recommendations: Home, no PT needs anticipated  Equipment recommendations: none  Barriers to discharge: None Identified     Assessment:     Raquel Houston is a 62 y.o. female admitted to [facility] on 2/1/2023 with medical diagnosis of Stem cell transplant candidate. Pt presents with impaired endurance. Pt tolerated session well.  Due to medical diagnosis and treatment plan, acute PT will continue to assess pt weekly due to pt's risk for deconditioning. Raquel Houston would benefit from acute PT intervention to improve quality of life, focus on recovery of impairments, provide patient/caregiver education, reduce fall risk, and maximize (I) and safety with functional mobility.     Rehab Prognosis: Good    Plan:     During this hospitalization, patient to be seen 1 x/week to address the identified rehab impairments via gait training, therapeutic activities, therapeutic exercises and progress towards stated goals.     Plan of Care Expires:  03/04/23  Plan of Care reviewed with: patient    This plan of care has been discussed with the patient/caregiver, who was included in its development and is in agreement with the identified goals and treatment plan.     Subjective     Communicated with RN prior to session.  Patient found HOB elevated upon PT entry to room, agreeable to evaluation.      Chief Complaint: Chemotherapy    Patient/Family Comments/goals: "I'm doing good"     Pain/Comfort:  Pain Rating 1: 0/10  Pain Rating Post-Intervention 1: 0/10    Patients cultural, spiritual, Uatsdin conflicts given the current situation: None identified     Patient History: information obtained from pt      Living Environment: Pt lives with son and " daughter in single level home  with 1 SAPNA. Bathroom set-up: tub/shower combo  Prior Level of Function: independent with mobility and ADLs  DME owned: rolling walker, single point cane, bedside commode, wheelchair, and rollator  Support Available/Caregiver Assistance: family    Objective:     Patient found with:  (permacath)    Recent Surgery: * No surgery found *    General Precautions: Standard,  fall   Orthopedic Precautions:N/A   Braces: N/A   Oxygen Device: room air      Exams:    Cognition:  Oriented X 4 and Alert  Command following: Follows multistep verbal commands  Communication: clear/fluent    Sensation:   Light touch sensation: Intact BLEs    Gross Motor Coordination: No deficits noted during functional mobility tasks     Edema/Skin Integrity: None noted; Visible skin intact    Postural examination/scapula alignment: Rounded shoulder    Lower Extremity Range of Motion:  Right Lower Extremity: WFL  Left Lower Extremity: WFL    Lower Extremity Strength:  Right Lower Extremity: 5/5  Left Lower Extremity:5/5    Functional Mobility:    Bed Mobility:  Supine > Sit with modified independence  Sit > Supine with modified independence  *HOB elevated     Transfers:   Sit <> Stand Transfer: Supervision or Set-up Assistance x 1 trials from EOB with no AD              Gait:  Distance: ~175ft.   Assistance level: Supervision    Assistive Device: none  Gait Assessment: reciprocal gait pattern with decreased gait speed, no overt LOB. Pt denied any SOB or dizziness during trial.     Balance:  Dynamic Sitting: NORMAL: No deviations seen in posture held dynamically  Standing:  Static: GOOD: Takes MODERATE challenges from all directions   Dynamic: GOOD-: Needs SUPERVISION only during gait and able to self right with moderate LOB inconsistently    Outcome Measure: AM-PAC 6 CLICK MOBILITY  Total Score:22     Patient/Caregiver Education:     Therapist educated pt/caregiver regarding:   PT POC and goals for therapy   Seated  therex recommendations   Safety with mobility and fall risk   Instruction on use of call button and importance of calling nursing staff for assistance with mobility   Time provided for therapeutic counseling and discussion of current health disposition. All questions answered to satisfaction, within scope of PT practice     Patient/caregiver able to verbalize understanding and expressed no further questions this visit; will follow-up with pt/caregiver during current admit for additional questions/concerns within scope of practice.     White board updated.     Patient left HOB elevated with all lines intact, call button in reach, RN notified, and RN present.    GOALS:   Multidisciplinary Problems       Physical Therapy Goals          Problem: Physical Therapy    Goal Priority Disciplines Outcome Goal Variances Interventions   Physical Therapy Goal     PT, PT/OT Ongoing, Progressing     Description: Goals to be met by: 23     Patient will increase functional independence with mobility by performin. Pt will tolerate daily ambulation program 3x 100-200 ft. With no AD, with no LOB with supervision   2. Pt will demonstrate independence with BLE HEP therex per handout x 30 reps for BLE strengthening                                History:     Past Medical History:   Diagnosis Date    Arthritis     Multiple myeloma     Osteoporosis        Past Surgical History:   Procedure Laterality Date    COLONOSCOPY N/A 2023    Procedure: COLONOSCOPY;  Surgeon: Diana Huynh MD;  Location: [facility] ([address] [place] [address]);  Service: Endoscopy;  Laterality: N/A;  inst via email  pre call no answer-as    FEMUR SURGERY      INSERTION OF TUNNELED CENTRAL VENOUS HEMODIALYSIS CATHETER Right 2023    Procedure: INSERTION, CATHETER, HEMODIALYSIS, DUAL LUMEN Bard 14.5 Fr Hemosplit Catheter Model [id number], Right Possible Left Chest;  Surgeon: Steven Monroy MD;  Location: [facility] OR [address] [place] [address];  Service: General;  Laterality: Right;        Time Tracking:     PT Received On: 02/02/23  PT Start Time: 1338     PT Stop Time: 1357  PT Total Time (min): 19 min     Billable Minutes: Evaluation 10 min and Therapeutic Activity 9 min    02/02/2023

## 2023-02-02 NOTE — ASSESSMENT & PLAN NOTE
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

## 2023-02-02 NOTE — PLAN OF CARE
Problem: Occupational Therapy  Goal: Occupational Therapy Goal  Description: Goals to be met by: 2/23/23     Patient will increase functional independence with ADLs by performing:    UE Dressing with [place].  LE Dressing with [place].  Grooming while standing at sink with [place].  Toileting from toilet with [place] for hygiene and clothing management.   Toilet transfer to toilet with [place].  Upper extremity exercise program x15 reps per handout, with independence.    Outcome: Ongoing, Progressing

## 2023-02-02 NOTE — PT/OT/SLP EVAL
"Occupational Therapy   Evaluation    Name: Raquel Houston  MRN: [id number]  Admitting Diagnosis: Stem cell transplant candidate  Recent Surgery: * No surgery found *      Recommendations:     Discharge Recommendations: home  Discharge Equipment Recommendations:  none  Barriers to discharge:  None    Assessment:     Raquel Houston is a 62 y.o. female with a medical diagnosis of Stem cell transplant candidate.  She presents with good tolerance to session on this date completing func amb in room w no AD and SBA. Pt stating she has been ambulating to and from restroom and took as shower prior to therapist arrival. Performance deficits affecting function: impaired endurance, impaired balance.    Pt motivated to return home however continues to  be at risk for deconditioning and would benefit from continued OT services at this time.     Rehab Prognosis: Good; patient would benefit from acute skilled OT services to address these deficits and reach maximum level of function.       Plan:     Patient to be seen 1 x/week to address the above listed problems via self-care/home management, therapeutic activities, therapeutic exercises  Plan of Care Expires: 03/02/23  Plan of Care Reviewed with: patient, family    Subjective     Chief Complaint: none  Patient/Family Comments/goals: "I took a shower this morning and everything"    Occupational Profile:  Living Environment: Lives w  son in [facility], threshold entry, TS combo  Previous level of function: ind w ADLs and func amb   Roles and Routines: drives, does not work  Equipment Used at Home: shower chair, bedside commode, wheelchair, walker, rolling, rollator, hospital bed  Assistance upon Discharge: family able to assist     Pain/Comfort:  Pain Rating 1: 0/10    Patients cultural, spiritual, Mormonism conflicts given the current situation: no    Objective:     Communicated with: RN prior to session.  Patient found supine with central line upon OT entry to " room.    General Precautions: Standard, fall  Orthopedic Precautions: N/A  Braces: N/A  Respiratory Status: Room air    Occupational Performance:    Bed Mobility:    Patient completed Supine to Sit with stand by assistance  Patient completed Sit to Supine with stand by assistance    Functional Mobility/Transfers:  Patient completed Sit <> Stand Transfer with stand by assistance  with  no assistive device   Functional Mobility: pt completed functional ambulation in room to simulate household mobility requiring SBA and no AD    Activities of Daily Living:  Pt deferred 2/2 pt stating she completed all ADLs prior to therapist arrival    Cognitive/Visual Perceptual:  Cognitive/Psychosocial Skills:     -       Oriented to: Person, Place, Time, and Situation   -       Follows Commands/attention:Follows multistep  commands  -       Communication: clear/fluent  -       Memory: No Deficits noted  -       Safety awareness/insight to disability: intact   -       Mood/Affect/Coping skills/emotional control: Cooperative and Pleasant    Physical Exam:  Upper Extremity Range of Motion:     -       Right Upper Extremity: WFL  -       Left Upper Extremity: WFL  Upper Extremity Strength:    -       Right Upper Extremity: WFL  -       Left Upper Extremity: WFL   Strength: -       Right Upper Extremity: WFL  -       Left Upper Extremity: WFL    AMPAC 6 Click ADL:  AMPAC Total Score: 24    Treatment & Education:  Pt educated on scope of practice and importance of daily functional mobility.   Pt educated on safety precautions during transfers  Pt updated on POC and discharge recc      Patient left sitting edge of bed with all lines intact, call button in reach, RN notified, and RN and family present    GOALS:   Multidisciplinary Problems       Occupational Therapy Goals          Problem: Occupational Therapy    Goal Priority Disciplines Outcome Interventions   Occupational Therapy Goal     OT, PT/OT Ongoing, Progressing     Description: Goals to be met by: 2/23/23     Patient will increase functional independence with ADLs by performing:    UE Dressing with [place].  LE Dressing with [place].  Grooming while standing at sink with [place].  Toileting from toilet with [place] for hygiene and clothing management.   Toilet transfer to toilet with [place].  Upper extremity exercise program x15 reps per handout, with independence.                         History:     Past Medical History:   Diagnosis Date    Arthritis     Multiple myeloma     Osteoporosis          Past Surgical History:   Procedure Laterality Date    COLONOSCOPY N/A 1/5/2023    Procedure: COLONOSCOPY;  Surgeon: Diana Huynh MD;  Location: [facility] ([facility]  Service: Endoscopy;  Laterality: N/A;  inst via email  pre call no answer-as    FEMUR SURGERY      INSERTION OF TUNNELED CENTRAL VENOUS HEMODIALYSIS CATHETER Right 1/25/2023    Procedure: INSERTION, CATHETER, HEMODIALYSIS, DUAL LUMEN Bard 14.5 Fr Hemosplit Catheter Model [id number], Right Possible Left Chest;  Surgeon: Steven Monroy MD;  Location: [facility] OR [address] [place] [address];  Service: General;  Laterality: Right;       Time Tracking:     OT Date of Treatment: 02/02/23  OT Start Time: 0945  OT Stop Time: 0955  OT Total Time (min): 10 min    Billable Minutes:Evaluation 10    2/2/2023

## 2023-02-03 LAB
ALBUMIN SERPL BCP-MCNC: 3.5 G/DL (ref 3.5–5.2)
ALP SERPL-CCNC: 253 U/L (ref 55–135)
ALT SERPL W/O P-5'-P-CCNC: 29 U/L (ref 10–44)
ANION GAP SERPL CALC-SCNC: 7 MMOL/L (ref 8–16)
AST SERPL-CCNC: 23 U/L (ref 10–40)
BASOPHILS # BLD AUTO: 0 K/UL (ref 0–0.2)
BASOPHILS NFR BLD: 0 % (ref 0–1.9)
BILIRUB SERPL-MCNC: 0.4 MG/DL (ref 0.1–1)
BUN SERPL-MCNC: 10 MG/DL (ref 6–20)
CALCIUM SERPL-MCNC: 9.4 MG/DL (ref 8.7–10.5)
CHLORIDE SERPL-SCNC: 109 MMOL/L (ref 95–110)
CO2 SERPL-SCNC: 24 MMOL/L (ref 23–29)
CREAT SERPL-MCNC: 0.9 MG/DL (ref 0.5–1.4)
DIFFERENTIAL METHOD: ABNORMAL
EOSINOPHIL # BLD AUTO: 0 K/UL (ref 0–0.5)
EOSINOPHIL NFR BLD: 0 % (ref 0–8)
ERYTHROCYTE [DISTWIDTH] IN BLOOD BY AUTOMATED COUNT: 15.4 % (ref 11.5–14.5)
EST. GFR  (NO RACE VARIABLE): >60 ML/MIN/1.73 M^2
GLUCOSE SERPL-MCNC: 114 MG/DL (ref 70–110)
HCT VFR BLD AUTO: 32.2 % (ref 37–48.5)
HGB BLD-MCNC: 10.4 G/DL (ref 12–16)
IMM GRANULOCYTES # BLD AUTO: 0.01 K/UL (ref 0–0.04)
IMM GRANULOCYTES NFR BLD AUTO: 0.4 % (ref 0–0.5)
LYMPHOCYTES # BLD AUTO: 0.7 K/UL (ref 1–4.8)
LYMPHOCYTES NFR BLD: 26.8 % (ref 18–48)
MAGNESIUM SERPL-MCNC: 1.9 MG/DL (ref 1.6–2.6)
MCH RBC QN AUTO: 28 PG (ref 27–31)
MCHC RBC AUTO-ENTMCNC: 32.3 G/DL (ref 32–36)
MCV RBC AUTO: 87 FL (ref 82–98)
MONOCYTES # BLD AUTO: 0.3 K/UL (ref 0.3–1)
MONOCYTES NFR BLD: 13.2 % (ref 4–15)
NEUTROPHILS # BLD AUTO: 1.5 K/UL (ref 1.8–7.7)
NEUTROPHILS NFR BLD: 59.6 % (ref 38–73)
NRBC BLD-RTO: 0 /100 WBC
PHOSPHATE SERPL-MCNC: 3.6 MG/DL (ref 2.7–4.5)
PLATELET # BLD AUTO: 107 K/UL (ref 150–450)
PMV BLD AUTO: 11.1 FL (ref 9.2–12.9)
POTASSIUM SERPL-SCNC: 5 MMOL/L (ref 3.5–5.1)
PROT SERPL-MCNC: 6.1 G/DL (ref 6–8.4)
RBC # BLD AUTO: 3.72 M/UL (ref 4–5.4)
SODIUM SERPL-SCNC: 140 MMOL/L (ref 136–145)
WBC # BLD AUTO: 2.5 K/UL (ref 3.9–12.7)

## 2023-02-03 PROCEDURE — 38208 THAW PRESERVED STEM CELLS: CPT

## 2023-02-03 PROCEDURE — 63600175 PHARM REV CODE 636 W HCPCS: Performed by: NURSE PRACTITIONER

## 2023-02-03 PROCEDURE — 20600001 HC STEP DOWN PRIVATE ROOM

## 2023-02-03 PROCEDURE — 63600175 PHARM REV CODE 636 W HCPCS: Performed by: INTERNAL MEDICINE

## 2023-02-03 PROCEDURE — 80053 COMPREHEN METABOLIC PANEL: CPT | Performed by: NURSE PRACTITIONER

## 2023-02-03 PROCEDURE — 85025 COMPLETE CBC W/AUTO DIFF WBC: CPT | Performed by: NURSE PRACTITIONER

## 2023-02-03 PROCEDURE — 84100 ASSAY OF PHOSPHORUS: CPT | Performed by: NURSE PRACTITIONER

## 2023-02-03 PROCEDURE — 99900035 HC TECH TIME PER 15 MIN (STAT)

## 2023-02-03 PROCEDURE — 99233 PR SUBSEQUENT HOSPITAL CARE,LEVL III: ICD-10-PCS | Mod: ,,, | Performed by: INTERNAL MEDICINE

## 2023-02-03 PROCEDURE — 94799 UNLISTED PULMONARY SVC/PX: CPT

## 2023-02-03 PROCEDURE — 25000003 PHARM REV CODE 250: Performed by: INTERNAL MEDICINE

## 2023-02-03 PROCEDURE — 25000003 PHARM REV CODE 250: Performed by: NURSE PRACTITIONER

## 2023-02-03 PROCEDURE — 94761 N-INVAS EAR/PLS OXIMETRY MLT: CPT

## 2023-02-03 PROCEDURE — 83735 ASSAY OF MAGNESIUM: CPT | Performed by: NURSE PRACTITIONER

## 2023-02-03 PROCEDURE — 38241 TRANSPLT AUTOL HCT/DONOR: CPT

## 2023-02-03 PROCEDURE — 99233 SBSQ HOSP IP/OBS HIGH 50: CPT | Mod: ,,, | Performed by: INTERNAL MEDICINE

## 2023-02-03 RX ADMIN — OLANZAPINE 5 MG: 5 TABLET, FILM COATED ORAL at 08:02

## 2023-02-03 RX ADMIN — HYDROCORTISONE SODIUM SUCCINATE 250 MG: 250 INJECTION, POWDER, FOR SOLUTION INTRAMUSCULAR; INTRAVENOUS at 12:02

## 2023-02-03 RX ADMIN — SODIUM CHLORIDE AND POTASSIUM CHLORIDE: .9; .15 SOLUTION INTRAVENOUS at 09:02

## 2023-02-03 RX ADMIN — LORAZEPAM 1 MG: 1 TABLET ORAL at 12:02

## 2023-02-03 RX ADMIN — ONDANSETRON 8 MG: 8 TABLET, ORALLY DISINTEGRATING ORAL at 05:02

## 2023-02-03 RX ADMIN — LEVOFLOXACIN 500 MG: 500 TABLET, FILM COATED ORAL at 08:02

## 2023-02-03 RX ADMIN — SODIUM CHLORIDE AND POTASSIUM CHLORIDE: .9; .15 SOLUTION INTRAVENOUS at 11:02

## 2023-02-03 RX ADMIN — ACYCLOVIR 800 MG: 200 CAPSULE ORAL at 08:02

## 2023-02-03 RX ADMIN — SODIUM CHLORIDE AND POTASSIUM CHLORIDE: .9; .15 SOLUTION INTRAVENOUS at 02:02

## 2023-02-03 RX ADMIN — MAGNESIUM OXIDE TAB 400 MG (241.3 MG ELEMENTAL MG) 400 MG: 400 (241.3 MG) TAB at 12:02

## 2023-02-03 RX ADMIN — AMLODIPINE BESYLATE 10 MG: 10 TABLET ORAL at 08:02

## 2023-02-03 RX ADMIN — MAGNESIUM OXIDE TAB 400 MG (241.3 MG ELEMENTAL MG) 400 MG: 400 (241.3 MG) TAB at 05:02

## 2023-02-03 RX ADMIN — Medication 1 DOSE: at 08:02

## 2023-02-03 RX ADMIN — ONDANSETRON 8 MG: 8 TABLET, ORALLY DISINTEGRATING ORAL at 12:02

## 2023-02-03 RX ADMIN — ONDANSETRON 8 MG: 8 TABLET, ORALLY DISINTEGRATING ORAL at 08:02

## 2023-02-03 RX ADMIN — FLUCONAZOLE 400 MG: 200 TABLET ORAL at 08:02

## 2023-02-03 RX ADMIN — PANTOPRAZOLE SODIUM 40 MG: 40 TABLET, DELAYED RELEASE ORAL at 08:02

## 2023-02-03 RX ADMIN — ENOXAPARIN SODIUM 40 MG: 40 INJECTION SUBCUTANEOUS at 05:02

## 2023-02-03 RX ADMIN — Medication 1 DOSE: at 05:02

## 2023-02-03 RX ADMIN — HEPARIN SODIUM 1600 UNITS: 1000 INJECTION, SOLUTION INTRAVENOUS; SUBCUTANEOUS at 02:02

## 2023-02-03 RX ADMIN — DIPHENHYDRAMINE HYDROCHLORIDE 50 MG: 50 INJECTION, SOLUTION INTRAMUSCULAR; INTRAVENOUS at 12:02

## 2023-02-03 NOTE — PLAN OF CARE
Patient AAOX4, up independently to the bathroom, and protective isolation maintained. Day 0 of Génesis AUTO SCT; 3 bags tolerated well, frequent vitals performed. Son at bedside and involved in care. Patient stable at this time.

## 2023-02-03 NOTE — PROGRESS NOTES
[facility] - Oncology ([facility])  Hematology  Bone Marrow Transplant  Progress Note    Patient Name: Raquel Houston  Admission Date: 2/1/2023  Hospital Length of Stay: 2 days  Code Status: Full Code    Subjective:     Interval History: Day 0 for a Génesis 200 auto SCT for MM. She will receive 3 bags and a CD34 dose of 3.13 x 10^6 today at 1330. Received Melphalan yesterday and tolerated well. Documented weight 6 lbs greater than that of admission weight. Does not appear fluid overloaded on exam, and ~ 4 Liters of documented UOP in the last 24 hours. Will defer diuresis today. Afebrile. VSS. Neck pain associated with vas cath line improved.    Objective:     Vital Signs (Most Recent):  Temp: 97.7 °F (36.5 °C) (02/03/23 0719)  Pulse: 91 (02/03/23 0719)  Resp: 16 (02/03/23 0719)  BP: 130/79 (02/03/23 0719)  SpO2: 96 % (02/03/23 0719) Vital Signs (24h Range):  Temp:  [97.6 °F (36.4 °C)-98.4 °F (36.9 °C)] 97.7 °F (36.5 °C)  Pulse:  [] 91  Resp:  [16-18] 16  SpO2:  [94 %-100 %] 96 %  BP: (109-138)/(65-79) 130/79     Weight: 101.8 kg (224 lb 6.9 oz)  Body mass index is 36.22 kg/m².  Body surface area is 2.18 meters squared.    ECOG SCORE           [unfilled]    Intake/Output - Last 3 Shifts         02/01 0700  02/02 0659 02/02 0700 02/03 0659 02/03 0700 02/04 0659    P.O. 250 705     I.V. (mL/kg) 1176.8 (11.9) 2802.3 (27.5)     Total Intake(mL/kg) 1426.8 (14.4) 3507.3 (34.5)     Urine (mL/kg/hr) 200 3975 (1.6)     Total Output 200 3975     Net +1226.8 -467.8            Urine Occurrence  1 x     Stool Occurrence   1 x            Physical Exam  Constitutional:       Appearance: She is well-developed.   HENT:      Head: Normocephalic and atraumatic.      Mouth/Throat:      Pharynx: No oropharyngeal exudate.   Eyes:      Conjunctiva/sclera: Conjunctivae normal.      Pupils: Pupils are equal, round, and reactive to light.   Cardiovascular:      Rate and Rhythm: Normal rate and regular rhythm.      Heart sounds: Normal  heart sounds. No murmur heard.  Pulmonary:      Effort: Pulmonary effort is normal.      Breath sounds: Normal breath sounds.   Abdominal:      General: Bowel sounds are normal. There is no distension.      Palpations: Abdomen is soft.      Tenderness: There is no abdominal tenderness.   Musculoskeletal:         General: No deformity. Normal range of motion.      Cervical back: Normal range of motion and neck supple.   Skin:     General: Skin is warm and dry.      Findings: No erythema or rash.   Neurological:      Mental Status: She is alert and oriented to person, place, and time.   Psychiatric:         Behavior: Behavior normal.         Thought Content: Thought content normal.         Judgment: Judgment normal.       Significant Labs:   CBC:   Recent Labs   Lab 02/01/23  1133 02/02/23  0337 02/03/23  0340   WBC 3.16* 3.01*  3.01* 2.50*   HGB 12.0 10.5*  10.5* 10.4*   HCT 38.5 33.8*  33.8* 32.2*   PLT 92* 76*  76* 107*      and CMP:   Recent Labs   Lab 02/01/23  1133 02/02/23  0337 02/02/23  1351 02/03/23  0340    141  141 142 140   K 3.9 5.6*  5.6* 4.9 5.0    111*  111* 110 109   CO2 23 25  25 19* 24   * 93  93 149* 114*   BUN 17 15  15 13 10   CREATININE 0.9 0.8  0.8 0.9 0.9   CALCIUM 9.3 8.7  8.7 9.0 9.4   PROT 6.8 6.1  6.1  --  6.1   ALBUMIN 3.9 3.4*  3.4*  --  3.5   BILITOT 0.4 0.3  0.3  --  0.4   ALKPHOS 238* 223*  223*  --  253*   AST 21 22  22  --  23   ALT 30 23  23  --  29   ANIONGAP 9 5*  5* 13 7*         Diagnostic Results:  I have reviewed all pertinent imaging results/findings within the past 24 hours.    Assessment/Plan:     * Stem cell transplant candidate  - Approved by the transplant selection committee for a Génesis 200  - Underwent consent signing with Dr. Casas 1/20/2023  - Admitted for Génesis 200 Auto today, day -1. She will receive Melphalan today.  - She will receive 3 bags with a total CD34 dose of 3.13 x10^6/kg on today at 1330.                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;14), and monosomy 13  - R-ISS stage III (elevated beta-2 microglobulin, elevated LDH, decreased albumin, and high risk cytogenetics)  - S/p 5 Cycles D-Vrd (discontinued jasen after cycle 4)  - Has had significant clinical improvement as well as reduction in IgG and M-spike showing excellent response  - see autologous stem cell transplant    Pancytopenia due to chemotherapy  - Expect blood counts to drop further with chemo  - Transfuse for hgb < 7 or plts < 10K  - Continue antimicrobial ppx per protocol  - Daily CBC while inpatient    Pain  - C/o pain to neck associated with vas cath  - Presented to ED with c/o pain on 1/28. No evidence of acute process on imaging.  - No edema or sign of infection to site  - Started PRN Tramadol  - Improved today per patient    Hyperkalemia  - K+ 5.6 2/2/23. Gave lokelma x 1 dose. wnl at 5.0 today.  - Daily CMP while inpatient    Neutropenia  - Expect pancytopenia post chemo and transplant  - Daily CBC while inppatient  - Continue ppx antimicrobials per protocol    Thrombocytopenia  - Daily CBC  - Transfuse for platelets <10 or <50k if bleeding/pre-procedure  - Hold Lovenox when platelets <50k    HTN (hypertension)  - Continue home amlodipine         VTE Risk Mitigation (From admission, onward)         Ordered     enoxaparin injection 40 mg  Daily         02/01/23 1442     heparin (porcine) injection 1,600 Units  As needed (PRN)         02/01/23 1528     IP VTE HIGH RISK PATIENT  Once         02/01/23 1442     Place sequential compression device  Until discontinued         02/01/23 1442                 Disposition: Inpatient for SCT.    Wendy Vazquez, NP  Bone Marrow Transplant  [facility] - Oncology ([facility])

## 2023-02-03 NOTE — ASSESSMENT & PLAN NOTE
- C/o pain to neck associated with vas cath  - Presented to ED with c/o pain on 1/28. No evidence of acute process on imaging.  - No edema or sign of infection to site  - Started PRN Tramadol  - Improved today per patient

## 2023-02-03 NOTE — PLAN OF CARE
Day 0 of Génesis Auto HSCT. Patient is progressing and involved with plan of care, communicating needs throughout shift. IVF reduced as ordered. Up ad ketan.Tolerating diet, voiding without difficulty. No c/o pain. Pt Ramona in right ear. All vitals stable; no acute events this shift. Pt. Remaining free from falls or injury throughout shift; bed in lowest position; side rails up X2; call light within reach; pt instructed to call for assistance as needed - verbalized understanding. Q2h rounding on patient. Will continue to monitor.

## 2023-02-03 NOTE — SUBJECTIVE & OBJECTIVE
Subjective:     Interval History: Day 0 for a Génesis 200 auto SCT for MM. She will receive 3 bags and a CD34 dose of 3.13 x 10^6 today at 1330. Received Melphalan yesterday and tolerated well. Documented weight 6 lbs greater than that of admission weight. Does not appear fluid overloaded on exam, and ~ 4 Liters of documented UOP in the last 24 hours. Will defer diuresis today. Afebrile. VSS. Neck pain associated with vas cath line improved.    Objective:     Vital Signs (Most Recent):  Temp: 97.7 °F (36.5 °C) (02/03/23 0719)  Pulse: 91 (02/03/23 0719)  Resp: 16 (02/03/23 0719)  BP: 130/79 (02/03/23 0719)  SpO2: 96 % (02/03/23 0719) Vital Signs (24h Range):  Temp:  [97.6 °F (36.4 °C)-98.4 °F (36.9 °C)] 97.7 °F (36.5 °C)  Pulse:  [] 91  Resp:  [16-18] 16  SpO2:  [94 %-100 %] 96 %  BP: (109-138)/(65-79) 130/79     Weight: 101.8 kg (224 lb 6.9 oz)  Body mass index is 36.22 kg/m².  Body surface area is 2.18 meters squared.    ECOG SCORE           [unfilled]    Intake/Output - Last 3 Shifts         02/01 0700 02/02 0659 02/02 0700 02/03 0659 02/03 0700 02/04 0659    P.O. 250 705     I.V. (mL/kg) 1176.8 (11.9) 2802.3 (27.5)     Total Intake(mL/kg) 1426.8 (14.4) 3507.3 (34.5)     Urine (mL/kg/hr) 200 3975 (1.6)     Total Output 200 3975     Net +1226.8 -467.8            Urine Occurrence  1 x     Stool Occurrence   1 x            Physical Exam  Constitutional:       Appearance: She is well-developed.   HENT:      Head: Normocephalic and atraumatic.      Mouth/Throat:      Pharynx: No oropharyngeal exudate.   Eyes:      Conjunctiva/sclera: Conjunctivae normal.      Pupils: Pupils are equal, round, and reactive to light.   Cardiovascular:      Rate and Rhythm: Normal rate and regular rhythm.      Heart sounds: Normal heart sounds. No murmur heard.  Pulmonary:      Effort: Pulmonary effort is normal.      Breath sounds: Normal breath sounds.   Abdominal:      General: Bowel sounds are normal. There is no distension.       Palpations: Abdomen is soft.      Tenderness: There is no abdominal tenderness.   Musculoskeletal:         General: No deformity. Normal range of motion.      Cervical back: Normal range of motion and neck supple.   Skin:     General: Skin is warm and dry.      Findings: No erythema or rash.   Neurological:      Mental Status: She is alert and oriented to person, place, and time.   Psychiatric:         Behavior: Behavior normal.         Thought Content: Thought content normal.         Judgment: Judgment normal.       Significant Labs:   CBC:   Recent Labs   Lab 02/01/23  1133 02/02/23  0337 02/03/23  0340   WBC 3.16* 3.01*  3.01* 2.50*   HGB 12.0 10.5*  10.5* 10.4*   HCT 38.5 33.8*  33.8* 32.2*   PLT 92* 76*  76* 107*      and CMP:   Recent Labs   Lab 02/01/23  1133 02/02/23  0337 02/02/23  1351 02/03/23  0340    141  141 142 140   K 3.9 5.6*  5.6* 4.9 5.0    111*  111* 110 109   CO2 23 25  25 19* 24   * 93  93 149* 114*   BUN 17 15  15 13 10   CREATININE 0.9 0.8  0.8 0.9 0.9   CALCIUM 9.3 8.7  8.7 9.0 9.4   PROT 6.8 6.1  6.1  --  6.1   ALBUMIN 3.9 3.4*  3.4*  --  3.5   BILITOT 0.4 0.3  0.3  --  0.4   ALKPHOS 238* 223*  223*  --  253*   AST 21 22  22  --  23   ALT 30 23  23  --  29   ANIONGAP 9 5*  5* 13 7*         Diagnostic Results:  I have reviewed all pertinent imaging results/findings within the past 24 hours.

## 2023-02-04 LAB
ALBUMIN SERPL BCP-MCNC: 3.2 G/DL (ref 3.5–5.2)
ALP SERPL-CCNC: 212 U/L (ref 55–135)
ALT SERPL W/O P-5'-P-CCNC: 26 U/L (ref 10–44)
ANION GAP SERPL CALC-SCNC: 11 MMOL/L (ref 8–16)
AST SERPL-CCNC: 23 U/L (ref 10–40)
BASOPHILS # BLD AUTO: 0 K/UL (ref 0–0.2)
BASOPHILS NFR BLD: 0 % (ref 0–1.9)
BILIRUB SERPL-MCNC: 0.3 MG/DL (ref 0.1–1)
BUN SERPL-MCNC: 16 MG/DL (ref 6–20)
CALCIUM SERPL-MCNC: 8 MG/DL (ref 8.7–10.5)
CHLORIDE SERPL-SCNC: 111 MMOL/L (ref 95–110)
CO2 SERPL-SCNC: 20 MMOL/L (ref 23–29)
CREAT SERPL-MCNC: 0.8 MG/DL (ref 0.5–1.4)
DIFFERENTIAL METHOD: ABNORMAL
EOSINOPHIL # BLD AUTO: 0 K/UL (ref 0–0.5)
EOSINOPHIL NFR BLD: 0 % (ref 0–8)
ERYTHROCYTE [DISTWIDTH] IN BLOOD BY AUTOMATED COUNT: 15.5 % (ref 11.5–14.5)
EST. GFR  (NO RACE VARIABLE): >60 ML/MIN/1.73 M^2
GLUCOSE SERPL-MCNC: 156 MG/DL (ref 70–110)
HCT VFR BLD AUTO: 30.9 % (ref 37–48.5)
HGB BLD-MCNC: 9.9 G/DL (ref 12–16)
IMM GRANULOCYTES # BLD AUTO: 0.03 K/UL (ref 0–0.04)
IMM GRANULOCYTES NFR BLD AUTO: 0.9 % (ref 0–0.5)
LYMPHOCYTES # BLD AUTO: 0.3 K/UL (ref 1–4.8)
LYMPHOCYTES NFR BLD: 9.8 % (ref 18–48)
MAGNESIUM SERPL-MCNC: 2 MG/DL (ref 1.6–2.6)
MCH RBC QN AUTO: 27.7 PG (ref 27–31)
MCHC RBC AUTO-ENTMCNC: 32 G/DL (ref 32–36)
MCV RBC AUTO: 87 FL (ref 82–98)
MONOCYTES # BLD AUTO: 0.2 K/UL (ref 0.3–1)
MONOCYTES NFR BLD: 4.5 % (ref 4–15)
NEUTROPHILS # BLD AUTO: 2.9 K/UL (ref 1.8–7.7)
NEUTROPHILS NFR BLD: 84.8 % (ref 38–73)
NRBC BLD-RTO: 0 /100 WBC
PHOSPHATE SERPL-MCNC: 3.8 MG/DL (ref 2.7–4.5)
PLATELET # BLD AUTO: 115 K/UL (ref 150–450)
PMV BLD AUTO: 12.1 FL (ref 9.2–12.9)
POTASSIUM SERPL-SCNC: 4.1 MMOL/L (ref 3.5–5.1)
PROT SERPL-MCNC: 5.7 G/DL (ref 6–8.4)
RBC # BLD AUTO: 3.57 M/UL (ref 4–5.4)
SODIUM SERPL-SCNC: 142 MMOL/L (ref 136–145)
WBC # BLD AUTO: 3.36 K/UL (ref 3.9–12.7)

## 2023-02-04 PROCEDURE — 63600175 PHARM REV CODE 636 W HCPCS: Performed by: INTERNAL MEDICINE

## 2023-02-04 PROCEDURE — 99233 SBSQ HOSP IP/OBS HIGH 50: CPT | Mod: ,,, | Performed by: INTERNAL MEDICINE

## 2023-02-04 PROCEDURE — 84100 ASSAY OF PHOSPHORUS: CPT | Performed by: NURSE PRACTITIONER

## 2023-02-04 PROCEDURE — 63600175 PHARM REV CODE 636 W HCPCS: Performed by: NURSE PRACTITIONER

## 2023-02-04 PROCEDURE — 85025 COMPLETE CBC W/AUTO DIFF WBC: CPT | Performed by: NURSE PRACTITIONER

## 2023-02-04 PROCEDURE — 83735 ASSAY OF MAGNESIUM: CPT | Performed by: NURSE PRACTITIONER

## 2023-02-04 PROCEDURE — 20600001 HC STEP DOWN PRIVATE ROOM

## 2023-02-04 PROCEDURE — 25000003 PHARM REV CODE 250: Performed by: INTERNAL MEDICINE

## 2023-02-04 PROCEDURE — 80053 COMPREHEN METABOLIC PANEL: CPT | Performed by: NURSE PRACTITIONER

## 2023-02-04 PROCEDURE — 25000003 PHARM REV CODE 250: Performed by: NURSE PRACTITIONER

## 2023-02-04 PROCEDURE — 99233 PR SUBSEQUENT HOSPITAL CARE,LEVL III: ICD-10-PCS | Mod: ,,, | Performed by: INTERNAL MEDICINE

## 2023-02-04 RX ADMIN — AMLODIPINE BESYLATE 10 MG: 10 TABLET ORAL at 09:02

## 2023-02-04 RX ADMIN — LEVOFLOXACIN 500 MG: 500 TABLET, FILM COATED ORAL at 09:02

## 2023-02-04 RX ADMIN — ACYCLOVIR 800 MG: 200 CAPSULE ORAL at 09:02

## 2023-02-04 RX ADMIN — PANTOPRAZOLE SODIUM 40 MG: 40 TABLET, DELAYED RELEASE ORAL at 09:02

## 2023-02-04 RX ADMIN — Medication 1 DOSE: at 08:02

## 2023-02-04 RX ADMIN — HEPARIN SODIUM 1600 UNITS: 1000 INJECTION, SOLUTION INTRAVENOUS; SUBCUTANEOUS at 09:02

## 2023-02-04 RX ADMIN — Medication 1 DOSE: at 12:02

## 2023-02-04 RX ADMIN — ONDANSETRON 8 MG: 8 TABLET, ORALLY DISINTEGRATING ORAL at 12:02

## 2023-02-04 RX ADMIN — FLUCONAZOLE 400 MG: 200 TABLET ORAL at 09:02

## 2023-02-04 RX ADMIN — ENOXAPARIN SODIUM 40 MG: 40 INJECTION SUBCUTANEOUS at 05:02

## 2023-02-04 RX ADMIN — OLANZAPINE 5 MG: 5 TABLET, FILM COATED ORAL at 08:02

## 2023-02-04 RX ADMIN — ONDANSETRON 8 MG: 8 TABLET, ORALLY DISINTEGRATING ORAL at 08:02

## 2023-02-04 RX ADMIN — Medication 1 DOSE: at 05:02

## 2023-02-04 RX ADMIN — Medication 1 DOSE: at 09:02

## 2023-02-04 RX ADMIN — ONDANSETRON 8 MG: 8 TABLET, ORALLY DISINTEGRATING ORAL at 04:02

## 2023-02-04 RX ADMIN — OLANZAPINE 5 MG: 5 TABLET, FILM COATED ORAL at 09:02

## 2023-02-04 RX ADMIN — ACYCLOVIR 800 MG: 200 CAPSULE ORAL at 08:02

## 2023-02-04 NOTE — SUBJECTIVE & OBJECTIVE
Subjective:     Interval History: Day 1 for a Génesis 200 auto SCT for MM. No acute events overnight. Patient eating and drinking without issue, will discontinue fluids. Continue supportive care.    Objective:     Vital Signs (Most Recent):  Temp: 98.6 °F (37 °C) (02/04/23 1148)  Pulse: 108 (02/04/23 1148)  Resp: 18 (02/04/23 1148)  BP: 133/65 (02/04/23 1148)  SpO2: 97 % (02/04/23 1148) Vital Signs (24h Range):  Temp:  [97.3 °F (36.3 °C)-98.6 °F (37 °C)] 98.6 °F (37 °C)  Pulse:  [] 108  Resp:  [16-20] 18  SpO2:  [94 %-98 %] 97 %  BP: (112-133)/(57-76) 133/65     Weight: 102.3 kg (225 lb 8.5 oz)  Body mass index is 36.4 kg/m².  Body surface area is 2.18 meters squared.      Intake/Output - Last 3 Shifts         02/02 0700  02/03 0659 02/03 0700  02/04 0659 02/04 0700  02/05 0659    P.O. 705 1709 480    I.V. (mL/kg) 2802.3 (27.5) 4024.4 (39.3) 257.8 (2.5)    Total Intake(mL/kg) 3507.3 (34.5) 5733.4 (56) 737.8 (7.2)    Urine (mL/kg/hr) 3975 (1.6) 3050 (1.2) 700 (0.9)    Stool  0     Total Output 3975 3050 700    Net -467.8 +2683.4 +37.8           Urine Occurrence 1 x      Stool Occurrence  2 x             Physical Exam  Constitutional:       Appearance: She is well-developed.   HENT:      Head: Normocephalic and atraumatic.      Mouth/Throat:      Pharynx: No oropharyngeal exudate.   Eyes:      Conjunctiva/sclera: Conjunctivae normal.      Pupils: Pupils are equal, round, and reactive to light.   Cardiovascular:      Rate and Rhythm: Normal rate and regular rhythm.      Heart sounds: Normal heart sounds. No murmur heard.  Pulmonary:      Effort: Pulmonary effort is normal.      Breath sounds: Normal breath sounds.   Abdominal:      General: Bowel sounds are normal. There is no distension.      Palpations: Abdomen is soft.      Tenderness: There is no abdominal tenderness.   Musculoskeletal:         General: No deformity. Normal range of motion.      Cervical back: Normal range of motion and neck supple.   Skin:      General: Skin is warm and dry.      Findings: No erythema or rash.   Neurological:      Mental Status: She is alert and oriented to person, place, and time.   Psychiatric:         Behavior: Behavior normal.         Thought Content: Thought content normal.         Judgment: Judgment normal.     Significant Labs:   All pertinent labs from the last 24 hours have been reviewed.    Diagnostic Results:  I have reviewed all pertinent imaging results/findings within the past 24 hours.

## 2023-02-04 NOTE — PLAN OF CARE
Day +1 of Génesis Auto HSCT. Patient is progressing and involved with plan of care, communicating needs throughout shift. Up ad ketan.Tolerating diet, voiding without difficulty. No c/o pain. Pt "[employer] in right ear. All VSS; no acute events this shift. Pt. Remaining free from falls or injury throughout shift; bed in lowest position; side rails up X2; call light within reach; pt instructed to call for assistance as needed - verbalized understanding. Q2h rounding on patient. Will continue to monitor.

## 2023-02-04 NOTE — PLAN OF CARE
Pt. Is day +1 of AutoSCT.  Pt. with nonskid footwear on with bed in lowest position and locked with bed rails up x2.  Pt. ambulates independently and instructed to call if assistance is needed.  Pt. with call light within reach.  IVF's D/C'd this AM.  Pt. With no complaints today.  Pt. With a great appetite.  Will continue to monitor pt.

## 2023-02-04 NOTE — PROGRESS NOTES
[facility] - Oncology ([facility])  Hematology  Bone Marrow Transplant  Progress Note    Patient Name: Raquel Houston  Admission Date: 2/1/2023  Hospital Length of Stay: 3 days  Code Status: Full Code    Subjective:     Interval History: Day 1 for a Génesis 200 auto SCT for MM. No acute events overnight. Patient eating and drinking without issue, will discontinue fluids. Continue supportive care.    Objective:     Vital Signs (Most Recent):  Temp: 98.6 °F (37 °C) (02/04/23 1148)  Pulse: 108 (02/04/23 1148)  Resp: 18 (02/04/23 1148)  BP: 133/65 (02/04/23 1148)  SpO2: 97 % (02/04/23 1148) Vital Signs (24h Range):  Temp:  [97.3 °F (36.3 °C)-98.6 °F (37 °C)] 98.6 °F (37 °C)  Pulse:  [] 108  Resp:  [16-20] 18  SpO2:  [94 %-98 %] 97 %  BP: (112-133)/(57-76) 133/65     Weight: 102.3 kg (225 lb 8.5 oz)  Body mass index is 36.4 kg/m².  Body surface area is 2.18 meters squared.      Intake/Output - Last 3 Shifts         02/02 0700  02/03 0659 02/03 0700  02/04 0659 02/04 0700  02/05 0659    P.O. 705 1709 480    I.V. (mL/kg) 2802.3 (27.5) 4024.4 (39.3) 257.8 (2.5)    Total Intake(mL/kg) 3507.3 (34.5) 5733.4 (56) 737.8 (7.2)    Urine (mL/kg/hr) 3975 (1.6) 3050 (1.2) 700 (0.9)    Stool  0     Total Output 3975 3050 700    Net -467.8 +2683.4 +37.8           Urine Occurrence 1 x      Stool Occurrence  2 x             Physical Exam  Constitutional:       Appearance: She is well-developed.   HENT:      Head: Normocephalic and atraumatic.      Mouth/Throat:      Pharynx: No oropharyngeal exudate.   Eyes:      Conjunctiva/sclera: Conjunctivae normal.      Pupils: Pupils are equal, round, and reactive to light.   Cardiovascular:      Rate and Rhythm: Normal rate and regular rhythm.      Heart sounds: Normal heart sounds. No murmur heard.  Pulmonary:      Effort: Pulmonary effort is normal.      Breath sounds: Normal breath sounds.   Abdominal:      General: Bowel sounds are normal. There is no distension.      Palpations: Abdomen  is soft.      Tenderness: There is no abdominal tenderness.   Musculoskeletal:         General: No deformity. Normal range of motion.      Cervical back: Normal range of motion and neck supple.   Skin:     General: Skin is warm and dry.      Findings: No erythema or rash.   Neurological:      Mental Status: She is alert and oriented to person, place, and time.   Psychiatric:         Behavior: Behavior normal.         Thought Content: Thought content normal.         Judgment: Judgment normal.     Significant Labs:   All pertinent labs from the last 24 hours have been reviewed.    Diagnostic Results:  I have reviewed all pertinent imaging results/findings within the past 24 hours.    Assessment/Plan:     * Stem cell transplant candidate  - Approved by the transplant selection committee for a Génesis 200  - Underwent consent signing with Dr. Casas 1/20/2023  - Admitted for Génesis 200 Auto today, day -1. She will receive Melphalan today.  - She will receive 3 bags with a total CD34 dose of 3.13 x10^6/kg on today at 1330.  - Today is day +1                Planned conditioning regimen:  Melphalan on Day -1     Antimicrobial Prophylaxis:  Acyclovir starting on Day -1  Levofloxacin starting on Day -1  Fluconazole starting on Day -1     Growth Factor Support:  Neupogen starting on Day +7      Caregiver: daughter in law and son  Post-transplant discharge plans: [place]          Pain  - C/o pain to neck associated with vas cath  - Presented to ED with c/o pain on 1/28. No evidence of acute process on imaging.  - No edema or sign of infection to site  - Started PRN Tramadol    Hyperkalemia  - K+ 5.6 2/2/23. Gave lokelma x 1 dose. wnl at 5.0 today.  - Daily CMP while inpatient    Pancytopenia due to chemotherapy  - Expect blood counts to drop further with chemo  - Transfuse for hgb < 7 or plts < 10K  - Continue antimicrobial ppx per protocol  - Daily CBC while inpatient    Neutropenia  - Expect pancytopenia post chemo and  transplant  - Daily CBC while inppatient  - Continue ppx antimicrobials per protocol    Thrombocytopenia  - Daily CBC  - Transfuse for platelets <10 or <50k if bleeding/pre-procedure  - Hold Lovenox when platelets <50k    HTN (hypertension)  - Continue home amlodipine     Multiple myeloma in remission  - Complex cytogenetics with gain 1q21, t(4;14), and monosomy 13  - R-ISS stage III (elevated beta-2 microglobulin, elevated LDH, decreased albumin, and high risk cytogenetics)  - S/p 5 Cycles D-Vrd (discontinued jasen after cycle 4)  - Has had significant clinical improvement as well as reduction in IgG and M-spike showing excellent response  - see autologous stem cell transplant        VTE Risk Mitigation (From admission, onward)         Ordered     enoxaparin injection 40 mg  Daily         02/01/23 1442     heparin (porcine) injection 1,600 Units  As needed (PRN)         02/01/23 1528     IP VTE HIGH RISK PATIENT  Once         02/01/23 1442     Place sequential compression device  Until discontinued         02/01/23 1442                Disposition: TBD    Tk Jane MD  Bone Marrow Transplant  [facility] - Oncology ([facility])

## 2023-02-04 NOTE — ASSESSMENT & PLAN NOTE
- Approved by the transplant selection committee for a Génesis 200  - Underwent consent signing with Dr. Casas 1/20/2023  - Admitted for Génesis 200 Auto today, day -1. She will receive Melphalan today.  - She will receive 3 bags with a total CD34 dose of 3.13 x10^6/kg on today at 1330.  - Today is day +1                Planned conditioning regimen:  Melphalan on Day -1     Antimicrobial Prophylaxis:  Acyclovir starting on Day -1  Levofloxacin starting on Day -1  Fluconazole starting on Day -1     Growth Factor Support:  Neupogen starting on Day +7      Caregiver: daughter in law and son  Post-transplant discharge plans: Patricia Alanis

## 2023-02-05 LAB
ABO + RH BLD: NORMAL
ALBUMIN SERPL BCP-MCNC: 3.2 G/DL (ref 3.5–5.2)
ALP SERPL-CCNC: 176 U/L (ref 55–135)
ALT SERPL W/O P-5'-P-CCNC: 25 U/L (ref 10–44)
ANION GAP SERPL CALC-SCNC: 8 MMOL/L (ref 8–16)
ANISOCYTOSIS BLD QL SMEAR: SLIGHT
AST SERPL-CCNC: 18 U/L (ref 10–40)
BASOPHILS NFR BLD: 0 % (ref 0–1.9)
BILIRUB SERPL-MCNC: 0.6 MG/DL (ref 0.1–1)
BLD GP AB SCN CELLS X3 SERPL QL: NORMAL
BUN SERPL-MCNC: 17 MG/DL (ref 6–20)
CALCIUM SERPL-MCNC: 8.2 MG/DL (ref 8.7–10.5)
CHLORIDE SERPL-SCNC: 108 MMOL/L (ref 95–110)
CO2 SERPL-SCNC: 24 MMOL/L (ref 23–29)
CREAT SERPL-MCNC: 0.9 MG/DL (ref 0.5–1.4)
DIFFERENTIAL METHOD: ABNORMAL
EOSINOPHIL NFR BLD: 0 % (ref 0–8)
ERYTHROCYTE [DISTWIDTH] IN BLOOD BY AUTOMATED COUNT: 15.7 % (ref 11.5–14.5)
EST. GFR  (NO RACE VARIABLE): >60 ML/MIN/1.73 M^2
GLUCOSE SERPL-MCNC: 117 MG/DL (ref 70–110)
HCT VFR BLD AUTO: 29.9 % (ref 37–48.5)
HGB BLD-MCNC: 9.7 G/DL (ref 12–16)
IMM GRANULOCYTES # BLD AUTO: ABNORMAL K/UL (ref 0–0.04)
IMM GRANULOCYTES NFR BLD AUTO: ABNORMAL % (ref 0–0.5)
LYMPHOCYTES NFR BLD: 18 % (ref 18–48)
MAGNESIUM SERPL-MCNC: 2.1 MG/DL (ref 1.6–2.6)
MCH RBC QN AUTO: 28.2 PG (ref 27–31)
MCHC RBC AUTO-ENTMCNC: 32.4 G/DL (ref 32–36)
MCV RBC AUTO: 87 FL (ref 82–98)
MONOCYTES NFR BLD: 2 % (ref 4–15)
NEUTROPHILS NFR BLD: 80 % (ref 38–73)
NRBC BLD-RTO: 0 /100 WBC
OVALOCYTES BLD QL SMEAR: ABNORMAL
PHOSPHATE SERPL-MCNC: 3.9 MG/DL (ref 2.7–4.5)
PLATELET # BLD AUTO: 102 K/UL (ref 150–450)
PMV BLD AUTO: 10.9 FL (ref 9.2–12.9)
POIKILOCYTOSIS BLD QL SMEAR: SLIGHT
POLYCHROMASIA BLD QL SMEAR: ABNORMAL
POTASSIUM SERPL-SCNC: 4.3 MMOL/L (ref 3.5–5.1)
PROT SERPL-MCNC: 5.3 G/DL (ref 6–8.4)
RBC # BLD AUTO: 3.44 M/UL (ref 4–5.4)
SODIUM SERPL-SCNC: 140 MMOL/L (ref 136–145)
WBC # BLD AUTO: 0.51 K/UL (ref 3.9–12.7)

## 2023-02-05 PROCEDURE — 80053 COMPREHEN METABOLIC PANEL: CPT | Performed by: NURSE PRACTITIONER

## 2023-02-05 PROCEDURE — 99233 SBSQ HOSP IP/OBS HIGH 50: CPT | Mod: ,,, | Performed by: INTERNAL MEDICINE

## 2023-02-05 PROCEDURE — 84100 ASSAY OF PHOSPHORUS: CPT | Performed by: NURSE PRACTITIONER

## 2023-02-05 PROCEDURE — 99233 PR SUBSEQUENT HOSPITAL CARE,LEVL III: ICD-10-PCS | Mod: ,,, | Performed by: INTERNAL MEDICINE

## 2023-02-05 PROCEDURE — 25000003 PHARM REV CODE 250: Performed by: INTERNAL MEDICINE

## 2023-02-05 PROCEDURE — 85027 COMPLETE CBC AUTOMATED: CPT | Performed by: NURSE PRACTITIONER

## 2023-02-05 PROCEDURE — 86900 BLOOD TYPING SEROLOGIC ABO: CPT | Performed by: NURSE PRACTITIONER

## 2023-02-05 PROCEDURE — 20600001 HC STEP DOWN PRIVATE ROOM

## 2023-02-05 PROCEDURE — 83735 ASSAY OF MAGNESIUM: CPT | Performed by: NURSE PRACTITIONER

## 2023-02-05 PROCEDURE — 85007 BL SMEAR W/DIFF WBC COUNT: CPT | Performed by: NURSE PRACTITIONER

## 2023-02-05 PROCEDURE — 63600175 PHARM REV CODE 636 W HCPCS: Performed by: NURSE PRACTITIONER

## 2023-02-05 PROCEDURE — 63600175 PHARM REV CODE 636 W HCPCS: Performed by: INTERNAL MEDICINE

## 2023-02-05 PROCEDURE — 25000003 PHARM REV CODE 250: Performed by: NURSE PRACTITIONER

## 2023-02-05 RX ADMIN — FLUCONAZOLE 400 MG: 200 TABLET ORAL at 09:02

## 2023-02-05 RX ADMIN — ACYCLOVIR 800 MG: 200 CAPSULE ORAL at 09:02

## 2023-02-05 RX ADMIN — ACYCLOVIR 800 MG: 200 CAPSULE ORAL at 08:02

## 2023-02-05 RX ADMIN — ENOXAPARIN SODIUM 40 MG: 40 INJECTION SUBCUTANEOUS at 04:02

## 2023-02-05 RX ADMIN — LEVOFLOXACIN 500 MG: 500 TABLET, FILM COATED ORAL at 09:02

## 2023-02-05 RX ADMIN — Medication 1 DOSE: at 04:02

## 2023-02-05 RX ADMIN — Medication 1 DOSE: at 12:02

## 2023-02-05 RX ADMIN — AMLODIPINE BESYLATE 10 MG: 10 TABLET ORAL at 09:02

## 2023-02-05 RX ADMIN — PANTOPRAZOLE SODIUM 40 MG: 40 TABLET, DELAYED RELEASE ORAL at 09:02

## 2023-02-05 RX ADMIN — Medication 1 DOSE: at 09:02

## 2023-02-05 RX ADMIN — HEPARIN SODIUM 1600 UNITS: 1000 INJECTION, SOLUTION INTRAVENOUS; SUBCUTANEOUS at 03:02

## 2023-02-05 NOTE — ASSESSMENT & PLAN NOTE
- C/o pain to neck associated with vas cath  - Presented to ED with c/o pain on 1/28. No evidence of acute process on imaging.  - No edema or sign of infection to site  - PRN Tramadol

## 2023-02-05 NOTE — PROGRESS NOTES
[facility] - Oncology ([facility])  Hematology  Bone Marrow Transplant  Progress Note    Patient Name: Raquel Houston  Admission Date: 2/1/2023  Hospital Length of Stay: 4 days  Code Status: Full Code    Subjective:     Interval History: Day 2 for a Génesis 200 auto SCT for MM. No acute events overnight. Continue supportive care    Objective:     Vital Signs (Most Recent):  Temp: 97.8 °F (36.6 °C) (02/05/23 0804)  Pulse: 100 (02/05/23 0804)  Resp: 18 (02/05/23 0804)  BP: 113/62 (02/05/23 0804)  SpO2: 96 % (02/05/23 0804) Vital Signs (24h Range):  Temp:  [97.5 °F (36.4 °C)-99 °F (37.2 °C)] 97.8 °F (36.6 °C)  Pulse:  [100-111] 100  Resp:  [17-18] 18  SpO2:  [96 %-99 %] 96 %  BP: (111-133)/(62-72) 113/62     Weight: 102.1 kg (225 lb 1.4 oz)  Body mass index is 36.33 kg/m².  Body surface area is 2.18 meters squared.    Intake/Output - Last 3 Shifts         02/03 0700  02/04 0659 02/04 0700  02/05 0659 02/05 0700  02/06 0659    P.O. 1709 1440     I.V. (mL/kg) 4024.4 (39.3) 257.8 (2.5)     Total Intake(mL/kg) 5733.4 (56) 1697.8 (16.6)     Urine (mL/kg/hr) 3050 (1.2) 2900 (1.2) 1000 (2.2)    Stool 0 0     Total Output 3050 2900 1000    Net +2683.4 -1202.2 -1000           Stool Occurrence 2 x 2 x             Physical Exam  Vitals reviewed.   Constitutional:       General: She is not in acute distress.     Appearance: She is well-developed.   HENT:      Mouth/Throat:      Pharynx: No oropharyngeal exudate or posterior oropharyngeal erythema.   Eyes:      General: No scleral icterus.     Conjunctiva/sclera: Conjunctivae normal.      Pupils: Pupils are equal, round, and reactive to light.   Neck:      Thyroid: No thyromegaly.   Cardiovascular:      Rate and Rhythm: Normal rate and regular rhythm.      Heart sounds: Normal heart sounds.   Pulmonary:      Effort: Pulmonary effort is normal. No respiratory distress.      Breath sounds: Normal breath sounds.   Abdominal:      General: Bowel sounds are normal. There is no  distension.      Palpations: Abdomen is soft. There is no hepatomegaly or splenomegaly.      Tenderness: There is no abdominal tenderness.   Musculoskeletal:         General: No tenderness. Normal range of motion.      Cervical back: Normal range of motion and neck supple.   Lymphadenopathy:      Cervical: No cervical adenopathy.   Skin:     Coloration: Skin is not pale.      Findings: No rash.      Nails: There is no clubbing.      Comments: Vascath intact with no redness or drainage   Neurological:      Mental Status: She is alert and oriented to person, place, and time.      Cranial Nerves: No cranial nerve deficit.      Coordination: Coordination normal.   Psychiatric:         Behavior: Behavior normal.         Thought Content: Thought content normal.       Significant Labs:   All pertinent labs from the last 24 hours have been reviewed.    Diagnostic Results:  I have reviewed all pertinent imaging results/findings within the past 24 hours.    Assessment/Plan:     * Stem cell transplant candidate  - Approved by the transplant selection committee for a Génesis 200  - Underwent consent signing with Dr. Casas 1/20/2023  - Admitted for Génesis 200 Auto today, day -1. She will receive Melphalan today.  - She will receive 3 bags with a total CD34 dose of 3.13 x10^6/kg on today at 1330.  - Today is day +2                Planned conditioning regimen:  Melphalan on Day -1     Antimicrobial Prophylaxis:  Acyclovir starting on Day -1  Levofloxacin starting on Day -1  Fluconazole starting on Day -1     Growth Factor Support:  Neupogen starting on Day +7      Caregiver: daughter in law and son  Post-transplant discharge plans: [place]          Pain  - C/o pain to neck associated with vas cath  - Presented to ED with c/o pain on 1/28. No evidence of acute process on imaging.  - No edema or sign of infection to site  - PRN Tramadol    Hyperkalemia  - K+ 5.6 2/2/23. Gave lokelma x 1 dose.   - Daily CMP while  inpatient    Pancytopenia due to chemotherapy  - Expect blood counts to drop further with chemo  - Transfuse for hgb < 7 or plts < 10K  - Continue antimicrobial ppx per protocol  - Daily CBC while inpatient    Neutropenia  - Expect pancytopenia post chemo and transplant  - Daily CBC while inppatient  - Continue ppx antimicrobials per protocol    Thrombocytopenia  - Daily CBC  - Transfuse for platelets <10 or <50k if bleeding/pre-procedure  - Hold Lovenox when platelets <50k    HTN (hypertension)  - Continue home amlodipine     Multiple myeloma in remission  - Complex cytogenetics with gain 1q21, t(4;14), and monosomy 13  - R-ISS stage III (elevated beta-2 microglobulin, elevated LDH, decreased albumin, and high risk cytogenetics)  - S/p 5 Cycles D-Vrd (discontinued jasen after cycle 4)  - Has had significant clinical improvement as well as reduction in IgG and M-spike showing excellent response  - see autologous stem cell transplant        VTE Risk Mitigation (From admission, onward)         Ordered     enoxaparin injection 40 mg  Daily         02/01/23 1442     heparin (porcine) injection 1,600 Units  As needed (PRN)         02/01/23 1528     IP VTE HIGH RISK PATIENT  Once         02/01/23 1442     Place sequential compression device  Until discontinued         02/01/23 1442                Disposition: MUMTAZ Jane MD  Bone Marrow Transplant  [facility] - Oncology ([facility])

## 2023-02-05 NOTE — ASSESSMENT & PLAN NOTE
- Approved by the transplant selection committee for a Génesis 200  - Underwent consent signing with Dr. Casas 1/20/2023  - Admitted for Génesis 200 Auto today, day -1. She will receive Melphalan today.  - She will receive 3 bags with a total CD34 dose of 3.13 x10^6/kg on today at 1330.  - Today is day +2                Planned conditioning regimen:  Melphalan on Day -1     Antimicrobial Prophylaxis:  Acyclovir starting on Day -1  Levofloxacin starting on Day -1  Fluconazole starting on Day -1     Growth Factor Support:  Neupogen starting on Day +7      Caregiver: daughter in law and son  Post-transplant discharge plans: Patricia Alanis

## 2023-02-05 NOTE — SUBJECTIVE & OBJECTIVE
Subjective:     Interval History: Day 2 for a Génesis 200 auto SCT for MM. No acute events overnight. Continue supportive care    Objective:     Vital Signs (Most Recent):  Temp: 97.8 °F (36.6 °C) (02/05/23 0804)  Pulse: 100 (02/05/23 0804)  Resp: 18 (02/05/23 0804)  BP: 113/62 (02/05/23 0804)  SpO2: 96 % (02/05/23 0804) Vital Signs (24h Range):  Temp:  [97.5 °F (36.4 °C)-99 °F (37.2 °C)] 97.8 °F (36.6 °C)  Pulse:  [100-111] 100  Resp:  [17-18] 18  SpO2:  [96 %-99 %] 96 %  BP: (111-133)/(62-72) 113/62     Weight: 102.1 kg (225 lb 1.4 oz)  Body mass index is 36.33 kg/m².  Body surface area is 2.18 meters squared.    Intake/Output - Last 3 Shifts         02/03 0700  02/04 0659 02/04 0700  02/05 0659 02/05 0700  02/06 0659    P.O. 1709 1440     I.V. (mL/kg) 4024.4 (39.3) 257.8 (2.5)     Total Intake(mL/kg) 5733.4 (56) 1697.8 (16.6)     Urine (mL/kg/hr) 3050 (1.2) 2900 (1.2) 1000 (2.2)    Stool 0 0     Total Output 3050 2900 1000    Net +2683.4 -1202.2 -1000           Stool Occurrence 2 x 2 x             Physical Exam  Vitals reviewed.   Constitutional:       General: She is not in acute distress.     Appearance: She is well-developed.   HENT:      Mouth/Throat:      Pharynx: No oropharyngeal exudate or posterior oropharyngeal erythema.   Eyes:      General: No scleral icterus.     Conjunctiva/sclera: Conjunctivae normal.      Pupils: Pupils are equal, round, and reactive to light.   Neck:      Thyroid: No thyromegaly.   Cardiovascular:      Rate and Rhythm: Normal rate and regular rhythm.      Heart sounds: Normal heart sounds.   Pulmonary:      Effort: Pulmonary effort is normal. No respiratory distress.      Breath sounds: Normal breath sounds.   Abdominal:      General: Bowel sounds are normal. There is no distension.      Palpations: Abdomen is soft. There is no hepatomegaly or splenomegaly.      Tenderness: There is no abdominal tenderness.   Musculoskeletal:         General: No tenderness. Normal range of motion.       Cervical back: Normal range of motion and neck supple.   Lymphadenopathy:      Cervical: No cervical adenopathy.   Skin:     Coloration: Skin is not pale.      Findings: No rash.      Nails: There is no clubbing.      Comments: Vascath intact with no redness or drainage   Neurological:      Mental Status: She is alert and oriented to person, place, and time.      Cranial Nerves: No cranial nerve deficit.      Coordination: Coordination normal.   Psychiatric:         Behavior: Behavior normal.         Thought Content: Thought content normal.       Significant Labs:   All pertinent labs from the last 24 hours have been reviewed.    Diagnostic Results:  I have reviewed all pertinent imaging results/findings within the past 24 hours.

## 2023-02-06 PROBLEM — Z94.84 HISTORY OF AUTOLOGOUS STEM CELL TRANSPLANT: Status: ACTIVE | Noted: 2023-02-01

## 2023-02-06 PROBLEM — R52 PAIN: Status: RESOLVED | Noted: 2023-02-02 | Resolved: 2023-02-06

## 2023-02-06 PROBLEM — D69.6 THROMBOCYTOPENIA: Status: RESOLVED | Noted: 2023-02-01 | Resolved: 2023-02-06

## 2023-02-06 PROBLEM — E87.5 HYPERKALEMIA: Status: RESOLVED | Noted: 2023-02-02 | Resolved: 2023-02-06

## 2023-02-06 PROBLEM — D70.9 NEUTROPENIA: Status: RESOLVED | Noted: 2023-02-01 | Resolved: 2023-02-06

## 2023-02-06 LAB
ALBUMIN SERPL BCP-MCNC: 3 G/DL (ref 3.5–5.2)
ALP SERPL-CCNC: 149 U/L (ref 55–135)
ALT SERPL W/O P-5'-P-CCNC: 21 U/L (ref 10–44)
ANION GAP SERPL CALC-SCNC: 8 MMOL/L (ref 8–16)
AST SERPL-CCNC: 16 U/L (ref 10–40)
BASOPHILS # BLD AUTO: 0 K/UL (ref 0–0.2)
BASOPHILS NFR BLD: 0 % (ref 0–1.9)
BILIRUB SERPL-MCNC: 0.7 MG/DL (ref 0.1–1)
BUN SERPL-MCNC: 15 MG/DL (ref 6–20)
CALCIUM SERPL-MCNC: 8.1 MG/DL (ref 8.7–10.5)
CHLORIDE SERPL-SCNC: 103 MMOL/L (ref 95–110)
CO2 SERPL-SCNC: 24 MMOL/L (ref 23–29)
CREAT SERPL-MCNC: 0.8 MG/DL (ref 0.5–1.4)
DIFFERENTIAL METHOD: ABNORMAL
EOSINOPHIL # BLD AUTO: 0 K/UL (ref 0–0.5)
EOSINOPHIL NFR BLD: 0 % (ref 0–8)
ERYTHROCYTE [DISTWIDTH] IN BLOOD BY AUTOMATED COUNT: 15.1 % (ref 11.5–14.5)
EST. GFR  (NO RACE VARIABLE): >60 ML/MIN/1.73 M^2
GLUCOSE SERPL-MCNC: 108 MG/DL (ref 70–110)
HCT VFR BLD AUTO: 29.2 % (ref 37–48.5)
HGB BLD-MCNC: 9.4 G/DL (ref 12–16)
IMM GRANULOCYTES # BLD AUTO: 0 K/UL (ref 0–0.04)
IMM GRANULOCYTES NFR BLD AUTO: 0 % (ref 0–0.5)
LYMPHOCYTES # BLD AUTO: 0.1 K/UL (ref 1–4.8)
LYMPHOCYTES NFR BLD: 13 % (ref 18–48)
MAGNESIUM SERPL-MCNC: 2 MG/DL (ref 1.6–2.6)
MCH RBC QN AUTO: 27.9 PG (ref 27–31)
MCHC RBC AUTO-ENTMCNC: 32.2 G/DL (ref 32–36)
MCV RBC AUTO: 87 FL (ref 82–98)
MONOCYTES # BLD AUTO: 0 K/UL (ref 0.3–1)
MONOCYTES NFR BLD: 2.2 % (ref 4–15)
NEUTROPHILS # BLD AUTO: 0.4 K/UL (ref 1.8–7.7)
NEUTROPHILS NFR BLD: 84.8 % (ref 38–73)
NRBC BLD-RTO: 0 /100 WBC
PHOSPHATE SERPL-MCNC: 3.7 MG/DL (ref 2.7–4.5)
PLATELET # BLD AUTO: 113 K/UL (ref 150–450)
PLATELET BLD QL SMEAR: ABNORMAL
PMV BLD AUTO: 10.4 FL (ref 9.2–12.9)
POTASSIUM SERPL-SCNC: 4.1 MMOL/L (ref 3.5–5.1)
PROT SERPL-MCNC: 5.2 G/DL (ref 6–8.4)
RBC # BLD AUTO: 3.37 M/UL (ref 4–5.4)
SODIUM SERPL-SCNC: 135 MMOL/L (ref 136–145)
WBC # BLD AUTO: 0.46 K/UL (ref 3.9–12.7)

## 2023-02-06 PROCEDURE — 63600175 PHARM REV CODE 636 W HCPCS: Performed by: NURSE PRACTITIONER

## 2023-02-06 PROCEDURE — 94761 N-INVAS EAR/PLS OXIMETRY MLT: CPT

## 2023-02-06 PROCEDURE — 20600001 HC STEP DOWN PRIVATE ROOM

## 2023-02-06 PROCEDURE — 99900035 HC TECH TIME PER 15 MIN (STAT)

## 2023-02-06 PROCEDURE — 84100 ASSAY OF PHOSPHORUS: CPT | Performed by: NURSE PRACTITIONER

## 2023-02-06 PROCEDURE — 99900031 HC PATIENT EDUCATION (STAT)

## 2023-02-06 PROCEDURE — 25000003 PHARM REV CODE 250: Performed by: NURSE PRACTITIONER

## 2023-02-06 PROCEDURE — 99233 SBSQ HOSP IP/OBS HIGH 50: CPT | Mod: FS,,, | Performed by: INTERNAL MEDICINE

## 2023-02-06 PROCEDURE — 80053 COMPREHEN METABOLIC PANEL: CPT | Performed by: NURSE PRACTITIONER

## 2023-02-06 PROCEDURE — 94799 UNLISTED PULMONARY SVC/PX: CPT

## 2023-02-06 PROCEDURE — 99233 PR SUBSEQUENT HOSPITAL CARE,LEVL III: ICD-10-PCS | Mod: FS,,, | Performed by: INTERNAL MEDICINE

## 2023-02-06 PROCEDURE — 83735 ASSAY OF MAGNESIUM: CPT | Performed by: NURSE PRACTITIONER

## 2023-02-06 PROCEDURE — 25000003 PHARM REV CODE 250: Performed by: INTERNAL MEDICINE

## 2023-02-06 PROCEDURE — 85025 COMPLETE CBC W/AUTO DIFF WBC: CPT | Performed by: NURSE PRACTITIONER

## 2023-02-06 RX ADMIN — Medication 1 DOSE: at 01:02

## 2023-02-06 RX ADMIN — AMLODIPINE BESYLATE 10 MG: 10 TABLET ORAL at 09:02

## 2023-02-06 RX ADMIN — FLUCONAZOLE 400 MG: 200 TABLET ORAL at 09:02

## 2023-02-06 RX ADMIN — Medication 1 DOSE: at 09:02

## 2023-02-06 RX ADMIN — LEVOFLOXACIN 500 MG: 500 TABLET, FILM COATED ORAL at 09:02

## 2023-02-06 RX ADMIN — ACYCLOVIR 800 MG: 200 CAPSULE ORAL at 08:02

## 2023-02-06 RX ADMIN — ENOXAPARIN SODIUM 40 MG: 40 INJECTION SUBCUTANEOUS at 04:02

## 2023-02-06 RX ADMIN — Medication 1 DOSE: at 04:02

## 2023-02-06 RX ADMIN — PANTOPRAZOLE SODIUM 40 MG: 40 TABLET, DELAYED RELEASE ORAL at 09:02

## 2023-02-06 RX ADMIN — ACYCLOVIR 800 MG: 200 CAPSULE ORAL at 09:02

## 2023-02-06 NOTE — ASSESSMENT & PLAN NOTE
- Expect blood counts to drop further with chemo  - Transfuse for hgb < 7 or plts < 10K  - Continue antimicrobial ppx per protocol  - Daily CBC while inpatient  - stop VTE ppx when platelets <50K

## 2023-02-06 NOTE — PLAN OF CARE
Plan of care reviewed with patient . Day 3 of autostem cell  transplant.  Fall precautions maintained, side rails up x2, call light in reach, bed in low position and locked, nonskid socks on.  Encouraged to eat and drink.  Voiding without difficulty.  No complaints of pain voiced.

## 2023-02-06 NOTE — ASSESSMENT & PLAN NOTE
- Approved by the transplant selection committee for a Génesis 200  - Underwent consent signing with Dr. Casas 1/20/2023  - Admitted for Génesis 200 Auto on 2/1/22  - Tolerated chemotherapy on 2/2 with no issues  - Received 3 bags with a total CD34 dose of 3.13 x10^6/kg on 2/3 at 1330 without incident  - Today is day +3                Planned conditioning regimen:  Melphalan on Day -1     Antimicrobial Prophylaxis:  Acyclovir starting on Day -1  Levofloxacin starting on Day -1  Fluconazole starting on Day -1     Growth Factor Support:  Neupogen starting on Day +7      Caregiver: daughter in law and son  Post-transplant discharge plans: Patricia Alanis

## 2023-02-06 NOTE — PLAN OF CARE
POC reviewed with patient at the start of shift. Pt continues to have a decreased sense of taste. Pt taking in fluids without nausea. Pt swallowing without difficulty. Pt voiding spontaneously. Fall precautions in place. Pt ambulating in room without assistance. VSS and afebrile. Labs monitored. Will continue to monitor pt.

## 2023-02-06 NOTE — PROGRESS NOTES
Titus Baig - Oncology ([facility])  Hematology  Bone Marrow Transplant  Progress Note    Patient Name: Raquel Houston  Admission Date: 2/1/2023  Hospital Length of Stay: 5 days  Code Status: Full Code    Subjective:     Interval History: Day +3 s/p Génesis 200 Auto SCT for MM. Patient feeling well overall. Reports change in taste which is affecting appetite. Denies any pain, n/v/d/c. Afebrile, VSS    Objective:     Vital Signs (Most Recent):  Temp: 99 °F (37.2 °C) (02/06/23 0900)  Pulse: 105 (02/06/23 0900)  Resp: 18 (02/06/23 0900)  BP: 117/73 (02/06/23 0900)  SpO2: (!) 93 % (02/06/23 0900)   Vital Signs (24h Range):  Temp:  [98.2 °F (36.8 °C)-99.7 °F (37.6 °C)] 99 °F (37.2 °C)  Pulse:  [] 105  Resp:  [15-18] 18  SpO2:  [93 %-97 %] 93 %  BP: (109-127)/(65-76) 117/73     Weight: 101.5 kg (223 lb 12.3 oz)  Body mass index is 36.12 kg/m².  Body surface area is 2.17 meters squared.    Intake/Output - Last 3 Shifts         02/04 0700  02/05 0659 02/05 0700  02/06 0659 02/06 0700  02/07 0659    P.O. 1440 1400 360    I.V. (mL/kg) 257.8 (2.5)      Total Intake(mL/kg) 1697.8 (16.6) 1400 (13.8) 360 (3.5)    Urine (mL/kg/hr) 2900 (1.2) 3350 (1.4) 500 (1.6)    Stool 0 0     Total Output 2900 3350 500    Net -1202.2 -1950 -140           Stool Occurrence 2 x 0 x             Physical Exam  Vitals and nursing note reviewed.   Constitutional:       General: She is not in acute distress.     Appearance: Normal appearance. She is well-developed. She is obese.   HENT:      Head: Normocephalic.      Mouth/Throat:      Pharynx: No oropharyngeal exudate or posterior oropharyngeal erythema.   Eyes:      General: No scleral icterus.     Extraocular Movements: Extraocular movements intact.      Conjunctiva/sclera: Conjunctivae normal.   Neck:      Thyroid: No thyromegaly.   Cardiovascular:      Rate and Rhythm: Normal rate and regular rhythm.      Pulses: Normal pulses.      Heart sounds: Normal heart sounds.   Pulmonary:      Effort:  Pulmonary effort is normal. No respiratory distress.      Breath sounds: Normal breath sounds.   Abdominal:      General: Bowel sounds are normal. There is no distension.      Palpations: Abdomen is soft. There is no hepatomegaly or splenomegaly.      Tenderness: There is no abdominal tenderness.   Musculoskeletal:         General: No tenderness. Normal range of motion.      Cervical back: Normal range of motion and neck supple.      Right lower leg: No edema.      Left lower leg: No edema.   Lymphadenopathy:      Cervical: No cervical adenopathy.   Skin:     General: Skin is warm and dry.      Coloration: Skin is not pale.      Findings: No bruising or rash.      Nails: There is no clubbing.      Comments: Vascath intact with no redness or drainage   Neurological:      General: No focal deficit present.      Mental Status: She is alert and oriented to person, place, and time.      Cranial Nerves: No cranial nerve deficit.      Coordination: Coordination normal.   Psychiatric:         Mood and Affect: Mood normal.         Behavior: Behavior normal.         Thought Content: Thought content normal.         Judgment: Judgment normal.       Significant Labs:   CBC:   Recent Labs   Lab 02/05/23  0314 02/06/23  0357   WBC 0.51* 0.46*   HGB 9.7* 9.4*   HCT 29.9* 29.2*   * 113*    and CMP:   Recent Labs   Lab 02/05/23  0314 02/06/23  0357    135*   K 4.3 4.1    103   CO2 24 24   * 108   BUN 17 15   CREATININE 0.9 0.8   CALCIUM 8.2* 8.1*   PROT 5.3* 5.2*   ALBUMIN 3.2* 3.0*   BILITOT 0.6 0.7   ALKPHOS 176* 149*   AST 18 16   ALT 25 21   ANIONGAP 8 8       Diagnostic Results:  None    Assessment/Plan:     * History of autologous stem cell transplant  - Approved by the transplant selection committee for a Génesis 200  - Underwent consent signing with Dr. Casas 1/20/2023  - Admitted for Génesis 200 Auto on 2/1/22  - Tolerated chemotherapy on 2/2 with no issues  - Received 3 bags with a total CD34 dose of 3.13  x10^6/kg on 2/3 at 1330 without incident  - Today is day +3                Planned conditioning regimen:  Melphalan on Day -1     Antimicrobial Prophylaxis:  Acyclovir starting on Day -1  Levofloxacin starting on Day -1  Fluconazole starting on Day -1     Growth Factor Support:  Neupogen starting on Day +7      Caregiver: daughter in law and son  Post-transplant discharge plans: [facility]ricia Alanis    Multiple myeloma in remission  - Complex cytogenetics with gain 1q21, t(4;14), and monosomy 13  - R-ISS stage III (elevated beta-2 microglobulin, elevated LDH, decreased albumin, and high risk cytogenetics)  - S/p 5 Cycles D-Vrd (discontinued jasen after cycle 4)  - Has had significant clinical improvement as well as reduction in IgG and M-spike showing excellent response  - see autologous stem cell transplant     Pancytopenia due to chemotherapy  - Expect blood counts to drop further with chemo  - Transfuse for hgb < 7 or plts < 10K  - Continue antimicrobial ppx per protocol  - Daily CBC while inpatient  - stop VTE ppx when platelets <50K    HTN (hypertension)  - Continue home amlodipine         VTE Risk Mitigation (From admission, onward)         Ordered     enoxaparin injection 40 mg  Daily         02/01/23 1442     heparin (porcine) injection 1,600 Units  As needed (PRN)         02/01/23 1528     IP VTE HIGH RISK PATIENT  Once         02/01/23 1442     Place sequential compression device  Until discontinued         02/01/23 1442                Disposition: Remains inpatient    Kiki Matos NP  Bone Marrow Transplant  [facility] Baig - Oncology ([facility])

## 2023-02-06 NOTE — SUBJECTIVE & OBJECTIVE
Subjective:     Interval History: Day +3 s/p Génesis 200 Auto SCT for MM. Patient feeling well overall. Reports change in taste which is affecting appetite. Denies any pain, n/v/d/c. Afebrile, VSS    Objective:     Vital Signs (Most Recent):  Temp: 99 °F (37.2 °C) (02/06/23 0900)  Pulse: 105 (02/06/23 0900)  Resp: 18 (02/06/23 0900)  BP: 117/73 (02/06/23 0900)  SpO2: (!) 93 % (02/06/23 0900)   Vital Signs (24h Range):  Temp:  [98.2 °F (36.8 °C)-99.7 °F (37.6 °C)] 99 °F (37.2 °C)  Pulse:  [] 105  Resp:  [15-18] 18  SpO2:  [93 %-97 %] 93 %  BP: (109-127)/(65-76) 117/73     Weight: 101.5 kg (223 lb 12.3 oz)  Body mass index is 36.12 kg/m².  Body surface area is 2.17 meters squared.    Intake/Output - Last 3 Shifts         02/04 0700  02/05 0659 02/05 0700  02/06 0659 02/06 0700  02/07 0659    P.O. 1440 1400 360    I.V. (mL/kg) 257.8 (2.5)      Total Intake(mL/kg) 1697.8 (16.6) 1400 (13.8) 360 (3.5)    Urine (mL/kg/hr) 2900 (1.2) 3350 (1.4) 500 (1.6)    Stool 0 0     Total Output 2900 3350 500    Net -1202.2 -1950 -140           Stool Occurrence 2 x 0 x             Physical Exam  Vitals and nursing note reviewed.   Constitutional:       General: She is not in acute distress.     Appearance: Normal appearance. She is well-developed. She is obese.   HENT:      Head: Normocephalic.      Mouth/Throat:      Pharynx: No oropharyngeal exudate or posterior oropharyngeal erythema.   Eyes:      General: No scleral icterus.     Extraocular Movements: Extraocular movements intact.      Conjunctiva/sclera: Conjunctivae normal.   Neck:      Thyroid: No thyromegaly.   Cardiovascular:      Rate and Rhythm: Normal rate and regular rhythm.      Pulses: Normal pulses.      Heart sounds: Normal heart sounds.   Pulmonary:      Effort: Pulmonary effort is normal. No respiratory distress.      Breath sounds: Normal breath sounds.   Abdominal:      General: Bowel sounds are normal. There is no distension.      Palpations: Abdomen is soft.  There is no hepatomegaly or splenomegaly.      Tenderness: There is no abdominal tenderness.   Musculoskeletal:         General: No tenderness. Normal range of motion.      Cervical back: Normal range of motion and neck supple.      Right lower leg: No edema.      Left lower leg: No edema.   Lymphadenopathy:      Cervical: No cervical adenopathy.   Skin:     General: Skin is warm and dry.      Coloration: Skin is not pale.      Findings: No bruising or rash.      Nails: There is no clubbing.      Comments: Vascath intact with no redness or drainage   Neurological:      General: No focal deficit present.      Mental Status: She is alert and oriented to person, place, and time.      Cranial Nerves: No cranial nerve deficit.      Coordination: Coordination normal.   Psychiatric:         Mood and Affect: Mood normal.         Behavior: Behavior normal.         Thought Content: Thought content normal.         Judgment: Judgment normal.       Significant Labs:   CBC:   Recent Labs   Lab 02/05/23 0314 02/06/23  0357   WBC 0.51* 0.46*   HGB 9.7* 9.4*   HCT 29.9* 29.2*   * 113*    and CMP:   Recent Labs   Lab 02/05/23 0314 02/06/23  0357    135*   K 4.3 4.1    103   CO2 24 24   * 108   BUN 17 15   CREATININE 0.9 0.8   CALCIUM 8.2* 8.1*   PROT 5.3* 5.2*   ALBUMIN 3.2* 3.0*   BILITOT 0.6 0.7   ALKPHOS 176* 149*   AST 18 16   ALT 25 21   ANIONGAP 8 8       Diagnostic Results:  None

## 2023-02-06 NOTE — PLAN OF CARE
Pt. Is day +2 of AutoSCT.  Pt. with nonskid footwear on with bed in lowest position and locked with bed rails up x2.  Pt. ambulates independently and instructed to call if assistance is needed.  Pt. with call light within reach.  Pt. With no complaints today.  Pt. With a great appetite.  Will continue to monitor pt.

## 2023-02-07 LAB
ALBUMIN SERPL BCP-MCNC: 3 G/DL (ref 3.5–5.2)
ALP SERPL-CCNC: 129 U/L (ref 55–135)
ALT SERPL W/O P-5'-P-CCNC: 19 U/L (ref 10–44)
ANION GAP SERPL CALC-SCNC: 9 MMOL/L (ref 8–16)
ANISOCYTOSIS BLD QL SMEAR: SLIGHT
AST SERPL-CCNC: 15 U/L (ref 10–40)
BASOPHILS # BLD AUTO: 0 K/UL (ref 0–0.2)
BASOPHILS NFR BLD: 0 % (ref 0–1.9)
BILIRUB SERPL-MCNC: 0.5 MG/DL (ref 0.1–1)
BUN SERPL-MCNC: 14 MG/DL (ref 6–20)
C DIFF GDH STL QL: NEGATIVE
C DIFF TOX A+B STL QL IA: NEGATIVE
CALCIUM SERPL-MCNC: 8.1 MG/DL (ref 8.7–10.5)
CHLORIDE SERPL-SCNC: 105 MMOL/L (ref 95–110)
CO2 SERPL-SCNC: 22 MMOL/L (ref 23–29)
CREAT SERPL-MCNC: 0.7 MG/DL (ref 0.5–1.4)
DIFFERENTIAL METHOD: ABNORMAL
EOSINOPHIL # BLD AUTO: 0 K/UL (ref 0–0.5)
EOSINOPHIL NFR BLD: 1.4 % (ref 0–8)
ERYTHROCYTE [DISTWIDTH] IN BLOOD BY AUTOMATED COUNT: 15.1 % (ref 11.5–14.5)
EST. GFR  (NO RACE VARIABLE): >60 ML/MIN/1.73 M^2
GLUCOSE SERPL-MCNC: 96 MG/DL (ref 70–110)
HCT VFR BLD AUTO: 29.7 % (ref 37–48.5)
HGB BLD-MCNC: 9.5 G/DL (ref 12–16)
IMM GRANULOCYTES # BLD AUTO: 0 K/UL (ref 0–0.04)
IMM GRANULOCYTES NFR BLD AUTO: 0 % (ref 0–0.5)
LYMPHOCYTES # BLD AUTO: 0 K/UL (ref 1–4.8)
LYMPHOCYTES NFR BLD: 5.7 % (ref 18–48)
MAGNESIUM SERPL-MCNC: 1.9 MG/DL (ref 1.6–2.6)
MCH RBC QN AUTO: 27.3 PG (ref 27–31)
MCHC RBC AUTO-ENTMCNC: 32 G/DL (ref 32–36)
MCV RBC AUTO: 85 FL (ref 82–98)
MONOCYTES # BLD AUTO: 0 K/UL (ref 0.3–1)
MONOCYTES NFR BLD: 1.4 % (ref 4–15)
NEUTROPHILS # BLD AUTO: 0.6 K/UL (ref 1.8–7.7)
NEUTROPHILS NFR BLD: 91.5 % (ref 38–73)
NRBC BLD-RTO: 0 /100 WBC
OVALOCYTES BLD QL SMEAR: ABNORMAL
PHOSPHATE SERPL-MCNC: 3.4 MG/DL (ref 2.7–4.5)
PLATELET # BLD AUTO: 92 K/UL (ref 150–450)
PLATELET BLD QL SMEAR: ABNORMAL
PMV BLD AUTO: 11.8 FL (ref 9.2–12.9)
POIKILOCYTOSIS BLD QL SMEAR: SLIGHT
POTASSIUM SERPL-SCNC: 4 MMOL/L (ref 3.5–5.1)
PROT SERPL-MCNC: 5.2 G/DL (ref 6–8.4)
RBC # BLD AUTO: 3.48 M/UL (ref 4–5.4)
SODIUM SERPL-SCNC: 136 MMOL/L (ref 136–145)
WBC # BLD AUTO: 0.7 K/UL (ref 3.9–12.7)

## 2023-02-07 PROCEDURE — 96158 PR INTERVENTION, HEALTH BEHAVIOR, INDIV, 1ST 30 MINS: ICD-10-PCS | Mod: AH,HB,, | Performed by: PSYCHOLOGIST

## 2023-02-07 PROCEDURE — 85025 COMPLETE CBC W/AUTO DIFF WBC: CPT | Performed by: NURSE PRACTITIONER

## 2023-02-07 PROCEDURE — 27000207 HC ISOLATION

## 2023-02-07 PROCEDURE — 83735 ASSAY OF MAGNESIUM: CPT | Performed by: NURSE PRACTITIONER

## 2023-02-07 PROCEDURE — 94761 N-INVAS EAR/PLS OXIMETRY MLT: CPT

## 2023-02-07 PROCEDURE — 99233 PR SUBSEQUENT HOSPITAL CARE,LEVL III: ICD-10-PCS | Mod: FS,,, | Performed by: INTERNAL MEDICINE

## 2023-02-07 PROCEDURE — 99233 SBSQ HOSP IP/OBS HIGH 50: CPT | Mod: FS,,, | Performed by: INTERNAL MEDICINE

## 2023-02-07 PROCEDURE — 80053 COMPREHEN METABOLIC PANEL: CPT | Performed by: NURSE PRACTITIONER

## 2023-02-07 PROCEDURE — 63600175 PHARM REV CODE 636 W HCPCS: Performed by: NURSE PRACTITIONER

## 2023-02-07 PROCEDURE — 20600001 HC STEP DOWN PRIVATE ROOM

## 2023-02-07 PROCEDURE — 84100 ASSAY OF PHOSPHORUS: CPT | Performed by: NURSE PRACTITIONER

## 2023-02-07 PROCEDURE — 96158 HLTH BHV IVNTJ INDIV 1ST 30: CPT | Mod: AH,HB,, | Performed by: PSYCHOLOGIST

## 2023-02-07 PROCEDURE — 25000003 PHARM REV CODE 250: Performed by: NURSE PRACTITIONER

## 2023-02-07 PROCEDURE — 25000003 PHARM REV CODE 250: Performed by: INTERNAL MEDICINE

## 2023-02-07 PROCEDURE — 87449 NOS EACH ORGANISM AG IA: CPT | Performed by: NURSE PRACTITIONER

## 2023-02-07 RX ORDER — LOPERAMIDE HYDROCHLORIDE 2 MG/1
2 CAPSULE ORAL EVERY 4 HOURS PRN
Status: DISCONTINUED | OUTPATIENT
Start: 2023-02-08 | End: 2023-02-15 | Stop reason: HOSPADM

## 2023-02-07 RX ADMIN — ENOXAPARIN SODIUM 40 MG: 40 INJECTION SUBCUTANEOUS at 04:02

## 2023-02-07 RX ADMIN — MAGNESIUM OXIDE TAB 400 MG (241.3 MG ELEMENTAL MG) 400 MG: 400 (241.3 MG) TAB at 06:02

## 2023-02-07 RX ADMIN — AMLODIPINE BESYLATE 10 MG: 10 TABLET ORAL at 08:02

## 2023-02-07 RX ADMIN — ACYCLOVIR 800 MG: 200 CAPSULE ORAL at 10:02

## 2023-02-07 RX ADMIN — FLUCONAZOLE 400 MG: 200 TABLET ORAL at 08:02

## 2023-02-07 RX ADMIN — Medication 1 DOSE: at 10:02

## 2023-02-07 RX ADMIN — LEVOFLOXACIN 500 MG: 500 TABLET, FILM COATED ORAL at 08:02

## 2023-02-07 RX ADMIN — Medication 1 DOSE: at 12:02

## 2023-02-07 RX ADMIN — ACYCLOVIR 800 MG: 200 CAPSULE ORAL at 08:02

## 2023-02-07 RX ADMIN — PANTOPRAZOLE SODIUM 40 MG: 40 TABLET, DELAYED RELEASE ORAL at 08:02

## 2023-02-07 RX ADMIN — Medication 1 DOSE: at 04:02

## 2023-02-07 RX ADMIN — MAGNESIUM OXIDE TAB 400 MG (241.3 MG ELEMENTAL MG) 400 MG: 400 (241.3 MG) TAB at 11:02

## 2023-02-07 NOTE — ASSESSMENT & PLAN NOTE
- Approved by the transplant selection committee for a Génesis 200  - Underwent consent signing with Dr. Casas 1/20/2023  - Admitted for Génesis 200 Auto on 2/1/22  - Tolerated chemotherapy on 2/2 with no issues  - Received 3 bags with a total CD34 dose of 3.13 x10^6/kg on 2/3 without incident  - Today is day +4                Planned conditioning regimen:  Melphalan on Day -1     Antimicrobial Prophylaxis:  Acyclovir starting on Day -1  Levofloxacin starting on Day -1  Fluconazole starting on Day -1     Growth Factor Support:  Neupogen starting on Day +7      Caregiver: daughter in law and son  Post-transplant discharge plans: Patricia Alanis

## 2023-02-07 NOTE — PLAN OF CARE
Day +4 AutoSCT  Patient continues on room air, denies pain. PO mag replacement given. Loose stools noted- stool sample collected to r/o c. Diff. Patient stable at this time, no acute changes.     Problem: Adult Inpatient Plan of Care  Goal: Plan of Care Review  Outcome: Ongoing, Progressing  Goal: Patient-Specific Goal (Individualized)  Outcome: Ongoing, Progressing  Goal: Absence of Hospital-Acquired Illness or Injury  Outcome: Ongoing, Progressing  Goal: Optimal Comfort and Wellbeing  Outcome: Ongoing, Progressing  Goal: Readiness for Transition of Care  Outcome: Ongoing, Progressing     Problem: Adjustment to Transplant (Stem Cell/Bone Marrow Transplant)  Goal: Optimal Coping with Transplant  Outcome: Ongoing, Progressing     Problem: Bladder Irritation (Stem Cell/Bone Marrow Transplant)  Goal: Symptom-Free Urinary Elimination  Outcome: Ongoing, Progressing     Problem: Diarrhea (Stem Cell/Bone Marrow Transplant)  Goal: Diarrhea Symptom Control  Outcome: Ongoing, Progressing     Problem: Fatigue (Stem Cell/Bone Marrow Transplant)  Goal: Improved Activity Tolerance  Outcome: Ongoing, Progressing     Problem: Hematologic Alteration (Stem Cell/Bone Marrow Transplant)  Goal: Blood Counts Within Acceptable Range  Outcome: Ongoing, Progressing     Problem: Hypersensitivity Reaction (Stem Cell/Bone Marrow Transplant)  Goal: Absence of Hypersensitivity Reaction  Outcome: Ongoing, Progressing     Problem: Infection (Stem Cell/Bone Marrow Transplant)  Goal: Absence of Infection  Outcome: Ongoing, Progressing     Problem: Mucositis (Stem Cell/Bone Marrow Transplant)  Goal: Improved Oral Mucous Membrane Health and Integrity  Outcome: Ongoing, Progressing     Problem: Nausea and Vomiting (Stem Cell/Bone Marrow Transplant)  Goal: Nausea and Vomiting Symptom Relief  Outcome: Ongoing, Progressing     Problem: Nutrition Intake Altered (Stem Cell/Bone Marrow Transplant)  Goal: Optimal Nutrition Intake  Outcome: Ongoing,  Progressing     Problem: Fall Injury Risk  Goal: Absence of Fall and Fall-Related Injury  Outcome: Ongoing, Progressing     Problem: Infection  Goal: Absence of Infection Signs and Symptoms  Outcome: Ongoing, Progressing

## 2023-02-07 NOTE — PROGRESS NOTES
Started having loose stools this morning. Will check stool for c-diff and start prn imodium if negative.    Wendy Vazquez, FNP  Hematology/Oncology/Bone Marrow Transplant

## 2023-02-07 NOTE — PLAN OF CARE
No acute events this shift. Afebrile ( max temp 99.9) & VSS on room air. No complaints of pain, ambulates independently to bathroom.       Problem: Adult Inpatient Plan of Care  Goal: Plan of Care Review  Outcome: Ongoing, Progressing  Goal: Patient-Specific Goal (Individualized)  Outcome: Ongoing, Progressing  Goal: Absence of Hospital-Acquired Illness or Injury  Outcome: Ongoing, Progressing  Goal: Optimal Comfort and Wellbeing  Outcome: Ongoing, Progressing  Goal: Readiness for Transition of Care  Outcome: Ongoing, Progressing     Problem: Adjustment to Transplant (Stem Cell/Bone Marrow Transplant)  Goal: Optimal Coping with Transplant  Outcome: Ongoing, Progressing     Problem: Bladder Irritation (Stem Cell/Bone Marrow Transplant)  Goal: Symptom-Free Urinary Elimination  Outcome: Ongoing, Progressing     Problem: Diarrhea (Stem Cell/Bone Marrow Transplant)  Goal: Diarrhea Symptom Control  Outcome: Ongoing, Progressing     Problem: Fatigue (Stem Cell/Bone Marrow Transplant)  Goal: Improved Activity Tolerance  Outcome: Ongoing, Progressing     Problem: Hematologic Alteration (Stem Cell/Bone Marrow Transplant)  Goal: Blood Counts Within Acceptable Range  Outcome: Ongoing, Progressing     Problem: Hypersensitivity Reaction (Stem Cell/Bone Marrow Transplant)  Goal: Absence of Hypersensitivity Reaction  Outcome: Ongoing, Progressing     Problem: Infection (Stem Cell/Bone Marrow Transplant)  Goal: Absence of Infection  Outcome: Ongoing, Progressing     Problem: Mucositis (Stem Cell/Bone Marrow Transplant)  Goal: Improved Oral Mucous Membrane Health and Integrity  Outcome: Ongoing, Progressing     Problem: Nausea and Vomiting (Stem Cell/Bone Marrow Transplant)  Goal: Nausea and Vomiting Symptom Relief  Outcome: Ongoing, Progressing     Problem: Nutrition Intake Altered (Stem Cell/Bone Marrow Transplant)  Goal: Optimal Nutrition Intake  Outcome: Ongoing, Progressing     Problem: Fall Injury Risk  Goal: Absence of Fall  and Fall-Related Injury  Outcome: Ongoing, Progressing      PROVIDER:[TOKEN:[[id number]],FOLLOWUP:[1 week],ESTABLISHEDPATIENT:[T]]

## 2023-02-07 NOTE — PROGRESS NOTES
Titus Baig - Oncology ([facility])  Hematology  Bone Marrow Transplant  Progress Note    Patient Name: Raquel Houston  Admission Date: 2/1/2023  Hospital Length of Stay: 6 days  Code Status: Full Code    Subjective:     Interval History: Day +4 from a Génesis 200 auto SCT for MM. Remains afebrile. VSS. Today's ANC pending. Up 3 lbs since admission but down 2.5 lbs from yesterday. Good UOP (2.1 L in last 24 hours). Good oral intake. Normal BMs. Overall doing well.    Objective:     Vital Signs (Most Recent):  Temp: 98.8 °F (37.1 °C) (02/07/23 0740)  Pulse: 101 (02/07/23 0740)  Resp: 16 (02/07/23 0740)  BP: 117/65 (02/07/23 0740)  SpO2: 97 % (02/07/23 0740) Vital Signs (24h Range):  Temp:  [98.8 °F (37.1 °C)-99.9 °F (37.7 °C)] 98.8 °F (37.1 °C)  Pulse:  [101-109] 101  Resp:  [16-18] 16  SpO2:  [93 %-100 %] 97 %  BP: (117-132)/(64-73) 117/65     Weight: 100.4 kg (221 lb 3.7 oz)  Body mass index is 35.71 kg/m².  Body surface area is 2.16 meters squared.    ECOG SCORE           [unfilled]    Intake/Output - Last 3 Shifts         02/05 0700  02/06 0659 02/06 0700 02/07 0659 02/07 0700 02/08 0659    P.O. 1400 940     I.V. (mL/kg)       Total Intake(mL/kg) 1400 (13.8) 940 (9.4)     Urine (mL/kg/hr) 3350 (1.4) 2150 (0.9)     Stool 0 0     Total Output 3350 2150     Net -1950 -1210            Stool Occurrence 0 x 2 x             Physical Exam  Constitutional:       Appearance: She is well-developed.   HENT:      Head: Normocephalic and atraumatic.      Mouth/Throat:      Pharynx: No oropharyngeal exudate.   Eyes:      Conjunctiva/sclera: Conjunctivae normal.      Pupils: Pupils are equal, round, and reactive to light.   Cardiovascular:      Rate and Rhythm: Normal rate and regular rhythm.      Heart sounds: Normal heart sounds. No murmur heard.  Pulmonary:      Effort: Pulmonary effort is normal.      Breath sounds: Normal breath sounds.   Abdominal:      General: Bowel sounds are normal. There is no distension.       Palpations: Abdomen is soft.      Tenderness: There is no abdominal tenderness.   Musculoskeletal:         General: No deformity. Normal range of motion.      Cervical back: Normal range of motion and neck supple.   Skin:     General: Skin is warm and dry.      Findings: No erythema or rash.   Neurological:      Mental Status: She is alert and oriented to person, place, and time.   Psychiatric:         Behavior: Behavior normal.         Thought Content: Thought content normal.         Judgment: Judgment normal.       Significant Labs:   CBC:   Recent Labs   Lab 02/06/23 0357 02/07/23 0459   WBC 0.46* 0.70*   HGB 9.4* 9.5*   HCT 29.2* 29.7*   * 92*      and CMP:   Recent Labs   Lab 02/06/23 0357 02/07/23 0459   * 136   K 4.1 4.0    105   CO2 24 22*    96   BUN 15 14   CREATININE 0.8 0.7   CALCIUM 8.1* 8.1*   PROT 5.2* 5.2*   ALBUMIN 3.0* 3.0*   BILITOT 0.7 0.5   ALKPHOS 149* 129   AST 16 15   ALT 21 19   ANIONGAP 8 9         Diagnostic Results:  I have reviewed all pertinent imaging results/findings within the past 24 hours.    Assessment/Plan:     * History of autologous stem cell transplant  - Approved by the transplant selection committee for a Génesis 200  - Underwent consent signing with Dr. Casas 1/20/2023  - Admitted for Génesis 200 Auto on 2/1/22  - Tolerated chemotherapy on 2/2 with no issues  - Received 3 bags with a total CD34 dose of 3.13 x10^6/kg on 2/3 without incident  - Today is day +4                Planned conditioning regimen:  Melphalan on Day -1     Antimicrobial Prophylaxis:  Acyclovir starting on Day -1  Levofloxacin starting on Day -1  Fluconazole starting on Day -1     Growth Factor Support:  Neupogen starting on Day +7      Caregiver: daughter in law and son  Post-transplant discharge plans: [facility] [place]    Multiple myeloma in remission  - Complex cytogenetics with gain 1q21, t(4;14), and monosomy 13  - R-ISS stage III (elevated beta-2 microglobulin, elevated LDH,  decreased albumin, and high risk cytogenetics)  - S/p 5 Cycles D-Vrd (discontinued jasen after cycle 4)  - Has had significant clinical improvement as well as reduction in IgG and M-spike showing excellent response  - see autologous stem cell transplant     Pancytopenia due to chemotherapy  - Expect blood counts to drop further with chemo  - Transfuse for hgb < 7 or plts < 10K  - Continue antimicrobial ppx per protocol  - Daily CBC while inpatient  - stop VTE ppx when platelets <50K    HTN (hypertension)  - Continue home amlodipine         VTE Risk Mitigation (From admission, onward)         Ordered     enoxaparin injection 40 mg  Daily         02/01/23 1442     heparin (porcine) injection 1,600 Units  As needed (PRN)         02/01/23 1528     IP VTE HIGH RISK PATIENT  Once         02/01/23 1442     Place sequential compression device  Until discontinued         02/01/23 1442                Disposition: Inpatient for SCT. Awaiting engraftment.    Wendy Vazquez, NP  Bone Marrow Transplant  [facility]ig - Oncology ([facility])

## 2023-02-07 NOTE — SUBJECTIVE & OBJECTIVE
Subjective:     Interval History: Day +4 from a Génesis 200 auto SCT for MM. Remains afebrile. VSS. Today's ANC pending. Up 3 lbs since admission but down 2.5 lbs from yesterday. Good UOP (2.1 L in last 24 hours). Good oral intake. Normal BMs. Overall doing well.    Objective:     Vital Signs (Most Recent):  Temp: 98.8 °F (37.1 °C) (02/07/23 0740)  Pulse: 101 (02/07/23 0740)  Resp: 16 (02/07/23 0740)  BP: 117/65 (02/07/23 0740)  SpO2: 97 % (02/07/23 0740) Vital Signs (24h Range):  Temp:  [98.8 °F (37.1 °C)-99.9 °F (37.7 °C)] 98.8 °F (37.1 °C)  Pulse:  [101-109] 101  Resp:  [16-18] 16  SpO2:  [93 %-100 %] 97 %  BP: (117-132)/(64-73) 117/65     Weight: 100.4 kg (221 lb 3.7 oz)  Body mass index is 35.71 kg/m².  Body surface area is 2.16 meters squared.    ECOG SCORE           [unfilled]    Intake/Output - Last 3 Shifts         02/05 0700  02/06 0659 02/06 0700  02/07 0659 02/07 0700  02/08 0659    P.O. 1400 940     I.V. (mL/kg)       Total Intake(mL/kg) 1400 (13.8) 940 (9.4)     Urine (mL/kg/hr) 3350 (1.4) 2150 (0.9)     Stool 0 0     Total Output 3350 2150     Net -1950 -1210            Stool Occurrence 0 x 2 x             Physical Exam  Constitutional:       Appearance: She is well-developed.   HENT:      Head: Normocephalic and atraumatic.      Mouth/Throat:      Pharynx: No oropharyngeal exudate.   Eyes:      Conjunctiva/sclera: Conjunctivae normal.      Pupils: Pupils are equal, round, and reactive to light.   Cardiovascular:      Rate and Rhythm: Normal rate and regular rhythm.      Heart sounds: Normal heart sounds. No murmur heard.  Pulmonary:      Effort: Pulmonary effort is normal.      Breath sounds: Normal breath sounds.   Abdominal:      General: Bowel sounds are normal. There is no distension.      Palpations: Abdomen is soft.      Tenderness: There is no abdominal tenderness.   Musculoskeletal:         General: No deformity. Normal range of motion.      Cervical back: Normal range of motion and neck supple.    Skin:     General: Skin is warm and dry.      Findings: No erythema or rash.   Neurological:      Mental Status: She is alert and oriented to person, place, and time.   Psychiatric:         Behavior: Behavior normal.         Thought Content: Thought content normal.         Judgment: Judgment normal.       Significant Labs:   CBC:   Recent Labs   Lab 02/06/23 0357 02/07/23 0459   WBC 0.46* 0.70*   HGB 9.4* 9.5*   HCT 29.2* 29.7*   * 92*      and CMP:   Recent Labs   Lab 02/06/23 0357 02/07/23 0459   * 136   K 4.1 4.0    105   CO2 24 22*    96   BUN 15 14   CREATININE 0.8 0.7   CALCIUM 8.1* 8.1*   PROT 5.2* 5.2*   ALBUMIN 3.0* 3.0*   BILITOT 0.7 0.5   ALKPHOS 149* 129   AST 16 15   ALT 21 19   ANIONGAP 8 9         Diagnostic Results:  I have reviewed all pertinent imaging results/findings within the past 24 hours.

## 2023-02-08 PROBLEM — T45.1X5A CHEMOTHERAPY-INDUCED DIARRHEA: Status: ACTIVE | Noted: 2023-02-08

## 2023-02-08 PROBLEM — K52.1 CHEMOTHERAPY-INDUCED DIARRHEA: Status: ACTIVE | Noted: 2023-02-08

## 2023-02-08 LAB
ABO + RH BLD: NORMAL
ALBUMIN SERPL BCP-MCNC: 3 G/DL (ref 3.5–5.2)
ALP SERPL-CCNC: 128 U/L (ref 55–135)
ALT SERPL W/O P-5'-P-CCNC: 19 U/L (ref 10–44)
ANION GAP SERPL CALC-SCNC: 9 MMOL/L (ref 8–16)
ANISOCYTOSIS BLD QL SMEAR: SLIGHT
AST SERPL-CCNC: 16 U/L (ref 10–40)
BASOPHILS # BLD AUTO: 0 K/UL (ref 0–0.2)
BASOPHILS NFR BLD: 0 % (ref 0–1.9)
BILIRUB SERPL-MCNC: 0.6 MG/DL (ref 0.1–1)
BLD GP AB SCN CELLS X3 SERPL QL: NORMAL
BUN SERPL-MCNC: 14 MG/DL (ref 6–20)
CALCIUM SERPL-MCNC: 8 MG/DL (ref 8.7–10.5)
CHLORIDE SERPL-SCNC: 106 MMOL/L (ref 95–110)
CO2 SERPL-SCNC: 21 MMOL/L (ref 23–29)
CREAT SERPL-MCNC: 0.7 MG/DL (ref 0.5–1.4)
DIFFERENTIAL METHOD: ABNORMAL
EOSINOPHIL # BLD AUTO: 0 K/UL (ref 0–0.5)
EOSINOPHIL NFR BLD: 2.2 % (ref 0–8)
ERYTHROCYTE [DISTWIDTH] IN BLOOD BY AUTOMATED COUNT: 14.6 % (ref 11.5–14.5)
EST. GFR  (NO RACE VARIABLE): >60 ML/MIN/1.73 M^2
GLUCOSE SERPL-MCNC: 92 MG/DL (ref 70–110)
HCT VFR BLD AUTO: 27.8 % (ref 37–48.5)
HGB BLD-MCNC: 8.9 G/DL (ref 12–16)
HYPOCHROMIA BLD QL SMEAR: ABNORMAL
IMM GRANULOCYTES # BLD AUTO: 0 K/UL (ref 0–0.04)
IMM GRANULOCYTES NFR BLD AUTO: 0 % (ref 0–0.5)
LYMPHOCYTES # BLD AUTO: 0 K/UL (ref 1–4.8)
LYMPHOCYTES NFR BLD: 6.7 % (ref 18–48)
MAGNESIUM SERPL-MCNC: 1.9 MG/DL (ref 1.6–2.6)
MCH RBC QN AUTO: 27.5 PG (ref 27–31)
MCHC RBC AUTO-ENTMCNC: 32 G/DL (ref 32–36)
MCV RBC AUTO: 86 FL (ref 82–98)
MONOCYTES # BLD AUTO: 0 K/UL (ref 0.3–1)
MONOCYTES NFR BLD: 0 % (ref 4–15)
NEUTROPHILS # BLD AUTO: 0.4 K/UL (ref 1.8–7.7)
NEUTROPHILS NFR BLD: 91.1 % (ref 38–73)
NRBC BLD-RTO: 0 /100 WBC
OVALOCYTES BLD QL SMEAR: ABNORMAL
PHOSPHATE SERPL-MCNC: 3.6 MG/DL (ref 2.7–4.5)
PLATELET # BLD AUTO: 72 K/UL (ref 150–450)
PLATELET BLD QL SMEAR: ABNORMAL
PMV BLD AUTO: 11.7 FL (ref 9.2–12.9)
POIKILOCYTOSIS BLD QL SMEAR: SLIGHT
POLYCHROMASIA BLD QL SMEAR: ABNORMAL
POTASSIUM SERPL-SCNC: 4 MMOL/L (ref 3.5–5.1)
PROT SERPL-MCNC: 5.2 G/DL (ref 6–8.4)
RBC # BLD AUTO: 3.24 M/UL (ref 4–5.4)
SODIUM SERPL-SCNC: 136 MMOL/L (ref 136–145)
WBC # BLD AUTO: 0.45 K/UL (ref 3.9–12.7)

## 2023-02-08 PROCEDURE — 80053 COMPREHEN METABOLIC PANEL: CPT | Performed by: NURSE PRACTITIONER

## 2023-02-08 PROCEDURE — 63600175 PHARM REV CODE 636 W HCPCS: Performed by: NURSE PRACTITIONER

## 2023-02-08 PROCEDURE — 25000003 PHARM REV CODE 250: Performed by: INTERNAL MEDICINE

## 2023-02-08 PROCEDURE — 94761 N-INVAS EAR/PLS OXIMETRY MLT: CPT

## 2023-02-08 PROCEDURE — 84100 ASSAY OF PHOSPHORUS: CPT | Performed by: NURSE PRACTITIONER

## 2023-02-08 PROCEDURE — 25000003 PHARM REV CODE 250: Performed by: NURSE PRACTITIONER

## 2023-02-08 PROCEDURE — 86900 BLOOD TYPING SEROLOGIC ABO: CPT | Performed by: INTERNAL MEDICINE

## 2023-02-08 PROCEDURE — 85025 COMPLETE CBC W/AUTO DIFF WBC: CPT | Performed by: NURSE PRACTITIONER

## 2023-02-08 PROCEDURE — 99233 PR SUBSEQUENT HOSPITAL CARE,LEVL III: ICD-10-PCS | Mod: ,,, | Performed by: INTERNAL MEDICINE

## 2023-02-08 PROCEDURE — 99233 SBSQ HOSP IP/OBS HIGH 50: CPT | Mod: ,,, | Performed by: INTERNAL MEDICINE

## 2023-02-08 PROCEDURE — 20600001 HC STEP DOWN PRIVATE ROOM

## 2023-02-08 PROCEDURE — 83735 ASSAY OF MAGNESIUM: CPT | Performed by: NURSE PRACTITIONER

## 2023-02-08 RX ADMIN — Medication 1 DOSE: at 12:02

## 2023-02-08 RX ADMIN — Medication 1 DOSE: at 04:02

## 2023-02-08 RX ADMIN — ACYCLOVIR 800 MG: 200 CAPSULE ORAL at 09:02

## 2023-02-08 RX ADMIN — LOPERAMIDE HYDROCHLORIDE 2 MG: 2 CAPSULE ORAL at 02:02

## 2023-02-08 RX ADMIN — MAGNESIUM OXIDE TAB 400 MG (241.3 MG ELEMENTAL MG) 400 MG: 400 (241.3 MG) TAB at 05:02

## 2023-02-08 RX ADMIN — PANTOPRAZOLE SODIUM 40 MG: 40 TABLET, DELAYED RELEASE ORAL at 09:02

## 2023-02-08 RX ADMIN — Medication 1 DOSE: at 09:02

## 2023-02-08 RX ADMIN — FLUCONAZOLE 400 MG: 200 TABLET ORAL at 09:02

## 2023-02-08 RX ADMIN — LEVOFLOXACIN 500 MG: 500 TABLET, FILM COATED ORAL at 09:02

## 2023-02-08 RX ADMIN — ENOXAPARIN SODIUM 40 MG: 40 INJECTION SUBCUTANEOUS at 04:02

## 2023-02-08 RX ADMIN — AMLODIPINE BESYLATE 10 MG: 10 TABLET ORAL at 09:02

## 2023-02-08 RX ADMIN — LOPERAMIDE HYDROCHLORIDE 2 MG: 2 CAPSULE ORAL at 10:02

## 2023-02-08 RX ADMIN — MAGNESIUM OXIDE TAB 400 MG (241.3 MG ELEMENTAL MG) 400 MG: 400 (241.3 MG) TAB at 10:02

## 2023-02-08 NOTE — PROGRESS NOTES
Ttius Baig - Oncology ([facility])  Adult Nutrition  Progress Note    SUMMARY       Recommendations    1. Continue Regular Diet as tolerated.   2. RD to monitor and follow-up.    Goals: To meet % of EEN/EPN by next RD f/u  Nutrition Goal Status: other (comment) (New/Goal met)  Communication of RD Recs:  (POC)    Assessment and Plan    Nutrition Problem  Increased energy/ PRO needs     Related to (etiology):   Muti myeloma     Signs and Symptoms (as evidenced by):   Bone Marrow Transplant     Interventions/Recommendations (treatment strategy):  Collaboration of Nutrition Care with other providers.   RD provided diet education.     Nutrition Diagnosis Status:   Continues       Malnutrition Assessment         Nutrition Problem  Increased energy/ PRO needs     Related to (etiology):   Muti myeloma     Signs and Symptoms (as evidenced by):   Bone Marrow Transplant     Interventions/Recommendations (treatment strategy):  Collaboration of Nutrition Care with other providers.   RD provided diet education.     Nutrition Diagnosis Status:   New            Reason for Assessment    Reason For Assessment: RD follow-up  Diagnosis:  (Bone marrow transplant; s/p Génesis 200 auto)  Relevant Medical History: Multi myeloma, OA, gout  Interdisciplinary Rounds: did not attend  General Information Comments: RD follow-up. Pt report no difficulty chewing/swallowing. No N/V/C noted. Diarrhea noted, immodium provided. Pt appears well nourished. Pt endorses decreased appetite, altered tastebuds eating % at meals.    Nutrition Discharge Planning: Dietary information left with pt: Bone Marrow Transplant diet and High PRO/Kcal diet education materials left with pt.    Nutrition Risk Screen    Nutrition Risk Screen: no indicators present    Nutrition/Diet History    Spiritual, Cultural Beliefs, Christian Practices, Values that Affect Care: no  Food Allergies: NKFA    Anthropometrics    Temp: 98.3 °F (36.8 °C)  Height Method: Stated  Height:  "5' 6" (167.6 cm)  Height (inches): 66 in  Weight Method: Standard Scale  Weight: 99.5 kg (219 lb 4 oz)  Weight (lb): 219.25 lb  Ideal Body Weight (IBW), Female: 130 lb  % Ideal Body Weight, Female (lb): 165.6 %  BMI (Calculated): 35.4  BMI Grade: 30 - 34.9- obesity - grade I       Lab/Procedures/Meds    Pertinent Labs Reviewed: reviewed  Pertinent Labs Comments: CO2 21, Willard 8.0  Pertinent Medications Reviewed: reviewed  Pertinent Medications Comments: evomela, pantoprazole, amlodipine, neupogen, abx, ondanstron, NaCl, K-phos, heparin    Estimated/Assessed Needs    Weight Used For Calorie Calculations: 99 kg (218 lb 4.1 oz)  Energy Calorie Requirements (kcal): 1980- 2475kcal (20-25kcal/kg)  Energy Need Method: Kcal/kg  Protein Requirements: 99- 119g (1.0-1.2g/kg)  Weight Used For Protein Calculations: 99 kg (218 lb 4.1 oz)  Fluid Requirements (mL): 1ml/1kcal or per MD  Estimated Fluid Requirement Method: RDA Method  RDA Method (mL): 1980         Nutrition Prescription Ordered    Current Diet Order: Regular diet    Evaluation of Received Nutrient/Fluid Intake    I/O: -790 mL  Energy Calories Required: meeting needs  Protein Required: meeting needs  Fluid Required:  (As per MD)  Comments: LBM: 2/7  Tolerance: tolerating  % Intake of Estimated Energy Needs: 50 - 75 %  % Meal Intake: 50 - 75 %    Nutrition Risk    Level of Risk/Frequency of Follow-up:  (RD to f/u x1/week)     Monitor and Evaluation    Food and Nutrient Intake: energy intake, food and beverage intake  Food and Nutrient Adminstration: diet order  Knowledge/Beliefs/Attitudes: food and nutrition knowledge/skill, beliefs and attitudes  Physical Activity and Function: factors affecting access to physical activity  Anthropometric Measurements: weight, weight change, body mass index  Biochemical Data, Medical Tests and Procedures: electrolyte and renal panel, gastrointestinal profile, glucose/endocrine profile, inflammatory profile, lipid " profile  Nutrition-Focused Physical Findings: overall appearance, skin     Nutrition Follow-Up    RD Follow-up?: Yes    Loan French Registration Eligible, Provisional LDN

## 2023-02-08 NOTE — SUBJECTIVE & OBJECTIVE
Therapeutic Intervention: Met with patient.  Inpatient Health and Behavior Intervention 00511    Chief Complaint/Reason for Encounter:  adaptation to disease and treatment     Interval History and Content of Current Session: Discussed patient coping with HSCT and hospitalization. Patient coping very well through family support, prayer and distraction. Describes optimism and good spirits, despite increasing GI distress. Sleeping well. Family coping well.    Risk Parameters:  Patient reports no suicidal ideation  Patient reports no homicidal ideation  Patient reports no self-injurious behavior  Patient reports no violent behavior    Verbal Deficits: None    Patient's response to intervention:  The patient's response to intervention is accepting, motivated.    Progress toward goals and other mental status changes:  The patient's progress toward goals is good.

## 2023-02-08 NOTE — PLAN OF CARE
No acute events this shift. Afebrile & VSS on room air. No complaints of pain, ambulates independently to bathroom; Mag replacement orderd.       Problem: Adult Inpatient Plan of Care  Goal: Plan of Care Review  Outcome: Ongoing, Progressing  Goal: Patient-Specific Goal (Individualized)  Outcome: Ongoing, Progressing  Goal: Absence of Hospital-Acquired Illness or Injury  Outcome: Ongoing, Progressing  Goal: Optimal Comfort and Wellbeing  Outcome: Ongoing, Progressing  Goal: Readiness for Transition of Care  Outcome: Ongoing, Progressing     Problem: Adjustment to Transplant (Stem Cell/Bone Marrow Transplant)  Goal: Optimal Coping with Transplant  Outcome: Ongoing, Progressing     Problem: Bladder Irritation (Stem Cell/Bone Marrow Transplant)  Goal: Symptom-Free Urinary Elimination  Outcome: Ongoing, Progressing     Problem: Diarrhea (Stem Cell/Bone Marrow Transplant)  Goal: Diarrhea Symptom Control  Outcome: Ongoing, Progressing     Problem: Fatigue (Stem Cell/Bone Marrow Transplant)  Goal: Improved Activity Tolerance  Outcome: Ongoing, Progressing     Problem: Hematologic Alteration (Stem Cell/Bone Marrow Transplant)  Goal: Blood Counts Within Acceptable Range  Outcome: Ongoing, Progressing     Problem: Hypersensitivity Reaction (Stem Cell/Bone Marrow Transplant)  Goal: Absence of Hypersensitivity Reaction  Outcome: Ongoing, Progressing     Problem: Infection (Stem Cell/Bone Marrow Transplant)  Goal: Absence of Infection  Outcome: Ongoing, Progressing     Problem: Mucositis (Stem Cell/Bone Marrow Transplant)  Goal: Improved Oral Mucous Membrane Health and Integrity  Outcome: Ongoing, Progressing     Problem: Nausea and Vomiting (Stem Cell/Bone Marrow Transplant)  Goal: Nausea and Vomiting Symptom Relief  Outcome: Ongoing, Progressing     Problem: Nutrition Intake Altered (Stem Cell/Bone Marrow Transplant)  Goal: Optimal Nutrition Intake  Outcome: Ongoing, Progressing     Problem: Fall Injury Risk  Goal: Absence of  Fall and Fall-Related Injury  Outcome: Ongoing, Progressing     Problem: Infection  Goal: Absence of Infection Signs and Symptoms  Outcome: Ongoing, Progressing

## 2023-02-08 NOTE — PROGRESS NOTES
Titus Baig - Oncology ([facility])  Hematology  Bone Marrow Transplant  Progress Note    Patient Name: Raquel Houston  Admission Date: 2/1/2023  Hospital Length of Stay: 7 days  Code Status: Full Code    Subjective:     Interval History: Day +5 from a Génesis 200 auto SCT for MM. Remains afebrile. VSS. ANC down to 410. Started having liquid stools yesterday. Ruled out C-diff, so PRN imodium started. Appetite waning, but oral intake still adequate. Overall doing well.     Objective:     Vital Signs (Most Recent):  Temp: 98.7 °F (37.1 °C) (02/08/23 0330)  Pulse: 101 (02/08/23 0330)  Resp: 16 (02/08/23 0330)  BP: 119/69 (02/08/23 0330)  SpO2: 95 % (02/08/23 0330) Vital Signs (24h Range):  Temp:  [97.5 °F (36.4 °C)-99.3 °F (37.4 °C)] 98.7 °F (37.1 °C)  Pulse:  [] 101  Resp:  [16-18] 16  SpO2:  [94 %-99 %] 95 %  BP: (104-129)/(59-71) 119/69     Weight: 99.5 kg (219 lb 4 oz)  Body mass index is 35.39 kg/m².  Body surface area is 2.15 meters squared.    ECOG SCORE           [unfilled]    Intake/Output - Last 3 Shifts         02/06 0700  02/07 0659 02/07 0700  02/08 0659 02/08 0700  02/09 0659    P.O. 940 180 680    Total Intake(mL/kg) 940 (9.4) 180 (1.8) 680 (6.8)    Urine (mL/kg/hr) 2150 (0.9) 1100 (0.5) 1150 (2.3)    Stool 0 0 0    Total Output 2150 1100 1150    Net -1210 -920 -470           Stool Occurrence 2 x 7 x 1 x            Physical Exam  Constitutional:       Appearance: She is well-developed.   HENT:      Head: Normocephalic and atraumatic.      Mouth/Throat:      Pharynx: No oropharyngeal exudate.   Eyes:      Conjunctiva/sclera: Conjunctivae normal.      Pupils: Pupils are equal, round, and reactive to light.   Cardiovascular:      Rate and Rhythm: Normal rate and regular rhythm.      Heart sounds: Normal heart sounds. No murmur heard.  Pulmonary:      Effort: Pulmonary effort is normal.      Breath sounds: Normal breath sounds.   Abdominal:      General: Bowel sounds are normal. There is no distension.       Palpations: Abdomen is soft.      Tenderness: There is no abdominal tenderness.   Musculoskeletal:         General: No deformity. Normal range of motion.      Cervical back: Normal range of motion and neck supple.   Skin:     General: Skin is warm and dry.      Findings: No erythema or rash.   Neurological:      Mental Status: She is alert and oriented to person, place, and time.   Psychiatric:         Behavior: Behavior normal.         Thought Content: Thought content normal.         Judgment: Judgment normal.       Significant Labs:   CBC:   Recent Labs   Lab 02/07/23 0459 02/08/23 0428   WBC 0.70* 0.45*   HGB 9.5* 8.9*   HCT 29.7* 27.8*   PLT 92* 72*      and CMP:   Recent Labs   Lab 02/07/23 0459 02/08/23 0428    136   K 4.0 4.0    106   CO2 22* 21*   GLU 96 92   BUN 14 14   CREATININE 0.7 0.7   CALCIUM 8.1* 8.0*   PROT 5.2* 5.2*   ALBUMIN 3.0* 3.0*   BILITOT 0.5 0.6   ALKPHOS 129 128   AST 15 16   ALT 19 19   ANIONGAP 9 9         Diagnostic Results:  I have reviewed all pertinent imaging results/findings within the past 24 hours.    Assessment/Plan:     * History of autologous stem cell transplant  - Approved by the transplant selection committee for a Génesis 200  - Underwent consent signing with Dr. Casas 1/20/2023  - Admitted for Génesis 200 Auto on 2/1/22  - Tolerated chemotherapy on 2/2 with no issues  - Received 3 bags with a total CD34 dose of 3.13 x10^6/kg on 2/3 without incident  - Today is day +5                Planned conditioning regimen:  Melphalan on Day -1     Antimicrobial Prophylaxis:  Acyclovir starting on Day -1  Levofloxacin starting on Day -1  Fluconazole starting on Day -1     Growth Factor Support:  Neupogen starting on Day +7      Caregiver: daughter in law and son  Post-transplant discharge plans: Hope Walker    Multiple myeloma in remission  - Complex cytogenetics with gain 1q21, t(4;14), and monosomy 13  - R-ISS stage III (elevated beta-2 microglobulin, elevated LDH,  decreased albumin, and high risk cytogenetics)  - S/p 5 Cycles D-Vrd (discontinued jasen after cycle 4)  - Has had significant clinical improvement as well as reduction in IgG and M-spike showing excellent response  - see autologous stem cell transplant     Pancytopenia due to chemotherapy  - Expect blood counts to drop further with chemo  - Transfuse for hgb < 7 or plts < 10K  - Continue antimicrobial ppx per protocol  - Daily CBC while inpatient  - stop VTE ppx when platelets <50K    Chemotherapy-induced diarrhea  - Started having loose stools on 2/7/23  - Ruled out c-diff  - Started PRN imodium    HTN (hypertension)  - Continue home amlodipine         VTE Risk Mitigation (From admission, onward)         Ordered     enoxaparin injection 40 mg  Daily         02/01/23 1442     heparin (porcine) injection 1,600 Units  As needed (PRN)         02/01/23 1528     IP VTE HIGH RISK PATIENT  Once         02/01/23 1442     Place sequential compression device  Until discontinued         02/01/23 1442                Disposition: Inpatient for SCT. Awaiting engraftment.    Wendy Vazquez, NP  Bone Marrow Transplant  [facility] Baig - Oncology ([facility])

## 2023-02-08 NOTE — PLAN OF CARE
Plan of care reviewed with patient . Day 5  of transplant .  Fall precautions maintained, side rails up x2, call light in reach, bed in low position and locked nonskid socks on.  Tolerating a regular diet without difficulty.  No complaints voiced.

## 2023-02-08 NOTE — HPI
Raquel Houston, a 62 y.o. female, for therapy visit.  Met with patient.    Chief Complaint/Reason for Encounter: adaptation to disease and treatment

## 2023-02-08 NOTE — ASSESSMENT & PLAN NOTE
- Approved by the transplant selection committee for a Génesis 200  - Underwent consent signing with Dr. Casas 1/20/2023  - Admitted for Génesis 200 Auto on 2/1/22  - Tolerated chemotherapy on 2/2 with no issues  - Received 3 bags with a total CD34 dose of 3.13 x10^6/kg on 2/3 without incident  - Today is day +5                Planned conditioning regimen:  Melphalan on Day -1     Antimicrobial Prophylaxis:  Acyclovir starting on Day -1  Levofloxacin starting on Day -1  Fluconazole starting on Day -1     Growth Factor Support:  Neupogen starting on Day +7      Caregiver: daughter in law and son  Post-transplant discharge plans: Patricia Alanis

## 2023-02-08 NOTE — PROGRESS NOTES
[facility])  Psychology  Progress Note  Individual Psychotherapy (PhD/LCSW)    Patient Name: Raquel Houston  MRN: [id number]    Patient Class: IP- Inpatient  Admission Date: 2/1/2023  Hospital Length of Stay: 6 days  Attending Physician: Brian Hermosillo MD  Primary Care Provider: Everett Boogie MD    Therapeutic Intervention: Met with patient.  Inpatient Health and Behavior Intervention 58124    Chief Complaint/Reason for Encounter:  adaptation to disease and treatment     Interval History and Content of Current Session: Discussed patient coping with HSCT and hospitalization. Patient coping very well through family support, prayer and distraction. Describes optimism and good spirits, despite increasing GI distress. Sleeping well. Family coping well.    Risk Parameters:  Patient reports no suicidal ideation  Patient reports no homicidal ideation  Patient reports no self-injurious behavior  Patient reports no violent behavior    Verbal Deficits: None    Patient's response to intervention:  The patient's response to intervention is accepting, motivated.    Progress toward goals and other mental status changes:  The patient's progress toward goals is good.    Diagnostic Impression - Plan:     Multiple myeloma in remission  Patient seen at her request during pre-procedure assessment. Patient coping well with HSCT.  Discussed mood maintenance strategies, importance of light discipline, and emphasis on movement.    Patient aware of how to reach me, if needed.           Additional contacts: as needed    Length of Service (minutes): 20    Howard Belle, PhD  Psychology  [facility])

## 2023-02-08 NOTE — ASSESSMENT & PLAN NOTE
Patient seen at her request during pre-procedure assessment. Patient coping well with HSCT.  Discussed mood maintenance strategies, importance of light discipline, and emphasis on movement.    Patient aware of how to reach me, if needed.

## 2023-02-08 NOTE — SUBJECTIVE & OBJECTIVE
Subjective:     Interval History: Day +5 from a Génesis 200 auto SCT for MM. Remains afebrile. VSS. ANC down to 410. Started having liquid stools yesterday. Ruled out C-diff, so PRN imodium started. Appetite waning, but oral intake still adequate. Overall doing well.     Objective:     Vital Signs (Most Recent):  Temp: 98.7 °F (37.1 °C) (02/08/23 0330)  Pulse: 101 (02/08/23 0330)  Resp: 16 (02/08/23 0330)  BP: 119/69 (02/08/23 0330)  SpO2: 95 % (02/08/23 0330) Vital Signs (24h Range):  Temp:  [97.5 °F (36.4 °C)-99.3 °F (37.4 °C)] 98.7 °F (37.1 °C)  Pulse:  [] 101  Resp:  [16-18] 16  SpO2:  [94 %-99 %] 95 %  BP: (104-129)/(59-71) 119/69     Weight: 99.5 kg (219 lb 4 oz)  Body mass index is 35.39 kg/m².  Body surface area is 2.15 meters squared.    ECOG SCORE           [unfilled]    Intake/Output - Last 3 Shifts         02/06 0700  02/07 0659 02/07 0700 02/08 0659 02/08 0700  02/09 0659    P.O. 940 180 680    Total Intake(mL/kg) 940 (9.4) 180 (1.8) 680 (6.8)    Urine (mL/kg/hr) 2150 (0.9) 1100 (0.5) 1150 (2.3)    Stool 0 0 0    Total Output 2150 1100 1150    Net -1210 -920 -470           Stool Occurrence 2 x 7 x 1 x            Physical Exam  Constitutional:       Appearance: She is well-developed.   HENT:      Head: Normocephalic and atraumatic.      Mouth/Throat:      Pharynx: No oropharyngeal exudate.   Eyes:      Conjunctiva/sclera: Conjunctivae normal.      Pupils: Pupils are equal, round, and reactive to light.   Cardiovascular:      Rate and Rhythm: Normal rate and regular rhythm.      Heart sounds: Normal heart sounds. No murmur heard.  Pulmonary:      Effort: Pulmonary effort is normal.      Breath sounds: Normal breath sounds.   Abdominal:      General: Bowel sounds are normal. There is no distension.      Palpations: Abdomen is soft.      Tenderness: There is no abdominal tenderness.   Musculoskeletal:         General: No deformity. Normal range of motion.      Cervical back: Normal range of motion and  neck supple.   Skin:     General: Skin is warm and dry.      Findings: No erythema or rash.   Neurological:      Mental Status: She is alert and oriented to person, place, and time.   Psychiatric:         Behavior: Behavior normal.         Thought Content: Thought content normal.         Judgment: Judgment normal.       Significant Labs:   CBC:   Recent Labs   Lab 02/07/23 0459 02/08/23 0428   WBC 0.70* 0.45*   HGB 9.5* 8.9*   HCT 29.7* 27.8*   PLT 92* 72*      and CMP:   Recent Labs   Lab 02/07/23 0459 02/08/23 0428    136   K 4.0 4.0    106   CO2 22* 21*   GLU 96 92   BUN 14 14   CREATININE 0.7 0.7   CALCIUM 8.1* 8.0*   PROT 5.2* 5.2*   ALBUMIN 3.0* 3.0*   BILITOT 0.5 0.6   ALKPHOS 129 128   AST 15 16   ALT 19 19   ANIONGAP 9 9         Diagnostic Results:  I have reviewed all pertinent imaging results/findings within the past 24 hours.

## 2023-02-08 NOTE — PLAN OF CARE
Recommendations    1. Continue Regular Diet as tolerated.   2. RD to monitor and follow-up.    Goals: To meet % of EEN/EPN by next RD f/u  Nutrition Goal Status: other (comment) (New/Goal met)  Communication of RD Recs:  (POC)

## 2023-02-09 LAB
ALBUMIN SERPL BCP-MCNC: 3 G/DL (ref 3.5–5.2)
ALP SERPL-CCNC: 128 U/L (ref 55–135)
ALT SERPL W/O P-5'-P-CCNC: 16 U/L (ref 10–44)
ANION GAP SERPL CALC-SCNC: 8 MMOL/L (ref 8–16)
AST SERPL-CCNC: 15 U/L (ref 10–40)
BASOPHILS # BLD AUTO: 0 K/UL (ref 0–0.2)
BASOPHILS NFR BLD: 0 % (ref 0–1.9)
BILIRUB SERPL-MCNC: 0.6 MG/DL (ref 0.1–1)
BUN SERPL-MCNC: 11 MG/DL (ref 6–20)
CALCIUM SERPL-MCNC: 8.2 MG/DL (ref 8.7–10.5)
CHLORIDE SERPL-SCNC: 105 MMOL/L (ref 95–110)
CO2 SERPL-SCNC: 22 MMOL/L (ref 23–29)
CREAT SERPL-MCNC: 0.7 MG/DL (ref 0.5–1.4)
DIFFERENTIAL METHOD: ABNORMAL
EOSINOPHIL # BLD AUTO: 0 K/UL (ref 0–0.5)
EOSINOPHIL NFR BLD: 3.8 % (ref 0–8)
ERYTHROCYTE [DISTWIDTH] IN BLOOD BY AUTOMATED COUNT: 14.4 % (ref 11.5–14.5)
EST. GFR  (NO RACE VARIABLE): >60 ML/MIN/1.73 M^2
GLUCOSE SERPL-MCNC: 87 MG/DL (ref 70–110)
HCT VFR BLD AUTO: 26.7 % (ref 37–48.5)
HGB BLD-MCNC: 8.8 G/DL (ref 12–16)
IMM GRANULOCYTES # BLD AUTO: 0.01 K/UL (ref 0–0.04)
IMM GRANULOCYTES NFR BLD AUTO: 3.8 % (ref 0–0.5)
LYMPHOCYTES # BLD AUTO: 0 K/UL (ref 1–4.8)
LYMPHOCYTES NFR BLD: 7.7 % (ref 18–48)
MAGNESIUM SERPL-MCNC: 1.9 MG/DL (ref 1.6–2.6)
MCH RBC QN AUTO: 27.9 PG (ref 27–31)
MCHC RBC AUTO-ENTMCNC: 33 G/DL (ref 32–36)
MCV RBC AUTO: 85 FL (ref 82–98)
MONOCYTES # BLD AUTO: 0 K/UL (ref 0.3–1)
MONOCYTES NFR BLD: 0 % (ref 4–15)
NEUTROPHILS # BLD AUTO: 0.2 K/UL (ref 1.8–7.7)
NEUTROPHILS NFR BLD: 84.7 % (ref 38–73)
NRBC BLD-RTO: 0 /100 WBC
PHOSPHATE SERPL-MCNC: 3.5 MG/DL (ref 2.7–4.5)
PLATELET # BLD AUTO: 43 K/UL (ref 150–450)
PLATELET BLD QL SMEAR: ABNORMAL
PMV BLD AUTO: 10.5 FL (ref 9.2–12.9)
POTASSIUM SERPL-SCNC: 3.9 MMOL/L (ref 3.5–5.1)
PROT SERPL-MCNC: 5.2 G/DL (ref 6–8.4)
RBC # BLD AUTO: 3.15 M/UL (ref 4–5.4)
SODIUM SERPL-SCNC: 135 MMOL/L (ref 136–145)
WBC # BLD AUTO: 0.26 K/UL (ref 3.9–12.7)

## 2023-02-09 PROCEDURE — 83735 ASSAY OF MAGNESIUM: CPT | Performed by: NURSE PRACTITIONER

## 2023-02-09 PROCEDURE — 99233 SBSQ HOSP IP/OBS HIGH 50: CPT | Mod: ,,, | Performed by: INTERNAL MEDICINE

## 2023-02-09 PROCEDURE — 80053 COMPREHEN METABOLIC PANEL: CPT | Performed by: NURSE PRACTITIONER

## 2023-02-09 PROCEDURE — 20600001 HC STEP DOWN PRIVATE ROOM

## 2023-02-09 PROCEDURE — 25000003 PHARM REV CODE 250: Performed by: INTERNAL MEDICINE

## 2023-02-09 PROCEDURE — 97535 SELF CARE MNGMENT TRAINING: CPT

## 2023-02-09 PROCEDURE — 84100 ASSAY OF PHOSPHORUS: CPT | Performed by: NURSE PRACTITIONER

## 2023-02-09 PROCEDURE — 25000003 PHARM REV CODE 250: Performed by: NURSE PRACTITIONER

## 2023-02-09 PROCEDURE — 94761 N-INVAS EAR/PLS OXIMETRY MLT: CPT

## 2023-02-09 PROCEDURE — 85025 COMPLETE CBC W/AUTO DIFF WBC: CPT | Performed by: NURSE PRACTITIONER

## 2023-02-09 PROCEDURE — 99233 PR SUBSEQUENT HOSPITAL CARE,LEVL III: ICD-10-PCS | Mod: ,,, | Performed by: INTERNAL MEDICINE

## 2023-02-09 RX ADMIN — PANTOPRAZOLE SODIUM 40 MG: 40 TABLET, DELAYED RELEASE ORAL at 09:02

## 2023-02-09 RX ADMIN — ACYCLOVIR 800 MG: 200 CAPSULE ORAL at 09:02

## 2023-02-09 RX ADMIN — Medication 1 DOSE: at 01:02

## 2023-02-09 RX ADMIN — Medication 1 DOSE: at 05:02

## 2023-02-09 RX ADMIN — MAGNESIUM OXIDE TAB 400 MG (241.3 MG ELEMENTAL MG) 400 MG: 400 (241.3 MG) TAB at 06:02

## 2023-02-09 RX ADMIN — MAGNESIUM OXIDE TAB 400 MG (241.3 MG ELEMENTAL MG) 400 MG: 400 (241.3 MG) TAB at 10:02

## 2023-02-09 RX ADMIN — FLUCONAZOLE 400 MG: 200 TABLET ORAL at 09:02

## 2023-02-09 RX ADMIN — LEVOFLOXACIN 500 MG: 500 TABLET, FILM COATED ORAL at 09:02

## 2023-02-09 RX ADMIN — Medication 1 DOSE: at 09:02

## 2023-02-09 NOTE — PT/OT/SLP PROGRESS
Occupational Therapy   Treatment    Name: Raquel Houston  MRN: [id number]  Admitting Diagnosis:  History of autologous stem cell transplant       Recommendations:     Discharge Recommendations: home  Discharge Equipment Recommendations:  none  Barriers to discharge:       Assessment:     Raquel Houston is a 62 y.o. female with a medical diagnosis of History of autologous stem cell transplant.  Pt is pleasant and agreeable to therapy this date.  Pt requiring supervision for bathing including verbal reminders to avoid perm cath during shower.  Pt is motivated to maintain functional independence. Pt  is performing at baseline but is at risk for deconditioning. OT to follow up weekly to maintain Pt independence . She presents with deficit listed below. Performance deficits affecting function are impaired endurance.     Rehab Prognosis:  Good; patient would benefit from acute skilled OT services to address these deficits and reach maximum level of function.       Plan:     Patient to be seen 1 x/week to address the above listed problems via self-care/home management, therapeutic activities, therapeutic exercises  Plan of Care Expires: 03/02/23  Plan of Care Reviewed with: patient    Subjective     Pain/Comfort:  Pain Rating 1: 0/10    Objective:     Communicated with: Nurse Cai prior to session.  Patient found HOB elevated with  (Perm Cath no active lines) upon OT entry to room.    General Precautions: Standard, fall    Orthopedic Precautions:N/A  Braces: N/A  Respiratory Status: Room air     Occupational Performance:     Bed Mobility:    Patient completed Rolling/Turning to Right with independence  Patient completed Scooting/Bridging with independence  Patient completed Supine to Sit with independence  Patient completed Sit to Supine with independence     Functional Mobility/Transfers:  Patient completed Sit <> Stand Transfer with modified independence  with  no assistive device   Patient completed Toilet  Transfer Step Transfer technique with modified independence with  no AD  Patient completed  Shower Transfer Step Transfer technique with modified independence with no AD  Functional Mobility: Pt walked a functional household distance with Mod (I) and no noted LOB.     Activities of Daily Living:  Grooming: independence while standing at sink for g/h  Bathing: supervision while standing in shower with use of grab bars for increased safety  Upper Body Dressing: modified independence to nohemi hospital gown  Lower Body Dressing: modified independence using  figure 4 technique on R leg and knee to chest technique on Left leg 2/2 to past femur fx  Toileting: modified independence with hygiene and clothes management      [facility] 6 Click ADL: 24    Treatment & Education:  Role of OT and OT POC  Lowerbody Dressing on figure 4 technique to nohemi/doff clothing  to decrease forward trunk flexion and maintain balance while seated EOB  Transfer Training on use of grab bars and seat if needed during  shower t/fs to reduce fall risk.   Educated on continued OOB activity and UE exercises and impact on increasing functional independence.      Patient left HOB elevated with call button in reach    GOALS:   Multidisciplinary Problems       Occupational Therapy Goals          Problem: Occupational Therapy    Goal Priority Disciplines Outcome Interventions   Occupational Therapy Goal     OT, PT/OT Ongoing, Progressing    Description: Goals to be met by: 2/23/23     Patient will increase functional independence with ADLs by performing:    UE Dressing with [place].  LE Dressing with [place].  Grooming while standing at sink with [place].  Toileting from toilet with [place] for hygiene and clothing management.   Toilet transfer to toilet with [place].  Upper extremity exercise program x15 reps per handout, with independence.                         Time Tracking:     OT Date of Treatment: 02/09/23  OT Start Time:  1056  OT Stop Time: 1113  OT Total Time (min): 17 min    Billable Minutes:Self Care/Home Management 17    OT/DYLAN: OT          2/9/2023

## 2023-02-09 NOTE — SUBJECTIVE & OBJECTIVE
Subjective:     Interval History: Day +6 from a Génesis 200 auto SCT for MM. Remains afebrile. VSS. BP low normal. Stopped amlodipine. Diarrhea improved with PRN imodium. Denies nausea, but appetite waning. Still managing good oral fluid intake at this point. Will resume IVF if she is unable to keep herself hydrated. ANC down to 220 today. Stopped lovenox with plt count of 43K.    Objective:     Vital Signs (Most Recent):  Temp: 99.9 °F (37.7 °C) (02/09/23 1151)  Pulse: 105 (02/09/23 1151)  Resp: 18 (02/09/23 1151)  BP: 109/60 (02/09/23 1151)  SpO2: 96 % (02/09/23 1151) Vital Signs (24h Range):  Temp:  [98.3 °F (36.8 °C)-99.9 °F (37.7 °C)] 99.9 °F (37.7 °C)  Pulse:  [] 105  Resp:  [14-18] 18  SpO2:  [95 %-99 %] 96 %  BP: (108-127)/(60-72) 109/60     Weight: 98.9 kg (217 lb 14.8 oz)  Body mass index is 35.17 kg/m².  Body surface area is 2.15 meters squared.    ECOG SCORE           [unfilled]    Intake/Output - Last 3 Shifts         02/07 0700  02/08 0659 02/08 0700  02/09 0659 02/09 0700  02/10 0659    P.O. 180 2040 910    Total Intake(mL/kg) 180 (1.8) 2040 (20.6) 910 (9.2)    Urine (mL/kg/hr) 1100 (0.5) 3400 (1.4) 300 (0.6)    Stool 0 0 0    Total Output 1100 3400 300    Net -920 -1360 +610           Stool Occurrence 7 x 2 x 1 x            Physical Exam  Constitutional:       Appearance: She is well-developed.   HENT:      Head: Normocephalic and atraumatic.      Mouth/Throat:      Pharynx: No oropharyngeal exudate.   Eyes:      Conjunctiva/sclera: Conjunctivae normal.      Pupils: Pupils are equal, round, and reactive to light.   Cardiovascular:      Rate and Rhythm: Normal rate and regular rhythm.      Heart sounds: Normal heart sounds. No murmur heard.  Pulmonary:      Effort: Pulmonary effort is normal.      Breath sounds: Normal breath sounds.   Abdominal:      General: Bowel sounds are normal. There is no distension.      Palpations: Abdomen is soft.      Tenderness: There is no abdominal tenderness.    Musculoskeletal:         General: No deformity. Normal range of motion.      Cervical back: Normal range of motion and neck supple.   Skin:     General: Skin is warm and dry.      Findings: No erythema or rash.   Neurological:      Mental Status: She is alert and oriented to person, place, and time.   Psychiatric:         Behavior: Behavior normal.         Thought Content: Thought content normal.         Judgment: Judgment normal.       Significant Labs:   CBC:   Recent Labs   Lab 02/08/23  0428 02/09/23  0509   WBC 0.45* 0.26*   HGB 8.9* 8.8*   HCT 27.8* 26.7*   PLT 72* 43*      and CMP:   Recent Labs   Lab 02/08/23  0428 02/09/23  0509    135*   K 4.0 3.9    105   CO2 21* 22*   GLU 92 87   BUN 14 11   CREATININE 0.7 0.7   CALCIUM 8.0* 8.2*   PROT 5.2* 5.2*   ALBUMIN 3.0* 3.0*   BILITOT 0.6 0.6   ALKPHOS 128 128   AST 16 15   ALT 19 16   ANIONGAP 9 8         Diagnostic Results:  I have reviewed all pertinent imaging results/findings within the past 24 hours.

## 2023-02-09 NOTE — PLAN OF CARE
Day +6 of auto transplant. Pt involved in plan of care and communicating needs throughout shift. Up to bathroom independently; no c/o pain. Tolerating small amounts of food, voiding without difficulty, pt states her diarrhea has stopped. Electrolytes replaced PRN. All VSS; no acute events so far this shift.  Pt remaining free from falls or injury throughout shift; bed locked and in lowest position; call light within reach.  Pt instructed to call for assistance as needed.  Q1H rounding done on pt.

## 2023-02-09 NOTE — PLAN OF CARE
AAOX4. Verbal and able to make needs known, CP reviewed with patient . Day 6  of transplant . Remains afebrile. Denies pain at this time. Fall precautions maintained, side rails up x2, call light in reach, bed in low position.

## 2023-02-09 NOTE — ASSESSMENT & PLAN NOTE
- Expect blood counts to drop further with chemo  - Transfuse for hgb < 7 or plts < 10K  - Continue antimicrobial ppx per protocol  - Daily CBC while inpatient  - stopped VTE ppx 2/9/23 with platelets <50K

## 2023-02-09 NOTE — PROGRESS NOTES
[facility] - Oncology ([facility])  Hematology  Bone Marrow Transplant  Progress Note    Patient Name: Raquel Houston  Admission Date: 2/1/2023  Hospital Length of Stay: 8 days  Code Status: Full Code    Subjective:     Interval History: Day +6 from a Génesis 200 auto SCT for MM. Remains afebrile. VSS. BP low normal. Stopped amlodipine. Diarrhea improved with PRN imodium. Denies nausea, but appetite waning. Still managing good oral fluid intake at this point. Will resume IVF if she is unable to keep herself hydrated. ANC down to 220 today. Stopped lovenox with plt count of 43K.    Objective:     Vital Signs (Most Recent):  Temp: 99.9 °F (37.7 °C) (02/09/23 1151)  Pulse: 105 (02/09/23 1151)  Resp: 18 (02/09/23 1151)  BP: 109/60 (02/09/23 1151)  SpO2: 96 % (02/09/23 1151) Vital Signs (24h Range):  Temp:  [98.3 °F (36.8 °C)-99.9 °F (37.7 °C)] 99.9 °F (37.7 °C)  Pulse:  [] 105  Resp:  [14-18] 18  SpO2:  [95 %-99 %] 96 %  BP: (108-127)/(60-72) 109/60     Weight: 98.9 kg (217 lb 14.8 oz)  Body mass index is 35.17 kg/m².  Body surface area is 2.15 meters squared.    ECOG SCORE           [unfilled]    Intake/Output - Last 3 Shifts         02/07 0700  02/08 0659 02/08 0700 02/09 0659 02/09 0700  02/10 0659    P.O. 180 2040 910    Total Intake(mL/kg) 180 (1.8) 2040 (20.6) 910 (9.2)    Urine (mL/kg/hr) 1100 (0.5) 3400 (1.4) 300 (0.6)    Stool 0 0 0    Total Output 1100 3400 300    Net -920 -1360 +610           Stool Occurrence 7 x 2 x 1 x            Physical Exam  Constitutional:       Appearance: She is well-developed.   HENT:      Head: Normocephalic and atraumatic.      Mouth/Throat:      Pharynx: No oropharyngeal exudate.   Eyes:      Conjunctiva/sclera: Conjunctivae normal.      Pupils: Pupils are equal, round, and reactive to light.   Cardiovascular:      Rate and Rhythm: Normal rate and regular rhythm.      Heart sounds: Normal heart sounds. No murmur heard.  Pulmonary:      Effort: Pulmonary effort is normal.       Breath sounds: Normal breath sounds.   Abdominal:      General: Bowel sounds are normal. There is no distension.      Palpations: Abdomen is soft.      Tenderness: There is no abdominal tenderness.   Musculoskeletal:         General: No deformity. Normal range of motion.      Cervical back: Normal range of motion and neck supple.   Skin:     General: Skin is warm and dry.      Findings: No erythema or rash.   Neurological:      Mental Status: She is alert and oriented to person, place, and time.   Psychiatric:         Behavior: Behavior normal.         Thought Content: Thought content normal.         Judgment: Judgment normal.       Significant Labs:   CBC:   Recent Labs   Lab 02/08/23 0428 02/09/23  0509   WBC 0.45* 0.26*   HGB 8.9* 8.8*   HCT 27.8* 26.7*   PLT 72* 43*      and CMP:   Recent Labs   Lab 02/08/23 0428 02/09/23  0509    135*   K 4.0 3.9    105   CO2 21* 22*   GLU 92 87   BUN 14 11   CREATININE 0.7 0.7   CALCIUM 8.0* 8.2*   PROT 5.2* 5.2*   ALBUMIN 3.0* 3.0*   BILITOT 0.6 0.6   ALKPHOS 128 128   AST 16 15   ALT 19 16   ANIONGAP 9 8         Diagnostic Results:  I have reviewed all pertinent imaging results/findings within the past 24 hours.    Assessment/Plan:     * History of autologous stem cell transplant  - Approved by the transplant selection committee for a Génesis 200  - Underwent consent signing with Dr. Casas 1/20/2023  - Admitted for Génesis 200 Auto on 2/1/22  - Tolerated chemotherapy on 2/2 with no issues  - Received 3 bags with a total CD34 dose of 3.13 x10^6/kg on 2/3 without incident  - Today is day +6                Planned conditioning regimen:  Melphalan on Day -1     Antimicrobial Prophylaxis:  Acyclovir starting on Day -1  Levofloxacin starting on Day -1  Fluconazole starting on Day -1     Growth Factor Support:  Neupogen starting on Day +7      Caregiver: daughter in law and son  Post-transplant discharge plans: [facility] [place]    Multiple myeloma in remission  - Complex  cytogenetics with gain 1q21, t(4;14), and monosomy 13  - R-ISS stage III (elevated beta-2 microglobulin, elevated LDH, decreased albumin, and high risk cytogenetics)  - S/p 5 Cycles D-Vrd (discontinued jasen after cycle 4)  - Has had significant clinical improvement as well as reduction in IgG and M-spike showing excellent response  - see autologous stem cell transplant     Pancytopenia due to chemotherapy  - Expect blood counts to drop further with chemo  - Transfuse for hgb < 7 or plts < 10K  - Continue antimicrobial ppx per protocol  - Daily CBC while inpatient  - stopped VTE ppx 2/9/23 with platelets <50K    Chemotherapy-induced diarrhea  - Started having loose stools on 2/7/23  - Ruled out c-diff  - Started PRN imodium    HTN (hypertension)  - Continued home amlodipine on admission, but now holding for low normal BP.         VTE Risk Mitigation (From admission, onward)         Ordered     heparin (porcine) injection 1,600 Units  As needed (PRN)         02/01/23 1528     IP VTE HIGH RISK PATIENT  Once         02/01/23 1442     Place sequential compression device  Until discontinued         02/01/23 1442                Disposition: Inpatient for SCT. Awaiting engraftment.    Wendy Vazquez, NP  Bone Marrow Transplant  [facility] - Oncology ([facility])

## 2023-02-09 NOTE — ASSESSMENT & PLAN NOTE
- Approved by the transplant selection committee for a Génesis 200  - Underwent consent signing with Dr. Casas 1/20/2023  - Admitted for Génesis 200 Auto on 2/1/22  - Tolerated chemotherapy on 2/2 with no issues  - Received 3 bags with a total CD34 dose of 3.13 x10^6/kg on 2/3 without incident  - Today is day +6                Planned conditioning regimen:  Melphalan on Day -1     Antimicrobial Prophylaxis:  Acyclovir starting on Day -1  Levofloxacin starting on Day -1  Fluconazole starting on Day -1     Growth Factor Support:  Neupogen starting on Day +7      Caregiver: daughter in law and son  Post-transplant discharge plans: [facility]

## 2023-02-09 NOTE — AI DETERIORATION ALERT
Evaluation of Early Detection of Sepsis Risk     Patient Name: Raquel Houston  MRN:  [id number]  Primary Care Team: [facility] HEMATOLOGY BMT  Date of Admission:  2/1/2023     Principal Problem:  History of autologous stem cell transplant   Date of Review: 02/09/2023    Patient reviewed for elevated sepsis risk score. Sepsis Screen Negative  Will continue to monitor for signs and symptoms of new or worsening infection and screen as needed. Please notify Rapid Response Team for any hypotension or decompensation.    Afebrile, low blood counts from chemo.     Sepsis Screen Results     IP Sepsis Screen (most recent)       Sepsis Screen (IP) - 02/09/23 5862       Is the patient's history or complaint suggestive of a possible infection? Yes  -SS    Are there at least two of the following signs and symptoms present? Yes  -SS    Sepsis signs/symptoms - Tachycardia Tachycardia     >90  -SS    Sepsis signs/symptoms - WBC WBC < 4,000 or WBC > 12,000  -SS    Are any of the following organ dysfunction criteria present and not considered to be due to a chronic condition? No   2/2 chemo -SS    Organ Dysfunction Criteria Total Bilirubin > 2.0 Platelet count < 100,000  -SS    Initiate Sepsis Protocol No  -SS              User Key  (r) = Recorded By, (t) = Taken By, (c) = Cosigned By      Initials Name    SS SULAIMAN House PA-C  Sepsis

## 2023-02-10 LAB
ALBUMIN SERPL BCP-MCNC: 2.9 G/DL (ref 3.5–5.2)
ALP SERPL-CCNC: 150 U/L (ref 55–135)
ALT SERPL W/O P-5'-P-CCNC: 23 U/L (ref 10–44)
ANION GAP SERPL CALC-SCNC: 10 MMOL/L (ref 8–16)
ANISOCYTOSIS BLD QL SMEAR: SLIGHT
AST SERPL-CCNC: 22 U/L (ref 10–40)
BASOPHILS # BLD AUTO: ABNORMAL K/UL (ref 0–0.2)
BASOPHILS NFR BLD: 0 % (ref 0–1.9)
BILIRUB SERPL-MCNC: 0.5 MG/DL (ref 0.1–1)
BUN SERPL-MCNC: 11 MG/DL (ref 6–20)
CALCIUM SERPL-MCNC: 8.3 MG/DL (ref 8.7–10.5)
CHLORIDE SERPL-SCNC: 103 MMOL/L (ref 95–110)
CO2 SERPL-SCNC: 22 MMOL/L (ref 23–29)
CREAT SERPL-MCNC: 0.7 MG/DL (ref 0.5–1.4)
DIFFERENTIAL METHOD: ABNORMAL
EOSINOPHIL # BLD AUTO: ABNORMAL K/UL (ref 0–0.5)
EOSINOPHIL NFR BLD: 0 % (ref 0–8)
ERYTHROCYTE [DISTWIDTH] IN BLOOD BY AUTOMATED COUNT: 14.2 % (ref 11.5–14.5)
EST. GFR  (NO RACE VARIABLE): >60 ML/MIN/1.73 M^2
GLUCOSE SERPL-MCNC: 110 MG/DL (ref 70–110)
HCT VFR BLD AUTO: 24.4 % (ref 37–48.5)
HGB BLD-MCNC: 8.2 G/DL (ref 12–16)
IMM GRANULOCYTES # BLD AUTO: ABNORMAL K/UL (ref 0–0.04)
IMM GRANULOCYTES NFR BLD AUTO: ABNORMAL % (ref 0–0.5)
LYMPHOCYTES # BLD AUTO: ABNORMAL K/UL (ref 1–4.8)
LYMPHOCYTES NFR BLD: 50 % (ref 18–48)
MAGNESIUM SERPL-MCNC: 1.8 MG/DL (ref 1.6–2.6)
MCH RBC QN AUTO: 28.7 PG (ref 27–31)
MCHC RBC AUTO-ENTMCNC: 33.6 G/DL (ref 32–36)
MCV RBC AUTO: 85 FL (ref 82–98)
MONOCYTES # BLD AUTO: ABNORMAL K/UL (ref 0.3–1)
MONOCYTES NFR BLD: 0 % (ref 4–15)
NEUTROPHILS NFR BLD: 50 % (ref 38–73)
NRBC BLD-RTO: 0 /100 WBC
PHOSPHATE SERPL-MCNC: 3.1 MG/DL (ref 2.7–4.5)
PLATELET # BLD AUTO: 22 K/UL (ref 150–450)
PLATELET BLD QL SMEAR: ABNORMAL
PMV BLD AUTO: 11.7 FL (ref 9.2–12.9)
POTASSIUM SERPL-SCNC: 3.7 MMOL/L (ref 3.5–5.1)
PROT SERPL-MCNC: 5.2 G/DL (ref 6–8.4)
RBC # BLD AUTO: 2.86 M/UL (ref 4–5.4)
SODIUM SERPL-SCNC: 135 MMOL/L (ref 136–145)
WBC # BLD AUTO: 0.07 K/UL (ref 3.9–12.7)

## 2023-02-10 PROCEDURE — 85007 BL SMEAR W/DIFF WBC COUNT: CPT | Performed by: INTERNAL MEDICINE

## 2023-02-10 PROCEDURE — 97116 GAIT TRAINING THERAPY: CPT | Mod: CQ

## 2023-02-10 PROCEDURE — 20600001 HC STEP DOWN PRIVATE ROOM

## 2023-02-10 PROCEDURE — 85027 COMPLETE CBC AUTOMATED: CPT | Performed by: INTERNAL MEDICINE

## 2023-02-10 PROCEDURE — 80053 COMPREHEN METABOLIC PANEL: CPT | Performed by: INTERNAL MEDICINE

## 2023-02-10 PROCEDURE — 99233 PR SUBSEQUENT HOSPITAL CARE,LEVL III: ICD-10-PCS | Mod: ,,, | Performed by: INTERNAL MEDICINE

## 2023-02-10 PROCEDURE — 83735 ASSAY OF MAGNESIUM: CPT | Performed by: INTERNAL MEDICINE

## 2023-02-10 PROCEDURE — 25000003 PHARM REV CODE 250: Performed by: NURSE PRACTITIONER

## 2023-02-10 PROCEDURE — 63600175 PHARM REV CODE 636 W HCPCS: Mod: JG | Performed by: INTERNAL MEDICINE

## 2023-02-10 PROCEDURE — 84100 ASSAY OF PHOSPHORUS: CPT | Performed by: INTERNAL MEDICINE

## 2023-02-10 PROCEDURE — 25000003 PHARM REV CODE 250: Performed by: INTERNAL MEDICINE

## 2023-02-10 PROCEDURE — 99233 SBSQ HOSP IP/OBS HIGH 50: CPT | Mod: ,,, | Performed by: INTERNAL MEDICINE

## 2023-02-10 RX ADMIN — ACYCLOVIR 800 MG: 200 CAPSULE ORAL at 09:02

## 2023-02-10 RX ADMIN — ACYCLOVIR 800 MG: 200 CAPSULE ORAL at 08:02

## 2023-02-10 RX ADMIN — FILGRASTIM 480 MCG: 480 INJECTION, SOLUTION INTRAVENOUS; SUBCUTANEOUS at 09:02

## 2023-02-10 RX ADMIN — MAGNESIUM OXIDE TAB 400 MG (241.3 MG ELEMENTAL MG) 400 MG: 400 (241.3 MG) TAB at 01:02

## 2023-02-10 RX ADMIN — Medication 1 DOSE: at 09:02

## 2023-02-10 RX ADMIN — PANTOPRAZOLE SODIUM 40 MG: 40 TABLET, DELAYED RELEASE ORAL at 09:02

## 2023-02-10 RX ADMIN — LOPERAMIDE HYDROCHLORIDE 2 MG: 2 CAPSULE ORAL at 08:02

## 2023-02-10 RX ADMIN — LEVOFLOXACIN 500 MG: 500 TABLET, FILM COATED ORAL at 09:02

## 2023-02-10 RX ADMIN — Medication 1 DOSE: at 01:02

## 2023-02-10 RX ADMIN — Medication 1 DOSE: at 04:02

## 2023-02-10 RX ADMIN — FLUCONAZOLE 400 MG: 200 TABLET ORAL at 09:02

## 2023-02-10 RX ADMIN — Medication 1 DOSE: at 08:02

## 2023-02-10 RX ADMIN — MAGNESIUM OXIDE TAB 400 MG (241.3 MG ELEMENTAL MG) 400 MG: 400 (241.3 MG) TAB at 09:02

## 2023-02-10 NOTE — ASSESSMENT & PLAN NOTE
- Approved by the transplant selection committee for a Génesis 200  - Underwent consent signing with Dr. Casas 1/20/2023  - Admitted for Génesis 200 Auto on 2/1/22  - Tolerated chemotherapy on 2/2 with no issues  - Received 3 bags with a total CD34 dose of 3.13 x10^6/kg on 2/3 without incident  - Today is day +7                Planned conditioning regimen:  Melphalan on Day -1     Antimicrobial Prophylaxis:  Acyclovir starting on Day -1  Levofloxacin starting on Day -1  Fluconazole starting on Day -1     Growth Factor Support:  Neupogen starting on Day +7      Caregiver: daughter in law and son  Post-transplant discharge plans: [facility]

## 2023-02-10 NOTE — PROGRESS NOTES
[facility] - Oncology ([facility])  Hematology  Bone Marrow Transplant  Progress Note    Patient Name: Raquel Houston  Admission Date: 2/1/2023  Hospital Length of Stay: 9 days  Code Status: Full Code    Subjective:     Interval History: Day +7 from a Génesis 200 auto SCT for MM. Had unsustained temp of 100.4 this morning. VS otherwise stable. Monitoring closely for neutropenic fevers. ANC down to 35 today. Day 1 of neupogen. Oral intake less but still adequate. Oral fluid intake good. Will resume IVF if she is not longer able to hydrate orally.     Objective:     Vital Signs (Most Recent):  Temp: 99.1 °F (37.3 °C) (02/10/23 1126)  Pulse: (!) 119 (02/10/23 1126)  Resp: 18 (02/10/23 1126)  BP: (!) 142/74 (02/10/23 1126)  SpO2: 99 % (02/10/23 1126) Vital Signs (24h Range):  Temp:  [98.6 °F (37 °C)-100.4 °F (38 °C)] 99.1 °F (37.3 °C)  Pulse:  [] 119  Resp:  [18-20] 18  SpO2:  [96 %-100 %] 99 %  BP: (100-142)/(60-74) 142/74     Weight: 98.2 kg (216 lb 7.9 oz)  Body mass index is 34.94 kg/m².  Body surface area is 2.14 meters squared.    ECOG SCORE           [unfilled]    Intake/Output - Last 3 Shifts         02/08 0700  02/09 0659 02/09 0700  02/10 0659 02/10 0700  02/11 0659    P.O. 2040 1530 236    Total Intake(mL/kg) 2040 (20.6) 1530 (15.6) 236 (2.4)    Urine (mL/kg/hr) 3400 (1.4) 1650 (0.7) 350 (0.7)    Stool 0 0     Total Output 3400 1650 350    Net -1360 -120 -114           Stool Occurrence 2 x 1 x             Physical Exam  Constitutional:       Appearance: She is well-developed.   HENT:      Head: Normocephalic and atraumatic.      Mouth/Throat:      Pharynx: No oropharyngeal exudate.   Eyes:      Conjunctiva/sclera: Conjunctivae normal.      Pupils: Pupils are equal, round, and reactive to light.   Cardiovascular:      Rate and Rhythm: Normal rate and regular rhythm.      Heart sounds: Normal heart sounds. No murmur heard.  Pulmonary:      Effort: Pulmonary effort is normal.      Breath sounds: Normal breath  sounds.   Abdominal:      General: Bowel sounds are normal. There is no distension.      Palpations: Abdomen is soft.      Tenderness: There is no abdominal tenderness.   Musculoskeletal:         General: No deformity. Normal range of motion.      Cervical back: Normal range of motion and neck supple.   Skin:     General: Skin is warm and dry.      Findings: No erythema or rash.   Neurological:      Mental Status: She is alert and oriented to person, place, and time.   Psychiatric:         Behavior: Behavior normal.         Thought Content: Thought content normal.         Judgment: Judgment normal.       Significant Labs:   CBC:   Recent Labs   Lab 02/09/23  0509 02/10/23  0559   WBC 0.26* 0.07*   HGB 8.8* 8.2*   HCT 26.7* 24.4*   PLT 43* 22*      and CMP:   Recent Labs   Lab 02/09/23  0509 02/10/23  0559   * 135*   K 3.9 3.7    103   CO2 22* 22*   GLU 87 110   BUN 11 11   CREATININE 0.7 0.7   CALCIUM 8.2* 8.3*   PROT 5.2* 5.2*   ALBUMIN 3.0* 2.9*   BILITOT 0.6 0.5   ALKPHOS 128 150*   AST 15 22   ALT 16 23   ANIONGAP 8 10         Diagnostic Results:  I have reviewed all pertinent imaging results/findings within the past 24 hours.    Assessment/Plan:     * History of autologous stem cell transplant  - Approved by the transplant selection committee for a Génesis 200  - Underwent consent signing with Dr. Casas 1/20/2023  - Admitted for Génesis 200 Auto on 2/1/22  - Tolerated chemotherapy on 2/2 with no issues  - Received 3 bags with a total CD34 dose of 3.13 x10^6/kg on 2/3 without incident  - Today is day +7                Planned conditioning regimen:  Melphalan on Day -1     Antimicrobial Prophylaxis:  Acyclovir starting on Day -1  Levofloxacin starting on Day -1  Fluconazole starting on Day -1     Growth Factor Support:  Neupogen starting on Day +7      Caregiver: daughter in law and son  Post-transplant discharge plans: [facility] [place]    Multiple myeloma in remission  - Complex cytogenetics with gain 1q21,  t(4;14), and monosomy 13  - R-ISS stage III (elevated beta-2 microglobulin, elevated LDH, decreased albumin, and high risk cytogenetics)  - S/p 5 Cycles D-Vrd (discontinued jasen after cycle 4)  - Has had significant clinical improvement as well as reduction in IgG and M-spike showing excellent response  - see autologous stem cell transplant     Pancytopenia due to chemotherapy  - Expect blood counts to drop further with chemo  - Transfuse for hgb < 7 or plts < 10K  - Continue antimicrobial ppx per protocol  - Daily CBC while inpatient  - Stopped VTE ppx 2/9/23 with platelets <50K  - Unsustained temp of 100.4 today. Monitoring closely for neutropenic fevers.    Chemotherapy-induced diarrhea  - Started having loose stools on 2/7/23  - Ruled out c-diff  - Started PRN imodium and now improved    HTN (hypertension)  - Continued home amlodipine on admission, but now holding for low normal BP.         VTE Risk Mitigation (From admission, onward)         Ordered     heparin (porcine) injection 1,600 Units  As needed (PRN)         02/01/23 1528     IP VTE HIGH RISK PATIENT  Once         02/01/23 1442     Place sequential compression device  Until discontinued         02/01/23 1442                Disposition: Inpatient for SCT. Awaiting engraftment.    Wendy Vazquez, NP  Bone Marrow Transplant  [facility] - Oncology ([facility])

## 2023-02-10 NOTE — PT/OT/SLP PROGRESS
"Physical Therapy Treatment    Patient Name:  Raquel Houston   MRN:  [id number]    Recommendations:     Discharge Recommendations: home (no PT needs anticipated)  Discharge Equipment Recommendations: none  Barriers to discharge: None    Assessment:     Raquel Houston is a 62 y.o. female admitted with a medical diagnosis of History of autologous stem cell transplant.  She presents with the following impairments/functional limitations: weakness, impaired endurance.  Pt was agreeable to shorter session today. Pt ambulated within the room and completed seated BLE therex sitting EOB. Pt completed gait with Supervision and noAD and completed therex with no issues. Pt educated on ambulating with staff during the week and perform therex throughout the day. Pt continues to benefit from therapy to prevent deconditioning and achieve highest level of independence prior to discharge.     Rehab Prognosis: Good; patient would benefit from acute skilled PT services to address these deficits and reach maximum level of function.    Recent Surgery: * No surgery found *      Plan:     During this hospitalization, patient to be seen 1 x/week to address the identified rehab impairments via gait training, therapeutic activities, therapeutic exercises and progress toward the following goals:    Plan of Care Expires:  03/04/23    Subjective     Chief Complaint: has a fever  Patient/Family Comments/goals: "it's going down" [referring to fever]  Pain/Comfort:  Pain Rating 1: 0/10  Pain Rating Post-Intervention 1: 0/10      Objective:     Communicated with RN prior to session.  Patient found HOB elevated with  (no active lines) upon PT entry to room.     General Precautions: Standard, fall  Orthopedic Precautions: N/A  Braces: N/A  Respiratory Status: Room air     Functional Mobility:  Bed Mobility:   Scooting to EOB: modified independence  Supine to Sit: modified independence; to right side of bed  Sit to Supine: modified " independence  Transfers:   Sit <> Stand Transfer: stand by assistance with no assistive device   Gait:  Pt ambulated ~60ft with supervision and no assistive device. No LOB noted  [place] distance d/t wanting to stay in the room and not feeling the best (elevated temperature)  Gait Deviation(s): mostly steady gait with decrease abeba.   Verbal/tactile cues for posture and pacing  Balance:   Static/Dynamic sitting EOB balance: SBA  Completed seated therex    AM-PAC 6 CLICK MOBILITY  Turning over in bed (including adjusting bedclothes, sheets and blankets)?: 4  Sitting down on and standing up from a chair with arms (e.g., wheelchair, bedside commode, etc.): 4  Moving from lying on back to sitting on the side of the bed?: 4  Moving to and from a bed to a chair (including a wheelchair)?: 4  Need to walk in hospital room?: 3  Climbing 3-5 steps with a railing?: 3  Basic Mobility Total Score: 22       Treatment & Education:  Seated BLE therex x15 reps: heel/toe raises, LAQ, marching   Educate pt to ambulated with staff 2-3 times a day with staff .   Patient educated on role of therapy, goals of session, and benefits of out of bed mobility.   Instructed on use of call button and importance of calling nursing staff for assistance with mobility   Questions/concerns addressed within PTA scope of practice  Pt verbalized understanding.    Patient left supine with all lines intact and call button in reach..    GOALS:   Multidisciplinary Problems       Physical Therapy Goals          Problem: Physical Therapy    Goal Priority Disciplines Outcome Goal Variances Interventions   Physical Therapy Goal     PT, PT/OT Ongoing, Progressing     Description: Goals to be met by: 23     Patient will increase functional independence with mobility by performin. Pt will tolerate daily ambulation program 3x 100-200 ft. With no AD, with no LOB with supervision   2. Pt will demonstrate independence with BLE HEP therex per handout x 30  reps for BLE strengthening                              Time Tracking:     PT Received On: 02/10/23  PT Start Time: 1108     PT Stop Time: 1117  PT Total Time (min): 9 min     Billable Minutes: Gait Training 9    Treatment Type: Treatment  PT/PTA: PTA     PTA Visit Number: 1     02/10/2023

## 2023-02-10 NOTE — ASSESSMENT & PLAN NOTE
- Started having loose stools on 2/7/23  - Ruled out c-diff  - Started PRN imodium and now improved

## 2023-02-10 NOTE — PLAN OF CARE
AAOX4. Verbal and able to make needs known, CP reviewed with patient . Day 7  of transplant . Remains afebrile. Denies pain at this time. Fall precautions maintained, side rails up x2, call light in reach, bed in low position.

## 2023-02-10 NOTE — SUBJECTIVE & OBJECTIVE
Subjective:     Interval History: Day +7 from a Génesis 200 auto SCT for MM. Had unsustained temp of 100.4 this morning. VS otherwise stable. Monitoring closely for neutropenic fevers. ANC down to 35 today. Day 1 of neupogen. Oral intake less but still adequate. Oral fluid intake good. Will resume IVF if she is not longer able to hydrate orally.     Objective:     Vital Signs (Most Recent):  Temp: 99.1 °F (37.3 °C) (02/10/23 1126)  Pulse: (!) 119 (02/10/23 1126)  Resp: 18 (02/10/23 1126)  BP: (!) 142/74 (02/10/23 1126)  SpO2: 99 % (02/10/23 1126) Vital Signs (24h Range):  Temp:  [98.6 °F (37 °C)-100.4 °F (38 °C)] 99.1 °F (37.3 °C)  Pulse:  [] 119  Resp:  [18-20] 18  SpO2:  [96 %-100 %] 99 %  BP: (100-142)/(60-74) 142/74     Weight: 98.2 kg (216 lb 7.9 oz)  Body mass index is 34.94 kg/m².  Body surface area is 2.14 meters squared.    ECOG SCORE           [unfilled]    Intake/Output - Last 3 Shifts         02/08 0700  02/09 0659 02/09 0700  02/10 0659 02/10 0700  02/11 0659    P.O. 2040 1530 236    Total Intake(mL/kg) 2040 (20.6) 1530 (15.6) 236 (2.4)    Urine (mL/kg/hr) 3400 (1.4) 1650 (0.7) 350 (0.7)    Stool 0 0     Total Output 3400 1650 350    Net -1360 -120 -114           Stool Occurrence 2 x 1 x             Physical Exam  Constitutional:       Appearance: She is well-developed.   HENT:      Head: Normocephalic and atraumatic.      Mouth/Throat:      Pharynx: No oropharyngeal exudate.   Eyes:      Conjunctiva/sclera: Conjunctivae normal.      Pupils: Pupils are equal, round, and reactive to light.   Cardiovascular:      Rate and Rhythm: Normal rate and regular rhythm.      Heart sounds: Normal heart sounds. No murmur heard.  Pulmonary:      Effort: Pulmonary effort is normal.      Breath sounds: Normal breath sounds.   Abdominal:      General: Bowel sounds are normal. There is no distension.      Palpations: Abdomen is soft.      Tenderness: There is no abdominal tenderness.   Musculoskeletal:         General:  No deformity. Normal range of motion.      Cervical back: Normal range of motion and neck supple.   Skin:     General: Skin is warm and dry.      Findings: No erythema or rash.   Neurological:      Mental Status: She is alert and oriented to person, place, and time.   Psychiatric:         Behavior: Behavior normal.         Thought Content: Thought content normal.         Judgment: Judgment normal.       Significant Labs:   CBC:   Recent Labs   Lab 02/09/23  0509 02/10/23  0559   WBC 0.26* 0.07*   HGB 8.8* 8.2*   HCT 26.7* 24.4*   PLT 43* 22*      and CMP:   Recent Labs   Lab 02/09/23  0509 02/10/23  0559   * 135*   K 3.9 3.7    103   CO2 22* 22*   GLU 87 110   BUN 11 11   CREATININE 0.7 0.7   CALCIUM 8.2* 8.3*   PROT 5.2* 5.2*   ALBUMIN 3.0* 2.9*   BILITOT 0.6 0.5   ALKPHOS 128 150*   AST 15 22   ALT 16 23   ANIONGAP 8 10         Diagnostic Results:  I have reviewed all pertinent imaging results/findings within the past 24 hours.

## 2023-02-10 NOTE — PLAN OF CARE
Plan of care reviewed with patient .  FAll precautions maintained, side rails up x2, call light in reach , bed in low position and locked, nonskid socks on.  Tolerating a regular diet without difficulty.  Appetite poor .  Voiding without difficulty.

## 2023-02-10 NOTE — ASSESSMENT & PLAN NOTE
- Expect blood counts to drop further with chemo  - Transfuse for hgb < 7 or plts < 10K  - Continue antimicrobial ppx per protocol  - Daily CBC while inpatient  - Stopped VTE ppx 2/9/23 with platelets <50K  - Unsustained temp of 100.4 today. Monitoring closely for neutropenic fevers.

## 2023-02-11 PROBLEM — R50.81 NEUTROPENIC FEVER: Status: ACTIVE | Noted: 2023-02-11

## 2023-02-11 PROBLEM — D70.9 NEUTROPENIC FEVER: Status: ACTIVE | Noted: 2023-02-11

## 2023-02-11 LAB
ABO + RH BLD: ABNORMAL
ALBUMIN SERPL BCP-MCNC: 2.9 G/DL (ref 3.5–5.2)
ALP SERPL-CCNC: 200 U/L (ref 55–135)
ALT SERPL W/O P-5'-P-CCNC: 62 U/L (ref 10–44)
ANION GAP SERPL CALC-SCNC: 9 MMOL/L (ref 8–16)
AST SERPL-CCNC: 54 U/L (ref 10–40)
BASOPHILS # BLD AUTO: 0 K/UL (ref 0–0.2)
BASOPHILS NFR BLD: 0 % (ref 0–1.9)
BILIRUB SERPL-MCNC: 0.7 MG/DL (ref 0.1–1)
BILIRUB UR QL STRIP: NEGATIVE
BLD GP AB SCN CELLS X3 SERPL QL: ABNORMAL
BLD PROD TYP BPU: NORMAL
BLOOD GROUP ANTIBODIES SERPL: NORMAL
BLOOD UNIT EXPIRATION DATE: NORMAL
BLOOD UNIT TYPE CODE: 6200
BLOOD UNIT TYPE: NORMAL
BUN SERPL-MCNC: 10 MG/DL (ref 6–20)
CALCIUM SERPL-MCNC: 8.2 MG/DL (ref 8.7–10.5)
CHLORIDE SERPL-SCNC: 101 MMOL/L (ref 95–110)
CLARITY UR REFRACT.AUTO: CLEAR
CO2 SERPL-SCNC: 20 MMOL/L (ref 23–29)
CODING SYSTEM: NORMAL
COLOR UR AUTO: YELLOW
CREAT SERPL-MCNC: 0.9 MG/DL (ref 0.5–1.4)
CROSSMATCH INTERPRETATION: NORMAL
DAT IGG-SP REAG RBC-IMP: NORMAL
DIFFERENTIAL METHOD: ABNORMAL
DISPENSE STATUS: NORMAL
EOSINOPHIL # BLD AUTO: 0 K/UL (ref 0–0.5)
EOSINOPHIL NFR BLD: 0 % (ref 0–8)
ERYTHROCYTE [DISTWIDTH] IN BLOOD BY AUTOMATED COUNT: 13.9 % (ref 11.5–14.5)
EST. GFR  (NO RACE VARIABLE): >60 ML/MIN/1.73 M^2
GLUCOSE SERPL-MCNC: 108 MG/DL (ref 70–110)
GLUCOSE UR QL STRIP: NEGATIVE
HCT VFR BLD AUTO: 24.6 % (ref 37–48.5)
HGB BLD-MCNC: 8 G/DL (ref 12–16)
HGB UR QL STRIP: NEGATIVE
IMM GRANULOCYTES # BLD AUTO: 0 K/UL (ref 0–0.04)
IMM GRANULOCYTES NFR BLD AUTO: 0 % (ref 0–0.5)
KETONES UR QL STRIP: ABNORMAL
LEUKOCYTE ESTERASE UR QL STRIP: NEGATIVE
LYMPHOCYTES # BLD AUTO: 0 K/UL (ref 1–4.8)
LYMPHOCYTES NFR BLD: 75 % (ref 18–48)
MAGNESIUM SERPL-MCNC: 1.7 MG/DL (ref 1.6–2.6)
MCH RBC QN AUTO: 28 PG (ref 27–31)
MCHC RBC AUTO-ENTMCNC: 32.5 G/DL (ref 32–36)
MCV RBC AUTO: 86 FL (ref 82–98)
MONOCYTES # BLD AUTO: 0 K/UL (ref 0.3–1)
MONOCYTES NFR BLD: 0 % (ref 4–15)
NEUTROPHILS # BLD AUTO: 0 K/UL (ref 1.8–7.7)
NEUTROPHILS NFR BLD: 25 % (ref 38–73)
NITRITE UR QL STRIP: NEGATIVE
NRBC BLD-RTO: 0 /100 WBC
PH UR STRIP: 6 [PH] (ref 5–8)
PHOSPHATE SERPL-MCNC: 2.8 MG/DL (ref 2.7–4.5)
PLATELET # BLD AUTO: 7 K/UL (ref 150–450)
PMV BLD AUTO: 10.9 FL (ref 9.2–12.9)
POTASSIUM SERPL-SCNC: 3.6 MMOL/L (ref 3.5–5.1)
PROT SERPL-MCNC: 5.4 G/DL (ref 6–8.4)
PROT UR QL STRIP: ABNORMAL
RBC # BLD AUTO: 2.86 M/UL (ref 4–5.4)
SODIUM SERPL-SCNC: 130 MMOL/L (ref 136–145)
SP GR UR STRIP: 1.02 (ref 1–1.03)
SPHEROCYTES BLD QL SMEAR: ABNORMAL
UNIT NUMBER: NORMAL
URN SPEC COLLECT METH UR: ABNORMAL
WBC # BLD AUTO: 0.04 K/UL (ref 3.9–12.7)

## 2023-02-11 PROCEDURE — 87088 URINE BACTERIA CULTURE: CPT | Performed by: STUDENT IN AN ORGANIZED HEALTH CARE EDUCATION/TRAINING PROGRAM

## 2023-02-11 PROCEDURE — 25000003 PHARM REV CODE 250: Performed by: INTERNAL MEDICINE

## 2023-02-11 PROCEDURE — 86870 RBC ANTIBODY IDENTIFICATION: CPT | Performed by: INTERNAL MEDICINE

## 2023-02-11 PROCEDURE — 83735 ASSAY OF MAGNESIUM: CPT | Performed by: NURSE PRACTITIONER

## 2023-02-11 PROCEDURE — 84100 ASSAY OF PHOSPHORUS: CPT | Performed by: NURSE PRACTITIONER

## 2023-02-11 PROCEDURE — 25000003 PHARM REV CODE 250: Performed by: NURSE PRACTITIONER

## 2023-02-11 PROCEDURE — 81003 URINALYSIS AUTO W/O SCOPE: CPT | Performed by: STUDENT IN AN ORGANIZED HEALTH CARE EDUCATION/TRAINING PROGRAM

## 2023-02-11 PROCEDURE — 85025 COMPLETE CBC W/AUTO DIFF WBC: CPT | Performed by: NURSE PRACTITIONER

## 2023-02-11 PROCEDURE — 63600175 PHARM REV CODE 636 W HCPCS: Performed by: INTERNAL MEDICINE

## 2023-02-11 PROCEDURE — P9037 PLATE PHERES LEUKOREDU IRRAD: HCPCS | Performed by: STUDENT IN AN ORGANIZED HEALTH CARE EDUCATION/TRAINING PROGRAM

## 2023-02-11 PROCEDURE — 80053 COMPREHEN METABOLIC PANEL: CPT | Performed by: NURSE PRACTITIONER

## 2023-02-11 PROCEDURE — 86880 COOMBS TEST DIRECT: CPT | Performed by: INTERNAL MEDICINE

## 2023-02-11 PROCEDURE — 99233 SBSQ HOSP IP/OBS HIGH 50: CPT | Mod: ,,, | Performed by: INTERNAL MEDICINE

## 2023-02-11 PROCEDURE — 99233 PR SUBSEQUENT HOSPITAL CARE,LEVL III: ICD-10-PCS | Mod: ,,, | Performed by: INTERNAL MEDICINE

## 2023-02-11 PROCEDURE — 20600001 HC STEP DOWN PRIVATE ROOM

## 2023-02-11 PROCEDURE — 87040 BLOOD CULTURE FOR BACTERIA: CPT | Performed by: STUDENT IN AN ORGANIZED HEALTH CARE EDUCATION/TRAINING PROGRAM

## 2023-02-11 PROCEDURE — 86922 COMPATIBILITY TEST ANTIGLOB: CPT | Performed by: NURSE PRACTITIONER

## 2023-02-11 PROCEDURE — 86900 BLOOD TYPING SEROLOGIC ABO: CPT | Performed by: INTERNAL MEDICINE

## 2023-02-11 PROCEDURE — 36430 TRANSFUSION BLD/BLD COMPNT: CPT

## 2023-02-11 PROCEDURE — 63600175 PHARM REV CODE 636 W HCPCS: Performed by: STUDENT IN AN ORGANIZED HEALTH CARE EDUCATION/TRAINING PROGRAM

## 2023-02-11 PROCEDURE — 87086 URINE CULTURE/COLONY COUNT: CPT | Performed by: STUDENT IN AN ORGANIZED HEALTH CARE EDUCATION/TRAINING PROGRAM

## 2023-02-11 PROCEDURE — 25000003 PHARM REV CODE 250: Performed by: STUDENT IN AN ORGANIZED HEALTH CARE EDUCATION/TRAINING PROGRAM

## 2023-02-11 RX ORDER — HYDROCODONE BITARTRATE AND ACETAMINOPHEN 500; 5 MG/1; MG/1
TABLET ORAL
Status: DISCONTINUED | OUTPATIENT
Start: 2023-02-11 | End: 2023-02-15 | Stop reason: HOSPADM

## 2023-02-11 RX ORDER — ACETAMINOPHEN 325 MG/1
650 TABLET ORAL EVERY 6 HOURS PRN
Status: DISCONTINUED | OUTPATIENT
Start: 2023-02-11 | End: 2023-02-15 | Stop reason: HOSPADM

## 2023-02-11 RX ADMIN — PANTOPRAZOLE SODIUM 40 MG: 40 TABLET, DELAYED RELEASE ORAL at 09:02

## 2023-02-11 RX ADMIN — HEPARIN SODIUM 1600 UNITS: 1000 INJECTION, SOLUTION INTRAVENOUS; SUBCUTANEOUS at 06:02

## 2023-02-11 RX ADMIN — ACYCLOVIR 800 MG: 200 CAPSULE ORAL at 09:02

## 2023-02-11 RX ADMIN — CEFEPIME 2 G: 2 INJECTION, POWDER, FOR SOLUTION INTRAVENOUS at 05:02

## 2023-02-11 RX ADMIN — ACETAMINOPHEN 650 MG: 325 TABLET ORAL at 07:02

## 2023-02-11 RX ADMIN — ACYCLOVIR 800 MG: 200 CAPSULE ORAL at 08:02

## 2023-02-11 RX ADMIN — LEVOFLOXACIN 500 MG: 500 TABLET, FILM COATED ORAL at 09:02

## 2023-02-11 RX ADMIN — Medication 1 DOSE: at 05:02

## 2023-02-11 RX ADMIN — FILGRASTIM 480 MCG: 480 INJECTION, SOLUTION INTRAVENOUS; SUBCUTANEOUS at 09:02

## 2023-02-11 RX ADMIN — CEFEPIME 2 G: 2 INJECTION, POWDER, FOR SOLUTION INTRAVENOUS at 03:02

## 2023-02-11 RX ADMIN — ACETAMINOPHEN 650 MG: 325 TABLET ORAL at 09:02

## 2023-02-11 RX ADMIN — FLUCONAZOLE 400 MG: 200 TABLET ORAL at 09:02

## 2023-02-11 RX ADMIN — POTASSIUM CHLORIDE 20 MEQ: 1500 TABLET, EXTENDED RELEASE ORAL at 09:02

## 2023-02-11 RX ADMIN — Medication 1 DOSE: at 08:02

## 2023-02-11 RX ADMIN — CEFEPIME 2 G: 2 INJECTION, POWDER, FOR SOLUTION INTRAVENOUS at 09:02

## 2023-02-11 RX ADMIN — MAGNESIUM OXIDE TAB 400 MG (241.3 MG ELEMENTAL MG) 400 MG: 400 (241.3 MG) TAB at 09:02

## 2023-02-11 RX ADMIN — MAGNESIUM OXIDE TAB 400 MG (241.3 MG ELEMENTAL MG) 400 MG: 400 (241.3 MG) TAB at 12:02

## 2023-02-11 RX ADMIN — LOPERAMIDE HYDROCHLORIDE 2 MG: 2 CAPSULE ORAL at 05:02

## 2023-02-11 RX ADMIN — Medication 1 DOSE: at 09:02

## 2023-02-11 NOTE — CARE UPDATE
"RAPID RESPONSE NURSE CHART REVIEW       Chart Reviewed: 02/11/2023, 10:29 AM    MRN: [id number]  Bed: 847/847 A    Dx: History of autologous stem cell transplant    Raquel Houston has a past medical history of Arthritis, Multiple myeloma, and Osteoporosis.    Last VS: /62 (BP Location: Right arm, Patient Position: Lying)   Pulse 106   Temp 99 °F (37.2 °C) (Oral)   Resp 18   Ht 5' 6" (1.676 m)   Wt 98.5 kg (217 lb 0.7 oz)   LMP  (LMP Unknown) Comment: pt no longer with menstrual cycles  SpO2 98%   Breastfeeding No   BMI 35.03 kg/m²     24H Vital Sign Range:  Temp:  [99 °F (37.2 °C)-102.3 °F (39.1 °C)]   Pulse:  [106-121]   Resp:  [18-20]   BP: (109-142)/(60-74)   SpO2:  [94 %-100 %]     Level of Consciousness (AVPU): alert    Recent Labs     02/09/23  0509 02/10/23  0559 02/11/23  0327   WBC 0.26* 0.07* 0.04*   HGB 8.8* 8.2* 8.0*   HCT 26.7* 24.4* 24.6*   PLT 43* 22* 7*       Recent Labs     02/09/23  0509 02/10/23  0559 02/11/23  0327   * 135* 130*   K 3.9 3.7 3.6    103 101   CO2 22* 22* 20*   CREATININE 0.7 0.7 0.9   GLU 87 110 108   PHOS 3.5 3.1 2.8   MG 1.9 1.8 1.7        No results for input(s): PH, PCO2, PO2, HCO3, POCSATURATED, BE in the last 72 hours.     OXYGEN:             MEWS score: 3    Charge RN, Yesenia  contacted. No concerns verbalized at this time. Instructed to call [phone] for further concerns or assistance.    Deedee Tapia RN        "

## 2023-02-11 NOTE — PROGRESS NOTES
[facility] ([facility])  Hematology  Bone Marrow Transplant  Progress Note    Patient Name: Raquel Houston  Admission Date: 2/1/2023  Hospital Length of Stay: 10 days  Code Status: Full Code    Subjective:     Interval History: Day +8 from Génesis 200 auto SCT for MM. Febrile overnight to 102.3F, no complaints. No source of infection identified. Started cefepime. ANC 10    Objective:     Vital Signs (Most Recent):  Temp: 99.5 °F (37.5 °C) (02/11/23 1549)  Pulse: 105 (02/11/23 1549)  Resp: 19 (02/11/23 1549)  BP: (!) 106/59 (02/11/23 1549)  SpO2: 99 % (02/11/23 1549)   Vital Signs (24h Range):  Temp:  [98.5 °F (36.9 °C)-102.3 °F (39.1 °C)] 99.5 °F (37.5 °C)  Pulse:  [] 105  Resp:  [18-20] 19  SpO2:  [94 %-100 %] 99 %  BP: (106-125)/(59-71) 106/59     Weight: 98.5 kg (217 lb 0.7 oz)  Body mass index is 35.03 kg/m².  Body surface area is 2.14 meters squared.    ECOG SCORE           [unfilled]    Intake/Output - Last 3 Shifts         02/09 0700  02/10 0659 02/10 0700  02/11 0659 02/11 0700  02/12 0659    P.O. 1530 1235 725    Blood   209    Total Intake(mL/kg) 1530 (15.6) 1235 (12.6) 934 (9.5)    Urine (mL/kg/hr) 1650 (0.7) 1300 (0.6) 225 (0.2)    Stool 0 0     Total Output 1650 1300 225    Net -120 -65 +709           Stool Occurrence 1 x 1 x             Physical Exam  Constitutional:       General: She is not in acute distress.     Appearance: She is well-developed. She is not diaphoretic.   HENT:      Head: Normocephalic and atraumatic.   Eyes:      General: No scleral icterus.     Conjunctiva/sclera: Conjunctivae normal.   Cardiovascular:      Rate and Rhythm: Normal rate and regular rhythm.      Heart sounds: Normal heart sounds. No murmur heard.    No friction rub. No gallop.   Pulmonary:      Effort: Pulmonary effort is normal. No respiratory distress.      Breath sounds: Normal breath sounds. No wheezing or rales.   Abdominal:      General: Bowel sounds are normal. There is no distension.       Palpations: Abdomen is soft.      Tenderness: There is no abdominal tenderness. There is no guarding.   Musculoskeletal:         General: No tenderness. Normal range of motion.      Cervical back: Normal range of motion and neck supple.   Skin:     General: Skin is warm and dry.      Findings: No rash.   Neurological:      Mental Status: She is alert and oriented to person, place, and time.   Psychiatric:         Behavior: Behavior normal.       Significant Labs:   CBC:   Recent Labs   Lab 02/10/23  0559 02/11/23  0327   WBC 0.07* 0.04*   HGB 8.2* 8.0*   HCT 24.4* 24.6*   PLT 22* 7*    and CMP:   Recent Labs   Lab 02/10/23  0559 02/11/23  0327   * 130*   K 3.7 3.6    101   CO2 22* 20*    108   BUN 11 10   CREATININE 0.7 0.9   CALCIUM 8.3* 8.2*   PROT 5.2* 5.4*   ALBUMIN 2.9* 2.9*   BILITOT 0.5 0.7   ALKPHOS 150* 200*   AST 22 54*   ALT 23 62*   ANIONGAP 10 9       Diagnostic Results:  I have reviewed all pertinent imaging results/findings within the past 24 hours.    Assessment/Plan:     * History of autologous stem cell transplant  - Approved by the transplant selection committee for a Génesis 200  - Underwent consent signing with Dr. Casas 1/20/2023  - Admitted for Génesis 200 Auto on 2/1/22  - Tolerated chemotherapy on 2/2 with no issues  - Received 3 bags with a total CD34 dose of 3.13 x10^6/kg on 2/3 without incident  - Today is day +8                Planned conditioning regimen:  Melphalan on Day -1     Antimicrobial Prophylaxis:  Acyclovir starting on Day -1  Levofloxacin starting on Day -1  Fluconazole starting on Day -1     Growth Factor Support:  Neupogen starting on Day +7      Caregiver: daughter in law and son  Post-transplant discharge plans: [facility] [place]    Neutropenic fever  Fever 102.3F on 2/10 at 11:30pm  CXR with no focal infiltrate, UA not consistent with UTI    - cefepime 2g q8h  - blood cultures and urine cultures sent    Chemotherapy-induced diarrhea  - Started having loose stools on  2/7/23  - Ruled out c-diff  - Started PRN imodium and now improved    Pancytopenia due to chemotherapy  - Expect blood counts to drop further with chemo  - Transfuse for hgb < 7 or plts < 10K  - Continue antimicrobial ppx per protocol  - Daily CBC while inpatient  - Stopped VTE ppx 2/9/23 with platelets <50K      HTN (hypertension)  - Continued home amlodipine on admission, but now holding for low normal BP.     Multiple myeloma in remission  - Complex cytogenetics with gain 1q21, t(4;14), and monosomy 13  - R-ISS stage III (elevated beta-2 microglobulin, elevated LDH, decreased albumin, and high risk cytogenetics)  - S/p 5 Cycles D-Vrd (discontinued jasen after cycle 4)  - Has had significant clinical improvement as well as reduction in IgG and M-spike showing excellent response  - see autologous stem cell transplant         VTE Risk Mitigation (From admission, onward)         Ordered     heparin (porcine) injection 1,600 Units  As needed (PRN)         02/01/23 1528     IP VTE HIGH RISK PATIENT  Once         02/01/23 1442     Place sequential compression device  Until discontinued         02/01/23 1442                Disposition: remain inpatient    Gloria Garvey DO  Bone Marrow Transplant  [facility] ([facility])

## 2023-02-11 NOTE — PLAN OF CARE
Patient is AAOX4. D+ 8 AUTO BMT. Patient is in regular diet with low intake. TMAX 101.2, Dr lou notified, prn tylenol administered. Ambulated to bathroom with assistance. 1U platelet given. Electrolytes replaced, IV antibiotics administered. Patient stable at this time.

## 2023-02-11 NOTE — SUBJECTIVE & OBJECTIVE
Subjective:     Interval History: Day +8 from Génesis 200 auto SCT for MM. Febrile overnight to 102.3F, no complaints. No source of infection identified. Started cefepime. ANC 10    Objective:     Vital Signs (Most Recent):  Temp: 99.5 °F (37.5 °C) (02/11/23 1549)  Pulse: 105 (02/11/23 1549)  Resp: 19 (02/11/23 1549)  BP: (!) 106/59 (02/11/23 1549)  SpO2: 99 % (02/11/23 1549)   Vital Signs (24h Range):  Temp:  [98.5 °F (36.9 °C)-102.3 °F (39.1 °C)] 99.5 °F (37.5 °C)  Pulse:  [] 105  Resp:  [18-20] 19  SpO2:  [94 %-100 %] 99 %  BP: (106-125)/(59-71) 106/59     Weight: 98.5 kg (217 lb 0.7 oz)  Body mass index is 35.03 kg/m².  Body surface area is 2.14 meters squared.    ECOG SCORE           [unfilled]    Intake/Output - Last 3 Shifts         02/09 0700  02/10 0659 02/10 0700  02/11 0659 02/11 0700  02/12 0659    P.O. 1530 1235 725    Blood   209    Total Intake(mL/kg) 1530 (15.6) 1235 (12.6) 934 (9.5)    Urine (mL/kg/hr) 1650 (0.7) 1300 (0.6) 225 (0.2)    Stool 0 0     Total Output 1650 1300 225    Net -120 -65 +709           Stool Occurrence 1 x 1 x             Physical Exam  Constitutional:       General: She is not in acute distress.     Appearance: She is well-developed. She is not diaphoretic.   HENT:      Head: Normocephalic and atraumatic.   Eyes:      General: No scleral icterus.     Conjunctiva/sclera: Conjunctivae normal.   Cardiovascular:      Rate and Rhythm: Normal rate and regular rhythm.      Heart sounds: Normal heart sounds. No murmur heard.    No friction rub. No gallop.   Pulmonary:      Effort: Pulmonary effort is normal. No respiratory distress.      Breath sounds: Normal breath sounds. No wheezing or rales.   Abdominal:      General: Bowel sounds are normal. There is no distension.      Palpations: Abdomen is soft.      Tenderness: There is no abdominal tenderness. There is no guarding.   Musculoskeletal:         General: No tenderness. Normal range of motion.      Cervical back: Normal range  of motion and neck supple.   Skin:     General: Skin is warm and dry.      Findings: No rash.   Neurological:      Mental Status: She is alert and oriented to person, place, and time.   Psychiatric:         Behavior: Behavior normal.       Significant Labs:   CBC:   Recent Labs   Lab 02/10/23  0559 02/11/23  0327   WBC 0.07* 0.04*   HGB 8.2* 8.0*   HCT 24.4* 24.6*   PLT 22* 7*    and CMP:   Recent Labs   Lab 02/10/23  0559 02/11/23 0327   * 130*   K 3.7 3.6    101   CO2 22* 20*    108   BUN 11 10   CREATININE 0.7 0.9   CALCIUM 8.3* 8.2*   PROT 5.2* 5.4*   ALBUMIN 2.9* 2.9*   BILITOT 0.5 0.7   ALKPHOS 150* 200*   AST 22 54*   ALT 23 62*   ANIONGAP 10 9       Diagnostic Results:  I have reviewed all pertinent imaging results/findings within the past 24 hours.

## 2023-02-11 NOTE — ASSESSMENT & PLAN NOTE
- Approved by the transplant selection committee for a Génesis 200  - Underwent consent signing with Dr. Casas 1/20/2023  - Admitted for Génesis 200 Auto on 2/1/22  - Tolerated chemotherapy on 2/2 with no issues  - Received 3 bags with a total CD34 dose of 3.13 x10^6/kg on 2/3 without incident  - Today is day +8                Planned conditioning regimen:  Melphalan on Day -1     Antimicrobial Prophylaxis:  Acyclovir starting on Day -1  Levofloxacin starting on Day -1  Fluconazole starting on Day -1     Growth Factor Support:  Neupogen starting on Day +7      Caregiver: daughter in law and son  Post-transplant discharge plans: [facility]

## 2023-02-11 NOTE — ASSESSMENT & PLAN NOTE
Fever 102.3F on 2/10 at 11:30pm  CXR with no focal infiltrate, UA not consistent with UTI    - cefepime 2g q8h  - blood cultures and urine cultures sent

## 2023-02-12 LAB
ALBUMIN SERPL BCP-MCNC: 2.8 G/DL (ref 3.5–5.2)
ALP SERPL-CCNC: 176 U/L (ref 55–135)
ALT SERPL W/O P-5'-P-CCNC: 41 U/L (ref 10–44)
ANION GAP SERPL CALC-SCNC: 13 MMOL/L (ref 8–16)
ANISOCYTOSIS BLD QL SMEAR: SLIGHT
AST SERPL-CCNC: 19 U/L (ref 10–40)
BACTERIA UR CULT: ABNORMAL
BASOPHILS NFR BLD: 0 % (ref 0–1.9)
BILIRUB SERPL-MCNC: 0.7 MG/DL (ref 0.1–1)
BUN SERPL-MCNC: 9 MG/DL (ref 6–20)
CALCIUM SERPL-MCNC: 8.4 MG/DL (ref 8.7–10.5)
CHLORIDE SERPL-SCNC: 100 MMOL/L (ref 95–110)
CO2 SERPL-SCNC: 19 MMOL/L (ref 23–29)
CREAT SERPL-MCNC: 0.8 MG/DL (ref 0.5–1.4)
DIFFERENTIAL METHOD: ABNORMAL
EOSINOPHIL NFR BLD: 0 % (ref 0–8)
ERYTHROCYTE [DISTWIDTH] IN BLOOD BY AUTOMATED COUNT: 13.9 % (ref 11.5–14.5)
EST. GFR  (NO RACE VARIABLE): >60 ML/MIN/1.73 M^2
GLUCOSE SERPL-MCNC: 98 MG/DL (ref 70–110)
HCT VFR BLD AUTO: 21.8 % (ref 37–48.5)
HGB BLD-MCNC: 7.3 G/DL (ref 12–16)
HYPOCHROMIA BLD QL SMEAR: ABNORMAL
IMM GRANULOCYTES # BLD AUTO: ABNORMAL K/UL (ref 0–0.04)
IMM GRANULOCYTES NFR BLD AUTO: ABNORMAL % (ref 0–0.5)
LYMPHOCYTES NFR BLD: 4 % (ref 18–48)
MAGNESIUM SERPL-MCNC: 1.8 MG/DL (ref 1.6–2.6)
MCH RBC QN AUTO: 27.8 PG (ref 27–31)
MCHC RBC AUTO-ENTMCNC: 33.5 G/DL (ref 32–36)
MCV RBC AUTO: 83 FL (ref 82–98)
MONOCYTES NFR BLD: 0 % (ref 4–15)
MYELOCYTES NFR BLD MANUAL: 1 %
NEUTROPHILS NFR BLD: 2 % (ref 38–73)
NRBC BLD-RTO: 0 /100 WBC
OVALOCYTES BLD QL SMEAR: ABNORMAL
PHOSPHATE SERPL-MCNC: 2.4 MG/DL (ref 2.7–4.5)
PLATELET # BLD AUTO: 13 K/UL (ref 150–450)
PLATELET BLD QL SMEAR: ABNORMAL
PMV BLD AUTO: 9.9 FL (ref 9.2–12.9)
POIKILOCYTOSIS BLD QL SMEAR: SLIGHT
POLYCHROMASIA BLD QL SMEAR: ABNORMAL
POTASSIUM SERPL-SCNC: 3.7 MMOL/L (ref 3.5–5.1)
PROT SERPL-MCNC: 5.7 G/DL (ref 6–8.4)
RBC # BLD AUTO: 2.63 M/UL (ref 4–5.4)
SODIUM SERPL-SCNC: 132 MMOL/L (ref 136–145)
SPHEROCYTES BLD QL SMEAR: ABNORMAL
WBC # BLD AUTO: 0.08 K/UL (ref 3.9–12.7)

## 2023-02-12 PROCEDURE — 25000003 PHARM REV CODE 250: Performed by: NURSE PRACTITIONER

## 2023-02-12 PROCEDURE — 80053 COMPREHEN METABOLIC PANEL: CPT | Performed by: NURSE PRACTITIONER

## 2023-02-12 PROCEDURE — 99233 PR SUBSEQUENT HOSPITAL CARE,LEVL III: ICD-10-PCS | Mod: ,,, | Performed by: INTERNAL MEDICINE

## 2023-02-12 PROCEDURE — 63600175 PHARM REV CODE 636 W HCPCS: Performed by: INTERNAL MEDICINE

## 2023-02-12 PROCEDURE — 25000003 PHARM REV CODE 250: Performed by: INTERNAL MEDICINE

## 2023-02-12 PROCEDURE — 99233 SBSQ HOSP IP/OBS HIGH 50: CPT | Mod: ,,, | Performed by: INTERNAL MEDICINE

## 2023-02-12 PROCEDURE — 63600175 PHARM REV CODE 636 W HCPCS: Performed by: STUDENT IN AN ORGANIZED HEALTH CARE EDUCATION/TRAINING PROGRAM

## 2023-02-12 PROCEDURE — 36415 COLL VENOUS BLD VENIPUNCTURE: CPT | Performed by: STUDENT IN AN ORGANIZED HEALTH CARE EDUCATION/TRAINING PROGRAM

## 2023-02-12 PROCEDURE — 83735 ASSAY OF MAGNESIUM: CPT | Performed by: NURSE PRACTITIONER

## 2023-02-12 PROCEDURE — 63600175 PHARM REV CODE 636 W HCPCS: Mod: JG | Performed by: INTERNAL MEDICINE

## 2023-02-12 PROCEDURE — 20600001 HC STEP DOWN PRIVATE ROOM

## 2023-02-12 PROCEDURE — 25000003 PHARM REV CODE 250: Performed by: STUDENT IN AN ORGANIZED HEALTH CARE EDUCATION/TRAINING PROGRAM

## 2023-02-12 PROCEDURE — 87040 BLOOD CULTURE FOR BACTERIA: CPT | Performed by: STUDENT IN AN ORGANIZED HEALTH CARE EDUCATION/TRAINING PROGRAM

## 2023-02-12 PROCEDURE — 85007 BL SMEAR W/DIFF WBC COUNT: CPT | Performed by: NURSE PRACTITIONER

## 2023-02-12 PROCEDURE — 85027 COMPLETE CBC AUTOMATED: CPT | Performed by: NURSE PRACTITIONER

## 2023-02-12 PROCEDURE — 84100 ASSAY OF PHOSPHORUS: CPT | Performed by: NURSE PRACTITIONER

## 2023-02-12 RX ADMIN — Medication 1 DOSE: at 08:02

## 2023-02-12 RX ADMIN — ACETAMINOPHEN 650 MG: 325 TABLET ORAL at 07:02

## 2023-02-12 RX ADMIN — ACYCLOVIR 800 MG: 200 CAPSULE ORAL at 08:02

## 2023-02-12 RX ADMIN — LOPERAMIDE HYDROCHLORIDE 2 MG: 2 CAPSULE ORAL at 12:02

## 2023-02-12 RX ADMIN — Medication 1 DOSE: at 12:02

## 2023-02-12 RX ADMIN — PANTOPRAZOLE SODIUM 40 MG: 40 TABLET, DELAYED RELEASE ORAL at 08:02

## 2023-02-12 RX ADMIN — CEFEPIME 2 G: 2 INJECTION, POWDER, FOR SOLUTION INTRAVENOUS at 04:02

## 2023-02-12 RX ADMIN — CEFEPIME 2 G: 2 INJECTION, POWDER, FOR SOLUTION INTRAVENOUS at 12:02

## 2023-02-12 RX ADMIN — CEFEPIME 2 G: 2 INJECTION, POWDER, FOR SOLUTION INTRAVENOUS at 09:02

## 2023-02-12 RX ADMIN — MAGNESIUM OXIDE TAB 400 MG (241.3 MG ELEMENTAL MG) 400 MG: 400 (241.3 MG) TAB at 05:02

## 2023-02-12 RX ADMIN — DIBASIC SODIUM PHOSPHATE, MONOBASIC POTASSIUM PHOSPHATE AND MONOBASIC SODIUM PHOSPHATE 1 TABLET: 852; 155; 130 TABLET ORAL at 07:02

## 2023-02-12 RX ADMIN — ACETAMINOPHEN 650 MG: 325 TABLET ORAL at 03:02

## 2023-02-12 RX ADMIN — FLUCONAZOLE 400 MG: 200 TABLET ORAL at 08:02

## 2023-02-12 RX ADMIN — DIBASIC SODIUM PHOSPHATE, MONOBASIC POTASSIUM PHOSPHATE AND MONOBASIC SODIUM PHOSPHATE 1 TABLET: 852; 155; 130 TABLET ORAL at 05:02

## 2023-02-12 RX ADMIN — VANCOMYCIN HYDROCHLORIDE 2000 MG: 500 INJECTION, POWDER, LYOPHILIZED, FOR SOLUTION INTRAVENOUS at 08:02

## 2023-02-12 RX ADMIN — FILGRASTIM 480 MCG: 480 INJECTION, SOLUTION INTRAVENOUS; SUBCUTANEOUS at 08:02

## 2023-02-12 RX ADMIN — HEPARIN SODIUM 1600 UNITS: 1000 INJECTION, SOLUTION INTRAVENOUS; SUBCUTANEOUS at 02:02

## 2023-02-12 RX ADMIN — DIBASIC SODIUM PHOSPHATE, MONOBASIC POTASSIUM PHOSPHATE AND MONOBASIC SODIUM PHOSPHATE 1 TABLET: 852; 155; 130 TABLET ORAL at 09:02

## 2023-02-12 RX ADMIN — LOPERAMIDE HYDROCHLORIDE 2 MG: 2 CAPSULE ORAL at 08:02

## 2023-02-12 RX ADMIN — POTASSIUM CHLORIDE 20 MEQ: 1500 TABLET, EXTENDED RELEASE ORAL at 05:02

## 2023-02-12 RX ADMIN — Medication 1 DOSE: at 04:02

## 2023-02-12 RX ADMIN — DIBASIC SODIUM PHOSPHATE, MONOBASIC POTASSIUM PHOSPHATE AND MONOBASIC SODIUM PHOSPHATE 1 TABLET: 852; 155; 130 TABLET ORAL at 12:02

## 2023-02-12 RX ADMIN — MAGNESIUM OXIDE TAB 400 MG (241.3 MG ELEMENTAL MG) 400 MG: 400 (241.3 MG) TAB at 09:02

## 2023-02-12 NOTE — PROGRESS NOTES
[facility] ([facility])  Hematology  Bone Marrow Transplant  Progress Note    Patient Name: Raquel Houston  Admission Date: 2/1/2023  Hospital Length of Stay: 11 days  Code Status: Full Code    Subjective:     Interval History: Day +9 from Génesis 200 auto SCT for MM. Last fever at midnight, 100.9F. Day 2 cefepime. Cultures NGTD. Feels well. ANC 1    Objective:     Vital Signs (Most Recent):  Temp: 99.7 °F (37.6 °C) (02/12/23 0732)  Pulse: 110 (02/12/23 0732)  Resp: 20 (02/12/23 0732)  BP: 113/63 (02/12/23 0732)  SpO2: 97 % (02/12/23 0732)   Vital Signs (24h Range):  Temp:  [98.5 °F (36.9 °C)-102.3 °F (39.1 °C)] 99.7 °F (37.6 °C)  Pulse:  [] 110  Resp:  [16-20] 20  SpO2:  [97 %-100 %] 97 %  BP: (106-119)/(59-71) 113/63     Weight: 98.1 kg (216 lb 2.6 oz)  Body mass index is 34.89 kg/m².  Body surface area is 2.14 meters squared.    ECOG SCORE           [unfilled]    Intake/Output - Last 3 Shifts         02/10 0700  02/11 0659 02/11 0700  02/12 0659 02/12 0700  02/13 0659    P.O. 1235 1795     Blood  209     Total Intake(mL/kg) 1235 (12.6) 2004 (20.4)     Urine (mL/kg/hr) 1300 (0.6) 975 (0.4) 150 (0.4)    Stool 0 0 0    Total Output 1300 975 150    Net -65 +1029 -150           Stool Occurrence 1 x 3 x 1 x            Physical Exam  Constitutional:       General: She is not in acute distress.     Appearance: She is well-developed. She is not diaphoretic.   HENT:      Head: Normocephalic and atraumatic.   Eyes:      General: No scleral icterus.     Conjunctiva/sclera: Conjunctivae normal.   Cardiovascular:      Rate and Rhythm: Normal rate and regular rhythm.      Heart sounds: Normal heart sounds. No murmur heard.    No friction rub. No gallop.   Pulmonary:      Effort: Pulmonary effort is normal. No respiratory distress.      Breath sounds: Normal breath sounds. No wheezing or rales.   Abdominal:      General: Bowel sounds are normal. There is no distension.      Palpations: Abdomen is soft.       Tenderness: There is no abdominal tenderness. There is no guarding.   Musculoskeletal:         General: No tenderness. Normal range of motion.      Cervical back: Normal range of motion and neck supple.   Skin:     General: Skin is warm and dry.      Findings: No rash.   Neurological:      Mental Status: She is alert and oriented to person, place, and time.   Psychiatric:         Behavior: Behavior normal.       Significant Labs:   CBC:   Recent Labs   Lab 02/11/23  0327 02/12/23  0320   WBC 0.04* 0.08*   HGB 8.0* 7.3*   HCT 24.6* 21.8*   PLT 7* 13*      and CMP:   Recent Labs   Lab 02/11/23  0327 02/12/23  0320   * 132*   K 3.6 3.7    100   CO2 20* 19*    98   BUN 10 9   CREATININE 0.9 0.8   CALCIUM 8.2* 8.4*   PROT 5.4* 5.7*   ALBUMIN 2.9* 2.8*   BILITOT 0.7 0.7   ALKPHOS 200* 176*   AST 54* 19   ALT 62* 41   ANIONGAP 9 13         Diagnostic Results:  I have reviewed all pertinent imaging results/findings within the past 24 hours.    Assessment/Plan:     * History of autologous stem cell transplant  - Approved by the transplant selection committee for a Génesis 200  - Underwent consent signing with Dr. Casas 1/20/2023  - Admitted for Génesis 200 Auto on 2/1/22  - Tolerated chemotherapy on 2/2 with no issues  - Received 3 bags with a total CD34 dose of 3.13 x10^6/kg on 2/3 without incident  - Today is day +9                Planned conditioning regimen:  Melphalan on Day -1     Antimicrobial Prophylaxis:  Acyclovir starting on Day -1  Levofloxacin starting on Day -1  Fluconazole starting on Day -1     Growth Factor Support:  Neupogen starting on Day +7      Caregiver: daughter in law and son  Post-transplant discharge plans: [facility] [place]    Neutropenic fever  Fever 102.3F on 2/10 at 11:30pm  CXR with no focal infiltrate, UA not consistent with UTI    - cefepime 2g q8h  - blood cultures and urine cultures sent    Chemotherapy-induced diarrhea  - Started having loose stools on 2/7/23  - Ruled out c-diff  -  Started PRN imodium and now improved    Pancytopenia due to chemotherapy  - Expect blood counts to drop further with chemo  - Transfuse for hgb < 7 or plts < 10K  - Continue antimicrobial ppx per protocol  - Daily CBC while inpatient  - Stopped VTE ppx 2/9/23 with platelets <50K      HTN (hypertension)  - Continued home amlodipine on admission, but now holding for low normal BP.     Multiple myeloma in remission  - Complex cytogenetics with gain 1q21, t(4;14), and monosomy 13  - R-ISS stage III (elevated beta-2 microglobulin, elevated LDH, decreased albumin, and high risk cytogenetics)  - S/p 5 Cycles D-Vrd (discontinued jasen after cycle 4)  - Has had significant clinical improvement as well as reduction in IgG and M-spike showing excellent response  - see autologous stem cell transplant         VTE Risk Mitigation (From admission, onward)         Ordered     heparin (porcine) injection 1,600 Units  As needed (PRN)         02/01/23 1528     IP VTE HIGH RISK PATIENT  Once         02/01/23 1442     Place sequential compression device  Until discontinued         02/01/23 1442                Disposition: remain inpatient    Gloria Garvey, DO  Bone Marrow Transplant  [facility] - [facility] ([facility])

## 2023-02-12 NOTE — SUBJECTIVE & OBJECTIVE
Subjective:     Interval History: Day +9 from Génesis 200 auto SCT for MM. Last fever at midnight, 100.9F. Day 2 cefepime. Cultures NGTD. Feels well. ANC 1    Objective:     Vital Signs (Most Recent):  Temp: 99.7 °F (37.6 °C) (02/12/23 0732)  Pulse: 110 (02/12/23 0732)  Resp: 20 (02/12/23 0732)  BP: 113/63 (02/12/23 0732)  SpO2: 97 % (02/12/23 0732)   Vital Signs (24h Range):  Temp:  [98.5 °F (36.9 °C)-102.3 °F (39.1 °C)] 99.7 °F (37.6 °C)  Pulse:  [] 110  Resp:  [16-20] 20  SpO2:  [97 %-100 %] 97 %  BP: (106-119)/(59-71) 113/63     Weight: 98.1 kg (216 lb 2.6 oz)  Body mass index is 34.89 kg/m².  Body surface area is 2.14 meters squared.    ECOG SCORE           [unfilled]    Intake/Output - Last 3 Shifts         02/10 0700  02/11 0659 02/11 0700  02/12 0659 02/12 0700  02/13 0659    P.O. 1235 1795     Blood  209     Total Intake(mL/kg) 1235 (12.6) 2004 (20.4)     Urine (mL/kg/hr) 1300 (0.6) 975 (0.4) 150 (0.4)    Stool 0 0 0    Total Output 1300 975 150    Net -65 +1029 -150           Stool Occurrence 1 x 3 x 1 x            Physical Exam  Constitutional:       General: She is not in acute distress.     Appearance: She is well-developed. She is not diaphoretic.   HENT:      Head: Normocephalic and atraumatic.   Eyes:      General: No scleral icterus.     Conjunctiva/sclera: Conjunctivae normal.   Cardiovascular:      Rate and Rhythm: Normal rate and regular rhythm.      Heart sounds: Normal heart sounds. No murmur heard.    No friction rub. No gallop.   Pulmonary:      Effort: Pulmonary effort is normal. No respiratory distress.      Breath sounds: Normal breath sounds. No wheezing or rales.   Abdominal:      General: Bowel sounds are normal. There is no distension.      Palpations: Abdomen is soft.      Tenderness: There is no abdominal tenderness. There is no guarding.   Musculoskeletal:         General: No tenderness. Normal range of motion.      Cervical back: Normal range of motion and neck supple.   Skin:      General: Skin is warm and dry.      Findings: No rash.   Neurological:      Mental Status: She is alert and oriented to person, place, and time.   Psychiatric:         Behavior: Behavior normal.       Significant Labs:   CBC:   Recent Labs   Lab 02/11/23 0327 02/12/23 0320   WBC 0.04* 0.08*   HGB 8.0* 7.3*   HCT 24.6* 21.8*   PLT 7* 13*      and CMP:   Recent Labs   Lab 02/11/23 0327 02/12/23 0320   * 132*   K 3.6 3.7    100   CO2 20* 19*    98   BUN 10 9   CREATININE 0.9 0.8   CALCIUM 8.2* 8.4*   PROT 5.4* 5.7*   ALBUMIN 2.9* 2.8*   BILITOT 0.7 0.7   ALKPHOS 200* 176*   AST 54* 19   ALT 62* 41   ANIONGAP 9 13         Diagnostic Results:  I have reviewed all pertinent imaging results/findings within the past 24 hours.

## 2023-02-12 NOTE — PLAN OF CARE
Patient is AAOX4. D+ 9 AUTO BMT today. Patient is in regular diet but has low appetite. TMAX 101.9 today, blood culture sent, tylenol given. Ambulated to bathroom with assistance. Patient had diarrhea for 3 times, imodium given twice, electrolytes replaced. IV antibiotics administered. Patient stable at this time.

## 2023-02-12 NOTE — PROGRESS NOTES
Pt fever persisting, resolved to 100.3 @ 0100. Will continue to monitor.        02/12/23 0034   Vital Signs   Temp (!) 100.9 °F (38.3 °C)   Temp src Oral   Pulse 107   Heart Rate Source Monitor   Resp 16   SpO2 98 %   Pulse Oximetry Type Intermittent   Device (Oxygen Therapy) room air   BP (!) 110/59   MAP (mmHg) 78   BP Location Right arm   BP Method Automatic   Patient Position Lying   Assessments (Pre/Post)   Level of Consciousness (AVPU) alert

## 2023-02-12 NOTE — PROGRESS NOTES
Pt presented with 102.3 fever, given 650mg Tylenol. Dr. Garvey called and aware, no new orders given. Will continue to monitor.       02/11/23 1911   Vital Signs   Temp (!) 102.3 °F (39.1 °C)   Temp src Oral   Pulse 109   Heart Rate Source Monitor   Resp 18   SpO2 100 %   Pulse Oximetry Type Intermittent   Device (Oxygen Therapy) room air   /62   MAP (mmHg) 84   BP Location Right arm   BP Method Automatic   Patient Position Lying   Assessments (Pre/Post)   Level of Consciousness (AVPU) alert

## 2023-02-12 NOTE — CARE UPDATE
RAPID RESPONSE NURSE ROUND       Rounding completed with charge RN, Zoila. No concerns verbalized at this time. Instructed to call [phone] for further concerns or assistance.

## 2023-02-12 NOTE — ASSESSMENT & PLAN NOTE
- Approved by the transplant selection committee for a Génesis 200  - Underwent consent signing with Dr. Casas 1/20/2023  - Admitted for Génesis 200 Auto on 2/1/22  - Tolerated chemotherapy on 2/2 with no issues  - Received 3 bags with a total CD34 dose of 3.13 x10^6/kg on 2/3 without incident  - Today is day +9                Planned conditioning regimen:  Melphalan on Day -1     Antimicrobial Prophylaxis:  Acyclovir starting on Day -1  Levofloxacin starting on Day -1  Fluconazole starting on Day -1     Growth Factor Support:  Neupogen starting on Day +7      Caregiver: daughter in law and son  Post-transplant discharge plans: Patricia Alanis

## 2023-02-13 PROBLEM — E83.39 HYPOPHOSPHATEMIA: Status: ACTIVE | Noted: 2023-02-13

## 2023-02-13 LAB
ALBUMIN SERPL BCP-MCNC: 2.5 G/DL (ref 3.5–5.2)
ALP SERPL-CCNC: 226 U/L (ref 55–135)
ALT SERPL W/O P-5'-P-CCNC: 51 U/L (ref 10–44)
ANION GAP SERPL CALC-SCNC: 9 MMOL/L (ref 8–16)
ANISOCYTOSIS BLD QL SMEAR: SLIGHT
AST SERPL-CCNC: 33 U/L (ref 10–40)
BASOPHILS # BLD AUTO: 0 K/UL (ref 0–0.2)
BASOPHILS NFR BLD: 0 % (ref 0–1.9)
BILIRUB SERPL-MCNC: 0.7 MG/DL (ref 0.1–1)
BLD PROD TYP BPU: NORMAL
BLOOD UNIT EXPIRATION DATE: NORMAL
BLOOD UNIT TYPE CODE: 7300
BLOOD UNIT TYPE: NORMAL
BUN SERPL-MCNC: 7 MG/DL (ref 6–20)
CALCIUM SERPL-MCNC: 8.2 MG/DL (ref 8.7–10.5)
CHLORIDE SERPL-SCNC: 99 MMOL/L (ref 95–110)
CO2 SERPL-SCNC: 20 MMOL/L (ref 23–29)
CODING SYSTEM: NORMAL
CREAT SERPL-MCNC: 0.8 MG/DL (ref 0.5–1.4)
CROSSMATCH INTERPRETATION: NORMAL
DIFFERENTIAL METHOD: ABNORMAL
DISPENSE STATUS: NORMAL
EOSINOPHIL # BLD AUTO: 0 K/UL (ref 0–0.5)
EOSINOPHIL NFR BLD: 0 % (ref 0–8)
ERYTHROCYTE [DISTWIDTH] IN BLOOD BY AUTOMATED COUNT: 13.9 % (ref 11.5–14.5)
EST. GFR  (NO RACE VARIABLE): >60 ML/MIN/1.73 M^2
GLUCOSE SERPL-MCNC: 104 MG/DL (ref 70–110)
HCT VFR BLD AUTO: 20.1 % (ref 37–48.5)
HGB BLD-MCNC: 6.9 G/DL (ref 12–16)
HYPOCHROMIA BLD QL SMEAR: ABNORMAL
IMM GRANULOCYTES # BLD AUTO: 0.01 K/UL (ref 0–0.04)
IMM GRANULOCYTES NFR BLD AUTO: 4 % (ref 0–0.5)
LYMPHOCYTES # BLD AUTO: 0.1 K/UL (ref 1–4.8)
LYMPHOCYTES NFR BLD: 20 % (ref 18–48)
MAGNESIUM SERPL-MCNC: 1.8 MG/DL (ref 1.6–2.6)
MCH RBC QN AUTO: 28.2 PG (ref 27–31)
MCHC RBC AUTO-ENTMCNC: 34.3 G/DL (ref 32–36)
MCV RBC AUTO: 82 FL (ref 82–98)
MONOCYTES # BLD AUTO: 0.1 K/UL (ref 0.3–1)
MONOCYTES NFR BLD: 52 % (ref 4–15)
NEUTROPHILS # BLD AUTO: 0.1 K/UL (ref 1.8–7.7)
NEUTROPHILS NFR BLD: 24 % (ref 38–73)
NRBC BLD-RTO: 0 /100 WBC
NUM UNITS TRANS PACKED RBC: NORMAL
OVALOCYTES BLD QL SMEAR: ABNORMAL
PHOSPHATE SERPL-MCNC: 2 MG/DL (ref 2.7–4.5)
PLATELET # BLD AUTO: 10 K/UL (ref 150–450)
PMV BLD AUTO: ABNORMAL FL (ref 9.2–12.9)
POIKILOCYTOSIS BLD QL SMEAR: SLIGHT
POLYCHROMASIA BLD QL SMEAR: ABNORMAL
POTASSIUM SERPL-SCNC: 3.5 MMOL/L (ref 3.5–5.1)
PROT SERPL-MCNC: 5.4 G/DL (ref 6–8.4)
RBC # BLD AUTO: 2.45 M/UL (ref 4–5.4)
SODIUM SERPL-SCNC: 128 MMOL/L (ref 136–145)
SPHEROCYTES BLD QL SMEAR: ABNORMAL
WBC # BLD AUTO: 0.25 K/UL (ref 3.9–12.7)

## 2023-02-13 PROCEDURE — P9040 RBC LEUKOREDUCED IRRADIATED: HCPCS | Performed by: NURSE PRACTITIONER

## 2023-02-13 PROCEDURE — 83735 ASSAY OF MAGNESIUM: CPT | Performed by: NURSE PRACTITIONER

## 2023-02-13 PROCEDURE — 85025 COMPLETE CBC W/AUTO DIFF WBC: CPT | Performed by: NURSE PRACTITIONER

## 2023-02-13 PROCEDURE — 99233 SBSQ HOSP IP/OBS HIGH 50: CPT | Mod: ,,, | Performed by: INTERNAL MEDICINE

## 2023-02-13 PROCEDURE — 80053 COMPREHEN METABOLIC PANEL: CPT | Performed by: NURSE PRACTITIONER

## 2023-02-13 PROCEDURE — 20600001 HC STEP DOWN PRIVATE ROOM

## 2023-02-13 PROCEDURE — 63600175 PHARM REV CODE 636 W HCPCS: Mod: JG | Performed by: INTERNAL MEDICINE

## 2023-02-13 PROCEDURE — 25000003 PHARM REV CODE 250: Performed by: NURSE PRACTITIONER

## 2023-02-13 PROCEDURE — 25000003 PHARM REV CODE 250: Performed by: INTERNAL MEDICINE

## 2023-02-13 PROCEDURE — 84100 ASSAY OF PHOSPHORUS: CPT | Performed by: NURSE PRACTITIONER

## 2023-02-13 PROCEDURE — 25000003 PHARM REV CODE 250: Performed by: STUDENT IN AN ORGANIZED HEALTH CARE EDUCATION/TRAINING PROGRAM

## 2023-02-13 PROCEDURE — 63600175 PHARM REV CODE 636 W HCPCS: Performed by: STUDENT IN AN ORGANIZED HEALTH CARE EDUCATION/TRAINING PROGRAM

## 2023-02-13 PROCEDURE — 63600175 PHARM REV CODE 636 W HCPCS: Mod: TB,JG | Performed by: INTERNAL MEDICINE

## 2023-02-13 PROCEDURE — 36430 TRANSFUSION BLD/BLD COMPNT: CPT

## 2023-02-13 PROCEDURE — 99233 PR SUBSEQUENT HOSPITAL CARE,LEVL III: ICD-10-PCS | Mod: ,,, | Performed by: INTERNAL MEDICINE

## 2023-02-13 RX ORDER — SODIUM CHLORIDE 9 MG/ML
INJECTION, SOLUTION INTRAVENOUS CONTINUOUS
Status: DISCONTINUED | OUTPATIENT
Start: 2023-02-13 | End: 2023-02-15 | Stop reason: HOSPADM

## 2023-02-13 RX ADMIN — MAGNESIUM OXIDE TAB 400 MG (241.3 MG ELEMENTAL MG) 400 MG: 400 (241.3 MG) TAB at 05:02

## 2023-02-13 RX ADMIN — CEFEPIME 2 G: 2 INJECTION, POWDER, FOR SOLUTION INTRAVENOUS at 09:02

## 2023-02-13 RX ADMIN — CEFEPIME 2 G: 2 INJECTION, POWDER, FOR SOLUTION INTRAVENOUS at 12:02

## 2023-02-13 RX ADMIN — SODIUM CHLORIDE: 9 INJECTION, SOLUTION INTRAVENOUS at 09:02

## 2023-02-13 RX ADMIN — PANTOPRAZOLE SODIUM 40 MG: 40 TABLET, DELAYED RELEASE ORAL at 09:02

## 2023-02-13 RX ADMIN — Medication 1 DOSE: at 06:02

## 2023-02-13 RX ADMIN — ACETAMINOPHEN 650 MG: 325 TABLET ORAL at 04:02

## 2023-02-13 RX ADMIN — ACYCLOVIR 800 MG: 200 CAPSULE ORAL at 09:02

## 2023-02-13 RX ADMIN — POTASSIUM CHLORIDE 20 MEQ: 1500 TABLET, EXTENDED RELEASE ORAL at 05:02

## 2023-02-13 RX ADMIN — Medication 1 DOSE: at 09:02

## 2023-02-13 RX ADMIN — MAGNESIUM OXIDE TAB 400 MG (241.3 MG ELEMENTAL MG) 400 MG: 400 (241.3 MG) TAB at 10:02

## 2023-02-13 RX ADMIN — CEFEPIME 2 G: 2 INJECTION, POWDER, FOR SOLUTION INTRAVENOUS at 06:02

## 2023-02-13 RX ADMIN — DIBASIC SODIUM PHOSPHATE, MONOBASIC POTASSIUM PHOSPHATE AND MONOBASIC SODIUM PHOSPHATE 2 TABLET: 852; 155; 130 TABLET ORAL at 03:02

## 2023-02-13 RX ADMIN — DIBASIC SODIUM PHOSPHATE, MONOBASIC POTASSIUM PHOSPHATE AND MONOBASIC SODIUM PHOSPHATE 2 TABLET: 852; 155; 130 TABLET ORAL at 05:02

## 2023-02-13 RX ADMIN — DIBASIC SODIUM PHOSPHATE, MONOBASIC POTASSIUM PHOSPHATE AND MONOBASIC SODIUM PHOSPHATE 2 TABLET: 852; 155; 130 TABLET ORAL at 10:02

## 2023-02-13 RX ADMIN — FLUCONAZOLE 400 MG: 200 TABLET ORAL at 09:02

## 2023-02-13 RX ADMIN — Medication 1 DOSE: at 12:02

## 2023-02-13 RX ADMIN — LOPERAMIDE HYDROCHLORIDE 2 MG: 2 CAPSULE ORAL at 01:02

## 2023-02-13 RX ADMIN — LOPERAMIDE HYDROCHLORIDE 2 MG: 2 CAPSULE ORAL at 10:02

## 2023-02-13 RX ADMIN — HEPARIN SODIUM 1600 UNITS: 1000 INJECTION, SOLUTION INTRAVENOUS; SUBCUTANEOUS at 03:02

## 2023-02-13 RX ADMIN — VANCOMYCIN HYDROCHLORIDE 1250 MG: 1.25 INJECTION, POWDER, LYOPHILIZED, FOR SOLUTION INTRAVENOUS at 09:02

## 2023-02-13 RX ADMIN — FILGRASTIM 480 MCG: 480 INJECTION, SOLUTION INTRAVENOUS; SUBCUTANEOUS at 09:02

## 2023-02-13 RX ADMIN — MAGNESIUM OXIDE TAB 400 MG (241.3 MG ELEMENTAL MG) 400 MG: 400 (241.3 MG) TAB at 08:02

## 2023-02-13 NOTE — NURSING
Temp 101.3 on initial assessment. Medical staff aware. Vancomycin ordered and administered. Repeat temp 99.7    0625 pt continues to have low grade fever <101.  Tylenol repeated. Pt ambulatory with assist. Pt denies needs at this time.

## 2023-02-13 NOTE — ASSESSMENT & PLAN NOTE
- Expect blood counts to drop further with chemo  - Transfuse for hgb < 7 or plts < 10K  - Continue antimicrobial ppx per protocol  - Daily CBC while inpatient  - Stopped VTE ppx 2/9/23 with platelets <50K  - ANC up to 60 today  - Transfusing 1 unit prbc for hgb of 6.9 today

## 2023-02-13 NOTE — PROGRESS NOTES
Pharmacokinetic Initial Assessment: IV Vancomycin    Assessment/Plan:    Renal function is stable, Scr: 0.8 at baseline   Initiate intravenous vancomycin with loading dose of 2000 mg once followed by a maintenance dose of vancomycin 1250 mg IV every 12  hours  Desired empiric serum trough concentration is 15 to 20 mcg/mL  Draw vancomycin trough level 60 min prior to fourth dose on 02/14/2023 at approximately 0800  Pharmacy will continue to follow and monitor vancomycin.      Please contact pharmacy at extension [phone] with any questions regarding this assessment.     Thank you for the consult,   Josiah Taylor       Patient brief summary:  Raquel Houston is a 62 y.o. female initiated on antimicrobial therapy with IV Vancomycin for treatment of suspected neutropenic fever    Drug Allergies:   Review of patient's allergies indicates:  No Known Allergies    Actual Body Weight:   98.1 kg    Renal Function:   Estimated Creatinine Clearance: 88.3 mL/min (based on SCr of 0.8 mg/dL).,     Dialysis Method (if applicable):  N/A    CBC (last 72 hours):  Recent Labs   Lab Result Units 02/10/23  0559 02/11/23 0327 02/12/23  0320   WBC K/uL 0.07* 0.04* 0.08*   Hemoglobin g/dL 8.2* 8.0* 7.3*   Hematocrit % 24.4* 24.6* 21.8*   Platelets K/uL 22* 7* 13*   Gran % % 50.0 25.0* 2.0*   Lymph % % 50.0* 75.0* 4.0*   Mono % % 0.0* 0.0* 0.0*   Eosinophil % % 0.0 0.0 0.0   Basophil % % 0.0 0.0 0.0   Differential Method  Manual Automated Manual       Metabolic Panel (last 72 hours):  Recent Labs   Lab Result Units 02/10/23  0559 02/11/23  0327 02/11/23  0917 02/12/23  0320   Sodium mmol/L 135* 130*  --  132*   Potassium mmol/L 3.7 3.6  --  3.7   Chloride mmol/L 103 101  --  100   CO2 mmol/L 22* 20*  --  19*   Glucose mg/dL 110 108  --  98   Glucose, UA   --   --  Negative  --    BUN mg/dL 11 10  --  9   Creatinine mg/dL 0.7 0.9  --  0.8   Albumin g/dL 2.9* 2.9*  --  2.8*   Total Bilirubin mg/dL 0.5 0.7  --  0.7   Alkaline Phosphatase U/L  150* 200*  --  176*   AST U/L 22 54*  --  19   ALT U/L 23 62*  --  41   Magnesium mg/dL 1.8 1.7  --  1.8   Phosphorus mg/dL 3.1 2.8  --  2.4*       Drug levels (last 3 results):  No results for input(s): VANCOMYCINRA, VANCORANDOM, VANCOMYCINPE, VANCOPEAK, VANCOMYCINTR, VANCOTROUGH in the last 72 hours.    Microbiologic Results:  Microbiology Results (last 7 days)       Procedure Component Value Units Date/Time    Blood culture [[id number]] Collected: 02/12/23 1716    Order Status: Sent Specimen: Blood Updated: 02/12/23 2015    Blood culture [[id number]] Collected: 02/12/23 1725    Order Status: Sent Specimen: Blood from Antecubital, Left Updated: 02/12/23 2015    Urine Culture High Risk [[id number]]  (Abnormal) Collected: 02/11/23 0144    Order Status: Completed Specimen: Urine Updated: 02/12/23 1046     Urine Culture, Routine COAGULASE-NEGATIVE STAPHYLOCOCCUS SPECIES  50,000 - 99,999 cfu/ml  Susceptibility testing not routinely performed.      Narrative:      Indicated criteria for high risk culture:->Neutropenic  patient    Blood culture [[id number]] Collected: 02/11/23 0327    Order Status: Completed Specimen: Blood Updated: 02/12/23 0613     Blood Culture, Routine No Growth to date      No Growth to date    Narrative:      Collection has been rescheduled by BM8 at 02/11/2023 00:23 Reason:   Unable to collect  Collection has been rescheduled by RF at 02/11/2023 03:25 Reason: The   phlebotomist alex who was assigned to the 8th floor for the night   shift was unable to collect this patient's  routine blood cultures   and he did not notify the coordinator that he was unable to collect   this patient blood work so I notified the technician who has this   floor for A.M. assignment to go and try this patient.  Collection has been rescheduled by BM8 at 02/11/2023 00:23 Reason:   Unable to collect  Collection has been rescheduled by RF at 02/11/2023 03:25 Reason: The   phlebotomist alex who was assigned to the 8th floor  for the night   shift was unable to collect this patient's  routine blood cultures   and he did not notify the coordinator that he was unable to collect   this patient blood work so I notified the technician who has this   floor for A.M. assignment to go and try this patient.    Blood culture [[id number]] Collected: 02/11/23 0327    Order Status: Completed Specimen: Blood Updated: 02/12/23 0613     Blood Culture, Routine No Growth to date      No Growth to date    Narrative:      Collection has been rescheduled by BM8 at 02/11/2023 00:23 Reason:   Unable to collect  Collection has been rescheduled by RF at 02/11/2023 03:25 Reason: The   phlebotomist alex who was assigned to the 8th floor for the night   shift was unable to collect this patient's  routine blood cultures   and he did not notify the coordinator that he was unable to collect   this patient blood work so I notified the technician who has this   floor for A.M. assignment to go and try this patient.  Collection has been rescheduled by BM8 at 02/11/2023 00:23 Reason:   Unable to collect  Collection has been rescheduled by RF at 02/11/2023 03:25 Reason: The   phlebotomist alex who was assigned to the 8th floor for the night   shift was unable to collect this patient's  routine blood cultures   and he did not notify the coordinator that he was unable to collect   this patient blood work so I notified the technician who has this   floor for A.M. assignment to go and try this patient.    Clostridium difficile EIA [[id number]] Collected: 02/07/23 1307    Order Status: Completed Specimen: Stool Updated: 02/07/23 2113     C. diff Antigen Negative     C difficile Toxins A+B, EIA Negative     Comment: Testing not recommended for children <24 months old.

## 2023-02-13 NOTE — SUBJECTIVE & OBJECTIVE
Subjective:     Interval History: Day +10 from a Génesis 200 auto SCT for MM. Temp of 101.7 overnight. HR 110s. BP stable. Vanc added. Infection w/u remains unremarkable. ANC up to 60 today, so may be engrafting. Transfusing 1 unit prbc for hgb of 6.9 today. Replacing phos.    Objective:     Vital Signs (Most Recent):  Temp: 98.7 °F (37.1 °C) (02/13/23 1200)  Pulse: 99 (02/13/23 1200)  Resp: 18 (02/13/23 1200)  BP: 131/63 (02/13/23 1200)  SpO2: 100 % (02/13/23 1200) Vital Signs (24h Range):  Temp:  [98 °F (36.7 °C)-102 °F (38.9 °C)] 98.7 °F (37.1 °C)  Pulse:  [] 99  Resp:  [16-20] 18  SpO2:  [94 %-100 %] 100 %  BP: (111-131)/(59-68) 131/63     Weight: 97.9 kg (215 lb 13.3 oz)  Body mass index is 34.84 kg/m².  Body surface area is 2.14 meters squared.    ECOG SCORE           [unfilled]    Intake/Output - Last 3 Shifts         02/11 0700  02/12 0659 02/12 0700  02/13 0659 02/13 0700  02/14 0659    P.O. 1795 1350     Blood 209  450    Total Intake(mL/kg) 2004 (20.4) 1350 (13.8) 450 (4.6)    Urine (mL/kg/hr) 975 (0.4) 1300 (0.6) 375 (0.6)    Stool 0 0 0    Total Output 975 1300 375    Net +1029 +50 +75           Stool Occurrence 3 x 5 x 2 x            Physical Exam  Constitutional:       Appearance: She is well-developed.   HENT:      Head: Normocephalic and atraumatic.      Mouth/Throat:      Pharynx: No oropharyngeal exudate.   Eyes:      Conjunctiva/sclera: Conjunctivae normal.      Pupils: Pupils are equal, round, and reactive to light.   Cardiovascular:      Rate and Rhythm: Normal rate and regular rhythm.      Heart sounds: Normal heart sounds. No murmur heard.  Pulmonary:      Effort: Pulmonary effort is normal.      Breath sounds: Normal breath sounds.   Abdominal:      General: Bowel sounds are normal. There is no distension.      Palpations: Abdomen is soft.      Tenderness: There is no abdominal tenderness.   Musculoskeletal:         General: No deformity. Normal range of motion.      Cervical back: Normal  range of motion and neck supple.   Skin:     General: Skin is warm and dry.      Findings: No erythema or rash.   Neurological:      Mental Status: She is alert and oriented to person, place, and time.   Psychiatric:         Behavior: Behavior normal.         Thought Content: Thought content normal.         Judgment: Judgment normal.       Significant Labs:   CBC:   Recent Labs   Lab 02/12/23 0320 02/13/23  0341   WBC 0.08* 0.25*   HGB 7.3* 6.9*   HCT 21.8* 20.1*   PLT 13* 10*      and CMP:   Recent Labs   Lab 02/12/23 0320 02/13/23  0341   * 128*   K 3.7 3.5    99   CO2 19* 20*   GLU 98 104   BUN 9 7   CREATININE 0.8 0.8   CALCIUM 8.4* 8.2*   PROT 5.7* 5.4*   ALBUMIN 2.8* 2.5*   BILITOT 0.7 0.7   ALKPHOS 176* 226*   AST 19 33   ALT 41 51*   ANIONGAP 13 9         Diagnostic Results:  I have reviewed all pertinent imaging results/findings within the past 24 hours.

## 2023-02-13 NOTE — PROGRESS NOTES
[facility] - [facility] ([facility])  Hematology  Bone Marrow Transplant  Progress Note    Patient Name: Raquel Houston  Admission Date: 2/1/2023  Hospital Length of Stay: 12 days  Code Status: Full Code    Subjective:     Interval History: Day +10 from a Génesis 200 auto SCT for MM. Temp of 101.7 overnight. HR 110s. BP stable. Vanc added. Infection w/u remains unremarkable. ANC up to 60 today, so may be engrafting. Transfusing 1 unit prbc for hgb of 6.9 today. Replacing phos.    Objective:     Vital Signs (Most Recent):  Temp: 98.7 °F (37.1 °C) (02/13/23 1200)  Pulse: 99 (02/13/23 1200)  Resp: 18 (02/13/23 1200)  BP: 131/63 (02/13/23 1200)  SpO2: 100 % (02/13/23 1200) Vital Signs (24h Range):  Temp:  [98 °F (36.7 °C)-102 °F (38.9 °C)] 98.7 °F (37.1 °C)  Pulse:  [] 99  Resp:  [16-20] 18  SpO2:  [94 %-100 %] 100 %  BP: (111-131)/(59-68) 131/63     Weight: 97.9 kg (215 lb 13.3 oz)  Body mass index is 34.84 kg/m².  Body surface area is 2.14 meters squared.    ECOG SCORE           [unfilled]    Intake/Output - Last 3 Shifts         02/11 0700  02/12 0659 02/12 0700 02/13 0659 02/13 0700 02/14 0659    P.O. 1795 1350     Blood 209  450    Total Intake(mL/kg) 2004 (20.4) 1350 (13.8) 450 (4.6)    Urine (mL/kg/hr) 975 (0.4) 1300 (0.6) 375 (0.6)    Stool 0 0 0    Total Output 975 1300 375    Net +1029 +50 +75           Stool Occurrence 3 x 5 x 2 x            Physical Exam  Constitutional:       Appearance: She is well-developed.   HENT:      Head: Normocephalic and atraumatic.      Mouth/Throat:      Pharynx: No oropharyngeal exudate.   Eyes:      Conjunctiva/sclera: Conjunctivae normal.      Pupils: Pupils are equal, round, and reactive to light.   Cardiovascular:      Rate and Rhythm: Normal rate and regular rhythm.      Heart sounds: Normal heart sounds. No murmur heard.  Pulmonary:      Effort: Pulmonary effort is normal.      Breath sounds: Normal breath sounds.   Abdominal:      General: Bowel sounds are normal.  There is no distension.      Palpations: Abdomen is soft.      Tenderness: There is no abdominal tenderness.   Musculoskeletal:         General: No deformity. Normal range of motion.      Cervical back: Normal range of motion and neck supple.   Skin:     General: Skin is warm and dry.      Findings: No erythema or rash.   Neurological:      Mental Status: She is alert and oriented to person, place, and time.   Psychiatric:         Behavior: Behavior normal.         Thought Content: Thought content normal.         Judgment: Judgment normal.       Significant Labs:   CBC:   Recent Labs   Lab 02/12/23  0320 02/13/23  0341   WBC 0.08* 0.25*   HGB 7.3* 6.9*   HCT 21.8* 20.1*   PLT 13* 10*      and CMP:   Recent Labs   Lab 02/12/23 0320 02/13/23  0341   * 128*   K 3.7 3.5    99   CO2 19* 20*   GLU 98 104   BUN 9 7   CREATININE 0.8 0.8   CALCIUM 8.4* 8.2*   PROT 5.7* 5.4*   ALBUMIN 2.8* 2.5*   BILITOT 0.7 0.7   ALKPHOS 176* 226*   AST 19 33   ALT 41 51*   ANIONGAP 13 9         Diagnostic Results:  I have reviewed all pertinent imaging results/findings within the past 24 hours.    Assessment/Plan:     * History of autologous stem cell transplant  - Approved by the transplant selection committee for a Génesis 200  - Underwent consent signing with Dr. Casas 1/20/2023  - Admitted for Génesis 200 Auto on 2/1/22  - Tolerated chemotherapy on 2/2 with no issues  - Received 3 bags with a total CD34 dose of 3.13 x10^6/kg on 2/3 without incident  - Today is day +10                Planned conditioning regimen:  Melphalan on Day -1     Antimicrobial Prophylaxis:  Acyclovir starting on Day -1  Levofloxacin starting on Day -1  Fluconazole starting on Day -1     Growth Factor Support:  Neupogen starting on Day +7      Caregiver: daughter in law and son  Post-transplant discharge plans: [facility]    Multiple myeloma in remission  - Complex cytogenetics with gain 1q21, 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;14), and monosomy 13  - R-ISS stage III (elevated beta-2  microglobulin, elevated LDH, decreased albumin, and high risk cytogenetics)  - S/p 5 Cycles D-Vrd (discontinued jasen after cycle 4)  - Has had significant clinical improvement as well as reduction in IgG and M-spike showing excellent response  - see autologous stem cell transplant     Pancytopenia due to chemotherapy  - Expect blood counts to drop further with chemo  - Transfuse for hgb < 7 or plts < 10K  - Continue antimicrobial ppx per protocol  - Daily CBC while inpatient  - Stopped VTE ppx 2/9/23 with platelets <50K  - ANC up to 60 today  - Transfusing 1 unit prbc for hgb of 6.9 today    Chemotherapy-induced diarrhea  - Started having loose stools on 2/7/23  - Ruled out c-diff  - Started PRN imodium and now improved    Hypophosphatemia  -Repleting per PRN electrolyte order set  - Daily phos level while inpatient    Neutropenic fever  - Fever 102.3F on 2/10 at 11:30pm  - CXR with no focal infiltrate. UA not consistent with UTI. Blood cx with NGTD.  - Started Cefepime 2g q8h  - Temp of 101.7 2/12 at ~ 1930. Vanc added. Repeated blood cx and still NGTD.      HTN (hypertension)  - Continued home amlodipine on admission, but now holding for low normal BP.         VTE Risk Mitigation (From admission, onward)         Ordered     heparin (porcine) injection 1,600 Units  As needed (PRN)         02/01/23 1528     IP VTE HIGH RISK PATIENT  Once         02/01/23 1442     Place sequential compression device  Until discontinued         02/01/23 1442                Disposition: Inpatient for SCT. Awaiting engraftment.    Wendy Vazquez, NP  Bone Marrow Transplant  [facility] - On[facility] ([facility])

## 2023-02-13 NOTE — ASSESSMENT & PLAN NOTE
- Fever 102.3F on 2/10 at 11:30pm  - CXR with no focal infiltrate. UA not consistent with UTI. Blood cx with NGTD.  - Started Cefepime 2g q8h  - Temp of 101.7 2/12 at ~ 1930. Vanc added. Repeated blood cx and still NGTD.

## 2023-02-13 NOTE — PLAN OF CARE
Pt is Day +10 for Génesis Auto SCT. 1u PRBC transfused without issue. Started on NS @ 75. Two episodes of diarrhea, imodium given PRN for relief. Phos replacements given. Vanc and cefepime continued. Tmax 99.1 and VSS. WCTM.

## 2023-02-14 PROBLEM — E87.8 ELECTROLYTE DISTURBANCE: Status: ACTIVE | Noted: 2023-02-13

## 2023-02-14 LAB
ABO + RH BLD: NORMAL
ALBUMIN SERPL BCP-MCNC: 2.4 G/DL (ref 3.5–5.2)
ALP SERPL-CCNC: 179 U/L (ref 55–135)
ALT SERPL W/O P-5'-P-CCNC: 27 U/L (ref 10–44)
ANION GAP SERPL CALC-SCNC: 8 MMOL/L (ref 8–16)
ANISOCYTOSIS BLD QL SMEAR: SLIGHT
AST SERPL-CCNC: 11 U/L (ref 10–40)
BASOPHILS # BLD AUTO: ABNORMAL K/UL (ref 0–0.2)
BASOPHILS NFR BLD: 0 % (ref 0–1.9)
BASOPHILS NFR BLD: 0 % (ref 0–1.9)
BILIRUB SERPL-MCNC: 0.4 MG/DL (ref 0.1–1)
BLD GP AB SCN CELLS X3 SERPL QL: NORMAL
BUN SERPL-MCNC: 6 MG/DL (ref 6–20)
CALCIUM SERPL-MCNC: 8.3 MG/DL (ref 8.7–10.5)
CHLORIDE SERPL-SCNC: 103 MMOL/L (ref 95–110)
CO2 SERPL-SCNC: 23 MMOL/L (ref 23–29)
CREAT SERPL-MCNC: 0.8 MG/DL (ref 0.5–1.4)
DIFFERENTIAL METHOD: ABNORMAL
DIFFERENTIAL METHOD: ABNORMAL
EOSINOPHIL # BLD AUTO: ABNORMAL K/UL (ref 0–0.5)
EOSINOPHIL NFR BLD: 0 % (ref 0–8)
EOSINOPHIL NFR BLD: 0 % (ref 0–8)
ERYTHROCYTE [DISTWIDTH] IN BLOOD BY AUTOMATED COUNT: 14.1 % (ref 11.5–14.5)
ERYTHROCYTE [DISTWIDTH] IN BLOOD BY AUTOMATED COUNT: 14.9 % (ref 11.5–14.5)
EST. GFR  (NO RACE VARIABLE): >60 ML/MIN/1.73 M^2
GLUCOSE SERPL-MCNC: 91 MG/DL (ref 70–110)
HCT VFR BLD AUTO: 23.6 % (ref 37–48.5)
HCT VFR BLD AUTO: 25.6 % (ref 37–48.5)
HGB BLD-MCNC: 8.1 G/DL (ref 12–16)
HGB BLD-MCNC: 8.4 G/DL (ref 12–16)
IMM GRANULOCYTES # BLD AUTO: ABNORMAL K/UL (ref 0–0.04)
IMM GRANULOCYTES # BLD AUTO: ABNORMAL K/UL (ref 0–0.04)
IMM GRANULOCYTES NFR BLD AUTO: ABNORMAL % (ref 0–0.5)
IMM GRANULOCYTES NFR BLD AUTO: ABNORMAL % (ref 0–0.5)
LYMPHOCYTES # BLD AUTO: ABNORMAL K/UL (ref 1–4.8)
LYMPHOCYTES NFR BLD: 16 % (ref 18–48)
LYMPHOCYTES NFR BLD: 7 % (ref 18–48)
MCH RBC QN AUTO: 28 PG (ref 27–31)
MCH RBC QN AUTO: 28.1 PG (ref 27–31)
MCHC RBC AUTO-ENTMCNC: 32.8 G/DL (ref 32–36)
MCHC RBC AUTO-ENTMCNC: 34.3 G/DL (ref 32–36)
MCV RBC AUTO: 82 FL (ref 82–98)
MCV RBC AUTO: 85 FL (ref 82–98)
METAMYELOCYTES NFR BLD MANUAL: 1 %
MONOCYTES # BLD AUTO: ABNORMAL K/UL (ref 0.3–1)
MONOCYTES NFR BLD: 23 % (ref 4–15)
MONOCYTES NFR BLD: 31 % (ref 4–15)
MYELOCYTES NFR BLD MANUAL: 2 %
MYELOCYTES NFR BLD MANUAL: 4 %
NEUTROPHILS NFR BLD: 44 % (ref 38–73)
NEUTROPHILS NFR BLD: 56 % (ref 38–73)
NEUTS BAND NFR BLD MANUAL: 4 %
NEUTS BAND NFR BLD MANUAL: 6 %
NRBC BLD-RTO: 0 /100 WBC
NRBC BLD-RTO: 0 /100 WBC
PLATELET # BLD AUTO: 16 K/UL (ref 150–450)
PLATELET # BLD AUTO: 19 K/UL (ref 150–450)
PLATELET BLD QL SMEAR: ABNORMAL
PMV BLD AUTO: ABNORMAL FL (ref 9.2–12.9)
PMV BLD AUTO: ABNORMAL FL (ref 9.2–12.9)
POIKILOCYTOSIS BLD QL SMEAR: SLIGHT
POTASSIUM SERPL-SCNC: 3.3 MMOL/L (ref 3.5–5.1)
PROT SERPL-MCNC: 5.3 G/DL (ref 6–8.4)
RBC # BLD AUTO: 2.88 M/UL (ref 4–5.4)
RBC # BLD AUTO: 3 M/UL (ref 4–5.4)
SODIUM SERPL-SCNC: 134 MMOL/L (ref 136–145)
VANCOMYCIN TROUGH SERPL-MCNC: 13 UG/ML (ref 10–22)
WBC # BLD AUTO: 1.44 K/UL (ref 3.9–12.7)
WBC # BLD AUTO: 1.6 K/UL (ref 3.9–12.7)
WBC OTHER NFR BLD MANUAL: 6 %

## 2023-02-14 PROCEDURE — 63600175 PHARM REV CODE 636 W HCPCS: Mod: TB,JG | Performed by: INTERNAL MEDICINE

## 2023-02-14 PROCEDURE — 80202 ASSAY OF VANCOMYCIN: CPT | Performed by: INTERNAL MEDICINE

## 2023-02-14 PROCEDURE — 99233 PR SUBSEQUENT HOSPITAL CARE,LEVL III: ICD-10-PCS | Mod: ,,, | Performed by: INTERNAL MEDICINE

## 2023-02-14 PROCEDURE — 99233 SBSQ HOSP IP/OBS HIGH 50: CPT | Mod: ,,, | Performed by: INTERNAL MEDICINE

## 2023-02-14 PROCEDURE — 85007 BL SMEAR W/DIFF WBC COUNT: CPT | Mod: 91 | Performed by: INTERNAL MEDICINE

## 2023-02-14 PROCEDURE — 25000003 PHARM REV CODE 250: Performed by: NURSE PRACTITIONER

## 2023-02-14 PROCEDURE — 20600001 HC STEP DOWN PRIVATE ROOM

## 2023-02-14 PROCEDURE — 94761 N-INVAS EAR/PLS OXIMETRY MLT: CPT

## 2023-02-14 PROCEDURE — 25000003 PHARM REV CODE 250: Performed by: STUDENT IN AN ORGANIZED HEALTH CARE EDUCATION/TRAINING PROGRAM

## 2023-02-14 PROCEDURE — 86900 BLOOD TYPING SEROLOGIC ABO: CPT | Performed by: INTERNAL MEDICINE

## 2023-02-14 PROCEDURE — 63600175 PHARM REV CODE 636 W HCPCS: Mod: JG | Performed by: INTERNAL MEDICINE

## 2023-02-14 PROCEDURE — 25000003 PHARM REV CODE 250: Performed by: INTERNAL MEDICINE

## 2023-02-14 PROCEDURE — 80053 COMPREHEN METABOLIC PANEL: CPT | Performed by: INTERNAL MEDICINE

## 2023-02-14 PROCEDURE — 85027 COMPLETE CBC AUTOMATED: CPT | Mod: 91 | Performed by: INTERNAL MEDICINE

## 2023-02-14 PROCEDURE — 63600175 PHARM REV CODE 636 W HCPCS: Performed by: STUDENT IN AN ORGANIZED HEALTH CARE EDUCATION/TRAINING PROGRAM

## 2023-02-14 PROCEDURE — 85007 BL SMEAR W/DIFF WBC COUNT: CPT | Performed by: NURSE PRACTITIONER

## 2023-02-14 PROCEDURE — 85027 COMPLETE CBC AUTOMATED: CPT | Performed by: NURSE PRACTITIONER

## 2023-02-14 RX ADMIN — FILGRASTIM 480 MCG: 480 INJECTION, SOLUTION INTRAVENOUS; SUBCUTANEOUS at 09:02

## 2023-02-14 RX ADMIN — CEFEPIME 2 G: 2 INJECTION, POWDER, FOR SOLUTION INTRAVENOUS at 12:02

## 2023-02-14 RX ADMIN — CEFEPIME 2 G: 2 INJECTION, POWDER, FOR SOLUTION INTRAVENOUS at 04:02

## 2023-02-14 RX ADMIN — LOPERAMIDE HYDROCHLORIDE 2 MG: 2 CAPSULE ORAL at 04:02

## 2023-02-14 RX ADMIN — Medication 1 DOSE: at 08:02

## 2023-02-14 RX ADMIN — PANTOPRAZOLE SODIUM 40 MG: 40 TABLET, DELAYED RELEASE ORAL at 09:02

## 2023-02-14 RX ADMIN — Medication 1 DOSE: at 09:02

## 2023-02-14 RX ADMIN — VANCOMYCIN HYDROCHLORIDE 1250 MG: 1.25 INJECTION, POWDER, LYOPHILIZED, FOR SOLUTION INTRAVENOUS at 10:02

## 2023-02-14 RX ADMIN — POTASSIUM CHLORIDE 20 MEQ: 1500 TABLET, EXTENDED RELEASE ORAL at 04:02

## 2023-02-14 RX ADMIN — SODIUM CHLORIDE: 9 INJECTION, SOLUTION INTRAVENOUS at 02:02

## 2023-02-14 RX ADMIN — ACYCLOVIR 800 MG: 200 CAPSULE ORAL at 08:02

## 2023-02-14 RX ADMIN — CEFEPIME 2 G: 2 INJECTION, POWDER, FOR SOLUTION INTRAVENOUS at 09:02

## 2023-02-14 RX ADMIN — ACYCLOVIR 800 MG: 200 CAPSULE ORAL at 09:02

## 2023-02-14 RX ADMIN — FLUCONAZOLE 400 MG: 200 TABLET ORAL at 09:02

## 2023-02-14 RX ADMIN — POTASSIUM CHLORIDE 20 MEQ: 1500 TABLET, EXTENDED RELEASE ORAL at 01:02

## 2023-02-14 RX ADMIN — VANCOMYCIN HYDROCHLORIDE 1250 MG: 1.25 INJECTION, POWDER, LYOPHILIZED, FOR SOLUTION INTRAVENOUS at 08:02

## 2023-02-14 NOTE — PLAN OF CARE
Plan of care reviewed with patient. Pt is day +11 of a Génesis Auto. Afebrile. Free from falls or injury. No complaints of pain. NS infusing at 75. Cefepime given as scheduled. Imodium given once for loose stools. Pt up independently to bathroom. Bed locked in lowest position, non skid socks on, call light within reach. Pt instructed to call if any assistance is needed. Vitals stable. Will cont to arcelia pt.

## 2023-02-14 NOTE — PROGRESS NOTES
Pharmacokinetic Assessment Follow Up: IV Vancomycin    Vancomycin serum concentration assessment(s):    The trough level was drawn correctly and can be used to guide therapy at this time. The measurement is within the desired definitive target range of 10 to 20 mcg/mL.    Vancomycin Regimen Plan:    Continue regimen to Vancomycin 1250 mg IV every 12 hours with next serum trough concentration measured at 60min prior to the dose on 02/16 at 0800.    Drug levels (last 3 results):  Recent Labs   Lab Result Units 02/14/23  0925   Vancomycin-Trough ug/mL 13.0       Pharmacy will continue to follow and monitor vancomycin.    Please contact pharmacy at extension [phone] for questions regarding this assessment.    Thank you for the consult,   Rohini Hale       Patient brief summary:  Raquel Houstno is a 62 y.o. female initiated on antimicrobial therapy with IV Vancomycin for treatment of neutropenic fever      Drug Allergies:   Review of patient's allergies indicates:  No Known Allergies    Actual Body Weight:   97.4kg    Renal Function:   Estimated Creatinine Clearance: 88.1 mL/min (based on SCr of 0.8 mg/dL).,     Dialysis Method (if applicable):  N/A    CBC (last 72 hours):  Recent Labs   Lab Result Units 02/12/23  0320 02/13/23  0341 02/14/23  0407 02/14/23  0605   WBC K/uL 0.08* 0.25* 1.44* 1.60*   Hemoglobin g/dL 7.3* 6.9* 8.4* 8.1*   Hematocrit % 21.8* 20.1* 25.6* 23.6*   Platelets K/uL 13* 10* 19* 16*   Gran % % 2.0* 24.0* 44.0 56.0   Lymph % % 4.0* 20.0 16.0* 7.0*   Mono % % 0.0* 52.0* 31.0* 23.0*   Eosinophil % % 0.0 0.0 0.0 0.0   Basophil % % 0.0 0.0 0.0 0.0   Differential Method  Manual Automated Manual Manual       Metabolic Panel (last 72 hours):  Recent Labs   Lab Result Units 02/12/23  0320 02/13/23  0341 02/14/23  0605   Sodium mmol/L 132* 128* 134*   Potassium mmol/L 3.7 3.5 3.3*   Chloride mmol/L 100 99 103   CO2 mmol/L 19* 20* 23   Glucose mg/dL 98 104 91   BUN mg/dL 9 7 6   Creatinine mg/dL 0.8 0.8  0.8   Albumin g/dL 2.8* 2.5* 2.4*   Total Bilirubin mg/dL 0.7 0.7 0.4   Alkaline Phosphatase U/L 176* 226* 179*   AST U/L 19 33 11   ALT U/L 41 51* 27   Magnesium mg/dL 1.8 1.8  --    Phosphorus mg/dL 2.4* 2.0*  --        Vancomycin Administrations:  vancomycin given in the last 96 hours                     vancomycin 1,250 mg in dextrose 5 % (D5W) 250 mL IVPB (Vial-Mate) (mg) 1,250 mg New Bag 02/14/23 1021     1,250 mg New Bag 02/13/23 2114     1,250 mg New Bag  0900    vancomycin 2 g in dextrose 5 % 500 mL IVPB (mg) 2,000 mg New Bag 02/12/23 2052                    Microbiologic Results:  Microbiology Results (last 7 days)       Procedure Component Value Units Date/Time    Blood culture [[id number]] Collected: 02/11/23 0327    Order Status: Completed Specimen: Blood Updated: 02/14/23 0612     Blood Culture, Routine No Growth to date      No Growth to date      No Growth to date      No Growth to date    Narrative:      Collection has been rescheduled by BM8 at 02/11/2023 00:23 Reason:   Unable to collect  Collection has been rescheduled by RF at 02/11/2023 03:25 Reason: The   phlebotomist alex who was assigned to the 8th floor for the night   shift was unable to collect this patient's  routine blood cultures   and he did not notify the coordinator that he was unable to collect   this patient blood work so I notified the technician who has this   floor for A.M. assignment to go and try this patient.  Collection has been rescheduled by BM8 at 02/11/2023 00:23 Reason:   Unable to collect  Collection has been rescheduled by RF at 02/11/2023 03:25 Reason: The   phlebotomist alex who was assigned to the 8th floor for the night   shift was unable to collect this patient's  routine blood cultures   and he did not notify the coordinator that he was unable to collect   this patient blood work so I notified the technician who has this   floor for A.M. assignment to go and try this patient.    Blood culture [[id number]]  Collected: 02/11/23 0327    Order Status: Completed Specimen: Blood Updated: 02/14/23 0612     Blood Culture, Routine No Growth to date      No Growth to date      No Growth to date      No Growth to date    Narrative:      Collection has been rescheduled by BM8 at 02/11/2023 00:23 Reason:   Unable to collect  Collection has been rescheduled by RF at 02/11/2023 03:25 Reason: The   phlebotomist alex who was assigned to the 8th floor for the night   shift was unable to collect this patient's  routine blood cultures   and he did not notify the coordinator that he was unable to collect   this patient blood work so I notified the technician who has this   floor for A.M. assignment to go and try this patient.  Collection has been rescheduled by BM8 at 02/11/2023 00:23 Reason:   Unable to collect  Collection has been rescheduled by RF at 02/11/2023 03:25 Reason: The   phlebotomist alex who was assigned to the 8th floor for the night   shift was unable to collect this patient's  routine blood cultures   and he did not notify the coordinator that he was unable to collect   this patient blood work so I notified the technician who has this   floor for A.M. assignment to go and try this patient.    Blood culture [[id number]] Collected: 02/12/23 1716    Order Status: Completed Specimen: Blood Updated: 02/13/23 2212     Blood Culture, Routine No Growth to date      No Growth to date    Blood culture [[id number]] Collected: 02/12/23 1725    Order Status: Completed Specimen: Blood from Antecubital, Left Updated: 02/13/23 2212     Blood Culture, Routine No Growth to date      No Growth to date    Urine Culture High Risk [[id number]]  (Abnormal) Collected: 02/11/23 0144    Order Status: Completed Specimen: Urine Updated: 02/12/23 1046     Urine Culture, Routine COAGULASE-NEGATIVE STAPHYLOCOCCUS SPECIES  50,000 - 99,999 cfu/ml  Susceptibility testing not routinely performed.      Narrative:      Indicated criteria for high risk  culture:->Neutropenic  patient    Clostridium difficile EIA [[id number]] Collected: 02/07/23 1307    Order Status: Completed Specimen: Stool Updated: 02/07/23 2113     C. diff Antigen Negative     C difficile Toxins A+B, EIA Negative     Comment: Testing not recommended for children <24 months old.

## 2023-02-14 NOTE — ASSESSMENT & PLAN NOTE
- Approved by the transplant selection committee for a Génesis 200  - Underwent consent signing with Dr. Casas 1/20/2023  - Admitted for Génesis 200 Auto on 2/1/22  - Tolerated chemotherapy on 2/2 with no issues  - Received 3 bags with a total CD34 dose of 3.13 x10^6/kg on 2/3 without incident  - Today is day +11  - engrafted today with                 Planned conditioning regimen:  Melphalan on Day -1     Antimicrobial Prophylaxis:  Acyclovir starting on Day -1  Levofloxacin starting on Day -1  Fluconazole starting on Day -1     Growth Factor Support:  Neupogen starting on Day +7      Caregiver: daughter in law and son  Post-transplant discharge plans: Patricia Alanis

## 2023-02-14 NOTE — ASSESSMENT & PLAN NOTE
- Fever 102.3F on 2/10 at 11:30pm  - CXR with no focal infiltrate. UA not consistent with UTI. Blood cx with NGTD.  - Started Cefepime 2g q8h  - Temp of 101.7 2/12 at ~ 1930.   - Vanc added on 2/12  - Repeated blood cx and still NGTD.  - urine culture with coag negative staph, low yield and asymptomatic  - afebrile x 24 hours and engrafted today with

## 2023-02-14 NOTE — SUBJECTIVE & OBJECTIVE
Subjective:     Interval History: Day +11 from a Génesis 200 auto SCT for MM. Engrafted today with . Afebrile x 24 hours, BC NGTD, UC with coag negative staph, asymptomatic. Patient denies nausea, diarrhea or mouth pain this am. States she is feeling much better.      Objective:     Vital Signs (Most Recent):  Temp: 97.5 °F (36.4 °C) (02/14/23 0733)  Pulse: 87 (02/14/23 0733)  Resp: 18 (02/14/23 0733)  BP: (Abnormal) 115/59 (02/14/23 0733)  SpO2: 96 % (02/14/23 0733)   Vital Signs (24h Range):  Temp:  [97.5 °F (36.4 °C)-99.1 °F (37.3 °C)] 97.5 °F (36.4 °C)  Pulse:  [] 87  Resp:  [16-18] 18  SpO2:  [93 %-100 %] 96 %  BP: (108-131)/(57-68) 115/59     Weight: 97.4 kg (214 lb 13.4 oz)  Body mass index is 34.68 kg/m².  Body surface area is 2.13 meters squared.    ECOG SCORE             Intake/Output - Last 3 Shifts       Intake/Output Category 02/12 0700 to 02/13 0659 02/13 0700 to 02/14 0659 02/14 0700 to 02/15 0659    P.O. 1350 440     I.V. (mL/kg)  1562.5 (16)     Blood  450     IV Piggyback  648.5     Total Intake(mL/kg) 1350 (13.8) 3101 (31.8)     Urine (mL/kg/hr) 1300 (0.6) 1375 (0.6) 300 (1.2)    Stool 0 0     Total Output 1300 1375 300    Net +50 +1726 -300           Stool Occurrence 5 x 4 x             Physical Exam  Constitutional:       Appearance: She is well-developed.   HENT:      Head: Normocephalic and atraumatic.      Mouth/Throat:      Pharynx: No oropharyngeal exudate or posterior oropharyngeal erythema.   Eyes:      Conjunctiva/sclera: Conjunctivae normal.      Pupils: Pupils are equal, round, and reactive to light.   Cardiovascular:      Rate and Rhythm: Normal rate and regular rhythm.      Pulses: Normal pulses.      Heart sounds: Normal heart sounds. No murmur heard.  Pulmonary:      Effort: Pulmonary effort is normal.      Breath sounds: Normal breath sounds.   Abdominal:      General: Bowel sounds are normal. There is no distension.      Palpations: Abdomen is soft.      Tenderness:  There is no abdominal tenderness.   Musculoskeletal:         General: No deformity. Normal range of motion.      Cervical back: Normal range of motion and neck supple.   Skin:     General: Skin is warm and dry.      Findings: No erythema or rash.      Comments: Vascath intact with no redness, swelling or drainage   Neurological:      General: No focal deficit present.      Mental Status: She is alert and oriented to person, place, and time.   Psychiatric:         Behavior: Behavior normal.         Thought Content: Thought content normal.         Judgment: Judgment normal.       Significant Labs:   CBC:   Recent Labs   Lab 02/13/23  0341 02/14/23  0407 02/14/23  0605   WBC 0.25* 1.44* 1.60*   HGB 6.9* 8.4* 8.1*   HCT 20.1* 25.6* 23.6*   PLT 10* 19* 16*    and CMP:   Recent Labs   Lab 02/13/23  0341 02/14/23  0605   * 134*   K 3.5 3.3*   CL 99 103   CO2 20* 23    91   BUN 7 6   CREATININE 0.8 0.8   CALCIUM 8.2* 8.3*   PROT 5.4* 5.3*   ALBUMIN 2.5* 2.4*   BILITOT 0.7 0.4   ALKPHOS 226* 179*   AST 33 11   ALT 51* 27   ANIONGAP 9 8       Diagnostic Results:  None

## 2023-02-14 NOTE — PROGRESS NOTES
[facility] ([facility])  Hematology  Bone Marrow Transplant  Progress Note    Patient Name: Raquel Houston  Admission Date: 2/1/2023  Hospital Length of Stay: 13 days  Code Status: Full Code    Subjective:     Interval History: Day +11 from a Génesis 200 auto SCT for MM. Engrafted today with . Afebrile x 24 hours, BC NGTD, UC with coag negative staph, asymptomatic. Patient denies nausea, diarrhea or mouth pain this am. States she is feeling much better.      Objective:     Vital Signs (Most Recent):  Temp: 97.5 °F (36.4 °C) (02/14/23 0733)  Pulse: 87 (02/14/23 0733)  Resp: 18 (02/14/23 0733)  BP: (Abnormal) 115/59 (02/14/23 0733)  SpO2: 96 % (02/14/23 0733)   Vital Signs (24h Range):  Temp:  [97.5 °F (36.4 °C)-99.1 °F (37.3 °C)] 97.5 °F (36.4 °C)  Pulse:  [] 87  Resp:  [16-18] 18  SpO2:  [93 %-100 %] 96 %  BP: (108-131)/(57-68) 115/59     Weight: 97.4 kg (214 lb 13.4 oz)  Body mass index is 34.68 kg/m².  Body surface area is 2.13 meters squared.    ECOG SCORE             Intake/Output - Last 3 Shifts       Intake/Output Category 02/12 0700 to 02/13 0659 02/13 0700 to 02/14 0659 02/14 0700 to 02/15 0659    P.O. 1350 440     I.V. (mL/kg)  1562.5 (16)     Blood  450     IV Piggyback  648.5     Total Intake(mL/kg) 1350 (13.8) 3101 (31.8)     Urine (mL/kg/hr) 1300 (0.6) 1375 (0.6) 300 (1.2)    Stool 0 0     Total Output 1300 1375 300    Net +50 +1726 -300           Stool Occurrence 5 x 4 x             Physical Exam  Constitutional:       Appearance: She is well-developed.   HENT:      Head: Normocephalic and atraumatic.      Mouth/Throat:      Pharynx: No oropharyngeal exudate or posterior oropharyngeal erythema.   Eyes:      Conjunctiva/sclera: Conjunctivae normal.      Pupils: Pupils are equal, round, and reactive to light.   Cardiovascular:      Rate and Rhythm: Normal rate and regular rhythm.      Pulses: Normal pulses.      Heart sounds: Normal heart sounds. No murmur heard.  Pulmonary:       Effort: Pulmonary effort is normal.      Breath sounds: Normal breath sounds.   Abdominal:      General: Bowel sounds are normal. There is no distension.      Palpations: Abdomen is soft.      Tenderness: There is no abdominal tenderness.   Musculoskeletal:         General: No deformity. Normal range of motion.      Cervical back: Normal range of motion and neck supple.   Skin:     General: Skin is warm and dry.      Findings: No erythema or rash.      Comments: Vascath intact with no redness, swelling or drainage   Neurological:      General: No focal deficit present.      Mental Status: She is alert and oriented to person, place, and time.   Psychiatric:         Behavior: Behavior normal.         Thought Content: Thought content normal.         Judgment: Judgment normal.       Significant Labs:   CBC:   Recent Labs   Lab 02/13/23  0341 02/14/23  0407 02/14/23  0605   WBC 0.25* 1.44* 1.60*   HGB 6.9* 8.4* 8.1*   HCT 20.1* 25.6* 23.6*   PLT 10* 19* 16*    and CMP:   Recent Labs   Lab 02/13/23  0341 02/14/23  0605   * 134*   K 3.5 3.3*   CL 99 103   CO2 20* 23    91   BUN 7 6   CREATININE 0.8 0.8   CALCIUM 8.2* 8.3*   PROT 5.4* 5.3*   ALBUMIN 2.5* 2.4*   BILITOT 0.7 0.4   ALKPHOS 226* 179*   AST 33 11   ALT 51* 27   ANIONGAP 9 8       Diagnostic Results:  None    Assessment/Plan:     * History of autologous stem cell transplant  - Approved by the transplant selection committee for a Génesis 200  - Underwent consent signing with Dr. Casas 1/20/2023  - Admitted for Génesis 200 Auto on 2/1/22  - Tolerated chemotherapy on 2/2 with no issues  - Received 3 bags with a total CD34 dose of 3.13 x10^6/kg on 2/3 without incident  - Today is day +11  - engrafted today with                 Planned conditioning regimen:  Melphalan on Day -1     Antimicrobial Prophylaxis:  Acyclovir starting on Day -1  Levofloxacin starting on Day -1  Fluconazole starting on Day -1     Growth Factor Support:  Neupogen starting on Day  +7      Caregiver: daughter in law and son  Post-transplant discharge plans: Patricia Alanis    Multiple myeloma in remission  - Complex cytogenetics with gain 1q21, t(4;14), and monosomy 13  - R-ISS stage III (elevated beta-2 microglobulin, elevated LDH, decreased albumin, and high risk cytogenetics)  - S/p 5 Cycles D-Vrd (discontinued jasen after cycle 4)  - Has had significant clinical improvement as well as reduction in IgG and M-spike showing excellent response  - see autologous stem cell transplant     Neutropenic fever  - Fever 102.3F on 2/10 at 11:30pm  - CXR with no focal infiltrate. UA not consistent with UTI. Blood cx with NGTD.  - Started Cefepime 2g q8h  - Temp of 101.7 2/12 at ~ 1930.   - Vanc added on 2/12  - Repeated blood cx and still NGTD.  - urine culture with coag negative staph, low yield and asymptomatic  - afebrile x 24 hours and engrafted today with       Pancytopenia due to chemotherapy  - Transfuse for hgb < 7 or plts < 10K  - Continue antimicrobial ppx per protocol  - Daily CBC while inpatient  - Stopped VTE ppx 2/9/23 with platelets <50K  - ANC up to 633 today    Thrombocytopenia  - see pancytopenia     Chemotherapy-induced diarrhea  - Started having loose stools on 2/7/23  - c-diff negative  - continue PRN imodium   Improved    Electrolyte disturbance  - Repleting per PRN electrolyte order set  - Daily CMP/Mag/Phos level while inpatient    HTN (hypertension)  - Continued home amlodipine on admission, but now holding for low normal BP.         VTE Risk Mitigation (From admission, onward)         Ordered     heparin (porcine) injection 1,600 Units  As needed (PRN)         02/01/23 1528     IP VTE HIGH RISK PATIENT  Once         02/01/23 1442     Place sequential compression device  Until discontinued         02/01/23 1442                Disposition: planning for discharge in the next 24-48 hours pending count recovery post transplant     Natali Calle NP  Bone Marrow Transplant  [facility]  [address] - Oncology ([facility])

## 2023-02-14 NOTE — PLAN OF CARE
Day +11 MelAuto SCT. AAOx4. VSS; afebrile; on room air. Pt denies pain, n/v, or headache. Cefepime & vanc continued as scheduled. Potassium replaced PO. Loose BM x2. Pt with nonskid footwear on with bed in lowest position and locked with bed rails up x2. Pt ambulates independently, instructed to call if assistance is needed, and call light is within reach. Will continue to monitor pt.

## 2023-02-14 NOTE — ASSESSMENT & PLAN NOTE
- Transfuse for hgb < 7 or plts < 10K  - Continue antimicrobial ppx per protocol  - Daily CBC while inpatient  - Stopped VTE ppx 2/9/23 with platelets <50K  - ANC up to 633 today

## 2023-02-15 VITALS
BODY MASS INDEX: 35.19 KG/M2 | HEART RATE: 106 BPM | HEIGHT: 66 IN | TEMPERATURE: 97 F | OXYGEN SATURATION: 94 % | SYSTOLIC BLOOD PRESSURE: 134 MMHG | DIASTOLIC BLOOD PRESSURE: 79 MMHG | RESPIRATION RATE: 18 BRPM | WEIGHT: 218.94 LBS

## 2023-02-15 LAB
ALBUMIN SERPL BCP-MCNC: 2.3 G/DL (ref 3.5–5.2)
ALP SERPL-CCNC: 172 U/L (ref 55–135)
ALT SERPL W/O P-5'-P-CCNC: 22 U/L (ref 10–44)
ANION GAP SERPL CALC-SCNC: 9 MMOL/L (ref 8–16)
AST SERPL-CCNC: 11 U/L (ref 10–40)
BASOPHILS NFR BLD: 0 % (ref 0–1.9)
BILIRUB SERPL-MCNC: 0.3 MG/DL (ref 0.1–1)
BLD PROD TYP BPU: NORMAL
BLOOD UNIT EXPIRATION DATE: NORMAL
BLOOD UNIT TYPE CODE: 6200
BLOOD UNIT TYPE: NORMAL
BUN SERPL-MCNC: 4 MG/DL (ref 6–20)
CALCIUM SERPL-MCNC: 8 MG/DL (ref 8.7–10.5)
CHLORIDE SERPL-SCNC: 105 MMOL/L (ref 95–110)
CO2 SERPL-SCNC: 21 MMOL/L (ref 23–29)
CODING SYSTEM: NORMAL
CREAT SERPL-MCNC: 0.7 MG/DL (ref 0.5–1.4)
CROSSMATCH INTERPRETATION: NORMAL
DIFFERENTIAL METHOD: ABNORMAL
DISPENSE STATUS: NORMAL
EOSINOPHIL NFR BLD: 0 % (ref 0–8)
ERYTHROCYTE [DISTWIDTH] IN BLOOD BY AUTOMATED COUNT: 14.3 % (ref 11.5–14.5)
EST. GFR  (NO RACE VARIABLE): >60 ML/MIN/1.73 M^2
GLUCOSE SERPL-MCNC: 83 MG/DL (ref 70–110)
HCT VFR BLD AUTO: 23.8 % (ref 37–48.5)
HGB BLD-MCNC: 7.9 G/DL (ref 12–16)
IMM GRANULOCYTES # BLD AUTO: ABNORMAL K/UL (ref 0–0.04)
IMM GRANULOCYTES NFR BLD AUTO: ABNORMAL % (ref 0–0.5)
LYMPHOCYTES NFR BLD: 0 % (ref 18–48)
MAGNESIUM SERPL-MCNC: 1.6 MG/DL (ref 1.6–2.6)
MCH RBC QN AUTO: 27.7 PG (ref 27–31)
MCHC RBC AUTO-ENTMCNC: 33.2 G/DL (ref 32–36)
MCV RBC AUTO: 84 FL (ref 82–98)
METAMYELOCYTES NFR BLD MANUAL: 2 %
MONOCYTES NFR BLD: 16 % (ref 4–15)
NEUTROPHILS NFR BLD: 71 % (ref 38–73)
NEUTS BAND NFR BLD MANUAL: 10 %
NRBC BLD-RTO: 0 /100 WBC
PHOSPHATE SERPL-MCNC: 1.7 MG/DL (ref 2.7–4.5)
PLATELET # BLD AUTO: 25 K/UL (ref 150–450)
PLATELET BLD QL SMEAR: ABNORMAL
PMV BLD AUTO: ABNORMAL FL (ref 9.2–12.9)
POTASSIUM SERPL-SCNC: 3.2 MMOL/L (ref 3.5–5.1)
PROMYELOCYTES NFR BLD MANUAL: 1 %
PROT SERPL-MCNC: 4.8 G/DL (ref 6–8.4)
RBC # BLD AUTO: 2.85 M/UL (ref 4–5.4)
SODIUM SERPL-SCNC: 135 MMOL/L (ref 136–145)
UNIT NUMBER: NORMAL
WBC # BLD AUTO: 8.98 K/UL (ref 3.9–12.7)

## 2023-02-15 PROCEDURE — 25000003 PHARM REV CODE 250: Performed by: NURSE PRACTITIONER

## 2023-02-15 PROCEDURE — 85007 BL SMEAR W/DIFF WBC COUNT: CPT | Performed by: NURSE PRACTITIONER

## 2023-02-15 PROCEDURE — 36430 TRANSFUSION BLD/BLD COMPNT: CPT

## 2023-02-15 PROCEDURE — 25000003 PHARM REV CODE 250: Performed by: INTERNAL MEDICINE

## 2023-02-15 PROCEDURE — 99233 PR SUBSEQUENT HOSPITAL CARE,LEVL III: ICD-10-PCS | Mod: ,,, | Performed by: INTERNAL MEDICINE

## 2023-02-15 PROCEDURE — 85027 COMPLETE CBC AUTOMATED: CPT | Performed by: NURSE PRACTITIONER

## 2023-02-15 PROCEDURE — 80053 COMPREHEN METABOLIC PANEL: CPT | Performed by: NURSE PRACTITIONER

## 2023-02-15 PROCEDURE — 63600175 PHARM REV CODE 636 W HCPCS: Performed by: STUDENT IN AN ORGANIZED HEALTH CARE EDUCATION/TRAINING PROGRAM

## 2023-02-15 PROCEDURE — 25000003 PHARM REV CODE 250: Performed by: STUDENT IN AN ORGANIZED HEALTH CARE EDUCATION/TRAINING PROGRAM

## 2023-02-15 PROCEDURE — 99233 SBSQ HOSP IP/OBS HIGH 50: CPT | Mod: ,,, | Performed by: INTERNAL MEDICINE

## 2023-02-15 PROCEDURE — 83735 ASSAY OF MAGNESIUM: CPT | Performed by: NURSE PRACTITIONER

## 2023-02-15 PROCEDURE — P9037 PLATE PHERES LEUKOREDU IRRAD: HCPCS | Performed by: NURSE PRACTITIONER

## 2023-02-15 PROCEDURE — 84100 ASSAY OF PHOSPHORUS: CPT | Performed by: NURSE PRACTITIONER

## 2023-02-15 RX ORDER — LANOLIN ALCOHOL/MO/W.PET/CERES
800 CREAM (GRAM) TOPICAL DAILY
Qty: 14 TABLET | Refills: 0 | Status: SHIPPED | OUTPATIENT
Start: 2023-02-15 | End: 2023-02-23 | Stop reason: SDUPTHER

## 2023-02-15 RX ORDER — HYDROCODONE BITARTRATE AND ACETAMINOPHEN 500; 5 MG/1; MG/1
TABLET ORAL
Status: DISCONTINUED | OUTPATIENT
Start: 2023-02-15 | End: 2023-02-15 | Stop reason: HOSPADM

## 2023-02-15 RX ORDER — POTASSIUM CHLORIDE 20 MEQ/1
40 TABLET, EXTENDED RELEASE ORAL DAILY
Qty: 14 TABLET | Refills: 0 | Status: SHIPPED | OUTPATIENT
Start: 2023-02-15 | End: 2023-02-22

## 2023-02-15 RX ORDER — ACETAMINOPHEN 325 MG/1
650 TABLET ORAL ONCE AS NEEDED
Status: DISCONTINUED | OUTPATIENT
Start: 2023-02-15 | End: 2023-02-15 | Stop reason: HOSPADM

## 2023-02-15 RX ORDER — DIPHENHYDRAMINE HCL 25 MG
25 CAPSULE ORAL ONCE AS NEEDED
Status: DISCONTINUED | OUTPATIENT
Start: 2023-02-15 | End: 2023-02-15 | Stop reason: HOSPADM

## 2023-02-15 RX ORDER — CEPHALEXIN 250 MG/1
500 CAPSULE ORAL EVERY 6 HOURS
Status: DISCONTINUED | OUTPATIENT
Start: 2023-02-15 | End: 2023-02-15 | Stop reason: HOSPADM

## 2023-02-15 RX ORDER — CEPHALEXIN 500 MG/1
500 CAPSULE ORAL EVERY 6 HOURS
Qty: 20 CAPSULE | Refills: 0 | Status: SHIPPED | OUTPATIENT
Start: 2023-02-15 | End: 2023-02-20

## 2023-02-15 RX ORDER — AMLODIPINE BESYLATE 10 MG/1
10 TABLET ORAL DAILY
Qty: 30 TABLET | Refills: 11 | Status: SHIPPED | OUTPATIENT
Start: 2023-02-15 | End: 2024-02-15

## 2023-02-15 RX ORDER — ACYCLOVIR 200 MG/1
800 CAPSULE ORAL 2 TIMES DAILY
Qty: 240 CAPSULE | Refills: 11 | Status: SHIPPED | OUTPATIENT
Start: 2023-02-15 | End: 2024-02-15

## 2023-02-15 RX ORDER — LIDOCAINE 50 MG/G
1 PATCH TOPICAL DAILY PRN
Qty: 5 PATCH | Refills: 0 | Status: SHIPPED | OUTPATIENT
Start: 2023-02-15 | End: 2023-02-20

## 2023-02-15 RX ADMIN — DIBASIC SODIUM PHOSPHATE, MONOBASIC POTASSIUM PHOSPHATE AND MONOBASIC SODIUM PHOSPHATE 2 TABLET: 852; 155; 130 TABLET ORAL at 08:02

## 2023-02-15 RX ADMIN — DIBASIC SODIUM PHOSPHATE, MONOBASIC POTASSIUM PHOSPHATE AND MONOBASIC SODIUM PHOSPHATE 2 TABLET: 852; 155; 130 TABLET ORAL at 12:02

## 2023-02-15 RX ADMIN — POTASSIUM CHLORIDE 20 MEQ: 1500 TABLET, EXTENDED RELEASE ORAL at 11:02

## 2023-02-15 RX ADMIN — PANTOPRAZOLE SODIUM 40 MG: 40 TABLET, DELAYED RELEASE ORAL at 08:02

## 2023-02-15 RX ADMIN — Medication 400 MG: at 12:02

## 2023-02-15 RX ADMIN — CEFEPIME 2 G: 2 INJECTION, POWDER, FOR SOLUTION INTRAVENOUS at 12:02

## 2023-02-15 RX ADMIN — CEPHALEXIN 500 MG: 250 CAPSULE ORAL at 12:02

## 2023-02-15 RX ADMIN — Medication 400 MG: at 08:02

## 2023-02-15 RX ADMIN — POTASSIUM CHLORIDE 20 MEQ: 1500 TABLET, EXTENDED RELEASE ORAL at 12:02

## 2023-02-15 RX ADMIN — POTASSIUM CHLORIDE 20 MEQ: 1500 TABLET, EXTENDED RELEASE ORAL at 08:02

## 2023-02-15 RX ADMIN — LOPERAMIDE HYDROCHLORIDE 2 MG: 2 CAPSULE ORAL at 12:02

## 2023-02-15 RX ADMIN — ACYCLOVIR 800 MG: 200 CAPSULE ORAL at 08:02

## 2023-02-15 NOTE — ASSESSMENT & PLAN NOTE
- Transfuse for hgb < 7 or plts < 10K  - Continue antimicrobial ppx per protocol  - Daily CBC while inpatient  - Stopped VTE ppx 2/9/23 with platelets <50K  - ANC up to 6376 today

## 2023-02-15 NOTE — ASSESSMENT & PLAN NOTE
- Fever 102.3F on 2/10 at 11:30pm  - CXR with no focal infiltrate. UA not consistent with UTI. Blood cx with NGTD.  - Started Cefepime 2g q8h  - Temp of 101.7 2/12 at ~ 1930.   - Vanc added on 2/12  - Repeated blood cx and still NGTD.  - Urine culture with coag negative staph, low yield and asymptomatic  - Stopped IV abx today and will discharge on Keflex to complete a 7 day course of abx.

## 2023-02-15 NOTE — CONSULTS
Permacath Removal Procedure Note         Procedure:   Right permacath removal     Date: 2/15/2023     Location: right internal jugular vein     Anesthesia: Local       Procedure:  After verbal consent and timeout was performed. Patient was placed supine. Following this local anesthetic was injected on the anterior right chest. Permacath cuff was dissected and catheter was removed without any resistance. Dressings were applied. Patient tolerated the procedure well. Pressure was held at the IJ insertion site for 5 minute.      Complications/Comments:  none     Estimated Blood Loss: None

## 2023-02-15 NOTE — ASSESSMENT & PLAN NOTE
- Approved by the transplant selection committee for a Génesis 200  - Underwent consent signing with Dr. Casas 1/20/2023  - Admitted for Génesis 200 Auto on 2/1/22  - Tolerated chemotherapy on 2/2 with no issues  - Received 3 bags with a total CD34 dose of 3.13 x10^6/kg on 2/3 without incident  - Today is Day +12  - Engrafted Day +11 (2/14) with an ANC of 633. Day 2 of engraftment. ANC is 6376 today.                Planned conditioning regimen:  Melphalan on Day -1     Antimicrobial Prophylaxis:  Acyclovir starting on Day -1  Levofloxacin starting on Day -1  Fluconazole starting on Day -1     Growth Factor Support:  Neupogen starting on Day +7      Caregiver: daughter in law and son  Post-transplant discharge plans: Patricia Alanis

## 2023-02-15 NOTE — PLAN OF CARE
Plan of care reviewed with patient. Pt is day +12 of a Génesis Auto. Afebrile. Free from falls or injury. No complaints of pain. NS infusing at 75. Cefepime given as scheduled. Two loose stools this shift. Pt up independently to bathroom. Bed locked in lowest position, non skid socks on, call light within reach. Pt instructed to call if any assistance is needed. Vitals stable. Will cont to [facility]celia pt.

## 2023-02-15 NOTE — SUBJECTIVE & OBJECTIVE
Subjective:     Interval History: Day +12 from a Génesis 200 auto SCT for MM. Day 2 of engraftment. ANC 6376 today. Remains afebrile. VSS. Denies diarrhea. Stopped IV abx and starting Keflex for coag neg staph UTI. Will discharge to [facility] today following line removal, discharge teaching, and pharmacy teaching.    Objective:     Vital Signs (Most Recent):  Temp: 97.7 °F (36.5 °C) (02/15/23 0805)  Pulse: 87 (02/15/23 0805)  Resp: 18 (02/15/23 0805)  BP: 120/67 (02/15/23 0805)  SpO2: 97 % (02/15/23 0805) Vital Signs (24h Range):  Temp:  [97.7 °F (36.5 °C)-98.5 °F (36.9 °C)] 97.7 °F (36.5 °C)  Pulse:  [] 87  Resp:  [17-18] 18  SpO2:  [94 %-99 %] 97 %  BP: (119-128)/(64-69) 120/67     Weight: 99.3 kg (218 lb 14.7 oz)  Body mass index is 35.33 kg/m².  Body surface area is 2.15 meters squared.    ECOG SCORE           [unfilled]    Intake/Output - Last 3 Shifts         02/13 0700  02/14 0659 02/14 0700  02/15 0659 02/15 0700  02/16 0659    P.O. 440 1640     I.V. (mL/kg) 1562.5 (16) 1166.8 (11.8)     Blood 450      IV Piggyback 648.5 646.7     Total Intake(mL/kg) 3101 (31.8) 3453.5 (34.8)     Urine (mL/kg/hr) 1375 (0.6) 2100 (0.9) 350 (1.6)    Stool 0 0 0    Total Output 1375 2100 350    Net +1726 +1353.5 -350           Urine Occurrence  2 x 1 x    Stool Occurrence 4 x 4 x 1 x            Physical Exam  Constitutional:       Appearance: She is well-developed.   HENT:      Head: Normocephalic and atraumatic.      Mouth/Throat:      Pharynx: No oropharyngeal exudate.   Eyes:      Conjunctiva/sclera: Conjunctivae normal.      Pupils: Pupils are equal, round, and reactive to light.   Cardiovascular:      Rate and Rhythm: Normal rate and regular rhythm.      Heart sounds: Normal heart sounds. No murmur heard.  Pulmonary:      Effort: Pulmonary effort is normal.      Breath sounds: Normal breath sounds.   Abdominal:      General: Bowel sounds are normal. There is no distension.      Palpations: Abdomen is soft.       Tenderness: There is no abdominal tenderness.   Musculoskeletal:         General: No deformity. Normal range of motion.      Cervical back: Normal range of motion and neck supple.   Skin:     General: Skin is warm and dry.      Findings: No erythema or rash.   Neurological:      Mental Status: She is alert and oriented to person, place, and time.   Psychiatric:         Behavior: Behavior normal.         Thought Content: Thought content normal.         Judgment: Judgment normal.       Significant Labs:   CBC:   Recent Labs   Lab 02/14/23  0407 02/14/23  0605 02/15/23  0335   WBC 1.44* 1.60* 8.98   HGB 8.4* 8.1* 7.9*   HCT 25.6* 23.6* 23.8*   PLT 19* 16* 25*      and CMP:   Recent Labs   Lab 02/14/23  0605 02/15/23  0335   * 135*   K 3.3* 3.2*    105   CO2 23 21*   GLU 91 83   BUN 6 4*   CREATININE 0.8 0.7   CALCIUM 8.3* 8.0*   PROT 5.3* 4.8*   ALBUMIN 2.4* 2.3*   BILITOT 0.4 0.3   ALKPHOS 179* 172*   AST 11 11   ALT 27 22   ANIONGAP 8 9         Diagnostic Results:  I have reviewed all pertinent imaging results/findings within the past 24 hours.

## 2023-02-15 NOTE — PROGRESS NOTES
Therapy with vancomycin complete and/or consult discontinued by provider.  Pharmacy will sign off, please re-consult as needed.    Eduardo MonzonD

## 2023-02-15 NOTE — ASSESSMENT & PLAN NOTE
- Started having loose stools on 2/7/23  - c-diff negative  - continue PRN imodium   - improved per patient

## 2023-02-15 NOTE — ASSESSMENT & PLAN NOTE
- Continued home amlodipine on admission, but held for low normal BP.   - Will continue to hold amlodipine at discharge. Can resume outpatient when appropriate.

## 2023-02-15 NOTE — PROGRESS NOTES
Discharge teaching done with patient and patient's caregiver on BMT unit.  Approximately 30 minutes spent on discussion of post-transplant guidelines including:  Low microbial diet, safe food handling, dining out, home sanitation, outpatient plan of care, progression of recovery of cells and immune system, ways to prevent infection, fatigue, ways to avoid bleeding, pet and plant exposure and care, returning to work, smoking and alcohol consumption, sexual activity, sexual desire and response, immunizations, traveling, nutrition, personal hygiene, hand washing, oral care, eye care, signs and symptoms to report immediately, and emotional changes post transplant.  Also discussed who to contact during business hours, after hours, and holidays.  Written materials supplied.  Patient and family given the opportunity to ask questions.  Verbalizes understanding.  All questions answered to their satisfaction.  Patient and family instructed to call with any questions or concerns.  Patient and caregiver were given handouts which reiterate all the above teaching.     Wendy Vazquez, FNP  Hematology/Oncology/Bone Marrow Transplant

## 2023-02-15 NOTE — PLAN OF CARE
Day +12 MelAuto SCT. AAOx4. VSS; afebrile; on room air. Pt denies pain, n/v, or headache. Electrolytes replaced PO. Loose BM x3. Administered immodium x1. Pt with nonskid footwear on with bed in lowest position and locked with bed rails up x2. Pt ambulates independently, instructed to call if assistance is needed, and call light is within reach. Plan is to discharge this afternoon. Will continue to monitor pt.

## 2023-02-15 NOTE — PROGRESS NOTES
Discharge instructions given and explained to pt. Pt verbalized understanding with no further questions. Vas cath D/C'd by MD. VS WDL.  Patient is awaiting ride home by caregiver.      ROAD TEST  O=SpO2 94% on RA  A=Ambulating around room and hallway  D=Vas cath D/C'd  T=Tolerating regular diet  E=Voids  S=Performs self care independently  T=Teaching on discharge instructions complete

## 2023-02-15 NOTE — PROGRESS NOTES
[facility] - On[facility] ([facility])  Hematology  Bone Marrow Transplant  Progress Note    Patient Name: Raquel Houston  Admission Date: 2/1/2023  Hospital Length of Stay: 14 days  Code Status: Full Code    Subjective:     Interval History: Day +12 from a Génesis 200 auto SCT for MM. Day 2 of engraftment. ANC 6376 today. Remains afebrile. VSS. Denies diarrhea. Stopped IV abx and starting Keflex for coag neg staph UTI. Will discharge to [facility] today following line removal, discharge teaching, and pharmacy teaching.    Objective:     Vital Signs (Most Recent):  Temp: 97.7 °F (36.5 °C) (02/15/23 0805)  Pulse: 87 (02/15/23 0805)  Resp: 18 (02/15/23 0805)  BP: 120/67 (02/15/23 0805)  SpO2: 97 % (02/15/23 0805) Vital Signs (24h Range):  Temp:  [97.7 °F (36.5 °C)-98.5 °F (36.9 °C)] 97.7 °F (36.5 °C)  Pulse:  [] 87  Resp:  [17-18] 18  SpO2:  [94 %-99 %] 97 %  BP: (119-128)/(64-69) 120/67     Weight: 99.3 kg (218 lb 14.7 oz)  Body mass index is 35.33 kg/m².  Body surface area is 2.15 meters squared.    ECOG SCORE           [unfilled]    Intake/Output - Last 3 Shifts         02/13 0700  02/14 0659 02/14 0700  02/15 0659 02/15 0700  02/16 0659    P.O. 440 1640     I.V. (mL/kg) 1562.5 (16) 1166.8 (11.8)     Blood 450      IV Piggyback 648.5 646.7     Total Intake(mL/kg) 3101 (31.8) 3453.5 (34.8)     Urine (mL/kg/hr) 1375 (0.6) 2100 (0.9) 350 (1.6)    Stool 0 0 0    Total Output 1375 2100 350    Net +1726 +1353.5 -350           Urine Occurrence  2 x 1 x    Stool Occurrence 4 x 4 x 1 x            Physical Exam  Constitutional:       Appearance: She is well-developed.   HENT:      Head: Normocephalic and atraumatic.      Mouth/Throat:      Pharynx: No oropharyngeal exudate.   Eyes:      Conjunctiva/sclera: Conjunctivae normal.      Pupils: Pupils are equal, round, and reactive to light.   Cardiovascular:      Rate and Rhythm: Normal rate and regular rhythm.      Heart sounds: Normal heart sounds. No murmur heard.  Pulmonary:       Effort: Pulmonary effort is normal.      Breath sounds: Normal breath sounds.   Abdominal:      General: Bowel sounds are normal. There is no distension.      Palpations: Abdomen is soft.      Tenderness: There is no abdominal tenderness.   Musculoskeletal:         General: No deformity. Normal range of motion.      Cervical back: Normal range of motion and neck supple.   Skin:     General: Skin is warm and dry.      Findings: No erythema or rash.   Neurological:      Mental Status: She is alert and oriented to person, place, and time.   Psychiatric:         Behavior: Behavior normal.         Thought Content: Thought content normal.         Judgment: Judgment normal.       Significant Labs:   CBC:   Recent Labs   Lab 02/14/23  0407 02/14/23  0605 02/15/23  0335   WBC 1.44* 1.60* 8.98   HGB 8.4* 8.1* 7.9*   HCT 25.6* 23.6* 23.8*   PLT 19* 16* 25*      and CMP:   Recent Labs   Lab 02/14/23  0605 02/15/23  0335   * 135*   K 3.3* 3.2*    105   CO2 23 21*   GLU 91 83   BUN 6 4*   CREATININE 0.8 0.7   CALCIUM 8.3* 8.0*   PROT 5.3* 4.8*   ALBUMIN 2.4* 2.3*   BILITOT 0.4 0.3   ALKPHOS 179* 172*   AST 11 11   ALT 27 22   ANIONGAP 8 9         Diagnostic Results:  I have reviewed all pertinent imaging results/findings within the past 24 hours.    Assessment/Plan:     * History of autologous stem cell transplant  - Approved by the transplant selection committee for a Génesis 200  - Underwent consent signing with Dr. Casas 1/20/2023  - Admitted for Génesis 200 Auto on 2/1/22  - Tolerated chemotherapy on 2/2 with no issues  - Received 3 bags with a total CD34 dose of 3.13 x10^6/kg on 2/3 without incident  - Today is Day +12  - Engrafted Day +11 (2/14) with an ANC of 633. Day 2 of engraftment. ANC is 6376 today.                Planned conditioning regimen:  Melphalan on Day -1     Antimicrobial Prophylaxis:  Acyclovir starting on Day -1  Levofloxacin starting on Day -1  Fluconazole starting on Day -1     Growth Factor  Support:  Neupogen starting on Day +7      Caregiver: daughter in law and son  Post-transplant discharge plans: Patricia Alansi    Multiple myeloma in remission  - Complex cytogenetics with gain 1q21, t(4;14), and monosomy 13  - R-ISS stage III (elevated beta-2 microglobulin, elevated LDH, decreased albumin, and high risk cytogenetics)  - S/p 5 Cycles D-Vrd (discontinued jasen after cycle 4)  - Has had significant clinical improvement as well as reduction in IgG and M-spike showing excellent response  - see autologous stem cell transplant     Pancytopenia due to chemotherapy  - Transfuse for hgb < 7 or plts < 10K  - Continue antimicrobial ppx per protocol  - Daily CBC while inpatient  - Stopped VTE ppx 2/9/23 with platelets <50K  - ANC up to 6376 today    Chemotherapy-induced diarrhea  - Started having loose stools on 2/7/23  - c-diff negative  - continue PRN imodium   - improved per patient    Electrolyte disturbance  - Repleting per PRN electrolyte order set  - Daily CMP/Mag/Phos level while inpatient  - Will discharge on K+, Phos, and Mag replacements x 7 days.    Neutropenic fever  - Fever 102.3F on 2/10 at 11:30pm  - CXR with no focal infiltrate. UA not consistent with UTI. Blood cx with NGTD.  - Started Cefepime 2g q8h  - Temp of 101.7 2/12 at ~ 1930.   - Vanc added on 2/12  - Repeated blood cx and still NGTD.  - Urine culture with coag negative staph, low yield and asymptomatic  - Stopped IV abx today and will discharge on Keflex to complete a 7 day course of abx.       Thrombocytopenia  - see pancytopenia     HTN (hypertension)  - Continued home amlodipine on admission, but held for low normal BP.   - Will continue to hold amlodipine at discharge. Can resume outpatient when appropriate.        VTE Risk Mitigation (From admission, onward)         Ordered     heparin (porcine) injection 1,600 Units  As needed (PRN)         02/01/23 1528     IP VTE HIGH RISK PATIENT  Once         02/01/23 1442     Place sequential  compression device  Until discontinued         02/01/23 9886                Disposition: Inpatient for SCT. Will plan to discharge to [facility] today.    Wendy Vazquez, NP  Bone Marrow Transplant  [facility] - Oncology ([facility])

## 2023-02-15 NOTE — PROGRESS NOTES
BMT Pharmacist Discharge Note     All discharge medications were reviewed with the patient and pt's son. Six medications were sent to the [facility] pharmacy for bedside delivery: acyclovir, cephalexin, K-PHOS-NEUTRAL, magnesium oxide, potassium chloride, lidocaine patches.     Medication Indication Morning Afternoon Evening/Night   **Acyclovir 800mg  Viral infection prevention 4 capsules  4 capsules   **Cephalexin 500mg Urinary Tract Infection for 5 days 1 capsule 1 capsule 1 capsule 1 capsule   **K-PHOS-NEUTRAL 250mg  Phosphorus supplement for 7 days 2 tablets     **Magnesium Oxide 400mg Magnesium supplement for 7 days  2 tablets      **Potassium Chloride Potassium supplement for 7 days 2 tablets      Amlodipine 10mg  Blood pressure  1 tablet     Pantoprazole 40mg  Heart Burn 1 tablet      **new medication or change in medication at discharge    AS NEEDED MEDICATIONS:  **Lidocaine patch 5%: place 1 patch onto the skin daily as needed for pain. Remove & Discard patch within 12 hours or as directed by MD.  Oxycodone 5mg: take 1-2 tablets every 6 hours as needed for pain       NO LONGER TAKE:   Aspirin  Dexamethasone  Lenalidomide    Notes:   I discussed the importance of taking acyclovir up until day +365 to prevent viral infections. Patient is aware that acyclovir dose was increased. I educated patient that potassium, phosphorus, and magnesium supplements are only for 7 days and this may change in the future based on labs in the clinic. Patient will take antibiotics for 5 days. Patient is aware to avoid any tylenol or ibuprofen products and call clinic if temperature is >/= 100.4.     All questions were answered.      Rohini Hale, PharmD, [facility]  Hematology/Oncology - BMT   Ext. [phone]

## 2023-02-15 NOTE — ASSESSMENT & PLAN NOTE
- Repleting per PRN electrolyte order set  - Daily CMP/Mag/Phos level while inpatient  - Will discharge on K+, Phos, and Mag replacements x 7 days.

## 2023-02-15 NOTE — DISCHARGE SUMMARY
[facility] - Oncology ([facility])  Hematology  Bone Marrow Transplant  Discharge Summary      Patient Name: Raquel Houston  MRN: [id number]  Admission Date: 2/1/2023  Hospital Length of Stay: 14 days  Discharge Date and Time: 2/15/2023  2:03 PM  Attending Physician: No att. providers found   Discharging Provider: Wendy Vazquez NP  Primary Care Provider: Everett Boogie MD    HPI: Ms. Fagan  is a 62 y.o.female with medical history of Multiple Myeloma, OA, gout, and osteoporosis presents today with her son for admission for Melphalan Autologous stem cell transplant. Her collection process went smoothly. She was seen in the ED over the weekend due to pain at her line site, she was not found to have dislodgement nor any infectious concerns. She is feeling well today, no new symptoms/complaints, and eager for admission. CoVID negative in clinic today.      * No surgery found *     Hospital Course: Admitted 2/1/23 for a Génesis 200 auto SCT. She received 3 bags and a CD34 dose of 3.13 x 10^6 on 2/3/23. Tolerated chemo and transplant well. Admission complicated by expected GI toxicities, electrolyte derangements requiring replacement, cytopenias requiring transfusion of blood products, coag neg stap UTI, and neutropenic fevers. She engrafted on Day +11 with an ANC of 633. She discharged to the [facility] in the care of her son on 2/15/23. Discharged on PO Keflex for UTI to complete a 7 day course of treatment and 7 days of Mag, Phos, and K+ replacements. Vas cath removed prior to discharge by gen surg. She will f/u in BMT clinic on 2/23/23.     History of autologous stem cell transplant  - Approved by the transplant selection committee for a Génesis 200  - Underwent consent signing with Dr. Casas 1/20/2023  - Admitted for Génesis 200 Auto on 2/1/22  - Tolerated chemotherapy on 2/2 with no issues  - Received 3 bags with a total CD34 dose of 3.13 x10^6/kg on 2/3 without incident  - Today is Day +12  - Engrafted Day +11 (2/14)  with an ANC of 633. Day 2 of engraftment. ANC is 6376 today.                Planned conditioning regimen:  Melphalan on Day -1     Antimicrobial Prophylaxis:  Acyclovir starting on Day -1  Levofloxacin starting on Day -1  Fluconazole starting on Day -1     Growth Factor Support:  Neupogen starting on Day +7      Caregiver: daughter in law and son  Post-transplant discharge plans: Patricia Alanis     Multiple myeloma in remission  - Complex cytogenetics with gain 1q21, t(4;14), and monosomy 13  - R-ISS stage III (elevated beta-2 microglobulin, elevated LDH, decreased albumin, and high risk cytogenetics)  - S/p 5 Cycles D-Vrd (discontinued jasen after cycle 4)  - Has had significant clinical improvement as well as reduction in IgG and M-spike showing excellent response  - see autologous stem cell transplant      Pancytopenia due to chemotherapy  - Transfuse for hgb < 7 or plts < 10K  - Continue antimicrobial ppx per protocol  - Daily CBC while inpatient  - Stopped VTE ppx 2/9/23 with platelets <50K  - ANC up to 6376 today     Chemotherapy-induced diarrhea  - Started having loose stools on 2/7/23  - c-diff negative  - continue PRN imodium   - improved per patient     Electrolyte disturbance  - Repleting per PRN electrolyte order set  - Daily CMP/Mag/Phos level while inpatient  - Will discharge on K+, Phos, and Mag replacements x 7 days.     Neutropenic fever  - Fever 102.3F on 2/10 at 11:30pm  - CXR with no focal infiltrate. UA not consistent with UTI. Blood cx with NGTD.  - Started Cefepime 2g q8h  - Temp of 101.7 2/12 at ~ 1930.   - Vanc added on 2/12  - Repeated blood cx and still NGTD.  - Urine culture with coag negative staph, low yield and asymptomatic  - Stopped IV abx today and will discharge on Keflex to complete a 7 day course of abx.       Thrombocytopenia  - see pancytopenia      HTN (hypertension)  - Continued home amlodipine on admission, but held for low normal BP.   - Will continue to hold amlodipine at  discharge. Can resume outpatient when appropriate.         Goals of Care Treatment Preferences:  Code Status: Full Code      Consults (From admission, onward)        Status Ordering Provider     Inpatient consult to General Surgery  Once        Provider:  (Not yet assigned)    Completed OSMAN MORE     Inpatient consult to Registered Dietitian/Nutritionist  Once        Provider:  (Not yet assigned)    Completed IRENE JHA          Significant Diagnostic Studies: Labs:   CMP   Recent Labs   Lab 02/14/23  0605 02/15/23  0335   * 135*   K 3.3* 3.2*    105   CO2 23 21*   GLU 91 83   BUN 6 4*   CREATININE 0.8 0.7   CALCIUM 8.3* 8.0*   PROT 5.3* 4.8*   ALBUMIN 2.4* 2.3*   BILITOT 0.4 0.3   ALKPHOS 179* 172*   AST 11 11   ALT 27 22   ANIONGAP 8 9    and CBC   Recent Labs   Lab 02/14/23  0407 02/14/23  0605 02/15/23  0335   WBC 1.44* 1.60* 8.98   HGB 8.4* 8.1* 7.9*   HCT 25.6* 23.6* 23.8*   PLT 19* 16* 25*       Pending Diagnostic Studies:     None        Final Active Diagnoses:    Diagnosis Date Noted POA    PRINCIPAL PROBLEM:  History of autologous stem cell transplant [Z94.84] 02/01/2023 Not Applicable    Multiple myeloma in remission [C90.01] 08/02/2022 Yes    Pancytopenia due to chemotherapy [D61.810] 02/02/2023 Yes    Chemotherapy-induced diarrhea [K52.1, T45.1X5A] 02/08/2023 Yes    Electrolyte disturbance [E87.8] 02/13/2023 Yes    Neutropenic fever [D70.9, R50.81] 02/11/2023 No    HTN (hypertension) [I10] 02/01/2023 Yes    Thrombocytopenia [D69.6] 02/01/2023 Yes      Problems Resolved During this Admission:    Diagnosis Date Noted Date Resolved POA    Pain [R52] 02/02/2023 02/06/2023 Yes    Hyperkalemia [E87.5] 02/02/2023 02/06/2023 Yes    Neutropenia [D70.9] 02/01/2023 02/06/2023 Yes      Discharged Condition: stable    Disposition: Home or Self Care    Follow Up:    Patient Instructions:      Diet Adult Regular     Notify your health care provider if you experience any of the  following:  temperature >100.4     Notify your health care provider if you experience any of the following:  persistent nausea and vomiting or diarrhea     Notify your health care provider if you experience any of the following:  severe uncontrolled pain     Notify your health care provider if you experience any of the following:  redness, tenderness, or signs of infection (pain, swelling, redness, odor or green/yellow discharge around incision site)     Notify your health care provider if you experience any of the following:  difficulty breathing or increased cough     Notify your health care provider if you experience any of the following:  severe persistent headache     Notify your health care provider if you experience any of the following:  persistent dizziness, light-headedness, or visual disturbances     Notify your health care provider if you experience any of the following:  increased confusion or weakness     Activity as tolerated     Medications:  Reconciled Home Medications:      Medication List      START taking these medications    acyclovir 200 MG capsule  Commonly known as: ZOVIRAX  Take 4 capsules (800 mg total) by mouth 2 (two) times daily.  Replaces: acyclovir 400 MG tablet     cephALEXin 500 MG capsule  Commonly known as: KEFLEX  Take 1 capsule (500 mg total) by mouth every 6 (six) hours. for 5 days     magnesium oxide 400 mg (241.3 mg magnesium) tablet  Commonly known as: MAG-OX  Take 2 tablets (800 mg total) by mouth once daily. for 7 days     PHOSPHA 250 NEUTRAL 250 mg Tab  Generic drug: k phos di & mono-sod phos mono  Take 2 tablets by mouth once daily. for 7 days     potassium chloride SA 20 MEQ tablet  Commonly known as: K-DUR,KLOR-CON  Take 2 tablets (40 mEq total) by mouth once daily. for 7 days        CONTINUE taking these medications    amLODIPine 10 MG tablet  Commonly known as: NORVASC  Take 1 tablet (10 mg total) by mouth once daily.     LIDOcaine 5 %  Commonly known as:  LIDODERM  Place 1 patch onto the skin daily as needed (pain). Remove & Discard patch within 12 hours or as directed by MD     pantoprazole 40 MG tablet  Commonly known as: PROTONIX  Take 1 tablet (40 mg total) by mouth once daily.        STOP taking these medications    acyclovir 400 MG tablet  Commonly known as: ZOVIRAX  Replaced by: acyclovir 200 MG capsule     aspirin 325 MG tablet  Commonly known as: CIERRA ASPIRIN     dexAMETHasone 4 MG Tab  Commonly known as: DECADRON     lenalidomide 25 mg Cap     oxyCODONE 5 MG immediate release tablet  Commonly known as: ROXICODONE            Wendy Vazquez, NP  Bone Marrow Transplant  [facility] - Oncology ([facility])

## 2023-02-16 LAB
BACTERIA BLD CULT: NORMAL
BACTERIA BLD CULT: NORMAL

## 2023-02-17 ENCOUNTER — OFFICE VISIT (OUTPATIENT)
Dept: HEMATOLOGY/ONCOLOGY | Facility: CLINIC | Age: 63
End: 2023-02-17
Payer: MEDICAID

## 2023-02-17 ENCOUNTER — LAB VISIT (OUTPATIENT)
Dept: LAB | Facility: HOSPITAL | Age: 63
End: 2023-02-17
Attending: INTERNAL MEDICINE
Payer: MEDICAID

## 2023-02-17 VITALS
DIASTOLIC BLOOD PRESSURE: 69 MMHG | WEIGHT: 221.31 LBS | HEART RATE: 96 BPM | HEIGHT: 66 IN | TEMPERATURE: 98 F | RESPIRATION RATE: 16 BRPM | BODY MASS INDEX: 35.57 KG/M2 | SYSTOLIC BLOOD PRESSURE: 131 MMHG | OXYGEN SATURATION: 98 %

## 2023-02-17 DIAGNOSIS — C90.01 MULTIPLE MYELOMA IN REMISSION: ICD-10-CM

## 2023-02-17 DIAGNOSIS — T50.905A DRUG-INDUCED CYTOPENIA: ICD-10-CM

## 2023-02-17 DIAGNOSIS — Z76.82 STEM CELL TRANSPLANT CANDIDATE: ICD-10-CM

## 2023-02-17 DIAGNOSIS — E87.8 ELECTROLYTE DISTURBANCE: ICD-10-CM

## 2023-02-17 DIAGNOSIS — R11.2 CINV (CHEMOTHERAPY-INDUCED NAUSEA AND VOMITING): ICD-10-CM

## 2023-02-17 DIAGNOSIS — D75.9 DRUG-INDUCED CYTOPENIA: ICD-10-CM

## 2023-02-17 DIAGNOSIS — T45.1X5A CINV (CHEMOTHERAPY-INDUCED NAUSEA AND VOMITING): ICD-10-CM

## 2023-02-17 DIAGNOSIS — Z94.84 HISTORY OF AUTOLOGOUS STEM CELL TRANSPLANT: Primary | ICD-10-CM

## 2023-02-17 DIAGNOSIS — C90.00 MULTIPLE MYELOMA NOT HAVING ACHIEVED REMISSION: ICD-10-CM

## 2023-02-17 LAB
ALBUMIN SERPL BCP-MCNC: 3 G/DL (ref 3.5–5.2)
ALP SERPL-CCNC: 202 U/L (ref 55–135)
ALT SERPL W/O P-5'-P-CCNC: 22 U/L (ref 10–44)
ANION GAP SERPL CALC-SCNC: 12 MMOL/L (ref 8–16)
ANISOCYTOSIS BLD QL SMEAR: SLIGHT
AST SERPL-CCNC: 20 U/L (ref 10–40)
BACTERIA BLD CULT: NORMAL
BACTERIA BLD CULT: NORMAL
BASOPHILS NFR BLD: 0 % (ref 0–1.9)
BILIRUB SERPL-MCNC: 0.3 MG/DL (ref 0.1–1)
BUN SERPL-MCNC: 3 MG/DL (ref 6–20)
CALCIUM SERPL-MCNC: 8.6 MG/DL (ref 8.7–10.5)
CHLORIDE SERPL-SCNC: 105 MMOL/L (ref 95–110)
CO2 SERPL-SCNC: 25 MMOL/L (ref 23–29)
CREAT SERPL-MCNC: 0.7 MG/DL (ref 0.5–1.4)
DIFFERENTIAL METHOD: ABNORMAL
EOSINOPHIL NFR BLD: 0 % (ref 0–8)
ERYTHROCYTE [DISTWIDTH] IN BLOOD BY AUTOMATED COUNT: 14.9 % (ref 11.5–14.5)
EST. GFR  (NO RACE VARIABLE): >60 ML/MIN/1.73 M^2
GIANT PLATELETS BLD QL SMEAR: PRESENT
GLUCOSE SERPL-MCNC: 106 MG/DL (ref 70–110)
HCT VFR BLD AUTO: 30.8 % (ref 37–48.5)
HGB BLD-MCNC: 9.8 G/DL (ref 12–16)
HYPOCHROMIA BLD QL SMEAR: ABNORMAL
IMM GRANULOCYTES # BLD AUTO: ABNORMAL K/UL (ref 0–0.04)
IMM GRANULOCYTES NFR BLD AUTO: ABNORMAL % (ref 0–0.5)
LYMPHOCYTES NFR BLD: 9 % (ref 18–48)
MAGNESIUM SERPL-MCNC: 1.5 MG/DL (ref 1.6–2.6)
MCH RBC QN AUTO: 27.7 PG (ref 27–31)
MCHC RBC AUTO-ENTMCNC: 31.8 G/DL (ref 32–36)
MCV RBC AUTO: 87 FL (ref 82–98)
METAMYELOCYTES NFR BLD MANUAL: 5 %
MONOCYTES NFR BLD: 18 % (ref 4–15)
MYELOCYTES NFR BLD MANUAL: 5 %
NEUTROPHILS NFR BLD: 60 % (ref 38–73)
NEUTS BAND NFR BLD MANUAL: 2 %
NRBC BLD-RTO: 0 /100 WBC
OVALOCYTES BLD QL SMEAR: ABNORMAL
PHOSPHATE SERPL-MCNC: 2.5 MG/DL (ref 2.7–4.5)
PLATELET # BLD AUTO: 96 K/UL (ref 150–450)
PLATELET BLD QL SMEAR: ABNORMAL
PMV BLD AUTO: 13 FL (ref 9.2–12.9)
POIKILOCYTOSIS BLD QL SMEAR: SLIGHT
POLYCHROMASIA BLD QL SMEAR: ABNORMAL
POTASSIUM SERPL-SCNC: 3.1 MMOL/L (ref 3.5–5.1)
PROMYELOCYTES NFR BLD MANUAL: 1 %
PROT SERPL-MCNC: 5.8 G/DL (ref 6–8.4)
RBC # BLD AUTO: 3.54 M/UL (ref 4–5.4)
SCHISTOCYTES BLD QL SMEAR: ABNORMAL
SODIUM SERPL-SCNC: 142 MMOL/L (ref 136–145)
TOXIC GRANULES BLD QL SMEAR: PRESENT
WBC # BLD AUTO: 7.19 K/UL (ref 3.9–12.7)

## 2023-02-17 PROCEDURE — 36415 COLL VENOUS BLD VENIPUNCTURE: CPT | Performed by: INTERNAL MEDICINE

## 2023-02-17 PROCEDURE — 99999 PR PBB SHADOW E&M-EST. PATIENT-LVL III: ICD-10-PCS | Mod: PBBFAC,,, | Performed by: NURSE PRACTITIONER

## 2023-02-17 PROCEDURE — 99213 OFFICE O/P EST LOW 20 MIN: CPT | Mod: PBBFAC | Performed by: NURSE PRACTITIONER

## 2023-02-17 PROCEDURE — 99999 PR PBB SHADOW E&M-EST. PATIENT-LVL III: CPT | Mod: PBBFAC,,, | Performed by: NURSE PRACTITIONER

## 2023-02-17 PROCEDURE — 99215 PR OFFICE/OUTPT VISIT, EST, LEVL V, 40-54 MIN: ICD-10-PCS | Mod: S$PBB,,, | Performed by: NURSE PRACTITIONER

## 2023-02-17 PROCEDURE — 83735 ASSAY OF MAGNESIUM: CPT | Performed by: INTERNAL MEDICINE

## 2023-02-17 PROCEDURE — 84100 ASSAY OF PHOSPHORUS: CPT | Performed by: INTERNAL MEDICINE

## 2023-02-17 PROCEDURE — 99215 OFFICE O/P EST HI 40 MIN: CPT | Mod: S$PBB,,, | Performed by: NURSE PRACTITIONER

## 2023-02-17 PROCEDURE — 85027 COMPLETE CBC AUTOMATED: CPT | Performed by: INTERNAL MEDICINE

## 2023-02-17 PROCEDURE — 85007 BL SMEAR W/DIFF WBC COUNT: CPT | Performed by: INTERNAL MEDICINE

## 2023-02-17 PROCEDURE — 80053 COMPREHEN METABOLIC PANEL: CPT | Performed by: INTERNAL MEDICINE

## 2023-02-17 NOTE — PROGRESS NOTES
02/21/2023      Primary Oncologic Diagnosis: IgG Kappa Multiple Myeloma      History of Present Ilness:   Raquel Houston (Raquel) is a pleasant 62 y.o.female with medical history of OA, gout, and osteoporosis presenting for follow up of multiple myeloma. History as above. Arrived alone today. Continues to do very well and is having no issues tolerating therapy. Has not been able to set up dental appointment yet. Denies any complaints including neuropathy. Appetite is doing great. Does not need to use a cane any longer.  Smoked 1ppd for 20 years, quit April 2022  Quit alcohol Apirl 2022  Denies recreational drug use  Father has a history of a diffuse bone cancer that he did not want to be treated for    Treatment History:  07/14/21: Presented with several months of worsening lethargy, generalized pain, 40-60lbs weight loss, and encephalopathy. Labs at time of presentation: hgb 7.6, plts 82, wbc 12, sodium 120, creatinine 1.55, corrected calcium 9.7, transaminitis, M-spike 6.4 g/dL. Skeletal survey showed lesions of left/right humerus, left/right proximal radius, pelvis, and femurs with a significant left femur lytic lesion. Multiple lucent lesions in calvarium. Given two doses of velcade while hospitalized.  07/18/21: Underwent left trochanteric femoral nail fixation. Pathology consistent with plasma cell neoplasm  07/19/22: BMBx showing 90% plasma cells with 100% cellularity, gain 1q21, t(4;14), and monosomy 13.  08/17/22: Cycle 1 Sofie-VRD for IgG Kappa Multiple Myeloma  09/13/22: Cycle 2 Sofie-VRD  10/11/22: Cycle 3 Sofie-VRD  11/08/22: Cycle 4 Sofie-VRD  12/06/22: Cycle 5 VRD    Transplant Evaluation:  --Bone Marrow Biopsy on 12/19/22: Normocellular marrow (40-50% total cellularity) with <1% polytypic   plasma cells and trilineage hematopoiesis with mild granulocytic hyperplasia and mild relative monocytosis. No monotypic plasma cells detected by MRD flow cytometric analysis   --Labs from 12/29/22:  Light  Chains  Kappa: 1.48  Lambda: 0.59  K/L: 2.51  SPEP:  Borderline mildly decreased total protein. Normal gamma globulins are   decreased. Paraprotein peak in gamma = 0.1 g/dL (previously, 0.15   g/dL).   --Colonoscopy on 1/5/23:   --Mammogram on 1/6/23: Routine screening mammogram in 1 year is recommended.    --PET on 1/11/23: In the bones, there is heterogeneous bone marrow throughout the calvarium and to a lesser degree throughout the spine.  Compression fracture L5 with moderate to severe height loss progressed from outside radiograph 07/16/2022 and associated with mild hypermetabolic activity SUV max 3.8.    Transplant Course  Admitted 2/1/23 for a Génesis 200 auto SCT. She received 3 bags and a CD34 dose of 3.13 x 10^6 on 2/3/23. Tolerated chemo and transplant well. Admission complicated by expected GI toxicities, electrolyte derangements requiring replacement, cytopenias requiring transfusion of blood products, coag neg stap UTI, and neutropenic fevers. She engrafted on Day +11 with an ANC of 633. She discharged to the [facility] in the care of her son on 2/15/23. Discharged on PO Keflex for UTI to complete a 7 day course of treatment and 7 days of Mag, Phos, and K+ replacements. Vas cath removed prior to discharge by gen surg. She will f/u in BMT clinic on 2/23/23.  Interval History:   Ms. Fagan presents today with her son for hosptal f/u. She received Génesis ASCT for MM. Today is day +14. Her transplant course was uneventful except expected mild GI toxicities. She is recovering from transplant. No acute complaints today other than fatigue. Less intake due to metallic taste.       PAST MEDICAL HISTORY:   Past Medical History:   Diagnosis Date    Arthritis     Multiple myeloma     Osteoporosis        PAST SURGICAL HISTORY:   Past Surgical History:   Procedure Laterality Date    COLONOSCOPY N/A 1/5/2023    Procedure: COLONOSCOPY;  Surgeon: Diana Huynh MD;  Location: [address]);  Service: Endoscopy;   Laterality: N/A;  inst via email  pre call no answer-as    FEMUR SURGERY      INSERTION OF TUNNELED CENTRAL VENOUS HEMODIALYSIS CATHETER Right 1/25/2023    Procedure: INSERTION, CATHETER, HEMODIALYSIS, DUAL LUMEN Bard 14.5 Fr Hemosplit Catheter Model [id number], Right Possible Left Chest;  Surgeon: Steven Monroy MD;  Location: [facility] OR [address] [place] [address];  Service: General;  Laterality: Right;       PAST SOCIAL HISTORY:   reports that she has quit smoking. Her smoking use included cigarettes. She has quit using smokeless tobacco. She reports that she does not currently use alcohol. She reports that she does not use drugs.    FAMILY HISTORY:  Family History   Problem Relation Age of Onset    Cancer Father        CURRENT MEDICATIONS:   Current Outpatient Medications   Medication Sig    acyclovir (ZOVIRAX) 200 MG capsule Take 4 capsules (800 mg total) by mouth 2 (two) times daily.    amLODIPine (NORVASC) 10 MG tablet Take 1 tablet (10 mg total) by mouth once daily.    k phos di & mono-sod phos mono (K-PHOS-NEUTRAL) 250 mg Tab Take 2 tablets by mouth once daily. for 7 days    magnesium oxide (MAG-OX) 400 mg (241.3 mg magnesium) tablet Take 2 tablets (800 mg total) by mouth once daily. for 7 days    pantoprazole (PROTONIX) 40 MG tablet Take 1 tablet (40 mg total) by mouth once daily.    potassium chloride SA (K-DUR,KLOR-CON) 20 MEQ tablet Take 2 tablets (40 mEq total) by mouth once daily. for 7 days     No current facility-administered medications for this visit.     ALLERGIES:   Review of patient's allergies indicates:  No Known Allergies      Review of Systems:     Review of Systems   Constitutional:  Negative for appetite change, chills, diaphoresis, fatigue, fever and unexpected weight change.   HENT:   Negative for hearing loss, mouth sores, nosebleeds, sore throat, trouble swallowing and voice change.    Eyes:  Negative for eye problems and icterus.   Respiratory:  Negative for chest tightness, cough, hemoptysis, shortness  of breath and wheezing.    Cardiovascular:  Negative for chest pain, leg swelling and palpitations.   Gastrointestinal:  Negative for abdominal distention, abdominal pain, blood in stool, diarrhea, nausea and vomiting.   Endocrine: Negative for hot flashes.   Genitourinary:  Negative for bladder incontinence, difficulty urinating, dysuria and hematuria.    Musculoskeletal:  Negative for arthralgias, back pain, flank pain, gait problem, myalgias, neck pain and neck stiffness.   Skin:  Negative for itching, rash and wound.   Neurological:  Negative for dizziness, extremity weakness, gait problem, headaches, numbness, seizures and speech difficulty.   Hematological:  Negative for adenopathy. Does not bruise/bleed easily.   Psychiatric/Behavioral:  Negative for confusion, depression and sleep disturbance. The patient is not nervous/anxious.         Physical Exam:     Vitals:    02/17/23 1413   BP: 131/69   Pulse: 96   Resp: 16   Temp: 98.2 °F (36.8 °C)         Physical Exam  Constitutional:       General: She is not in acute distress.     Appearance: She is well-developed. She is not diaphoretic.   HENT:      Head: Normocephalic and atraumatic.      Mouth/Throat:      Pharynx: No oropharyngeal exudate.   Eyes:      Conjunctiva/sclera: Conjunctivae normal.      Pupils: Pupils are equal, round, and reactive to light.   Neck:      Thyroid: No thyromegaly.      Vascular: No JVD.      Trachea: No tracheal deviation.   Cardiovascular:      Rate and Rhythm: Normal rate and regular rhythm.      Heart sounds: Normal heart sounds. No murmur heard.    No friction rub.   Pulmonary:      Effort: Pulmonary effort is normal. No respiratory distress.      Breath sounds: Normal breath sounds. No stridor. No wheezing or rales.   Chest:      Chest wall: No tenderness.   Abdominal:      General: Bowel sounds are normal. There is no distension.      Palpations: Abdomen is soft.      Tenderness: There is no abdominal tenderness. There is no  guarding or rebound.   Musculoskeletal:         General: No tenderness or deformity. Normal range of motion.      Cervical back: Normal range of motion and neck supple.   Skin:     General: Skin is warm and dry.      Capillary Refill: Capillary refill takes less than 2 seconds.      Coloration: Skin is not pale.      Findings: No erythema or rash.      Comments: Right chest wall cath removed, site healing   Neurological:      Mental Status: She is alert and oriented to person, place, and time.      Cranial Nerves: No cranial nerve deficit.      Sensory: No sensory deficit.      Motor: No abnormal muscle tone.      Coordination: Coordination normal.      Deep Tendon Reflexes: Reflexes normal.   Psychiatric:         Behavior: Behavior normal.         Thought Content: Thought content normal.         Judgment: Judgment normal.       ECOG Performance Status: (foot note - ECOG PS provided by Eastern Cooperative Oncology Group) 1 - Symptomatic but completely ambulatory    Karnofsky Performance Score:  90%- Able to Carry on Normal Activity: Minor Symptoms of Disease    Labs:   Lab Results   Component Value Date    WBC 7.19 02/17/2023    HGB 9.8 (L) 02/17/2023    HCT 30.8 (L) 02/17/2023    PLT 96 (L) 02/17/2023    ALT 22 02/17/2023    AST 20 02/17/2023     02/17/2023    K 3.1 (L) 02/17/2023     02/17/2023    CREATININE 0.7 02/17/2023    BUN 3 (L) 02/17/2023    CO2 25 02/17/2023    INR 1.0 01/25/2023         Imaging: Previous imaging has been reviewed     1. History of autologous stem cell transplant        2. Multiple myeloma in remission        3. Drug-induced cytopenia        4. Electrolyte disturbance        5. CINV (chemotherapy-induced nausea and vomiting)            Assessment and Plan:     Ms. Houston is a pleasant 62 year old with Multiple Myeloma.   History of autologous stem cell transplant       - Received CD34 dose of 3.13 x10^6/kg on 2/3/23 without incident.  Engrafted Day +11 (2/14) with an ANC of  633.  - Today is Day +17                   Multiple myeloma in remission  - Complex cytogenetics with gain 1q21, t(4;14), and monosomy 13  - R-ISS stage III (elevated beta-2 microglobulin, elevated LDH, decreased albumin, and high risk cytogenetics)  - S/p 5 Cycles D-Vrd (discontinued jasen after cycle 4)  - Has had significant clinical improvement as well as reduction in IgG and M-spike showing excellent response  - see autologous stem cell transplant      cytopenia due to chemotherapy  - Continue antimicrobial ppx per protocol  - Counts improving     Chemotherapy-induced diarrhea  - c-diff negative  - continue PRN imodium   Resolved   CINV  - Occasional  nausea   - Continue PRN zofran  Electrolyte disturbance  - Continue K+, Phos, and Mag replacements        HTN (hypertension)  - On amlodipine     BMT Chart Routing      Follow up with physician . Keep scheduled weekly labs and visits   Follow up with SERGIO    Provider visit type    Infusion scheduling note    Injection scheduling note    Labs    Imaging    Pharmacy appointment    Other referrals      Advance Care Planning     Date: 02/21/2023       We previously had discussions regarding her underlying diagnosis, natural history, prognosis, and treatment options.  She recently received Génesis transplant for MM. Will continue monitor closely post transplant period.  Total time of this visit was 30 minutes, including time spent face to face with patient and/or via video/audio, and also in preparing for today's visit for MDM and documentation. (Medical Decision Making, including consideration of possible diagnoses, management options, complex medical record review, review of diagnostic tests and information, consideration and discussion of significant complications based on comorbidities, and discussion with providers involved with the care of the patient). Greater than 50% was spent face to face with the patient counseling and coordinating care.    Ruth Bains,  NP  Hematology/Oncology/BMT

## 2023-02-23 ENCOUNTER — OFFICE VISIT (OUTPATIENT)
Dept: HEMATOLOGY/ONCOLOGY | Facility: CLINIC | Age: 63
End: 2023-02-23
Payer: MEDICAID

## 2023-02-23 ENCOUNTER — LAB VISIT (OUTPATIENT)
Dept: LAB | Facility: HOSPITAL | Age: 63
End: 2023-02-23
Attending: INTERNAL MEDICINE
Payer: MEDICAID

## 2023-02-23 VITALS
TEMPERATURE: 98 F | BODY MASS INDEX: 34.1 KG/M2 | OXYGEN SATURATION: 100 % | RESPIRATION RATE: 17 BRPM | HEART RATE: 90 BPM | DIASTOLIC BLOOD PRESSURE: 74 MMHG | SYSTOLIC BLOOD PRESSURE: 119 MMHG | WEIGHT: 212.19 LBS | HEIGHT: 66 IN

## 2023-02-23 DIAGNOSIS — I10 HYPERTENSION, UNSPECIFIED TYPE: ICD-10-CM

## 2023-02-23 DIAGNOSIS — Z76.82 STEM CELL TRANSPLANT CANDIDATE: ICD-10-CM

## 2023-02-23 DIAGNOSIS — E87.8 ELECTROLYTE DISTURBANCE: ICD-10-CM

## 2023-02-23 DIAGNOSIS — C90.00 MULTIPLE MYELOMA NOT HAVING ACHIEVED REMISSION: ICD-10-CM

## 2023-02-23 DIAGNOSIS — D75.9 DRUG-INDUCED CYTOPENIA: ICD-10-CM

## 2023-02-23 DIAGNOSIS — Z94.84 HISTORY OF AUTOLOGOUS STEM CELL TRANSPLANT: Primary | ICD-10-CM

## 2023-02-23 DIAGNOSIS — C90.00 MULTIPLE MYELOMA, REMISSION STATUS UNSPECIFIED: ICD-10-CM

## 2023-02-23 DIAGNOSIS — T50.905A DRUG-INDUCED CYTOPENIA: ICD-10-CM

## 2023-02-23 LAB
ALBUMIN SERPL BCP-MCNC: 3.4 G/DL (ref 3.5–5.2)
ALP SERPL-CCNC: 191 U/L (ref 55–135)
ALT SERPL W/O P-5'-P-CCNC: 29 U/L (ref 10–44)
ANION GAP SERPL CALC-SCNC: 12 MMOL/L (ref 8–16)
AST SERPL-CCNC: 28 U/L (ref 10–40)
BASOPHILS # BLD AUTO: 0.05 K/UL (ref 0–0.2)
BASOPHILS NFR BLD: 1.4 % (ref 0–1.9)
BILIRUB SERPL-MCNC: 0.5 MG/DL (ref 0.1–1)
BUN SERPL-MCNC: 7 MG/DL (ref 6–20)
CALCIUM SERPL-MCNC: 8.9 MG/DL (ref 8.7–10.5)
CHLORIDE SERPL-SCNC: 104 MMOL/L (ref 95–110)
CO2 SERPL-SCNC: 25 MMOL/L (ref 23–29)
CREAT SERPL-MCNC: 0.8 MG/DL (ref 0.5–1.4)
DIFFERENTIAL METHOD: ABNORMAL
EOSINOPHIL # BLD AUTO: 0 K/UL (ref 0–0.5)
EOSINOPHIL NFR BLD: 0 % (ref 0–8)
ERYTHROCYTE [DISTWIDTH] IN BLOOD BY AUTOMATED COUNT: 15.2 % (ref 11.5–14.5)
EST. GFR  (NO RACE VARIABLE): >60 ML/MIN/1.73 M^2
GLUCOSE SERPL-MCNC: 139 MG/DL (ref 70–110)
HCT VFR BLD AUTO: 32.6 % (ref 37–48.5)
HGB BLD-MCNC: 10.2 G/DL (ref 12–16)
IMM GRANULOCYTES # BLD AUTO: 0.03 K/UL (ref 0–0.04)
IMM GRANULOCYTES NFR BLD AUTO: 0.8 % (ref 0–0.5)
LYMPHOCYTES # BLD AUTO: 1.3 K/UL (ref 1–4.8)
LYMPHOCYTES NFR BLD: 35 % (ref 18–48)
MAGNESIUM SERPL-MCNC: 1.5 MG/DL (ref 1.6–2.6)
MCH RBC QN AUTO: 27.1 PG (ref 27–31)
MCHC RBC AUTO-ENTMCNC: 31.3 G/DL (ref 32–36)
MCV RBC AUTO: 87 FL (ref 82–98)
MONOCYTES # BLD AUTO: 0.9 K/UL (ref 0.3–1)
MONOCYTES NFR BLD: 25.6 % (ref 4–15)
NEUTROPHILS # BLD AUTO: 1.3 K/UL (ref 1.8–7.7)
NEUTROPHILS NFR BLD: 37.2 % (ref 38–73)
NRBC BLD-RTO: 0 /100 WBC
PHOSPHATE SERPL-MCNC: 3.3 MG/DL (ref 2.7–4.5)
PLATELET # BLD AUTO: 142 K/UL (ref 150–450)
PLATELET BLD QL SMEAR: ABNORMAL
PMV BLD AUTO: 12 FL (ref 9.2–12.9)
POTASSIUM SERPL-SCNC: 3.3 MMOL/L (ref 3.5–5.1)
PROT SERPL-MCNC: 6.3 G/DL (ref 6–8.4)
RBC # BLD AUTO: 3.77 M/UL (ref 4–5.4)
SODIUM SERPL-SCNC: 141 MMOL/L (ref 136–145)
WBC # BLD AUTO: 3.6 K/UL (ref 3.9–12.7)

## 2023-02-23 PROCEDURE — 80053 COMPREHEN METABOLIC PANEL: CPT | Performed by: INTERNAL MEDICINE

## 2023-02-23 PROCEDURE — 84100 ASSAY OF PHOSPHORUS: CPT | Performed by: INTERNAL MEDICINE

## 2023-02-23 PROCEDURE — 83735 ASSAY OF MAGNESIUM: CPT | Performed by: INTERNAL MEDICINE

## 2023-02-23 PROCEDURE — 99214 OFFICE O/P EST MOD 30 MIN: CPT | Mod: S$PBB,,, | Performed by: PHYSICIAN ASSISTANT

## 2023-02-23 PROCEDURE — 36415 COLL VENOUS BLD VENIPUNCTURE: CPT | Performed by: INTERNAL MEDICINE

## 2023-02-23 PROCEDURE — 99999 PR PBB SHADOW E&M-EST. PATIENT-LVL III: ICD-10-PCS | Mod: PBBFAC,,, | Performed by: PHYSICIAN ASSISTANT

## 2023-02-23 PROCEDURE — 85025 COMPLETE CBC W/AUTO DIFF WBC: CPT | Performed by: INTERNAL MEDICINE

## 2023-02-23 PROCEDURE — 99214 PR OFFICE/OUTPT VISIT, EST, LEVL IV, 30-39 MIN: ICD-10-PCS | Mod: S$PBB,,, | Performed by: PHYSICIAN ASSISTANT

## 2023-02-23 PROCEDURE — 99999 PR PBB SHADOW E&M-EST. PATIENT-LVL III: CPT | Mod: PBBFAC,,, | Performed by: PHYSICIAN ASSISTANT

## 2023-02-23 PROCEDURE — 99213 OFFICE O/P EST LOW 20 MIN: CPT | Mod: PBBFAC | Performed by: PHYSICIAN ASSISTANT

## 2023-02-23 RX ORDER — LANOLIN ALCOHOL/MO/W.PET/CERES
400 CREAM (GRAM) TOPICAL DAILY
Qty: 7 TABLET | Refills: 0 | Status: SHIPPED | OUTPATIENT
Start: 2023-02-23 | End: 2023-03-03 | Stop reason: SDUPTHER

## 2023-02-23 NOTE — PROGRESS NOTES
02/23/2023      Primary Oncologic Diagnosis: IgG Kappa Multiple Myeloma      History of Present Ilness:   Raquel Houston (Raquel) is a pleasant 62 y.o.female with medical history of OA, gout, and osteoporosis presenting for follow up of multiple myeloma. History as above. Arrived alone today. Continues to do very well and is having no issues tolerating therapy. Has not been able to set up dental appointment yet. Denies any complaints including neuropathy. Appetite is doing great. Does not need to use a cane any longer.  Smoked 1ppd for 20 years, quit April 2022  Quit alcohol Apirl 2022  Denies recreational drug use  Father has a history of a diffuse bone cancer that he did not want to be treated for    Treatment History:  07/14/21: Presented with several months of worsening lethargy, generalized pain, 40-60lbs weight loss, and encephalopathy. Labs at time of presentation: hgb 7.6, plts 82, wbc 12, sodium 120, creatinine 1.55, corrected calcium 9.7, transaminitis, M-spike 6.4 g/dL. Skeletal survey showed lesions of left/right humerus, left/right proximal radius, pelvis, and femurs with a significant left femur lytic lesion. Multiple lucent lesions in calvarium. Given two doses of velcade while hospitalized.  07/18/21: Underwent left trochanteric femoral nail fixation. Pathology consistent with plasma cell neoplasm  07/19/22: BMBx showing 90% plasma cells with 100% cellularity, gain 1q21, t(4;14), and monosomy 13.  08/17/22: Cycle 1 Sofie-VRD for IgG Kappa Multiple Myeloma  09/13/22: Cycle 2 Sofie-VRD  10/11/22: Cycle 3 Sofie-VRD  11/08/22: Cycle 4 Sofie-VRD  12/06/22: Cycle 5 VRD    Transplant Evaluation:  --Bone Marrow Biopsy on 12/19/22: Normocellular marrow (40-50% total cellularity) with <1% polytypic   plasma cells and trilineage hematopoiesis with mild granulocytic hyperplasia and mild relative monocytosis. No monotypic plasma cells detected by MRD flow cytometric analysis   --Labs from 12/29/22:  Light  Chains  Kappa: 1.48  Lambda: 0.59  K/L: 2.51  SPEP:  Borderline mildly decreased total protein. Normal gamma globulins are   decreased. Paraprotein peak in gamma = 0.1 g/dL (previously, 0.15   g/dL).   --Colonoscopy on 1/5/23:   --Mammogram on 1/6/23: Routine screening mammogram in 1 year is recommended.    --PET on 1/11/23: In the bones, there is heterogeneous bone marrow throughout the calvarium and to a lesser degree throughout the spine.  Compression fracture L5 with moderate to severe height loss progressed from outside radiograph 07/16/2022 and associated with mild hypermetabolic activity SUV max 3.8.    Transplant Course  Admitted 2/1/23 for a Génesis 200 auto SCT. She received 3 bags and a CD34 dose of 3.13 x 10^6 on 2/3/23. Tolerated chemo and transplant well. Admission complicated by expected GI toxicities, electrolyte derangements requiring replacement, cytopenias requiring transfusion of blood products, coag neg stap UTI, and neutropenic fevers. She engrafted on Day +11 with an ANC of 633. She discharged to the [facility] in the care of her son on 2/15/23. Discharged on PO Keflex for UTI to complete a 7 day course of treatment and 7 days of Mag, Phos, and K+ replacements. Vas cath removed prior to discharge by gen surg. She will f/u in BMT clinic on 2/23/23.    Interval History:   Ms. Fagan presents today with her son for hosptal f/u. She received Génesis ASCT for MM. Today is day +20. Her transplant course was uneventful except expected mild GI toxicities. She is recovering from transplant. No acute complaints today other than fatigue and decreased taste/metallic taste which is slowly improving. She is drinking plenty and eating small meals, but her appetite has not improved much. She needs K/mag supplementation and an rx was sent. She overall is feeling improved from last week and is in good spirits.       PAST MEDICAL HISTORY:   Past Medical History:   Diagnosis Date    Arthritis     Multiple myeloma      Osteoporosis        PAST SURGICAL HISTORY:   Past Surgical History:   Procedure Laterality Date    COLONOSCOPY N/A 1/5/2023    Procedure: COLONOSCOPY;  Surgeon: Diana Huynh MD;  Location: [facility] ([address] [place] [address]);  Service: Endoscopy;  Laterality: N/A;  inst via email  pre call no answer-as    FEMUR SURGERY      INSERTION OF TUNNELED CENTRAL VENOUS HEMODIALYSIS CATHETER Right 1/25/2023    Procedure: INSERTION, CATHETER, HEMODIALYSIS, DUAL LUMEN Bard 14.5 Fr Hemosplit Catheter Model [id number], Right Possible Left Chest;  Surgeon: Steven Monroy MD;  Location: [facility] OR [address] [place] [address];  Service: General;  Laterality: Right;       PAST SOCIAL HISTORY:   reports that she has quit smoking. Her smoking use included cigarettes. She has quit using smokeless tobacco. She reports that she does not currently use alcohol. She reports that she does not use drugs.    FAMILY HISTORY:  Family History   Problem Relation Age of Onset    Cancer Father        CURRENT MEDICATIONS:   Current Outpatient Medications   Medication Sig    acyclovir (ZOVIRAX) 200 MG capsule Take 4 capsules (800 mg total) by mouth 2 (two) times daily.    amLODIPine (NORVASC) 10 MG tablet Take 1 tablet (10 mg total) by mouth once daily.    pantoprazole (PROTONIX) 40 MG tablet Take 1 tablet (40 mg total) by mouth once daily.    k phos di & mono-sod phos mono (K-PHOS-NEUTRAL) 250 mg Tab Take 2 tablets by mouth once daily. for 7 days     No current facility-administered medications for this visit.     ALLERGIES:   Review of patient's allergies indicates:  No Known Allergies      Review of Systems:     Review of Systems   Constitutional:  Negative for appetite change, chills, diaphoresis, fatigue, fever and unexpected weight change.   HENT:   Negative for hearing loss, mouth sores, nosebleeds, sore throat, trouble swallowing and voice change.    Eyes:  Negative for eye problems and icterus.   Respiratory:  Negative for chest tightness, cough, hemoptysis, shortness of  breath and wheezing.    Cardiovascular:  Negative for chest pain, leg swelling and palpitations.   Gastrointestinal:  Negative for abdominal distention, abdominal pain, blood in stool, diarrhea, nausea and vomiting.   Endocrine: Negative for hot flashes.   Genitourinary:  Negative for bladder incontinence, difficulty urinating, dysuria and hematuria.    Musculoskeletal:  Negative for arthralgias, back pain, flank pain, gait problem, myalgias, neck pain and neck stiffness.   Skin:  Negative for itching, rash and wound.   Neurological:  Negative for dizziness, extremity weakness, gait problem, headaches, numbness, seizures and speech difficulty.   Hematological:  Negative for adenopathy. Does not bruise/bleed easily.   Psychiatric/Behavioral:  Negative for confusion, depression and sleep disturbance. The patient is not nervous/anxious.         Physical Exam:     Vitals:    02/23/23 0925   BP: 119/74   Pulse: 90   Resp: 17   Temp: 98 °F (36.7 °C)         Physical Exam  Constitutional:       General: She is not in acute distress.     Appearance: She is well-developed. She is not diaphoretic.   HENT:      Head: Normocephalic and atraumatic.      Mouth/Throat:      Pharynx: No oropharyngeal exudate.   Eyes:      Conjunctiva/sclera: Conjunctivae normal.      Pupils: Pupils are equal, round, and reactive to light.   Neck:      Thyroid: No thyromegaly.      Vascular: No JVD.      Trachea: No tracheal deviation.   Cardiovascular:      Rate and Rhythm: Normal rate and regular rhythm.      Heart sounds: Normal heart sounds. No murmur heard.    No friction rub.   Pulmonary:      Effort: Pulmonary effort is normal. No respiratory distress.      Breath sounds: Normal breath sounds. No stridor. No wheezing or rales.   Chest:      Chest wall: No tenderness.   Abdominal:      General: Bowel sounds are normal. There is no distension.      Palpations: Abdomen is soft.      Tenderness: There is no abdominal tenderness. There is no  guarding or rebound.   Musculoskeletal:         General: No tenderness or deformity. Normal range of motion.      Cervical back: Normal range of motion and neck supple.   Skin:     General: Skin is warm and dry.      Capillary Refill: Capillary refill takes less than 2 seconds.      Coloration: Skin is not pale.      Findings: No erythema or rash.      Comments: Right chest wall cath removed, site healing   Neurological:      Mental Status: She is alert and oriented to person, place, and time.      Cranial Nerves: No cranial nerve deficit.      Sensory: No sensory deficit.      Motor: No abnormal muscle tone.      Coordination: Coordination normal.      Deep Tendon Reflexes: Reflexes normal.   Psychiatric:         Behavior: Behavior normal.         Thought Content: Thought content normal.         Judgment: Judgment normal.       ECOG Performance Status: (foot note - ECOG PS provided by Eastern Cooperative Oncology Group) 1 - Symptomatic but completely ambulatory    Karnofsky Performance Score:  90%- Able to Carry on Normal Activity: Minor Symptoms of Disease    Labs:   Lab Results   Component Value Date    WBC 3.60 (L) 02/23/2023    HGB 10.2 (L) 02/23/2023    HCT 32.6 (L) 02/23/2023     (L) 02/23/2023    ALT 29 02/23/2023    AST 28 02/23/2023     02/23/2023    K 3.3 (L) 02/23/2023     02/23/2023    CREATININE 0.8 02/23/2023    BUN 7 02/23/2023    CO2 25 02/23/2023    INR 1.0 01/25/2023         Imaging: Previous imaging has been reviewed     1. History of autologous stem cell transplant  k phos di & mono-sod phos mono (K-PHOS-NEUTRAL) 250 mg Tab    magnesium oxide (MAG-OX) 400 mg (241.3 mg magnesium) tablet      2. Electrolyte disturbance        3. Drug-induced cytopenia        4. Multiple myeloma, remission status unspecified        5. Hypertension, unspecified type              Assessment and Plan:     Ms. Houston is a pleasant 62 year old with Multiple Myeloma.     History of autologous stem  cell transplant       - Received CD34 dose of 3.13 x10^6/kg on 2/3/23 without incident.  Engrafted Day +11 (2/14) with an ANC of 633.  - Today is Day +20, staying at [facility]                 Multiple myeloma in remission  - Complex cytogenetics with gain 1q21, t(4;14), and monosomy 13  - R-ISS stage III (elevated beta-2 microglobulin, elevated LDH, decreased albumin, and high risk cytogenetics)  - S/p 5 Cycles D-Vrd (discontinued jasen after cycle 4)  - Has had significant clinical improvement as well as reduction in IgG and M-spike showing excellent response  - see autologous stem cell transplant      Cytopenia due to chemotherapy  - Continue antimicrobial ppx per protocol  - Counts improving     CINV  - Occasional  nausea   - Continue PRN zofran    Electrolyte disturbance  - Continue K+, Mag replacements     HTN (hypertension)  - On amlodipine       Follow up as scheduled.       Arelis England PA-C  Malignant Hematology & Bone Marrow Transplant

## 2023-02-28 ENCOUNTER — TELEPHONE (OUTPATIENT)
Dept: HEMATOLOGY/ONCOLOGY | Facility: CLINIC | Age: 63
End: 2023-02-28
Payer: MEDICAID

## 2023-02-28 NOTE — TELEPHONE ENCOUNTER
Called patient to inquire whether patient received COVID vaccination or note prior to transplant; patient did not received COVID vaccination prior to transplant. Allowed time for questions.

## 2023-03-03 ENCOUNTER — OFFICE VISIT (OUTPATIENT)
Dept: HEMATOLOGY/ONCOLOGY | Facility: CLINIC | Age: 63
End: 2023-03-03
Payer: MEDICAID

## 2023-03-03 ENCOUNTER — LAB VISIT (OUTPATIENT)
Dept: LAB | Facility: HOSPITAL | Age: 63
End: 2023-03-03
Attending: INTERNAL MEDICINE
Payer: MEDICAID

## 2023-03-03 VITALS
SYSTOLIC BLOOD PRESSURE: 118 MMHG | WEIGHT: 212.63 LBS | HEART RATE: 92 BPM | OXYGEN SATURATION: 100 % | RESPIRATION RATE: 18 BRPM | BODY MASS INDEX: 34.17 KG/M2 | TEMPERATURE: 98 F | DIASTOLIC BLOOD PRESSURE: 71 MMHG | HEIGHT: 66 IN

## 2023-03-03 DIAGNOSIS — D75.9 DRUG-INDUCED CYTOPENIA: ICD-10-CM

## 2023-03-03 DIAGNOSIS — E87.8 ELECTROLYTE DISTURBANCE: ICD-10-CM

## 2023-03-03 DIAGNOSIS — C90.00 MULTIPLE MYELOMA, REMISSION STATUS UNSPECIFIED: ICD-10-CM

## 2023-03-03 DIAGNOSIS — Z94.84 HISTORY OF AUTOLOGOUS STEM CELL TRANSPLANT: Primary | ICD-10-CM

## 2023-03-03 DIAGNOSIS — T45.1X5A CINV (CHEMOTHERAPY-INDUCED NAUSEA AND VOMITING): ICD-10-CM

## 2023-03-03 DIAGNOSIS — C90.00 MULTIPLE MYELOMA NOT HAVING ACHIEVED REMISSION: ICD-10-CM

## 2023-03-03 DIAGNOSIS — R11.2 CINV (CHEMOTHERAPY-INDUCED NAUSEA AND VOMITING): ICD-10-CM

## 2023-03-03 DIAGNOSIS — I10 HYPERTENSION, UNSPECIFIED TYPE: ICD-10-CM

## 2023-03-03 DIAGNOSIS — H61.23 BILATERAL IMPACTED CERUMEN: ICD-10-CM

## 2023-03-03 DIAGNOSIS — Z76.82 STEM CELL TRANSPLANT CANDIDATE: ICD-10-CM

## 2023-03-03 DIAGNOSIS — T50.905A DRUG-INDUCED CYTOPENIA: ICD-10-CM

## 2023-03-03 LAB
ALBUMIN SERPL BCP-MCNC: 3.5 G/DL (ref 3.5–5.2)
ALP SERPL-CCNC: 138 U/L (ref 55–135)
ALT SERPL W/O P-5'-P-CCNC: 22 U/L (ref 10–44)
ANION GAP SERPL CALC-SCNC: 11 MMOL/L (ref 8–16)
AST SERPL-CCNC: 20 U/L (ref 10–40)
BASOPHILS # BLD AUTO: 0.02 K/UL (ref 0–0.2)
BASOPHILS NFR BLD: 0.6 % (ref 0–1.9)
BILIRUB SERPL-MCNC: 0.4 MG/DL (ref 0.1–1)
BUN SERPL-MCNC: 10 MG/DL (ref 6–20)
CALCIUM SERPL-MCNC: 9.3 MG/DL (ref 8.7–10.5)
CHLORIDE SERPL-SCNC: 106 MMOL/L (ref 95–110)
CO2 SERPL-SCNC: 23 MMOL/L (ref 23–29)
CREAT SERPL-MCNC: 0.8 MG/DL (ref 0.5–1.4)
DIFFERENTIAL METHOD: ABNORMAL
EOSINOPHIL # BLD AUTO: 0.1 K/UL (ref 0–0.5)
EOSINOPHIL NFR BLD: 1.7 % (ref 0–8)
ERYTHROCYTE [DISTWIDTH] IN BLOOD BY AUTOMATED COUNT: 15.9 % (ref 11.5–14.5)
EST. GFR  (NO RACE VARIABLE): >60 ML/MIN/1.73 M^2
GLUCOSE SERPL-MCNC: 107 MG/DL (ref 70–110)
HCT VFR BLD AUTO: 31.9 % (ref 37–48.5)
HGB BLD-MCNC: 9.8 G/DL (ref 12–16)
IMM GRANULOCYTES # BLD AUTO: 0.01 K/UL (ref 0–0.04)
IMM GRANULOCYTES NFR BLD AUTO: 0.3 % (ref 0–0.5)
LYMPHOCYTES # BLD AUTO: 1.3 K/UL (ref 1–4.8)
LYMPHOCYTES NFR BLD: 34.8 % (ref 18–48)
MAGNESIUM SERPL-MCNC: 1.4 MG/DL (ref 1.6–2.6)
MCH RBC QN AUTO: 27.5 PG (ref 27–31)
MCHC RBC AUTO-ENTMCNC: 30.7 G/DL (ref 32–36)
MCV RBC AUTO: 89 FL (ref 82–98)
MONOCYTES # BLD AUTO: 0.8 K/UL (ref 0.3–1)
MONOCYTES NFR BLD: 22.8 % (ref 4–15)
NEUTROPHILS # BLD AUTO: 1.4 K/UL (ref 1.8–7.7)
NEUTROPHILS NFR BLD: 39.8 % (ref 38–73)
NRBC BLD-RTO: 0 /100 WBC
PHOSPHATE SERPL-MCNC: 3.3 MG/DL (ref 2.7–4.5)
PLATELET # BLD AUTO: 105 K/UL (ref 150–450)
PMV BLD AUTO: 10.7 FL (ref 9.2–12.9)
POTASSIUM SERPL-SCNC: 3.4 MMOL/L (ref 3.5–5.1)
PROT SERPL-MCNC: 5.8 G/DL (ref 6–8.4)
RBC # BLD AUTO: 3.57 M/UL (ref 4–5.4)
SODIUM SERPL-SCNC: 140 MMOL/L (ref 136–145)
WBC # BLD AUTO: 3.59 K/UL (ref 3.9–12.7)

## 2023-03-03 PROCEDURE — 85025 COMPLETE CBC W/AUTO DIFF WBC: CPT | Performed by: INTERNAL MEDICINE

## 2023-03-03 PROCEDURE — 99214 PR OFFICE/OUTPT VISIT, EST, LEVL IV, 30-39 MIN: ICD-10-PCS | Mod: S$PBB,,, | Performed by: PHYSICIAN ASSISTANT

## 2023-03-03 PROCEDURE — 99999 PR PBB SHADOW E&M-EST. PATIENT-LVL III: ICD-10-PCS | Mod: PBBFAC,,, | Performed by: PHYSICIAN ASSISTANT

## 2023-03-03 PROCEDURE — 1111F DSCHRG MED/CURRENT MED MERGE: CPT | Mod: CPTII,,, | Performed by: PHYSICIAN ASSISTANT

## 2023-03-03 PROCEDURE — 1160F RVW MEDS BY RX/DR IN RCRD: CPT | Mod: CPTII,,, | Performed by: PHYSICIAN ASSISTANT

## 2023-03-03 PROCEDURE — 3008F BODY MASS INDEX DOCD: CPT | Mod: CPTII,,, | Performed by: PHYSICIAN ASSISTANT

## 2023-03-03 PROCEDURE — 1159F PR MEDICATION LIST DOCUMENTED IN MEDICAL RECORD: ICD-10-PCS | Mod: CPTII,,, | Performed by: PHYSICIAN ASSISTANT

## 2023-03-03 PROCEDURE — 99214 OFFICE O/P EST MOD 30 MIN: CPT | Mod: S$PBB,,, | Performed by: PHYSICIAN ASSISTANT

## 2023-03-03 PROCEDURE — 1160F PR REVIEW ALL MEDS BY PRESCRIBER/CLIN PHARMACIST DOCUMENTED: ICD-10-PCS | Mod: CPTII,,, | Performed by: PHYSICIAN ASSISTANT

## 2023-03-03 PROCEDURE — 1111F PR DISCHARGE MEDS RECONCILED W/ CURRENT OUTPATIENT MED LIST: ICD-10-PCS | Mod: CPTII,,, | Performed by: PHYSICIAN ASSISTANT

## 2023-03-03 PROCEDURE — 99213 OFFICE O/P EST LOW 20 MIN: CPT | Mod: PBBFAC | Performed by: PHYSICIAN ASSISTANT

## 2023-03-03 PROCEDURE — 83735 ASSAY OF MAGNESIUM: CPT | Performed by: INTERNAL MEDICINE

## 2023-03-03 PROCEDURE — 3008F PR BODY MASS INDEX (BMI) DOCUMENTED: ICD-10-PCS | Mod: CPTII,,, | Performed by: PHYSICIAN ASSISTANT

## 2023-03-03 PROCEDURE — 3078F DIAST BP <80 MM HG: CPT | Mod: CPTII,,, | Performed by: PHYSICIAN ASSISTANT

## 2023-03-03 PROCEDURE — 36415 COLL VENOUS BLD VENIPUNCTURE: CPT | Performed by: INTERNAL MEDICINE

## 2023-03-03 PROCEDURE — 84100 ASSAY OF PHOSPHORUS: CPT | Performed by: INTERNAL MEDICINE

## 2023-03-03 PROCEDURE — 3078F PR MOST RECENT DIASTOLIC BLOOD PRESSURE < 80 MM HG: ICD-10-PCS | Mod: CPTII,,, | Performed by: PHYSICIAN ASSISTANT

## 2023-03-03 PROCEDURE — 3074F PR MOST RECENT SYSTOLIC BLOOD PRESSURE < 130 MM HG: ICD-10-PCS | Mod: CPTII,,, | Performed by: PHYSICIAN ASSISTANT

## 2023-03-03 PROCEDURE — 3074F SYST BP LT 130 MM HG: CPT | Mod: CPTII,,, | Performed by: PHYSICIAN ASSISTANT

## 2023-03-03 PROCEDURE — 1159F MED LIST DOCD IN RCRD: CPT | Mod: CPTII,,, | Performed by: PHYSICIAN ASSISTANT

## 2023-03-03 PROCEDURE — 99999 PR PBB SHADOW E&M-EST. PATIENT-LVL III: CPT | Mod: PBBFAC,,, | Performed by: PHYSICIAN ASSISTANT

## 2023-03-03 PROCEDURE — 80053 COMPREHEN METABOLIC PANEL: CPT | Performed by: INTERNAL MEDICINE

## 2023-03-03 RX ORDER — LANOLIN ALCOHOL/MO/W.PET/CERES
400 CREAM (GRAM) TOPICAL DAILY
Qty: 7 TABLET | Refills: 0 | Status: SHIPPED | OUTPATIENT
Start: 2023-03-03 | End: 2023-03-11

## 2023-03-03 NOTE — PROGRESS NOTES
03/03/2023      Primary Oncologic Diagnosis: IgG Kappa Multiple Myeloma      History of Present Ilness:   Raquel Houston (Raquel) is a pleasant 62 y.o.female with medical history of OA, gout, and osteoporosis presenting for follow up of multiple myeloma. History as above. Arrived alone today. Continues to do very well and is having no issues tolerating therapy. Has not been able to set up dental appointment yet. Denies any complaints including neuropathy. Appetite is doing great. Does not need to use a cane any longer.  Smoked 1ppd for 20 years, quit April 2022  Quit alcohol Apirl 2022  Denies recreational drug use  Father has a history of a diffuse bone cancer that he did not want to be treated for    Treatment History:  07/14/21: Presented with several months of worsening lethargy, generalized pain, 40-60lbs weight loss, and encephalopathy. Labs at time of presentation: hgb 7.6, plts 82, wbc 12, sodium 120, creatinine 1.55, corrected calcium 9.7, transaminitis, M-spike 6.4 g/dL. Skeletal survey showed lesions of left/right humerus, left/right proximal radius, pelvis, and femurs with a significant left femur lytic lesion. Multiple lucent lesions in calvarium. Given two doses of velcade while hospitalized.  07/18/21: Underwent left trochanteric femoral nail fixation. Pathology consistent with plasma cell neoplasm  07/19/22: BMBx showing 90% plasma cells with 100% cellularity, gain 1q21, t(4;14), and monosomy 13.  08/17/22: Cycle 1 Sofie-VRD for IgG Kappa Multiple Myeloma  09/13/22: Cycle 2 Sofie-VRD  10/11/22: Cycle 3 Sofie-VRD  11/08/22: Cycle 4 Sofie-VRD  12/06/22: Cycle 5 VRD    Transplant Evaluation:  --Bone Marrow Biopsy on 12/19/22: Normocellular marrow (40-50% total cellularity) with <1% polytypic   plasma cells and trilineage hematopoiesis with mild granulocytic hyperplasia and mild relative monocytosis. No monotypic plasma cells detected by MRD flow cytometric analysis   --Labs from 12/29/22:  Light  Chains  Kappa: 1.48  Lambda: 0.59  K/L: 2.51  SPEP:  Borderline mildly decreased total protein. Normal gamma globulins are   decreased. Paraprotein peak in gamma = 0.1 g/dL (previously, 0.15   g/dL).   --Colonoscopy on 1/5/23:   --Mammogram on 1/6/23: Routine screening mammogram in 1 year is recommended.    --PET on 1/11/23: In the bones, there is heterogeneous bone marrow throughout the calvarium and to a lesser degree throughout the spine.  Compression fracture L5 with moderate to severe height loss progressed from outside radiograph 07/16/2022 and associated with mild hypermetabolic activity SUV max 3.8.    Transplant Course  Admitted 2/1/23 for a Génesis 200 auto SCT. She received 3 bags and a CD34 dose of 3.13 x 10^6 on 2/3/23. Tolerated chemo and transplant well. Admission complicated by expected GI toxicities, electrolyte derangements requiring replacement, cytopenias requiring transfusion of blood products, coag neg stap UTI, and neutropenic fevers. She engrafted on Day +11 with an ANC of 633. She discharged to the [facility] in the care of her son on 2/15/23. Discharged on PO Keflex for UTI to complete a 7 day course of treatment and 7 days of Mag, Phos, and K+ replacements. Vas cath removed prior to discharge by gen surg. She will f/u in BMT clinic on 2/23/23.    Interval History:   Ms. Fagan presents today with her son for hosptal f/u. She received Génesis ASCT for MM. Today is day +28. Her transplant course was uneventful except expected mild GI toxicities. She is recovering from transplant. She is feeling much better this week, appetite/taste significantly improved. Drinking plenty. Still needs mag/k supplementation. No fevers/chills. No abdominal complaints. Decreased hearing in R ear -- exam shows impacted cerumen bilat and cannot visualize TM. Debrox gtt sent.     Discussed pt could return home if she's feeling well, but she'd like to remain at [facility] until her f/u with Dr. Casas next week.    PAST  MEDICAL HISTORY:   Past Medical History:   Diagnosis Date    Arthritis     Multiple myeloma     Osteoporosis        PAST SURGICAL HISTORY:   Past Surgical History:   Procedure Laterality Date    COLONOSCOPY N/A 1/5/2023    Procedure: COLONOSCOPY;  Surgeon: Diana Huynh MD;  Location: [facility] ([address] [place] [address]);  Service: Endoscopy;  Laterality: N/A;  inst via email  pre call no answer-as    FEMUR SURGERY      INSERTION OF TUNNELED CENTRAL VENOUS HEMODIALYSIS CATHETER Right 1/25/2023    Procedure: INSERTION, CATHETER, HEMODIALYSIS, DUAL LUMEN Bard 14.5 Fr Hemosplit Catheter Model [id number], Right Possible Left Chest;  Surgeon: Steven Monroy MD;  Location: [facility] OR [address] [place] [address];  Service: General;  Laterality: Right;       PAST SOCIAL HISTORY:   reports that she has quit smoking. Her smoking use included cigarettes. She has quit using smokeless tobacco. She reports that she does not currently use alcohol. She reports that she does not use drugs.    FAMILY HISTORY:  Family History   Problem Relation Age of Onset    Cancer Father        CURRENT MEDICATIONS:   Current Outpatient Medications   Medication Sig    acyclovir (ZOVIRAX) 200 MG capsule Take 4 capsules (800 mg total) by mouth 2 (two) times daily.    amLODIPine (NORVASC) 10 MG tablet Take 1 tablet (10 mg total) by mouth once daily.    k phos di & mono-sod phos mono (K-PHOS-NEUTRAL) 250 mg Tab Take 1 tablet by mouth once daily. for 7 days    magnesium oxide (MAG-OX) 400 mg (241.3 mg magnesium) tablet Take 1 tablet (400 mg total) by mouth once daily. for 7 days    pantoprazole (PROTONIX) 40 MG tablet Take 1 tablet (40 mg total) by mouth once daily.     No current facility-administered medications for this visit.     ALLERGIES:   Review of patient's allergies indicates:  No Known Allergies      Review of Systems:     Review of Systems   Constitutional:  Negative for appetite change, chills, diaphoresis, fatigue, fever and unexpected weight change.   HENT:   Positive for  hearing loss (R > L). Negative for mouth sores, nosebleeds, sore throat, trouble swallowing and voice change.    Eyes:  Negative for eye problems and icterus.   Respiratory:  Negative for chest tightness, cough, hemoptysis, shortness of breath and wheezing.    Cardiovascular:  Negative for chest pain, leg swelling and palpitations.   Gastrointestinal:  Negative for abdominal distention, abdominal pain, blood in stool, diarrhea, nausea and vomiting.   Endocrine: Negative for hot flashes.   Genitourinary:  Negative for bladder incontinence, difficulty urinating, dysuria and hematuria.    Musculoskeletal:  Negative for arthralgias, back pain, flank pain, gait problem, myalgias, neck pain and neck stiffness.   Skin:  Negative for itching, rash and wound.   Neurological:  Negative for dizziness, extremity weakness, gait problem, headaches, numbness, seizures and speech difficulty.   Hematological:  Negative for adenopathy. Does not bruise/bleed easily.   Psychiatric/Behavioral:  Negative for confusion, depression and sleep disturbance. The patient is not nervous/anxious.         Physical Exam:     Vitals:    03/03/23 0836   BP: 118/71   Pulse: 92   Resp: 18   Temp: 97.7 °F (36.5 °C)         Physical Exam  Constitutional:       General: She is not in acute distress.     Appearance: She is well-developed. She is not diaphoretic.   HENT:      Head: Normocephalic and atraumatic.      Right Ear: There is impacted cerumen.      Left Ear: There is impacted cerumen.      Mouth/Throat:      Pharynx: No oropharyngeal exudate.   Eyes:      Conjunctiva/sclera: Conjunctivae normal.      Pupils: Pupils are equal, round, and reactive to light.   Neck:      Thyroid: No thyromegaly.      Vascular: No JVD.      Trachea: No tracheal deviation.   Cardiovascular:      Rate and Rhythm: Normal rate and regular rhythm.      Heart sounds: Normal heart sounds. No murmur heard.    No friction rub.   Pulmonary:      Effort: Pulmonary effort is  normal. No respiratory distress.      Breath sounds: Normal breath sounds. No stridor. No wheezing or rales.   Chest:      Chest wall: No tenderness.   Abdominal:      General: Bowel sounds are normal. There is no distension.      Palpations: Abdomen is soft.      Tenderness: There is no abdominal tenderness. There is no guarding or rebound.   Musculoskeletal:         General: No tenderness or deformity. Normal range of motion.      Cervical back: Normal range of motion and neck supple.   Skin:     General: Skin is warm and dry.      Capillary Refill: Capillary refill takes less than 2 seconds.      Coloration: Skin is not pale.      Findings: No erythema or rash.      Comments: Right chest wall cath removed, site healing   Neurological:      Mental Status: She is alert and oriented to person, place, and time.      Cranial Nerves: No cranial nerve deficit.      Sensory: No sensory deficit.      Motor: No abnormal muscle tone.      Coordination: Coordination normal.      Deep Tendon Reflexes: Reflexes normal.   Psychiatric:         Behavior: Behavior normal.         Thought Content: Thought content normal.         Judgment: Judgment normal.       ECOG Performance Status: (foot note - ECOG PS provided by Eastern Cooperative Oncology Group) 1 - Symptomatic but completely ambulatory    Karnofsky Performance Score:  90%- Able to Carry on Normal Activity: Minor Symptoms of Disease    Labs:   Lab Results   Component Value Date    WBC 3.59 (L) 03/03/2023    HGB 9.8 (L) 03/03/2023    HCT 31.9 (L) 03/03/2023     (L) 03/03/2023    ALT 22 03/03/2023    AST 20 03/03/2023     03/03/2023    K 3.4 (L) 03/03/2023     03/03/2023    CREATININE 0.8 03/03/2023    BUN 10 03/03/2023    CO2 23 03/03/2023    INR 1.0 01/25/2023         Imaging: Previous imaging has been reviewed     1. History of autologous stem cell transplant  magnesium oxide (MAG-OX) 400 mg (241.3 mg magnesium) tablet    k phos di & mono-sod phos mono  (K-PHOS-NEUTRAL) 250 mg Tab      2. Bilateral impacted cerumen  carbamide peroxide (DEBROX) 6.5 % otic solution      3. Electrolyte disturbance        4. Drug-induced cytopenia        5. Multiple myeloma, remission status unspecified        6. Hypertension, unspecified type        7. CINV (chemotherapy-induced nausea and vomiting)                Assessment and Plan:     Ms. Houston is a pleasant 62 year old with Multiple Myeloma.     History of autologous stem cell transplant       - Received CD34 dose of 3.13 x10^6/kg on 2/3/23 without incident.  Engrafted Day +11 (2/14) with an ANC of 633.  - Today is Day +28, staying at [facility]  - Continue acyclovir prophylaxis for at least one year post transplant                  Multiple myeloma in remission  - Complex cytogenetics with gain 1q21, t(4;14), and monosomy 13  - R-ISS stage III (elevated beta-2 microglobulin, elevated LDH, decreased albumin, and high risk cytogenetics)  - S/p 5 Cycles D-Vrd (discontinued jasen after cycle 4)  - Has had significant clinical improvement as well as reduction in IgG and M-spike showing excellent response  - see autologous stem cell transplant      Cytopenia due to chemotherapy  - Continue antimicrobial ppx per protocol  - Counts improving    Cerumen Impaction   - Bilat cerumen, leading to decreased hearing R ear, debrox drops sent      CINV  - Occasional  nausea   - Continue PRN zofran    Electrolyte disturbance  - Continue K+, Mag replacements     HTN (hypertension)  - On amlodipine       Follow up as scheduled next week with Dr. Casas. Will need D+100 restaging scheduled.       Arelis England PA-C  Malignant Hematology & Bone Marrow Transplant

## 2023-03-09 ENCOUNTER — TELEPHONE (OUTPATIENT)
Dept: HEMATOLOGY/ONCOLOGY | Facility: CLINIC | Age: 63
End: 2023-03-09
Payer: MEDICAID

## 2023-03-09 NOTE — TELEPHONE ENCOUNTER
Tried to call pt to confirm appt on 03/10/2023 at 1 pm with Dr. Casas. Pt didn't answer so left message regarding call

## 2023-03-10 ENCOUNTER — OFFICE VISIT (OUTPATIENT)
Dept: HEMATOLOGY/ONCOLOGY | Facility: CLINIC | Age: 63
End: 2023-03-10
Payer: MEDICAID

## 2023-03-10 ENCOUNTER — LAB VISIT (OUTPATIENT)
Dept: LAB | Facility: HOSPITAL | Age: 63
End: 2023-03-10
Payer: MEDICAID

## 2023-03-10 VITALS
HEIGHT: 66 IN | TEMPERATURE: 99 F | DIASTOLIC BLOOD PRESSURE: 78 MMHG | WEIGHT: 210.31 LBS | RESPIRATION RATE: 17 BRPM | BODY MASS INDEX: 33.8 KG/M2 | OXYGEN SATURATION: 99 % | SYSTOLIC BLOOD PRESSURE: 136 MMHG | HEART RATE: 97 BPM

## 2023-03-10 DIAGNOSIS — D61.810 PANCYTOPENIA DUE TO CHEMOTHERAPY: ICD-10-CM

## 2023-03-10 DIAGNOSIS — C90.00 MULTIPLE MYELOMA NOT HAVING ACHIEVED REMISSION: ICD-10-CM

## 2023-03-10 DIAGNOSIS — R50.81 NEUTROPENIC FEVER: ICD-10-CM

## 2023-03-10 DIAGNOSIS — D70.9 NEUTROPENIC FEVER: ICD-10-CM

## 2023-03-10 DIAGNOSIS — C90.01 MULTIPLE MYELOMA IN REMISSION: Primary | ICD-10-CM

## 2023-03-10 DIAGNOSIS — Z76.82 STEM CELL TRANSPLANT CANDIDATE: ICD-10-CM

## 2023-03-10 LAB
ALBUMIN SERPL BCP-MCNC: 4 G/DL (ref 3.5–5.2)
ALP SERPL-CCNC: 134 U/L (ref 55–135)
ALT SERPL W/O P-5'-P-CCNC: 24 U/L (ref 10–44)
ANION GAP SERPL CALC-SCNC: 11 MMOL/L (ref 8–16)
AST SERPL-CCNC: 22 U/L (ref 10–40)
BASOPHILS # BLD AUTO: 0.02 K/UL (ref 0–0.2)
BASOPHILS NFR BLD: 0.3 % (ref 0–1.9)
BILIRUB SERPL-MCNC: 0.5 MG/DL (ref 0.1–1)
BUN SERPL-MCNC: 10 MG/DL (ref 6–20)
CALCIUM SERPL-MCNC: 10 MG/DL (ref 8.7–10.5)
CHLORIDE SERPL-SCNC: 105 MMOL/L (ref 95–110)
CO2 SERPL-SCNC: 24 MMOL/L (ref 23–29)
CREAT SERPL-MCNC: 0.8 MG/DL (ref 0.5–1.4)
DIFFERENTIAL METHOD: ABNORMAL
EOSINOPHIL # BLD AUTO: 0.1 K/UL (ref 0–0.5)
EOSINOPHIL NFR BLD: 2.1 % (ref 0–8)
ERYTHROCYTE [DISTWIDTH] IN BLOOD BY AUTOMATED COUNT: 16.5 % (ref 11.5–14.5)
EST. GFR  (NO RACE VARIABLE): >60 ML/MIN/1.73 M^2
GLUCOSE SERPL-MCNC: 97 MG/DL (ref 70–110)
HCT VFR BLD AUTO: 35.8 % (ref 37–48.5)
HGB BLD-MCNC: 11.3 G/DL (ref 12–16)
IMM GRANULOCYTES # BLD AUTO: 0.01 K/UL (ref 0–0.04)
IMM GRANULOCYTES NFR BLD AUTO: 0.2 % (ref 0–0.5)
LYMPHOCYTES # BLD AUTO: 2.9 K/UL (ref 1–4.8)
LYMPHOCYTES NFR BLD: 46.6 % (ref 18–48)
MAGNESIUM SERPL-MCNC: 1.7 MG/DL (ref 1.6–2.6)
MCH RBC QN AUTO: 28 PG (ref 27–31)
MCHC RBC AUTO-ENTMCNC: 31.6 G/DL (ref 32–36)
MCV RBC AUTO: 89 FL (ref 82–98)
MONOCYTES # BLD AUTO: 0.9 K/UL (ref 0.3–1)
MONOCYTES NFR BLD: 13.6 % (ref 4–15)
NEUTROPHILS # BLD AUTO: 2.4 K/UL (ref 1.8–7.7)
NEUTROPHILS NFR BLD: 37.2 % (ref 38–73)
NRBC BLD-RTO: 0 /100 WBC
PHOSPHATE SERPL-MCNC: 3.5 MG/DL (ref 2.7–4.5)
PLATELET # BLD AUTO: 163 K/UL (ref 150–450)
PMV BLD AUTO: 11.5 FL (ref 9.2–12.9)
POTASSIUM SERPL-SCNC: 4 MMOL/L (ref 3.5–5.1)
PROT SERPL-MCNC: 6.7 G/DL (ref 6–8.4)
RBC # BLD AUTO: 4.04 M/UL (ref 4–5.4)
SODIUM SERPL-SCNC: 140 MMOL/L (ref 136–145)
WBC # BLD AUTO: 6.31 K/UL (ref 3.9–12.7)

## 2023-03-10 PROCEDURE — 85025 COMPLETE CBC W/AUTO DIFF WBC: CPT | Performed by: INTERNAL MEDICINE

## 2023-03-10 PROCEDURE — 36415 COLL VENOUS BLD VENIPUNCTURE: CPT | Performed by: INTERNAL MEDICINE

## 2023-03-10 PROCEDURE — 99215 PR OFFICE/OUTPT VISIT, EST, LEVL V, 40-54 MIN: ICD-10-PCS | Mod: S$PBB,,, | Performed by: INTERNAL MEDICINE

## 2023-03-10 PROCEDURE — 84100 ASSAY OF PHOSPHORUS: CPT | Performed by: INTERNAL MEDICINE

## 2023-03-10 PROCEDURE — 83735 ASSAY OF MAGNESIUM: CPT | Performed by: INTERNAL MEDICINE

## 2023-03-10 PROCEDURE — 80053 COMPREHEN METABOLIC PANEL: CPT | Performed by: INTERNAL MEDICINE

## 2023-03-10 PROCEDURE — 99999 PR PBB SHADOW E&M-EST. PATIENT-LVL III: ICD-10-PCS | Mod: PBBFAC,,, | Performed by: INTERNAL MEDICINE

## 2023-03-10 PROCEDURE — 99213 OFFICE O/P EST LOW 20 MIN: CPT | Mod: PBBFAC | Performed by: INTERNAL MEDICINE

## 2023-03-10 PROCEDURE — 99215 OFFICE O/P EST HI 40 MIN: CPT | Mod: S$PBB,,, | Performed by: INTERNAL MEDICINE

## 2023-03-10 PROCEDURE — 99999 PR PBB SHADOW E&M-EST. PATIENT-LVL III: CPT | Mod: PBBFAC,,, | Performed by: INTERNAL MEDICINE

## 2023-03-10 RX ORDER — OXYCODONE HYDROCHLORIDE 5 MG/1
5 TABLET ORAL EVERY 4 HOURS PRN
Qty: 5 TABLET | Refills: 0 | Status: SHIPPED | OUTPATIENT
Start: 2023-03-10

## 2023-03-10 RX ORDER — LORAZEPAM 1 MG/1
1 TABLET ORAL 2 TIMES DAILY
Qty: 1 TABLET | Refills: 0 | Status: SHIPPED | OUTPATIENT
Start: 2023-03-10 | End: 2024-03-09

## 2023-03-10 NOTE — PROGRESS NOTES
03/10/2023      Primary Oncologic Diagnosis: IgG Kappa Multiple Myeloma      History of Present Ilness:   Raquel Houston (Raquel) is a pleasant 62 y.o.female with medical history of OA, gout, and osteoporosis presenting for follow up of multiple myeloma. History as above. Arrived alone today. Continues to do very well and is having no issues tolerating therapy. Has not been able to set up dental appointment yet. Denies any complaints including neuropathy. Appetite is doing great. Does not need to use a cane any longer.  Smoked 1ppd for 20 years, quit April 2022  Quit alcohol Apirl 2022  Denies recreational drug use  Father has a history of a diffuse bone cancer that he did not want to be treated for    Treatment History:  07/14/21: Presented with several months of worsening lethargy, generalized pain, 40-60lbs weight loss, and encephalopathy. Labs at time of presentation: hgb 7.6, plts 82, wbc 12, sodium 120, creatinine 1.55, corrected calcium 9.7, transaminitis, M-spike 6.4 g/dL. Skeletal survey showed lesions of left/right humerus, left/right proximal radius, pelvis, and femurs with a significant left femur lytic lesion. Multiple lucent lesions in calvarium. Given two doses of velcade while hospitalized.  07/18/21: Underwent left trochanteric femoral nail fixation. Pathology consistent with plasma cell neoplasm  07/19/22: BMBx showing 90% plasma cells with 100% cellularity, gain 1q21, t(4;14), and monosomy 13.  08/17/22: Cycle 1 Sofie-VRD for IgG Kappa Multiple Myeloma  09/13/22: Cycle 2 Sofie-VRD  10/11/22: Cycle 3 Sofie-VRD  11/08/22: Cycle 4 Sofie-VRD  12/06/22: Cycle 5 VRD  12/19/22: Transplant evaluation: BMBx normocellular with no monotypic plasma cells detected by MRD flow cytometric analysis. SPEP 0.1 g/dL. FLC ratio 2.51. PET In the bones, there is heterogeneous bone marrow throughout the calvarium and to a lesser degree throughout the spine.  Compression fracture L5 with moderate to severe height  loss progressed from outside radiograph 07/16/2022 and associated with mild hypermetabolic activity SUV max 3.8.    02/03/23: Underwent meliza 200 auto SCT. Received 3 bags and a CD34 dose of 3.13 x 10^6. Engrafted day +11.         Interval History:   Ms. Fagan presents today with her daughter for hosptal f/u. She received Meliza ASCT for MM. Today is day +35. Feels her appetite and taste has improved. Her hearing is doing better with debrox. Overall she is feeling significantly back to herself. Denies fevers, chills, chest pain, abdominal pain, n/v/d.      PAST MEDICAL HISTORY:   Past Medical History:   Diagnosis Date    Arthritis     Multiple myeloma     Osteoporosis        PAST SURGICAL HISTORY:   Past Surgical History:   Procedure Laterality Date    COLONOSCOPY N/A 1/5/2023    Procedure: COLONOSCOPY;  Surgeon: Diana Huynh MD;  Location: [facility] (2ND FLR);  Service: Endoscopy;  Laterality: N/A;  inst via email  pre call no answer-as    FEMUR SURGERY      INSERTION OF TUNNELED CENTRAL VENOUS HEMODIALYSIS CATHETER Right 1/25/2023    Procedure: INSERTION, CATHETER, HEMODIALYSIS, DUAL LUMEN Bard 14.5 Fr Hemosplit Catheter Model [id number], Right Possible Left Chest;  Surgeon: Steven Monroy MD;  Location: [facility] OR [address] [place] [address];  Service: General;  Laterality: Right;       PAST SOCIAL HISTORY:   reports that she has quit smoking. Her smoking use included cigarettes. She has quit using smokeless tobacco. She reports that she does not currently use alcohol. She reports that she does not use drugs.    FAMILY HISTORY:  Family History   Problem Relation Age of Onset    Cancer Father        CURRENT MEDICATIONS:   Current Outpatient Medications   Medication Sig    acyclovir (ZOVIRAX) 200 MG capsule Take 4 capsules (800 mg total) by mouth 2 (two) times daily.    amLODIPine (NORVASC) 10 MG tablet Take 1 tablet (10 mg total) by mouth once daily.    carbamide peroxide (DEBROX) 6.5 % otic solution Place 5 drops into both ears 2  (two) times daily.    k phos di & mono-sod phos mono (K-PHOS-NEUTRAL) 250 mg Tab Take 1 tablet by mouth once daily. for 7 days    magnesium oxide (MAG-OX) 400 mg (241.3 mg magnesium) tablet Take 1 tablet (400 mg total) by mouth once daily. for 7 days    pantoprazole (PROTONIX) 40 MG tablet Take 1 tablet (40 mg total) by mouth once daily.     No current facility-administered medications for this visit.     ALLERGIES:   Review of patient's allergies indicates:  No Known Allergies      Review of Systems:     Review of Systems   Constitutional:  Negative for appetite change, chills, diaphoresis, fatigue, fever and unexpected weight change.   HENT:   Positive for hearing loss (R > L). Negative for mouth sores, nosebleeds, sore throat, trouble swallowing and voice change.    Eyes:  Negative for eye problems and icterus.   Respiratory:  Negative for chest tightness, cough, hemoptysis, shortness of breath and wheezing.    Cardiovascular:  Negative for chest pain, leg swelling and palpitations.   Gastrointestinal:  Negative for abdominal distention, abdominal pain, blood in stool, diarrhea, nausea and vomiting.   Endocrine: Negative for hot flashes.   Genitourinary:  Negative for bladder incontinence, difficulty urinating, dysuria and hematuria.    Musculoskeletal:  Negative for arthralgias, back pain, flank pain, gait problem, myalgias, neck pain and neck stiffness.   Skin:  Negative for itching, rash and wound.   Neurological:  Negative for dizziness, extremity weakness, gait problem, headaches, numbness, seizures and speech difficulty.   Hematological:  Negative for adenopathy. Does not bruise/bleed easily.   Psychiatric/Behavioral:  Negative for confusion, depression and sleep disturbance. The patient is not nervous/anxious.         Physical Exam:     Vitals:    03/10/23 1304   BP: 136/78   Pulse: 97   Resp: 17   Temp: 98.5 °F (36.9 °C)         Physical Exam  Constitutional:       General: She is not in acute distress.      Appearance: She is well-developed. She is not diaphoretic.   HENT:      Head: Normocephalic and atraumatic.      Mouth/Throat:      Pharynx: No oropharyngeal exudate.   Eyes:      Conjunctiva/sclera: Conjunctivae normal.      Pupils: Pupils are equal, round, and reactive to light.   Neck:      Thyroid: No thyromegaly.      Vascular: No JVD.      Trachea: No tracheal deviation.   Cardiovascular:      Rate and Rhythm: Normal rate and regular rhythm.      Heart sounds: Normal heart sounds. No murmur heard.    No friction rub.   Pulmonary:      Effort: Pulmonary effort is normal. No respiratory distress.      Breath sounds: Normal breath sounds. No stridor. No wheezing or rales.   Chest:      Chest wall: No tenderness.   Abdominal:      General: Bowel sounds are normal. There is no distension.      Palpations: Abdomen is soft.      Tenderness: There is no abdominal tenderness. There is no guarding or rebound.   Musculoskeletal:         General: No tenderness or deformity. Normal range of motion.      Cervical back: Normal range of motion and neck supple.   Skin:     General: Skin is warm and dry.      Capillary Refill: Capillary refill takes less than 2 seconds.      Coloration: Skin is not pale.      Findings: No erythema or rash.      Comments: Right chest wall cath removed, site healing   Neurological:      Mental Status: She is alert and oriented to person, place, and time.      Cranial Nerves: No cranial nerve deficit.      Sensory: No sensory deficit.      Motor: No abnormal muscle tone.      Coordination: Coordination normal.      Deep Tendon Reflexes: Reflexes normal.   Psychiatric:         Behavior: Behavior normal.         Thought Content: Thought content normal.         Judgment: Judgment normal.       ECOG Performance Status: (foot note - ECOG PS provided by Eastern Cooperative Oncology Group) 1 - Symptomatic but completely ambulatory    Karnofsky Performance Score:  90%- Able to Carry on Normal Activity:  Minor Symptoms of Disease    Labs:   Lab Results   Component Value Date    WBC 6.31 03/10/2023    HGB 11.3 (L) 03/10/2023    HCT 35.8 (L) 03/10/2023     03/10/2023    ALT 24 03/10/2023    AST 22 03/10/2023     03/10/2023    K 4.0 03/10/2023     03/10/2023    CREATININE 0.8 03/10/2023    BUN 10 03/10/2023    CO2 24 03/10/2023    INR 1.0 01/25/2023         Imaging: Previous imaging has been reviewed     No diagnosis found.          Assessment and Plan:     Ms. Houston is a pleasant 62 year old with Multiple Myeloma.     History of autologous stem cell transplant       - Received CD34 dose of 3.13 x10^6/kg on 2/3/23 without incident.  Engrafted Day +11 (2/14) with an ANC of 633.  - Today is Day +35  - Continue acyclovir prophylaxis for at least one year post transplant   - Will arrange for day +100 evaluation                 Multiple myeloma in remission  - Complex cytogenetics with gain 1q21, t(4;14), and monosomy 13  - R-ISS stage III (elevated beta-2 microglobulin, elevated LDH, decreased albumin, and high risk cytogenetics)  - S/p 5 Cycles D-Vrd (discontinued jasen after cycle 4)  - Has had significant clinical improvement as well as reduction in IgG and M-spike showing excellent response  - see autologous stem cell transplant      Cytopenia due to chemotherapy  - Continue antimicrobial ppx per protocol  - Counts improving    Cerumen Impaction   - Bilat cerumen, leading to decreased hearing R ear, debrox drops with considerable improvement     CINV  - Occasional  nausea   - Continue PRN zofran    Electrolyte disturbance  - Improved with replacements     HTN (hypertension)  - On amlodipine       Follow up in 1 month . Will need D+100 restaging scheduled.       Everett Boogie, PGY- VI  Hematology/Oncology Fellow    Route Chart for Scheduling    BMT Chart Routing      Follow up with physician 1 month. with Dr Boogie. day +100 BMBx on 5/15   Follow up with SERGIO    Provider visit type Malignant hem    Infusion scheduling note    Injection scheduling note    Labs   Scheduling:  Preferred lab:  Lab interval:  CBC and CMP prior to appointment. CBC, CMP, SPEPIFE, FLC, quant immunoglobulins on 5/15   Imaging   PET on 5/15   Pharmacy appointment    Other referrals              Therapy Plan Information  Flushes  heparin, porcine (PF) 100 unit/mL injection flush 500 Units  500 Units, Intravenous, Every visit  sodium chloride 0.9% flush 10 mL  10 mL, Intravenous, Every visit

## 2023-03-13 ENCOUNTER — TELEPHONE (OUTPATIENT)
Dept: HEMATOLOGY/ONCOLOGY | Facility: CLINIC | Age: 63
End: 2023-03-13
Payer: MEDICAID

## 2023-03-13 NOTE — TELEPHONE ENCOUNTER
"----- Message from Raya Vanessa sent at 3/13/2023  8:48 AM CDT -----  Regarding: Consult/Advisory:      Name Of Caller:     Taisha knox/ [facility]      Contact Preference?:  [phone]      What is the nature of the call?: Requesting clarification on directions and quantity pt's LORazepam (ATIVAN).      "Thank you for all that you do for our patients"       "

## 2023-03-20 ENCOUNTER — TELEPHONE (OUTPATIENT)
Dept: HEMATOLOGY/ONCOLOGY | Facility: CLINIC | Age: 63
End: 2023-03-20
Payer: MEDICAID

## 2023-03-20 NOTE — TELEPHONE ENCOUNTER
----- Message from Tono Alvarez sent at 3/20/2023  8:44 AM CDT -----  Regarding: Consult/Advisory      Name Of Caller: Self      Contact Preference:[phone]    Nature of call: Pt is calling to get transfer to [facility]. Pt is requesting a call back.

## 2023-03-20 NOTE — TELEPHONE ENCOUNTER
Will reach out to [facility] in [place] to send referral. [phone]. [phone] faxed

## 2023-04-11 DIAGNOSIS — C90.00 MULTIPLE MYELOMA, REMISSION STATUS UNSPECIFIED: ICD-10-CM

## 2023-04-11 DIAGNOSIS — C90.00 MULTIPLE MYELOMA NOT HAVING ACHIEVED REMISSION: ICD-10-CM

## 2023-04-11 DIAGNOSIS — C90.01 MULTIPLE MYELOMA IN REMISSION: Primary | ICD-10-CM

## 2023-04-11 DIAGNOSIS — Z94.84 HISTORY OF AUTOLOGOUS STEM CELL TRANSPLANT: ICD-10-CM

## 2023-08-15 ENCOUNTER — PATIENT MESSAGE (OUTPATIENT)
Dept: HEMATOLOGY/ONCOLOGY | Facility: CLINIC | Age: 63
End: 2023-08-15
Payer: MEDICAID

## 2023-08-17 DIAGNOSIS — I10 HYPERTENSION, UNSPECIFIED TYPE: ICD-10-CM

## (undated) DEVICE — DRAPE C-ARM ELAS CLIP 42X120IN

## (undated) DEVICE — SET DECANTER MEDICHOICE

## (undated) DEVICE — DRESSING ANTIMICROBIAL 1 INCH

## (undated) DEVICE — TRAY MINOR GEN SURG OMC

## (undated) DEVICE — CONTAINER SPECIMEN OR STER 4OZ

## (undated) DEVICE — SUT MCRYL PLUS 4-0 PS2 27IN

## (undated) DEVICE — SOL NACL 0.9% INJ PF/50151

## (undated) DEVICE — SUT PROLENE 2-0 30 SH

## (undated) DEVICE — TIP YANKAUERS BULB NO VENT

## (undated) DEVICE — ADHESIVE DERMABOND ADVANCED

## (undated) DEVICE — SYR DISP LL 5CC

## (undated) DEVICE — NDL HYPO REG 25G X 1 1/2

## (undated) DEVICE — ELECTRODE REM PLYHSV RETURN 9

## (undated) DEVICE — BLADE SURG CARBON STEEL SZ11

## (undated) DEVICE — DRAPE T THYROID STERILE

## (undated) DEVICE — SYR ONLY LUER LOCK 20CC

## (undated) DEVICE — APPLICATOR CHLORAPREP ORN 26ML